# Patient Record
Sex: MALE | Race: WHITE | Employment: OTHER | URBAN - METROPOLITAN AREA
[De-identification: names, ages, dates, MRNs, and addresses within clinical notes are randomized per-mention and may not be internally consistent; named-entity substitution may affect disease eponyms.]

---

## 2020-01-13 ENCOUNTER — APPOINTMENT (OUTPATIENT)
Dept: GENERAL RADIOLOGY | Age: 75
End: 2020-01-13
Payer: MEDICARE

## 2020-01-13 ENCOUNTER — APPOINTMENT (OUTPATIENT)
Dept: CT IMAGING | Age: 75
End: 2020-01-13
Payer: MEDICARE

## 2020-01-13 ENCOUNTER — HOSPITAL ENCOUNTER (EMERGENCY)
Age: 75
Discharge: HOME | End: 2020-01-13
Attending: EMERGENCY MEDICINE
Payer: MEDICARE

## 2020-01-13 VITALS
SYSTOLIC BLOOD PRESSURE: 111 MMHG | DIASTOLIC BLOOD PRESSURE: 62 MMHG | TEMPERATURE: 98.3 F | HEART RATE: 73 BPM | OXYGEN SATURATION: 94 % | RESPIRATION RATE: 18 BRPM

## 2020-01-13 PROCEDURE — 72125 CT NECK SPINE W/O DYE: CPT

## 2020-01-13 PROCEDURE — 70450 CT HEAD/BRAIN W/O DYE: CPT

## 2020-01-13 PROCEDURE — 90715 TDAP VACCINE 7 YRS/> IM: CPT | Performed by: EMERGENCY MEDICINE

## 2020-01-13 PROCEDURE — 90471 IMMUNIZATION ADMIN: CPT | Performed by: EMERGENCY MEDICINE

## 2020-01-13 PROCEDURE — 99284 EMERGENCY DEPT VISIT MOD MDM: CPT

## 2020-01-13 PROCEDURE — 12001 RPR S/N/AX/GEN/TRNK 2.5CM/<: CPT

## 2020-01-13 PROCEDURE — 6360000002 HC RX W HCPCS: Performed by: EMERGENCY MEDICINE

## 2020-01-13 PROCEDURE — 73030 X-RAY EXAM OF SHOULDER: CPT

## 2020-01-13 PROCEDURE — 6370000000 HC RX 637 (ALT 250 FOR IP): Performed by: EMERGENCY MEDICINE

## 2020-01-13 RX ORDER — ACETAMINOPHEN 325 MG/1
650 TABLET ORAL ONCE
Status: COMPLETED | OUTPATIENT
Start: 2020-01-13 | End: 2020-01-13

## 2020-01-13 RX ADMIN — ACETAMINOPHEN 650 MG: 325 TABLET ORAL at 14:22

## 2020-01-13 RX ADMIN — Medication 2.25 ML: at 12:43

## 2020-01-13 RX ADMIN — TETANUS TOXOID, REDUCED DIPHTHERIA TOXOID AND ACELLULAR PERTUSSIS VACCINE, ADSORBED 0.5 ML: 5; 2.5; 8; 8; 2.5 SUSPENSION INTRAMUSCULAR at 12:24

## 2020-01-13 ASSESSMENT — ENCOUNTER SYMPTOMS
ABDOMINAL PAIN: 0
SHORTNESS OF BREATH: 0
SORE THROAT: 0
CONSTIPATION: 0
BACK PAIN: 0
COUGH: 0
DIARRHEA: 0

## 2020-01-13 ASSESSMENT — PAIN SCALES - GENERAL: PAINLEVEL_OUTOF10: 8

## 2020-01-13 ASSESSMENT — PAIN DESCRIPTION - LOCATION: LOCATION: SHOULDER

## 2020-01-13 ASSESSMENT — PAIN DESCRIPTION - ORIENTATION: ORIENTATION: LEFT

## 2020-01-13 NOTE — ED NOTES
Call was placed to Case management and call was returned by [facility] and spoke to [facility]  01/13/20 5747

## 2020-01-13 NOTE — DISCHARGE INSTR - COC
{YES / IP:55368}  · Bladder: {YES / PW:99389}  Urinary Catheter: {Urinary Catheter:691841673}   Colostomy/Ileostomy/Ileal Conduit: {YES / KV:13043}       Date of Last BM: ***  No intake or output data in the 24 hours ending 20 1404  No intake/output data recorded. Safety Concerns:     [email or web address] Safety Concerns:791250444}    Impairments/Disabilities:      [email or web address] Impairments/Disabilities:132231325}    Nutrition Therapy:  Current Nutrition Therapy:   [email or web address] Diet List:571745535}    Routes of Feeding: {CHP DME Other Feedings:905972961}  Liquids: {Slp liquid thickness:18243}  Daily Fluid Restriction: {CHP DME Yes amt example:465268924}  Last Modified Barium Swallow with Video (Video Swallowing Test): {Done Not Done EPUY:024996414}    Treatments at the Time of Hospital Discharge:   Respiratory Treatments: ***  Oxygen Therapy:  {Therapy; copd oxygen:77580}  Ventilator:    [employer]  Vent XLBU:303573813}    Rehab Therapies: Physical Therapy and Skilled Nurse  Weight Bearing Status/Restrictions: [employer]  Weight Bearin}  Other Medical Equipment (for information only, NOT a DME order):  {EQUIPMENT:877401998}  Other Treatments: ***    Patient's personal belongings (please select all that are sent with patient):  {CHP DME Belongings:475596875}    RN SIGNATURE:  {Esignature:266764433}    CASE MANAGEMENT/SOCIAL WORK SECTION    Inpatient Status Date: ED    Readmission Risk Assessment Score:  Readmission Risk              Risk of Unplanned Readmission:        0         Discharging to Facility/ Agency   Name:  [facility]    Address: [address] [place] [address]  Phone: [phone]  Fax: [phone]      / signature: Electronically signed by Lucio Lawrence RN on 20 at 2:10 PM    PHYSICIAN SECTION    Prognosis: Fair    Condition at Discharge: Stable    Rehab Potential (if transferring to Rehab):  Fair    Recommended Labs or Other Treatments After Discharge: ***    Physician Certification: I certify the above information and transfer of Mary Johnson  is necessary for the continuing treatment of the diagnosis listed and that he requires [address] for less 30 days.      Update Admission H&P: No change in H&P    PHYSICIAN SIGNATURE:  Electronically signed by Genesis Villanueva MD/Lianne Monique RN on 1/13/20 at 2:10 PM

## 2020-01-13 NOTE — ED PROVIDER NOTES
stapled, please see separate procedure note. Family members were requesting home health services so this is arranged by case management. Patient discharged home with family with return precautions. CONSULTS:  IP CONSULT TO CASE MANAGEMENT  IP CONSULT TO HOME CARE NEEDS    PROCEDURES:  Unless otherwise noted below, none     Procedures      FINAL IMPRESSION      1. Injury of head, initial encounter    2. Laceration of scalp, initial encounter    3. Fall, initial encounter    4. Acute pain of left shoulder          DISPOSITION/PLAN   DISPOSITION Decision To Discharge 01/13/2020 02:00:37 PM      PATIENT REFERRED TO:  Kd Steel MD  [address] [place] [address]  [phone]    Schedule an appointment as soon as possible for a visit in 10 days  For suture removal      DISCHARGE MEDICATIONS:  Discharge Medication List as of 1/13/2020  2:18 PM        Controlled Substances Monitoring:     No flowsheet data found.     (Please note that portions of this note were completed with a voice recognition program.  Efforts were made to edit the dictations but occasionally words are mis-transcribed.)    Bebeto Cooper MD (electronically signed)  Attending Emergency Physician            Maribel Mendiola MD  01/13/20 9740

## 2020-01-13 NOTE — CARE COORDINATION
[facility]    DC order noted, all docs needed have been faxed to [facility] for home care services.         Rachid Ricardo  Work mobile: [phone]  [facility] office: [phone]

## 2020-05-04 ENCOUNTER — APPOINTMENT (OUTPATIENT)
Dept: GENERAL RADIOLOGY | Age: 75
End: 2020-05-04
Payer: MEDICARE

## 2020-05-04 ENCOUNTER — APPOINTMENT (OUTPATIENT)
Dept: CT IMAGING | Age: 75
End: 2020-05-04
Payer: MEDICARE

## 2020-05-04 ENCOUNTER — HOSPITAL ENCOUNTER (EMERGENCY)
Age: 75
Discharge: HOME | End: 2020-05-05
Payer: MEDICARE

## 2020-05-04 LAB
A/G RATIO: 1.5 (ref 1.1–2.2)
ALBUMIN SERPL-MCNC: 4.1 G/DL (ref 3.4–5)
ALP BLD-CCNC: 276 U/L (ref 40–129)
ALT SERPL-CCNC: <5 U/L (ref 10–40)
ANION GAP SERPL CALCULATED.3IONS-SCNC: 8 MMOL/L (ref 3–16)
AST SERPL-CCNC: 21 U/L (ref 15–37)
BASOPHILS ABSOLUTE: 0.1 K/UL (ref 0–0.2)
BASOPHILS RELATIVE PERCENT: 1.1 %
BILIRUB SERPL-MCNC: 0.3 MG/DL (ref 0–1)
BILIRUBIN URINE: NEGATIVE
BLOOD, URINE: NEGATIVE
BUN BLDV-MCNC: 20 MG/DL (ref 7–20)
CALCIUM SERPL-MCNC: 9.2 MG/DL (ref 8.3–10.6)
CHLORIDE BLD-SCNC: 97 MMOL/L (ref 99–110)
CLARITY: CLEAR
CO2: 32 MMOL/L (ref 21–32)
COLOR: YELLOW
CREAT SERPL-MCNC: 0.6 MG/DL (ref 0.8–1.3)
EOSINOPHILS ABSOLUTE: 0.2 K/UL (ref 0–0.6)
EOSINOPHILS RELATIVE PERCENT: 1.8 %
GFR AFRICAN AMERICAN: >60
GFR NON-AFRICAN AMERICAN: >60
GLOBULIN: 2.8 G/DL
GLUCOSE BLD-MCNC: 82 MG/DL (ref 70–99)
GLUCOSE URINE: NEGATIVE MG/DL
HCT VFR BLD CALC: 45.7 % (ref 40.5–52.5)
HEMOGLOBIN: 15.4 G/DL (ref 13.5–17.5)
KETONES, URINE: NEGATIVE MG/DL
LEUKOCYTE ESTERASE, URINE: NEGATIVE
LIPASE: 33 U/L (ref 13–60)
LYMPHOCYTES ABSOLUTE: 1.9 K/UL (ref 1–5.1)
LYMPHOCYTES RELATIVE PERCENT: 21 %
MCH RBC QN AUTO: 31.5 PG (ref 26–34)
MCHC RBC AUTO-ENTMCNC: 33.6 G/DL (ref 31–36)
MCV RBC AUTO: 93.9 FL (ref 80–100)
MICROSCOPIC EXAMINATION: NORMAL
MONOCYTES ABSOLUTE: 1.1 K/UL (ref 0–1.3)
MONOCYTES RELATIVE PERCENT: 11.7 %
NEUTROPHILS ABSOLUTE: 5.9 K/UL (ref 1.7–7.7)
NEUTROPHILS RELATIVE PERCENT: 64.4 %
NITRITE, URINE: NEGATIVE
PDW BLD-RTO: 13.9 % (ref 12.4–15.4)
PH UA: 7 (ref 5–8)
PLATELET # BLD: 271 K/UL (ref 135–450)
PMV BLD AUTO: 6.2 FL (ref 5–10.5)
POTASSIUM SERPL-SCNC: 4.3 MMOL/L (ref 3.5–5.1)
PRO-BNP: 37 PG/ML (ref 0–449)
PROTEIN UA: NEGATIVE MG/DL
RBC # BLD: 4.87 M/UL (ref 4.2–5.9)
SODIUM BLD-SCNC: 137 MMOL/L (ref 136–145)
SPECIFIC GRAVITY UA: 1.02 (ref 1–1.03)
TOTAL PROTEIN: 6.9 G/DL (ref 6.4–8.2)
TROPONIN: <0.01 NG/ML
URINE TYPE: NORMAL
UROBILINOGEN, URINE: 0.2 E.U./DL
WBC # BLD: 9.2 K/UL (ref 4–11)

## 2020-05-04 PROCEDURE — 83690 ASSAY OF LIPASE: CPT

## 2020-05-04 PROCEDURE — 99284 EMERGENCY DEPT VISIT MOD MDM: CPT

## 2020-05-04 PROCEDURE — 70450 CT HEAD/BRAIN W/O DYE: CPT

## 2020-05-04 PROCEDURE — 93005 ELECTROCARDIOGRAM TRACING: CPT | Performed by: PHYSICIAN ASSISTANT

## 2020-05-04 PROCEDURE — 85025 COMPLETE CBC W/AUTO DIFF WBC: CPT

## 2020-05-04 PROCEDURE — 80053 COMPREHEN METABOLIC PANEL: CPT

## 2020-05-04 PROCEDURE — 72125 CT NECK SPINE W/O DYE: CPT

## 2020-05-04 PROCEDURE — 96360 HYDRATION IV INFUSION INIT: CPT

## 2020-05-04 PROCEDURE — 84484 ASSAY OF TROPONIN QUANT: CPT

## 2020-05-04 PROCEDURE — 83880 ASSAY OF NATRIURETIC PEPTIDE: CPT

## 2020-05-04 PROCEDURE — 81003 URINALYSIS AUTO W/O SCOPE: CPT

## 2020-05-04 PROCEDURE — 2580000003 HC RX 258: Performed by: PHYSICIAN ASSISTANT

## 2020-05-04 PROCEDURE — 71045 X-RAY EXAM CHEST 1 VIEW: CPT

## 2020-05-04 RX ORDER — 0.9 % SODIUM CHLORIDE 0.9 %
1000 INTRAVENOUS SOLUTION INTRAVENOUS ONCE
Status: COMPLETED | OUTPATIENT
Start: 2020-05-04 | End: 2020-05-05

## 2020-05-04 RX ORDER — TRAZODONE HYDROCHLORIDE 50 MG/1
50 TABLET ORAL NIGHTLY
COMMUNITY

## 2020-05-04 RX ORDER — CARBIDOPA AND LEVODOPA 25; 100 MG/1; MG/1
1 TABLET, ORALLY DISINTEGRATING ORAL 4 TIMES DAILY
COMMUNITY

## 2020-05-04 RX ORDER — SERTRALINE HYDROCHLORIDE 100 MG/1
100 TABLET, FILM COATED ORAL NIGHTLY
COMMUNITY

## 2020-05-04 RX ADMIN — SODIUM CHLORIDE 1000 ML: 9 INJECTION, SOLUTION INTRAVENOUS at 23:36

## 2020-05-05 VITALS
DIASTOLIC BLOOD PRESSURE: 73 MMHG | HEART RATE: 72 BPM | BODY MASS INDEX: 21.03 KG/M2 | WEIGHT: 142 LBS | TEMPERATURE: 98.9 F | SYSTOLIC BLOOD PRESSURE: 125 MMHG | RESPIRATION RATE: 16 BRPM | OXYGEN SATURATION: 100 % | HEIGHT: 69 IN

## 2020-05-05 LAB
EKG ATRIAL RATE: 69 BPM
EKG DIAGNOSIS: NORMAL
EKG P AXIS: 26 DEGREES
EKG P-R INTERVAL: 182 MS
EKG Q-T INTERVAL: 404 MS
EKG QRS DURATION: 98 MS
EKG QTC CALCULATION (BAZETT): 432 MS
EKG R AXIS: -42 DEGREES
EKG T AXIS: 35 DEGREES
EKG VENTRICULAR RATE: 69 BPM

## 2020-05-05 PROCEDURE — 93010 ELECTROCARDIOGRAM REPORT: CPT | Performed by: INTERNAL MEDICINE

## 2020-05-05 PROCEDURE — 96361 HYDRATE IV INFUSION ADD-ON: CPT

## 2020-05-05 NOTE — ED NOTES
Discharge instructions reviewed without questions. No further needs voiced at this time. Taken from department via wheelchair to private car, daughter here to take patient home.      Shiloh Motley RN  05/05/20 5828

## 2020-05-05 NOTE — ED PROVIDER NOTES
[facility] Emergency Department    CHIEF COMPLAINT  Fatigue (generalized weakness that started approx 4 days ago, also had a change in medication 4 days ago)      HISTORY OF PRESENT ILLNESS  Tahir Luna is a 66 y.o. male who presents to the ED complaining of falls and fatigue. Patient dropped off today for evaluation. Reports history of Parkinson's disease. States that he has had several falls over the past week. Last week did strike head and lost consciousness although was not evaluated. Denies headaches or confusion. Lightheadedness worse with standing. Does feel tired and worn out. He denies visual changes disturbances. No difficulty speaking or swallowing. Mild neck pain worse with movement. Tingling in the fourth and fifth digits of hands bilaterally. Denies chest pain or shortness of breath. Nauseous although has not vomited. No diarrhea or constipation. No urinary symptoms. Denies use of blood thinners. No other complaints, modifying factors or associated symptoms. Nursing notes reviewed. Past Medical History:   Diagnosis Date    Arthritis     Knee pain     Parkinson disease ([facility] [address]      Past Surgical History:   Procedure Laterality Date    NECK SURGERY      SHOULDER SURGERY       History reviewed. No pertinent family history.   Social History     Socioeconomic History    Marital status:      Spouse name: Not on file    Number of children: Not on file    Years of education: Not on file    Highest education level: Not on file   Occupational History    Not on file   Social Needs    Financial resource strain: Not on file    Food insecurity     Worry: Not on file     Inability: Not on file    Transportation needs     Medical: Not on file     Non-medical: Not on file   Tobacco Use    Smoking status: Former Smoker     Last attempt to quit: 3/19/2008     Years since quittin.1    Smokeless tobacco: Never Used   Substance and Sexual RRR. No murmurs. No chest wall tenderness. LUNGS: Respirations unlabored. CTAB. Good air exchange. Speaking comfortably in full sentences. No wheezes, rhonchi, rales. ABDOMEN: Soft. Non-distended. Mild epigastric tenderness without rigidity,guarding or rebound. No midline pulsatile mass. Negative Alvarenga's, McBurney's and Rovsing's. No fluid waves or ascites. No hernias or masses. Bowel sounds normal quadrants. No CVA tenderness. EXTREMITIES: No peripheral edema. No unilateral calf pain, redness or swelling. Moves all extremities equally. All extremities neurovascularly intact. SKIN: Warm and dry. No acute rashes. NEUROLOGICAL: Alert and oriented. CN's 2-12 intact. No gross facial drooping. Strength 5/5, sensation intact. PSYCHIATRIC: Normal mood and affect. RADIOLOGY  Ct Head Wo Contrast    Result Date: 5/4/2020  EXAMINATION: CT OF THE HEAD WITHOUT CONTRAST  5/4/2020 11:05 pm TECHNIQUE: CT of the head was performed without the administration of intravenous contrast. Dose modulation, iterative reconstruction, and/or weight based adjustment of the mA/kV was utilized to reduce the radiation dose to as low as reasonably achievable. COMPARISON: CT head 01/13/2020 HISTORY: ORDERING SYSTEM PROVIDED HISTORY: fall TECHNOLOGIST PROVIDED HISTORY: Reason for exam:->fall Has a \"code stroke\" or \"stroke alert\" been called? ->No Reason for Exam: fall 3 days ago  hit head  neck pain Acuity: Acute Type of Exam: Initial Mechanism of Injury: fall Relevant Medical/Surgical History: neck pain FINDINGS: BRAIN/VENTRICLES: There is no acute intracranial hemorrhage, mass effect or midline shift. No abnormal extra-axial fluid collection. The gray-white differentiation is maintained without evidence of an acute infarct. There is no evidence of hydrocephalus. ORBITS: The visualized portion of the orbits demonstrate no acute abnormality.  SINUSES: The visualized paranasal sinuses and mastoid air cells demonstrate no acute abnormality. SOFT TISSUES/SKULL:  No acute abnormality of the visualized skull or soft tissues. No acute intracranial abnormality. Ct Cervical Spine Wo Contrast    Result Date: 5/4/2020  EXAMINATION: CT OF THE CERVICAL SPINE WITHOUT CONTRAST 5/4/2020 11:05 pm TECHNIQUE: CT of the cervical spine was performed without the administration of intravenous contrast. Multiplanar reformatted images are provided for review. Dose modulation, iterative reconstruction, and/or weight based adjustment of the mA/kV was utilized to reduce the radiation dose to as low as reasonably achievable. COMPARISON: 01/13/2020 HISTORY: ORDERING SYSTEM PROVIDED HISTORY: fall TECHNOLOGIST PROVIDED HISTORY: Reason for exam:->fall Reason for Exam: fall 3 days ago  hit head  neck pain Acuity: Acute Type of Exam: Initial Relevant Medical/Surgical History: neck pain FINDINGS: BONES/ALIGNMENT: There is no acute fracture or suspect osseous lesion. Vertebral body heights are maintained. There are stable changes of prior C6-7 anterior discectomy and fusion. There is no evidence of hardware failure or loosening. DEGENERATIVE CHANGES: Stable mild adjacent segment disease at C7-T1. SOFT TISSUES: There is no prevertebral soft tissue swelling. No acute abnormality of the cervical spine. Xr Chest Portable    Result Date: 5/4/2020  EXAMINATION: ONE XRAY VIEW OF THE CHEST 5/4/2020 10:16 pm COMPARISON: Rib series 10/14/2011 HISTORY: ORDERING SYSTEM PROVIDED HISTORY: fall TECHNOLOGIST PROVIDED HISTORY: Reason for exam:->fall Reason for Exam: FALL Acuity: Acute Type of Exam: Initial FINDINGS: No pneumothorax. Mach band/skin fold artifact course along the upper right hemithorax. No pleural effusion or focal airspace consolidation. Elevated left hemidiaphragm with associated atelectasis. Normal heart size and mediastinal contours. No acute osseous abnormality.   Chronic deformity of the distal right clavicle, presumably sequela of remote

## 2020-05-06 ENCOUNTER — CARE COORDINATION (OUTPATIENT)
Dept: CARE COORDINATION | Age: 75
End: 2020-05-06

## 2020-05-06 NOTE — CARE COORDINATION
Patient contacted regarding recent visit for viral symptoms. This Laya Rao contacted the patient by telephone to perform post discharge call. Verified name and  with family as identifiers. Provided introduction to self, and reason for call due to viral symptoms of infection and/or exposure to COVID-19. Patient presented to emergency department/flu clinic with complaints of fatique. Patient reports symptoms are improving. Due to no new or worsening symptoms the RN CTN/ACMEGHANA was not notified for escalation. Discussed exposure protocols and quarantine with CDC Guidelines What To Do If You Are Sick    Family was given an opportunity for questions and concerns. Stay home except to get medical care    Separate yourself from other people and animals in your home    Call ahead before visiting your doctor    Wear a facemask    Cover your coughs and sneezes    Clean your hands often    Avoid sharing personal household items    Clean all high-touch surfaces everyday    Monitor your symptoms  Seek prompt medical attention if your illness is worsening (e.g., difficulty breathing). Before seeking care, call your healthcare provider and tell them that you have, or are being evaluated for, COVID-19. Put on a facemask before you enter the facility. These steps will help the healthcare provider's office to keep other people in the office or waiting room from getting infected or exposed. Ask your healthcare provider to call the local or [facility] health department. Persons who are placed under If you have a medical emergency and need to call 911, notify the dispatch personnel that you have, or are being evaluated for COVID-19. If possible, put on a facemask before emergency medical services arrive. The family agrees to contact the [facility] exposure line [phone], local health department [address]: ([phone]) and PCP office for questions related to their healthcare.  Author provided contact

## 2020-05-07 NOTE — ED PROVIDER NOTES
Hematocrit 45.7 40.5 - 52.5 %    MCV 93.9 80.0 - 100.0 fL    MCH 31.5 26.0 - 34.0 pg    MCHC 33.6 31.0 - 36.0 g/dL    RDW 13.9 12.4 - 15.4 %    Platelets 037 556 - 706 K/uL    MPV 6.2 5.0 - 10.5 fL    Neutrophils % 64.4 %    Lymphocytes % 21.0 %    Monocytes % 11.7 %    Eosinophils % 1.8 %    Basophils % 1.1 %    Neutrophils Absolute 5.9 1.7 - 7.7 K/uL    Lymphocytes Absolute 1.9 1.0 - 5.1 K/uL    Monocytes Absolute 1.1 0.0 - 1.3 K/uL    Eosinophils Absolute 0.2 0.0 - 0.6 K/uL    Basophils Absolute 0.1 0.0 - 0.2 K/uL   Comprehensive Metabolic Panel   Result Value Ref Range    Sodium 137 136 - 145 mmol/L    Potassium 4.3 3.5 - 5.1 mmol/L    Chloride 97 (L) 99 - 110 mmol/L    CO2 32 21 - 32 mmol/L    Anion Gap 8 3 - 16    Glucose 82 70 - 99 mg/dL    BUN 20 7 - 20 mg/dL    CREATININE 0.6 (L) 0.8 - 1.3 mg/dL    GFR Non-African American >60 >60    GFR African American >60 >60    Calcium 9.2 8.3 - 10.6 mg/dL    Total Protein 6.9 6.4 - 8.2 g/dL    Alb 4.1 3.4 - 5.0 g/dL    Albumin/Globulin Ratio 1.5 1.1 - 2.2    Total Bilirubin 0.3 0.0 - 1.0 mg/dL    Alkaline Phosphatase 276 (H) 40 - 129 U/L    ALT <5 (L) 10 - 40 U/L    AST 21 15 - 37 U/L    Globulin 2.8 g/dL   Urinalysis   Result Value Ref Range    Color, UA Yellow Straw/Yellow    Clarity, UA Clear Clear    Glucose, Ur Negative Negative mg/dL    Bilirubin Urine Negative Negative    Ketones, Urine Negative Negative mg/dL    Specific Gravity, UA 1.020 1.005 - 1.030    Blood, Urine Negative Negative    pH, UA 7.0 5.0 - 8.0    Protein, UA Negative Negative mg/dL    Urobilinogen, Urine 0.2 <2.0 E.U./dL    Nitrite, Urine Negative Negative    Leukocyte Esterase, Urine Negative Negative    Microscopic Examination Not Indicated     Urine Type NotGiven    Lipase   Result Value Ref Range    Lipase 33.0 13.0 - 60.0 U/L   Troponin   Result Value Ref Range    Troponin <0.01 <0.01 ng/mL   Brain Natriuretic Peptide   Result Value Ref Range    Pro-BNP 37 0 - 449 pg/mL   EKG 12 Lead

## 2020-05-14 ENCOUNTER — CARE COORDINATION (OUTPATIENT)
Dept: CARE COORDINATION | Age: 75
End: 2020-05-14

## 2020-05-20 ENCOUNTER — CARE COORDINATION (OUTPATIENT)
Dept: CARE COORDINATION | Age: 75
End: 2020-05-20

## 2020-09-03 ENCOUNTER — APPOINTMENT (OUTPATIENT)
Dept: GENERAL RADIOLOGY | Age: 75
DRG: 871 | End: 2020-09-03
Payer: OTHER GOVERNMENT

## 2020-09-03 ENCOUNTER — HOSPITAL ENCOUNTER (EMERGENCY)
Age: 75
Discharge: HOME | DRG: 871 | End: 2020-09-03
Attending: STUDENT IN AN ORGANIZED HEALTH CARE EDUCATION/TRAINING PROGRAM
Payer: OTHER GOVERNMENT

## 2020-09-03 VITALS
TEMPERATURE: 98.8 F | WEIGHT: 151 LBS | HEIGHT: 69 IN | BODY MASS INDEX: 22.36 KG/M2 | SYSTOLIC BLOOD PRESSURE: 110 MMHG | DIASTOLIC BLOOD PRESSURE: 76 MMHG | OXYGEN SATURATION: 95 % | HEART RATE: 57 BPM | RESPIRATION RATE: 16 BRPM

## 2020-09-03 LAB
A/G RATIO: 1.8 (ref 1.1–2.2)
ALBUMIN SERPL-MCNC: 3.7 G/DL (ref 3.4–5)
ALP BLD-CCNC: 139 U/L (ref 40–129)
ALT SERPL-CCNC: <5 U/L (ref 10–40)
ANION GAP SERPL CALCULATED.3IONS-SCNC: 7 MMOL/L (ref 3–16)
AST SERPL-CCNC: 15 U/L (ref 15–37)
BASOPHILS ABSOLUTE: 0.1 K/UL (ref 0–0.2)
BASOPHILS RELATIVE PERCENT: 1.4 %
BILIRUB SERPL-MCNC: 0.5 MG/DL (ref 0–1)
BILIRUBIN URINE: NEGATIVE
BLOOD, URINE: NEGATIVE
BUN BLDV-MCNC: 20 MG/DL (ref 7–20)
CALCIUM SERPL-MCNC: 8.2 MG/DL (ref 8.3–10.6)
CHLORIDE BLD-SCNC: 99 MMOL/L (ref 99–110)
CLARITY: CLEAR
CO2: 27 MMOL/L (ref 21–32)
COLOR: YELLOW
CREAT SERPL-MCNC: 0.9 MG/DL (ref 0.8–1.3)
EKG ATRIAL RATE: 61 BPM
EKG DIAGNOSIS: NORMAL
EKG P AXIS: 33 DEGREES
EKG P-R INTERVAL: 188 MS
EKG Q-T INTERVAL: 424 MS
EKG QRS DURATION: 92 MS
EKG QTC CALCULATION (BAZETT): 426 MS
EKG R AXIS: -19 DEGREES
EKG T AXIS: 48 DEGREES
EKG VENTRICULAR RATE: 61 BPM
EOSINOPHILS ABSOLUTE: 0 K/UL (ref 0–0.6)
EOSINOPHILS RELATIVE PERCENT: 1 %
GFR AFRICAN AMERICAN: >60
GFR NON-AFRICAN AMERICAN: >60
GLOBULIN: 2.1 G/DL
GLUCOSE BLD-MCNC: 105 MG/DL (ref 70–99)
GLUCOSE URINE: NEGATIVE MG/DL
HCT VFR BLD CALC: 41.7 % (ref 40.5–52.5)
HEMOGLOBIN: 14 G/DL (ref 13.5–17.5)
KETONES, URINE: NEGATIVE MG/DL
LACTIC ACID, SEPSIS: 0.8 MMOL/L (ref 0.4–1.9)
LEUKOCYTE ESTERASE, URINE: NEGATIVE
LYMPHOCYTES ABSOLUTE: 0.5 K/UL (ref 1–5.1)
LYMPHOCYTES RELATIVE PERCENT: 13.3 %
MCH RBC QN AUTO: 30.9 PG (ref 26–34)
MCHC RBC AUTO-ENTMCNC: 33.5 G/DL (ref 31–36)
MCV RBC AUTO: 92.3 FL (ref 80–100)
MICROSCOPIC EXAMINATION: NORMAL
MONOCYTES ABSOLUTE: 1 K/UL (ref 0–1.3)
MONOCYTES RELATIVE PERCENT: 24.5 %
NEUTROPHILS ABSOLUTE: 2.4 K/UL (ref 1.7–7.7)
NEUTROPHILS RELATIVE PERCENT: 59.8 %
NITRITE, URINE: NEGATIVE
PDW BLD-RTO: 14.9 % (ref 12.4–15.4)
PH UA: 6 (ref 5–8)
PLATELET # BLD: 151 K/UL (ref 135–450)
PMV BLD AUTO: 6.8 FL (ref 5–10.5)
POTASSIUM REFLEX MAGNESIUM: 3.8 MMOL/L (ref 3.5–5.1)
PROTEIN UA: NEGATIVE MG/DL
RBC # BLD: 4.51 M/UL (ref 4.2–5.9)
SODIUM BLD-SCNC: 133 MMOL/L (ref 136–145)
SPECIFIC GRAVITY UA: <=1.005 (ref 1–1.03)
TOTAL PROTEIN: 5.8 G/DL (ref 6.4–8.2)
TROPONIN: <0.01 NG/ML
URINE REFLEX TO CULTURE: NORMAL
URINE TYPE: NORMAL
UROBILINOGEN, URINE: 0.2 E.U./DL
WBC # BLD: 4 K/UL (ref 4–11)

## 2020-09-03 PROCEDURE — 85025 COMPLETE CBC W/AUTO DIFF WBC: CPT

## 2020-09-03 PROCEDURE — 87040 BLOOD CULTURE FOR BACTERIA: CPT

## 2020-09-03 PROCEDURE — 84484 ASSAY OF TROPONIN QUANT: CPT

## 2020-09-03 PROCEDURE — 99284 EMERGENCY DEPT VISIT MOD MDM: CPT

## 2020-09-03 PROCEDURE — U0003 INFECTIOUS AGENT DETECTION BY NUCLEIC ACID (DNA OR RNA); SEVERE ACUTE RESPIRATORY SYNDROME CORONAVIRUS 2 (SARS-COV-2) (CORONAVIRUS DISEASE [COVID-19]), AMPLIFIED PROBE TECHNIQUE, MAKING USE OF HIGH THROUGHPUT TECHNOLOGIES AS DESCRIBED BY CMS-2020-01-R: HCPCS

## 2020-09-03 PROCEDURE — 2580000003 HC RX 258: Performed by: PHYSICIAN ASSISTANT

## 2020-09-03 PROCEDURE — 80053 COMPREHEN METABOLIC PANEL: CPT

## 2020-09-03 PROCEDURE — 81003 URINALYSIS AUTO W/O SCOPE: CPT

## 2020-09-03 PROCEDURE — 93005 ELECTROCARDIOGRAM TRACING: CPT | Performed by: PHYSICIAN ASSISTANT

## 2020-09-03 PROCEDURE — 83605 ASSAY OF LACTIC ACID: CPT

## 2020-09-03 PROCEDURE — 93010 ELECTROCARDIOGRAM REPORT: CPT | Performed by: INTERNAL MEDICINE

## 2020-09-03 PROCEDURE — 71045 X-RAY EXAM CHEST 1 VIEW: CPT

## 2020-09-03 RX ORDER — 0.9 % SODIUM CHLORIDE 0.9 %
1000 INTRAVENOUS SOLUTION INTRAVENOUS ONCE
Status: COMPLETED | OUTPATIENT
Start: 2020-09-03 | End: 2020-09-03

## 2020-09-03 RX ORDER — BENZONATATE 100 MG/1
100 CAPSULE ORAL 2 TIMES DAILY PRN
Qty: 20 CAPSULE | Refills: 0 | Status: ON HOLD | OUTPATIENT
Start: 2020-09-03 | End: 2020-09-04

## 2020-09-03 RX ADMIN — SODIUM CHLORIDE 1000 ML: 9 INJECTION, SOLUTION INTRAVENOUS at 16:37

## 2020-09-03 RX ADMIN — SODIUM CHLORIDE 1000 ML: 9 INJECTION, SOLUTION INTRAVENOUS at 18:50

## 2020-09-03 ASSESSMENT — ENCOUNTER SYMPTOMS
COUGH: 1
ABDOMINAL PAIN: 0
VOMITING: 0
VISUAL CHANGE: 0
SHORTNESS OF BREATH: 0

## 2020-09-03 NOTE — ED PROVIDER NOTES
[address] ED  EMERGENCY DEPARTMENT ENCOUNTER        Pt Name: Arminda Reeves  MRN: [id number]  [place] 1945  Date of evaluation: 9/3/2020  Provider: Suzi Hogue PA-C  PCP: Jackie Strong MD    Shared Visit or Autonomous Visit:  I have seen and evaluated this patient with my supervising physician Destiny Dove MD.    [address] [place] [address]       Chief Complaint   Patient presents with    Fatigue     EMS states pt c/o general weakness since yesterday, hx of parkinsons       HISTORY OF PRESENT ILLNESS   (Location/Symptom, Timing/Onset, Context/Setting, Quality, Duration, Modifying Factors, Severity)  Note limiting factors. Arminda Reeves is a 66 y.o. male presenting to the emergency department with complaint of general weakness and fatigue for the past 2 days. States he has had a bad cough started last night he denies fever. Wife states she checked his temperature today was 99. patietn denies any chest pain or shortness of breath. No abdominal pain. No vomiting. No focal weakness. He has Parkinson's disease he has involuntary movements of extremities states sometimes he is unsteady on his feet and he uses a walker at home. Denies feeling dizzy or lightheaded. No headache. No falls. The history is provided by the patient. Fatigue   Onset quality:  Gradual  Duration:  2 days  Chronicity:  New  Associated symptoms: cough    Associated symptoms: no abdominal pain, no chest pain, no dizziness, no dysuria, no numbness in extremities, no falls, no fever, no headaches, no loss of consciousness, no near-syncope, no shortness of breath, no stroke symptoms, no syncope, no vision change and no vomiting        Nursing Notes were reviewed    REVIEW OF SYSTEMS    (2-9 systems for level 4, 10 or more for level 5)     Review of Systems   Constitutional: Positive for fatigue. Negative for fever. Respiratory: Positive for cough. Negative for shortness of breath.     Cardiovascular: Negative for chest pain, leg swelling, syncope and near-syncope. Gastrointestinal: Negative for abdominal pain and vomiting. Genitourinary: Negative for difficulty urinating and dysuria. Musculoskeletal: Negative for falls. Neurological: Negative for dizziness, loss of consciousness, syncope and headaches. All other systems reviewed and are negative. Positives and Pertinent negatives as per HPI. PAST MEDICAL HISTORY     Past Medical History:   Diagnosis Date    Arthritis     Knee pain     Parkinson disease ([facility] [address]          SURGICAL HISTORY     Past Surgical History:   Procedure Laterality Date    NECK SURGERY      SHOULDER SURGERY           CURRENTMEDICATIONS       Previous Medications    CARBIDOPA-LEVODOPA (PARCOPA)  MG TBDP    Take 2 tablets by mouth 2 times daily    GABAPENTIN (NEURONTIN) 300 MG CAPSULE    Take 300 mg by mouth 3 times daily. SERTRALINE (ZOLOFT) 100 MG TABLET    Take 100 mg by mouth nightly    TRAZODONE (DESYREL) 50 MG TABLET    Take 50 mg by mouth nightly         ALLERGIES     Patient has no known allergies. FAMILYHISTORY     History reviewed. No pertinent family history.        SOCIAL HISTORY       Social History     Socioeconomic History    Marital status:      Spouse name: None    Number of children: None    Years of education: None    Highest education level: None   Occupational History    None   Social Needs    Financial resource strain: None    Food insecurity     Worry: None     Inability: None    Transportation needs     Medical: None     Non-medical: None   Tobacco Use    Smoking status: Former Smoker     Last attempt to quit: 3/19/2008     Years since quittin.4    Smokeless tobacco: Never Used   Substance and Sexual Activity    Alcohol use: No    Drug use: No    Sexual activity: Never   Lifestyle    Physical activity     Days per week: None     Minutes per session: None    Stress: None   Relationships    Social connections Talks on phone: None     Gets together: None     Attends Quaker service: None     Active member of club or organization: None     Attends meetings of clubs or organizations: None     Relationship status: None    Intimate partner violence     Fear of current or ex partner: None     Emotionally abused: None     Physically abused: None     Forced sexual activity: None   Other Topics Concern    None   Social History Narrative    None       SCREENINGS    [place] Coma Scale  Eye Opening: Spontaneous  Best Verbal Response: Oriented  Best Motor Response: Obeys commands  [place] Coma Scale Score: 15        PHYSICAL EXAM    (up to 7 for level 4, 8 or more for level 5)     ED Triage Vitals [09/03/20 1602]   BP Temp Temp Source Pulse Resp SpO2 Height Weight   (!) 95/57 98.8 °F (37.1 °C) Oral 62 16 98 % 5' 9\" (1.753 m) 151 lb (68.5 kg)       Physical Exam  Vitals signs and nursing note reviewed. Constitutional:       Appearance: He is well-developed. He is not toxic-appearing. HENT:      Head: Normocephalic and atraumatic. Right Ear: Tympanic membrane and ear canal normal.      Left Ear: Tympanic membrane and ear canal normal.      Mouth/Throat:      Mouth: Mucous membranes are moist.      Pharynx: Oropharynx is clear. No pharyngeal swelling or posterior oropharyngeal erythema. Eyes:      Extraocular Movements: Extraocular movements intact. Conjunctiva/sclera: Conjunctivae normal.      Pupils: Pupils are equal, round, and reactive to light. Neck:      Musculoskeletal: Normal range of motion and neck supple. Cardiovascular:      Rate and Rhythm: Normal rate and regular rhythm. Pulses: Normal pulses. Radial pulses are 2+ on the right side and 2+ on the left side. Posterior tibial pulses are 2+ on the right side and 2+ on the left side. Heart sounds: Normal heart sounds. Pulmonary:      Effort: Pulmonary effort is normal. No respiratory distress.       Breath sounds: Normal breath sounds. No stridor. No wheezing, rhonchi or rales. Abdominal:      General: Bowel sounds are normal.      Palpations: Abdomen is soft. Abdomen is not rigid. Tenderness: There is no abdominal tenderness. There is no guarding or rebound. Musculoskeletal: Normal range of motion. Right lower leg: No edema. Left lower leg: No edema. Skin:     General: Skin is warm and dry. Neurological:      Mental Status: He is alert and oriented to person, place, and time. GCS: GCS eye subscore is 4. GCS verbal subscore is 5. GCS motor subscore is 6. Cranial Nerves: No cranial nerve deficit. Sensory: Sensation is intact. No sensory deficit. Motor: Motor function is intact. No weakness or abnormal muscle tone. Coordination: Coordination normal. Finger-Nose-Finger Test normal.      Comments: Cranial facial musculature and sensation are intact. the pt has normal ROM neck with no nuchal rigidity or meningismus. Pt has intact finger to nose and intact visual fields grossly intact bilaterally. Intact sensation. Equal strength 5 out of 5 symmetric upper and lower extremities. Holding extremity up off the bed no drift. Psychiatric:         Speech: Speech normal.         Behavior: Behavior normal.         Thought Content:  Thought content normal.         DIAGNOSTIC RESULTS   LABS:    Labs Reviewed   CBC WITH AUTO DIFFERENTIAL - Abnormal; Notable for the following components:       Result Value    Lymphocytes Absolute 0.5 (*)     All other components within normal limits    Narrative:     Performed at:  [place] [address],  [facility]   Phone [phone]   COMPREHENSIVE METABOLIC PANEL W/ REFLEX TO MG FOR LOW K - Abnormal; Notable for the following components:    Sodium 133 (*)     Glucose 105 (*)     Calcium 8.2 (*)     Total Protein 5.8 (*)     Alkaline Phosphatase 139 (*)     ALT <5 (*)     All other components within normal Calcium 8.2 (L) 8.3 - 10.6 mg/dL    Total Protein 5.8 (L) 6.4 - 8.2 g/dL    Alb 3.7 3.4 - 5.0 g/dL    Albumin/Globulin Ratio 1.8 1.1 - 2.2    Total Bilirubin 0.5 0.0 - 1.0 mg/dL    Alkaline Phosphatase 139 (H) 40 - 129 U/L    ALT <5 (L) 10 - 40 U/L    AST 15 15 - 37 U/L    Globulin 2.1 g/dL   Troponin   Result Value Ref Range    Troponin <0.01 <0.01 ng/mL   Urinalysis Reflex to Culture    Specimen: Urine, clean catch   Result Value Ref Range    Color, UA Yellow Straw/Yellow    Clarity, UA Clear Clear    Glucose, Ur Negative Negative mg/dL    Bilirubin Urine Negative Negative    Ketones, Urine Negative Negative mg/dL    Specific Gravity, UA <=1.005 1.005 - 1.030    Blood, Urine Negative Negative    pH, UA 6.0 5.0 - 8.0    Protein, UA Negative Negative mg/dL    Urobilinogen, Urine 0.2 <2.0 E.U./dL    Nitrite, Urine Negative Negative    Leukocyte Esterase, Urine Negative Negative    Microscopic Examination Not Indicated     Urine Type NotGiven     Urine Reflex to Culture Not Indicated    EKG 12 Lead   Result Value Ref Range    Ventricular Rate 61 BPM    Atrial Rate 61 BPM    P-R Interval 188 ms    QRS Duration 92 ms    Q-T Interval 424 ms    QTc Calculation (Bazett) 426 ms    P Axis 33 degrees    R Axis -19 degrees    T Axis 48 degrees    Diagnosis       Normal sinus rhythmNormal ECGWhen compared with ECG of 04-MAY-2020 22:42,No significant change was foundConfirmed by Earnestine Peterson MD [facility] ([id number]) on 9/3/2020 4:59:55 PM       All other labs were within normal range or not returned as of this dictation. EKG: All EKG's are interpreted by the Emergency Department Physician in the absence of a cardiologist.  Please see their note for interpretation of EKG.       RADIOLOGY:   Non-plain film images such as CT, Ultrasound and MRI are read by the radiologist. Plain radiographic images are visualized andpreliminarily interpreted by the  ED Provider with the below findings:        Interpretation perthe Radiologist below, if available at the time of this note:    XR CHEST PORTABLE   Final Result   No acute findings           Xr Chest Portable    Result Date: 9/3/2020  EXAMINATION: ONE XRAY VIEW OF THE CHEST 9/3/2020 4:28 pm COMPARISON: May 4, 2020 HISTORY: ORDERING SYSTEM PROVIDED HISTORY: cough TECHNOLOGIST PROVIDED HISTORY: Reason for exam:->cough Reason for Exam: Fatigue (EMS states pt c/o general weakness since yesterday, hx of parkinsons) Acuity: Acute Type of Exam: Initial FINDINGS: Cardiac silhouette is normal in size. Lungs appear clear. No acute bony abnormality. No acute findings         PROCEDURES   Unless otherwise noted below, none     Procedures    CRITICAL CARE TIME   N/A    CONSULTS:  None      EMERGENCY DEPARTMENT COURSE and DIFFERENTIAL DIAGNOSIS/MDM:   Vitals:    Vitals:    09/03/20 1904 09/03/20 1907 09/03/20 2003 09/03/20 2005   BP: (!) 72/37 96/74 116/77 (!) 107/92   Pulse: 55 55 72    Resp: 16 16     Temp:       TempSrc:       SpO2: 95% 98% 96% 97%   Weight:       Height:           Patient was given thefollowing medications:  Medications   0.9 % sodium chloride bolus (0 mLs Intravenous Stopped 9/3/20 1812)   0.9 % sodium chloride bolus (0 mLs Intravenous Stopped 9/3/20 1951)       6:07 PM EDT      8:10 PM EDT  Recheck patient. Overall much improved. States he is feeling much better. He has eaten and drink here. He had transient hypotension that resolved after IV fluids. He is up and walking in the room and denies feeling dizzy or lightheaded. Repeat blood pressure 116/77. He was not orthostatic. I discussed test results with him and wife in the room. EKG showing sinus rhythm no acute changes. Troponin is normal.  Chest x-ray is negative no pneumonia. Labs are unremarkable with the exception of elevated alk phos which is stable from prior. Urinalysis negative for infection. Suspect viral illness. He does not appear septic or toxic. He is hemodynamically stable. He is requesting to go home. Wife at bedside she is comfortable with him going home. I discussed close follow-up with his doctor and to return to the emergency department for any worsening or change in his symptoms. They understand and agree. I COVID swab was sent they will follow this result. I estimate there is LOW risk for PNEUMONIA, MENINGITIS, SEPSIS or ACUTE CORONARY SYNDROME thus I consider the discharge disposition reasonable. FINAL IMPRESSION      1. Other fatigue    2. Cough    3. Viral illness    4.  Parkinson's disease ([address]    5. Elevated alkaline phosphatase level          DISPOSITION/PLAN   DISPOSITION     PATIENT REFERREDTO:  Kenneth Mckinley MD  [address]  [phone]    Schedule an appointment as soon as possible for a visit       [facility] ED  [address] [facility]  [phone]    If symptoms worsen      DISCHARGE MEDICATIONS:  New Prescriptions    BENZONATATE (TESSALON) 100 MG CAPSULE    Take 1 capsule by mouth 2 times daily as needed for Cough       DISCONTINUED MEDICATIONS:  Discontinued Medications    No medications on file              (Please note that portions ofthis note were completed with a voice recognition program.  Efforts were made to edit the dictations but occasionally words are mis-transcribed.)    Rhina Campos PA-C (electronically signed)            Johanne Molina PA-C  09/03/20 4630

## 2020-09-03 NOTE — ED TRIAGE NOTES
Chief Complaint   Patient presents with    Fatigue     EMS states pt c/o general weakness since yesterday, hx of parkinsons

## 2020-09-03 NOTE — ED PROVIDER NOTES
I independently examined and evaluated Buster Chin. In brief, patient with a history of Parkinson's, depression who presents for fatigue and cough. Symptoms since this morning. Reports fatigue and generalized weakness and malaise. He has had a cough that is productive of sputum. He denies any shortness of breath or chest pain. T-max 99. He denies any abdominal pain. Patient was noted to be hypotensive today. He does report a generalized headache. He is not on blood thinners. No trauma. Denies numbness, weakness, vision changes, vomiting. This was not the worst headache of life and was not maximal at onset. He denies any recent exposures to coronavirus, wife noted to be coughing in the room. Focused exam revealed mild bradycardia, regular rhythm. Lungs clear to auscultation bilaterally. Abdomen soft and nontender. Strength and sensation intact in extremities. Mood and affect at baseline per wife. ED course:     Based off symptoms, suspect viral illness. Differential diagnosis also includes pneumonia, UTI, ACS. Will obtain EKG, labs, and chest x-ray. ED Course as of Sep 03 2342   Thu Sep 03, 2020   1806 Urinalysis shows no evidence of blood or infection. CXR: IMPRESSION: No acute findings    Evidence of pneumonia, pneumothorax, or pleural effusion.    [ER]   1806 Lactate within normal limits. [ER]   1806 No Leukocytosis, anemia, or thrombocytopenia. [ER]   1806 Mild hyponatremia, no other significant electrolyte abnormalities or evidence of kidney dysfunction.    [ER]   1806 Liver function testing non-concerning.    [ER]   1807 Troponin within normal limits. [ER]   8853 Patient's work-up overall unremarkable. Patient remains well-appearing in the emergency department. He is satting appropriately on room air.    [ER]   1958 As patient was preparing for discharge, he was hypotensive. Patient given another liter of fluids and will reassess.     [ER]   2036 Orthostatics WNL    [ER]   2037 Person reports resolution of symptoms. Is ambulating without any symptoms of lightheadedness. He has tolerated food. Patient states that he feels he is ready to go. Repeat blood pressure showed that patient was no longer significantly hypotensive. Patient cleared for discharge.    [ER]      ED Course User Index  [ER] Nevaeh Briceno MD     XR CHEST PORTABLE   Final Result   No acute findings           The Ekg interpreted by me shows  normal sinus rhythm with a rate of 61  Axis is   Normal  QTc is  normal  Intervals and Durations are unremarkable. ST Segments: no acute change  No significant change from prior EKG dated 5/4/20    Work-up unremarkable. Coronavirus testing pending. I do believe patient likely has a viral illness, very possibly coronavirus. There is no evidence of pneumonia, UTI, sepsis, significant electrolyte abnormality, ACS. Patient was mildly hypertensive at the time of initial plan for discharge, patient received another liter of fluids and reports significant improvement of symptoms. Strict return precautions given. Patient encouraged to follow-up with PCP, and return to the emergency department immediately if symptoms worsen. Patient counseled to self isolate until coronavirus testing results. Patient given a prescription for Tessalon Perles. Patient discharged in stable condition. All diagnostic, treatment, and disposition decisions were made by myself in conjunction with the advanced practice provider. For all further details of the patient's emergency department visit, please see the advanced practice provider's documentation. Comment: Please note this report has been produced using speech recognition software and may contain errors related to that system including errors in grammar, punctuation, and spelling, as well as words and phrases that may be inappropriate.  If there are any questions or concerns please feel free to contact the dictating provider for clarification.       Sobia Dao MD  09/03/20 5254

## 2020-09-03 NOTE — ED NOTES
Upon checking pt vitals for discharge pt was hypotensive. Pt ambulated in hallway and states having slight dizziness but improved from previously. Pt still hypotensive and per provider will give additional fluids and reevaluate.      Madison Banuelos RN  09/03/20 3989

## 2020-09-04 ENCOUNTER — APPOINTMENT (OUTPATIENT)
Dept: CT IMAGING | Age: 75
DRG: 871 | End: 2020-09-04
Payer: OTHER GOVERNMENT

## 2020-09-04 ENCOUNTER — APPOINTMENT (OUTPATIENT)
Dept: GENERAL RADIOLOGY | Age: 75
DRG: 871 | End: 2020-09-04
Payer: OTHER GOVERNMENT

## 2020-09-04 ENCOUNTER — HOSPITAL ENCOUNTER (INPATIENT)
Age: 75
LOS: 5 days | Discharge: HOME HEALTH | DRG: 871 | End: 2020-09-09
Attending: STUDENT IN AN ORGANIZED HEALTH CARE EDUCATION/TRAINING PROGRAM | Admitting: INTERNAL MEDICINE
Payer: OTHER GOVERNMENT

## 2020-09-04 PROBLEM — A41.9 SEPSIS (HCC): Status: ACTIVE | Noted: 2020-09-04

## 2020-09-04 LAB
A/G RATIO: 1.8 (ref 1.1–2.2)
ABO/RH: NORMAL
ALBUMIN SERPL-MCNC: 3.6 G/DL (ref 3.4–5)
ALP BLD-CCNC: 147 U/L (ref 40–129)
ALT SERPL-CCNC: 6 U/L (ref 10–40)
AMMONIA: 38 UMOL/L (ref 16–60)
ANION GAP SERPL CALCULATED.3IONS-SCNC: 10 MMOL/L (ref 3–16)
ANTIBODY SCREEN: NORMAL
APTT: 30.2 SEC (ref 24.2–36.2)
APTT: 31.3 SEC (ref 24.2–36.2)
AST SERPL-CCNC: 22 U/L (ref 15–37)
BASE EXCESS VENOUS: -5.8 MMOL/L (ref -3–3)
BASOPHILS ABSOLUTE: 0.1 K/UL (ref 0–0.2)
BASOPHILS RELATIVE PERCENT: 1.4 %
BILIRUB SERPL-MCNC: 0.3 MG/DL (ref 0–1)
BILIRUBIN URINE: NEGATIVE
BLOOD, URINE: NEGATIVE
BUN BLDV-MCNC: 16 MG/DL (ref 7–20)
CALCIUM SERPL-MCNC: 8.4 MG/DL (ref 8.3–10.6)
CARBOXYHEMOGLOBIN: 1.7 % (ref 0–1.5)
CHLORIDE BLD-SCNC: 103 MMOL/L (ref 99–110)
CLARITY: CLEAR
CO2: 26 MMOL/L (ref 21–32)
COLOR: YELLOW
CREAT SERPL-MCNC: 0.9 MG/DL (ref 0.8–1.3)
D DIMER: 289 NG/ML DDU (ref 0–229)
EKG ATRIAL RATE: 95 BPM
EKG DIAGNOSIS: NORMAL
EKG P AXIS: 46 DEGREES
EKG P-R INTERVAL: 168 MS
EKG Q-T INTERVAL: 340 MS
EKG QRS DURATION: 98 MS
EKG QTC CALCULATION (BAZETT): 427 MS
EKG R AXIS: -49 DEGREES
EKG T AXIS: 49 DEGREES
EKG VENTRICULAR RATE: 95 BPM
EOSINOPHILS ABSOLUTE: 0 K/UL (ref 0–0.6)
EOSINOPHILS RELATIVE PERCENT: 0.7 %
FIBRINOGEN: 272 MG/DL (ref 200–397)
GFR AFRICAN AMERICAN: >60
GFR NON-AFRICAN AMERICAN: >60
GLOBULIN: 2 G/DL
GLUCOSE BLD-MCNC: 140 MG/DL (ref 70–99)
GLUCOSE URINE: NEGATIVE MG/DL
HCO3 VENOUS: 19.6 MMOL/L (ref 23–29)
HCT VFR BLD CALC: 41.9 % (ref 40.5–52.5)
HEMOGLOBIN: 13.8 G/DL (ref 13.5–17.5)
INR BLD: 1.01 (ref 0.86–1.14)
KETONES, URINE: NEGATIVE MG/DL
LACTATE DEHYDROGENASE: 213 U/L (ref 100–190)
LACTIC ACID, SEPSIS: 1.1 MMOL/L (ref 0.4–1.9)
LACTIC ACID, SEPSIS: 1.7 MMOL/L (ref 0.4–1.9)
LEUKOCYTE ESTERASE, URINE: NEGATIVE
LYMPHOCYTES ABSOLUTE: 0.9 K/UL (ref 1–5.1)
LYMPHOCYTES RELATIVE PERCENT: 21.2 %
MCH RBC QN AUTO: 30.8 PG (ref 26–34)
MCHC RBC AUTO-ENTMCNC: 33 G/DL (ref 31–36)
MCV RBC AUTO: 93.3 FL (ref 80–100)
METHEMOGLOBIN VENOUS: 0.3 %
MICROSCOPIC EXAMINATION: NORMAL
MONOCYTES ABSOLUTE: 0.4 K/UL (ref 0–1.3)
MONOCYTES RELATIVE PERCENT: 9.8 %
NEUTROPHILS ABSOLUTE: 2.9 K/UL (ref 1.7–7.7)
NEUTROPHILS RELATIVE PERCENT: 66.9 %
NITRITE, URINE: NEGATIVE
O2 CONTENT, VEN: 14 VOL %
O2 SAT, VEN: 74 %
O2 THERAPY: ABNORMAL
PCO2, VEN: 38.3 MMHG (ref 40–50)
PDW BLD-RTO: 15.2 % (ref 12.4–15.4)
PH UA: 5.5 (ref 5–8)
PH VENOUS: 7.33 (ref 7.35–7.45)
PLATELET # BLD: 134 K/UL (ref 135–450)
PMV BLD AUTO: 7.2 FL (ref 5–10.5)
PO2, VEN: 41.4 MMHG (ref 25–40)
POTASSIUM REFLEX MAGNESIUM: 3.7 MMOL/L (ref 3.5–5.1)
PRO-BNP: 420 PG/ML (ref 0–449)
PROCALCITONIN: 0.08 NG/ML (ref 0–0.15)
PROTEIN UA: NEGATIVE MG/DL
PROTHROMBIN TIME: 11.7 SEC (ref 10–13.2)
RBC # BLD: 4.49 M/UL (ref 4.2–5.9)
SARS-COV-2, NAA: DETECTED
SODIUM BLD-SCNC: 139 MMOL/L (ref 136–145)
SPECIFIC GRAVITY UA: 1.02 (ref 1–1.03)
TCO2 CALC VENOUS: 21 MMOL/L
TOTAL PROTEIN: 5.6 G/DL (ref 6.4–8.2)
TROPONIN: <0.01 NG/ML
URINE REFLEX TO CULTURE: NORMAL
URINE TYPE: NORMAL
UROBILINOGEN, URINE: 0.2 E.U./DL
WBC # BLD: 4.3 K/UL (ref 4–11)

## 2020-09-04 PROCEDURE — 84484 ASSAY OF TROPONIN QUANT: CPT

## 2020-09-04 PROCEDURE — 83615 LACTATE (LD) (LDH) ENZYME: CPT

## 2020-09-04 PROCEDURE — 96368 THER/DIAG CONCURRENT INF: CPT

## 2020-09-04 PROCEDURE — 6370000000 HC RX 637 (ALT 250 FOR IP): Performed by: STUDENT IN AN ORGANIZED HEALTH CARE EDUCATION/TRAINING PROGRAM

## 2020-09-04 PROCEDURE — 85730 THROMBOPLASTIN TIME PARTIAL: CPT

## 2020-09-04 PROCEDURE — 6370000000 HC RX 637 (ALT 250 FOR IP): Performed by: PHYSICIAN ASSISTANT

## 2020-09-04 PROCEDURE — 85025 COMPLETE CBC W/AUTO DIFF WBC: CPT

## 2020-09-04 PROCEDURE — 2580000003 HC RX 258: Performed by: INTERNAL MEDICINE

## 2020-09-04 PROCEDURE — 82140 ASSAY OF AMMONIA: CPT

## 2020-09-04 PROCEDURE — 6360000002 HC RX W HCPCS: Performed by: INTERNAL MEDICINE

## 2020-09-04 PROCEDURE — 02HV33Z INSERTION OF INFUSION DEVICE INTO SUPERIOR VENA CAVA, PERCUTANEOUS APPROACH: ICD-10-PCS | Performed by: INTERNAL MEDICINE

## 2020-09-04 PROCEDURE — 81003 URINALYSIS AUTO W/O SCOPE: CPT

## 2020-09-04 PROCEDURE — 85610 PROTHROMBIN TIME: CPT

## 2020-09-04 PROCEDURE — 86901 BLOOD TYPING SEROLOGIC RH(D): CPT

## 2020-09-04 PROCEDURE — 87040 BLOOD CULTURE FOR BACTERIA: CPT

## 2020-09-04 PROCEDURE — 86850 RBC ANTIBODY SCREEN: CPT

## 2020-09-04 PROCEDURE — 6360000002 HC RX W HCPCS: Performed by: PHYSICIAN ASSISTANT

## 2020-09-04 PROCEDURE — 82803 BLOOD GASES ANY COMBINATION: CPT

## 2020-09-04 PROCEDURE — 86900 BLOOD TYPING SEROLOGIC ABO: CPT

## 2020-09-04 PROCEDURE — 85384 FIBRINOGEN ACTIVITY: CPT

## 2020-09-04 PROCEDURE — 83605 ASSAY OF LACTIC ACID: CPT

## 2020-09-04 PROCEDURE — 71260 CT THORAX DX C+: CPT

## 2020-09-04 PROCEDURE — 80053 COMPREHEN METABOLIC PANEL: CPT

## 2020-09-04 PROCEDURE — 93005 ELECTROCARDIOGRAM TRACING: CPT | Performed by: PHYSICIAN ASSISTANT

## 2020-09-04 PROCEDURE — 82728 ASSAY OF FERRITIN: CPT

## 2020-09-04 PROCEDURE — 99285 EMERGENCY DEPT VISIT HI MDM: CPT

## 2020-09-04 PROCEDURE — 83880 ASSAY OF NATRIURETIC PEPTIDE: CPT

## 2020-09-04 PROCEDURE — 93010 ELECTROCARDIOGRAM REPORT: CPT | Performed by: INTERNAL MEDICINE

## 2020-09-04 PROCEDURE — 71045 X-RAY EXAM CHEST 1 VIEW: CPT

## 2020-09-04 PROCEDURE — 85379 FIBRIN DEGRADATION QUANT: CPT

## 2020-09-04 PROCEDURE — 2060000000 HC ICU INTERMEDIATE R&B

## 2020-09-04 PROCEDURE — 36415 COLL VENOUS BLD VENIPUNCTURE: CPT

## 2020-09-04 PROCEDURE — 84145 PROCALCITONIN (PCT): CPT

## 2020-09-04 PROCEDURE — 82306 VITAMIN D 25 HYDROXY: CPT

## 2020-09-04 PROCEDURE — 96365 THER/PROPH/DIAG IV INF INIT: CPT

## 2020-09-04 PROCEDURE — 87150 DNA/RNA AMPLIFIED PROBE: CPT

## 2020-09-04 PROCEDURE — 6360000004 HC RX CONTRAST MEDICATION: Performed by: PHYSICIAN ASSISTANT

## 2020-09-04 PROCEDURE — 2580000003 HC RX 258: Performed by: PHYSICIAN ASSISTANT

## 2020-09-04 RX ORDER — CARBIDOPA AND LEVODOPA 25; 100 MG/1; MG/1
2 TABLET, ORALLY DISINTEGRATING ORAL 2 TIMES DAILY
Status: DISCONTINUED | OUTPATIENT
Start: 2020-09-04 | End: 2020-09-04 | Stop reason: CLARIF

## 2020-09-04 RX ORDER — SODIUM CHLORIDE 0.9 % (FLUSH) 0.9 %
10 SYRINGE (ML) INJECTION EVERY 12 HOURS SCHEDULED
Status: DISCONTINUED | OUTPATIENT
Start: 2020-09-04 | End: 2020-09-09 | Stop reason: HOSPADM

## 2020-09-04 RX ORDER — 0.9 % SODIUM CHLORIDE 0.9 %
1000 INTRAVENOUS SOLUTION INTRAVENOUS ONCE
Status: DISCONTINUED | OUTPATIENT
Start: 2020-09-04 | End: 2020-09-04

## 2020-09-04 RX ORDER — 0.9 % SODIUM CHLORIDE 0.9 %
30 INTRAVENOUS SOLUTION INTRAVENOUS ONCE
Status: COMPLETED | OUTPATIENT
Start: 2020-09-04 | End: 2020-09-04

## 2020-09-04 RX ORDER — ACETAMINOPHEN 650 MG/1
650 SUPPOSITORY RECTAL ONCE
Status: COMPLETED | OUTPATIENT
Start: 2020-09-04 | End: 2020-09-04

## 2020-09-04 RX ORDER — 0.9 % SODIUM CHLORIDE 0.9 %
500 INTRAVENOUS SOLUTION INTRAVENOUS ONCE
Status: DISCONTINUED | OUTPATIENT
Start: 2020-09-04 | End: 2020-09-09 | Stop reason: HOSPADM

## 2020-09-04 RX ORDER — SERTRALINE HYDROCHLORIDE 100 MG/1
100 TABLET, FILM COATED ORAL NIGHTLY
Status: DISCONTINUED | OUTPATIENT
Start: 2020-09-05 | End: 2020-09-09 | Stop reason: HOSPADM

## 2020-09-04 RX ORDER — ACETAMINOPHEN 325 MG/1
650 TABLET ORAL EVERY 6 HOURS PRN
Status: DISCONTINUED | OUTPATIENT
Start: 2020-09-04 | End: 2020-09-09 | Stop reason: HOSPADM

## 2020-09-04 RX ORDER — IBUPROFEN 400 MG/1
400 TABLET ORAL EVERY 8 HOURS PRN
Status: DISCONTINUED | OUTPATIENT
Start: 2020-09-04 | End: 2020-09-09 | Stop reason: HOSPADM

## 2020-09-04 RX ORDER — SODIUM CHLORIDE 0.9 % (FLUSH) 0.9 %
10 SYRINGE (ML) INJECTION PRN
Status: DISCONTINUED | OUTPATIENT
Start: 2020-09-04 | End: 2020-09-09 | Stop reason: HOSPADM

## 2020-09-04 RX ORDER — SODIUM CHLORIDE 9 MG/ML
INJECTION, SOLUTION INTRAVENOUS CONTINUOUS
Status: DISCONTINUED | OUTPATIENT
Start: 2020-09-04 | End: 2020-09-06

## 2020-09-04 RX ORDER — ACETAMINOPHEN 650 MG/1
650 SUPPOSITORY RECTAL EVERY 6 HOURS PRN
Status: DISCONTINUED | OUTPATIENT
Start: 2020-09-04 | End: 2020-09-09 | Stop reason: HOSPADM

## 2020-09-04 RX ADMIN — AZITHROMYCIN DIHYDRATE 500 MG: 500 INJECTION, POWDER, LYOPHILIZED, FOR SOLUTION INTRAVENOUS at 21:51

## 2020-09-04 RX ADMIN — SODIUM CHLORIDE 2055 ML: 9 INJECTION, SOLUTION INTRAVENOUS at 18:16

## 2020-09-04 RX ADMIN — SODIUM CHLORIDE: 9 INJECTION, SOLUTION INTRAVENOUS at 21:50

## 2020-09-04 RX ADMIN — IOPAMIDOL 85 ML: 755 INJECTION, SOLUTION INTRAVENOUS at 19:07

## 2020-09-04 RX ADMIN — ACETAMINOPHEN 650 MG: 650 SUPPOSITORY RECTAL at 18:13

## 2020-09-04 RX ADMIN — DEXAMETHASONE 6 MG: 4 TABLET ORAL at 23:07

## 2020-09-04 RX ADMIN — Medication 10 ML: at 21:51

## 2020-09-04 RX ADMIN — CARBIDOPA AND LEVODOPA 2 TABLET: 25; 100 TABLET ORAL at 18:20

## 2020-09-04 RX ADMIN — VANCOMYCIN HYDROCHLORIDE 1000 MG: 1 INJECTION, POWDER, LYOPHILIZED, FOR SOLUTION INTRAVENOUS at 18:31

## 2020-09-04 RX ADMIN — ENOXAPARIN SODIUM 30 MG: 30 INJECTION, SOLUTION INTRAVENOUS; SUBCUTANEOUS at 21:50

## 2020-09-04 RX ADMIN — CEFEPIME 2 G: 2 INJECTION, POWDER, FOR SOLUTION INTRAVENOUS at 18:33

## 2020-09-04 ASSESSMENT — ENCOUNTER SYMPTOMS
EYE REDNESS: 0
COUGH: 1
RHINORRHEA: 0
CHEST TIGHTNESS: 0
SINUS PRESSURE: 0
CONSTIPATION: 0
SORE THROAT: 0
NAUSEA: 0
VOMITING: 0
SHORTNESS OF BREATH: 0
ABDOMINAL PAIN: 0
DIARRHEA: 0
SINUS PAIN: 0
EYE DISCHARGE: 0

## 2020-09-04 NOTE — ED NOTES
Bed: 19  Expected date:   Expected time:   Means of arrival:   Comments:  salvatore Colunga RN  09/04/20 2879

## 2020-09-04 NOTE — ED PROVIDER NOTES
I independently examined and evaluated Rosario Cristina. In brief, 40-year-old female with history of Parkinson's who returns to the emergency department today for worsening fever and shortness of breath. He was seen in the emergency department yesterday for fatigue, generalized weakness, cough, and low-grade temperature. Patient was not having any shortness of breath yesterday, but is having shortness of breath today. Patient is not on oxygen at home. Focused exam revealed ill appearing patient, diaphoresis, cardiac regular rate and rhythm, significant fever. Coarse breath sounds bilaterally. Abdomen soft and nontender. Strength and sensation intact in extremities. ED course:     Patient was discharged yesterday with concern for coronavirus. His work-up yesterday had been overall unremarkable without evidence of pneumonia, UTI, sepsis, significant electrolyte abnormality, or ACS. Patient was mildly hypotensive on initial plan to discharge and received a second liter of fluids with improvement. Patient returns today for continued symptoms, worsening fever, and shortness of breath. Laboratory studies will be repeated as well as chest x-ray. COVID testing still pending at this time. ED Course as of Sep 05 0042   Fri Sep 04, 2020   1910 No electrolyte abnormalities or evidence of kidney dysfunction.    [ER]   1910 Mild uptrending of elevated alkaline phosphatase, liver function testing otherwise unremarkable. [ER]   1910 Urinalysis without evidence of infection or blood. [ER]   1911 Coags within normal limits. [ER]   1911 Lactate within normal limits. [ER]   1911 Procalcitonin within normal limits. [ER]   1911 BNP within normal limits. Troponin within normal limits. [ER]   1911 D-dimer mildly elevated. [ER]   1911 No leukocytosis, anemia. [ER]   1912 Mild thrombocytopenia with downtrend of platelets from 1 day ago.     [ER]   1912 CXR: IMPRESSION: Worsening bilateral infiltrates compared to prior study    [ER]   Sat Sep 05, 2020   0041 Blood gas shows mild acidemia. No evidence of hypercarbia. [ER]   0041 D-dimer mildly elevated. [ER]   0041 ProCalcitonin within normal limits. [ER]      ED Course User Index  [ER] Samantha Brothers MD     CT CHEST PULMONARY EMBOLISM W CONTRAST   Final Result   1. Left lower lobe pneumonia. Recommend chest radiograph in 8 weeks to   confirm resolution. 2. Multiple age-indeterminate thoracic compression fractures. If there is   point tenderness, recommend nonemergent MRI of the thoracic spine. XR CHEST PORTABLE   Final Result   Worsening bilateral infiltrates compared to prior study           Laboratory studies still overall unremarkable. D-dimer was mildly elevated and EDP ordered CT scan which did not show evidence of pulmonary embolism but did show left lower lobe pneumonia. Patient started on antibiotics. Patient's coronavirus swab also turned out to be positive for COVID. In the emergency department, patient received antibiotics, IV fluids, oxygen. Patient admitted for coronavirus and possible pneumonia with worsening respiratory function. All diagnostic, treatment, and disposition decisions were made by myself in conjunction with the advanced practice provider. For all further details of the patient's emergency department visit, please see the advanced practice provider's documentation. Comment: Please note this report has been produced using speech recognition software and may contain errors related to that system including errors in grammar, punctuation, and spelling, as well as words and phrases that may be inappropriate. If there are any questions or concerns please feel free to contact the dictating provider for clarification.       Samantha Brothers MD  09/05/20 5253    The Ekg interpreted by me shows  normal sinus rhythm with a rate of 95  Axis is   Left axis deviation  QTc is  normal  Intervals and Durations are unremarkable.       ST Segments: no acute change  No significant change from prior EKG dated 9/3/20         Daniel Pearson MD  09/05/20 4644

## 2020-09-04 NOTE — ED NOTES
Pt in room 1 with fever 104.7. max in place. With temp probe. Labs sent wife at bedside. Pt is more confused than baseline.       Judit eMehan  09/04/20 4811

## 2020-09-04 NOTE — ED PROVIDER NOTES
[facility] ICU  EMERGENCY DEPARTMENT ENCOUNTER        Pt Name: Rosario Cristina  MRN: [id number]  [place] 1945  Date of evaluation: 9/4/2020  Provider: Isac Gaines PA-C  PCP: Luis Elder MD  ED Attending: No att. providers found      This patient was seen and evaluated by the attending physician No att. providers found. I have not independently evaluated this patient. CHIEF COMPLAINT       Chief Complaint   Patient presents with    Illness     Pt seen in the ER yesterday for fever. Discharge and had a fever last night. Pt arrived to the ER        HISTORY OF PRESENT ILLNESS   (Location/Symptom, Timing/Onset, Context/Setting, Quality, Duration, Modifying Factors, Severity)  Note limiting factors. Rosario Cristina is a 66 y.o. male a history of Parkinson's, depression presents to the ED with a complaint of fatigue, generalized weakness for 3 days, accompanying cough and 2 days of fevers. Fevers are not responing to tylenol or motrin. Patient denies any chest pain or shortness of breath no abdominal pain or vomiting no focal weakness. Patient uses a walker to ambulate at home where he lives with his wife with his daughter as his primary caretaker. They report no recent trauma or injury no headaches or falls. Pt is not on oxygen at home. Patient was seen for the same complaint one day prior to arrival, at [facility]. Nursing Notes were all reviewed and agreed with or any disagreements were addressed  in the HPI. REVIEW OF SYSTEMS  (2-9 systems for level 4, 10 or more for level 5)     Review of Systems   Constitutional: Positive for chills, fatigue and fever. HENT: Negative. Negative for congestion, rhinorrhea, sinus pressure, sinus pain and sore throat. Eyes: Negative for discharge, redness and visual disturbance. Respiratory: Positive for cough. Negative for chest tightness and shortness of breath. Cardiovascular: Negative for chest pain and palpitations. Gastrointestinal: Negative for abdominal pain, constipation, diarrhea, nausea and vomiting. Genitourinary: Negative for difficulty urinating, dysuria and frequency. Musculoskeletal: Negative. Skin: Negative. Neurological: Negative. Negative for dizziness, weakness, numbness and headaches. Psychiatric/Behavioral: Negative. All other systems reviewed and are negative. Positivesand Pertinent negatives as per HPI. Except as noted above in the ROS, all other systems were reviewed and negative. PAST MEDICAL HISTORY     Past Medical History:   Diagnosis Date    Arthritis     Knee pain     Parkinson disease ([facility] [address]          SURGICAL HISTORY       Past Surgical History:   Procedure Laterality Date    NECK SURGERY      SHOULDER SURGERY           CURRENT MEDICATIONS       Current Discharge Medication List      CONTINUE these medications which have NOT CHANGED    Details   carbidopa-levodopa (PARCOPA)  MG TBDP Take 2 tablets by mouth 2 times daily      traZODone (DESYREL) 50 MG tablet Take 50 mg by mouth nightly      sertraline (ZOLOFT) 100 MG tablet Take 100 mg by mouth nightly      gabapentin (NEURONTIN) 300 MG capsule Take 300 mg by mouth 3 times daily. ALLERGIES     Patient has no known allergies. FAMILY HISTORY     No family history on file.       SOCIAL HISTORY       Social History     Socioeconomic History    Marital status:      Spouse name: None    Number of children: None    Years of education: None    Highest education level: None   Occupational History    None   Social Needs    Financial resource strain: None    Food insecurity     Worry: None     Inability: None    Transportation needs     Medical: None     Non-medical: None   Tobacco Use    Smoking status: Former Smoker     Last attempt to quit: 3/19/2008     Years since quittin.4    Smokeless tobacco: Never Used   Substance and Sexual Activity    Alcohol use: No    Drug use: No    Sexual activity: Never   Lifestyle    Physical activity     Days per week: None     Minutes per session: None    Stress: None   Relationships    Social connections     Talks on phone: None     Gets together: None     Attends Orthodoxy service: None     Active member of club or organization: None     Attends meetings of clubs or organizations: None     Relationship status: None    Intimate partner violence     Fear of current or ex partner: None     Emotionally abused: None     Physically abused: None     Forced sexual activity: None   Other Topics Concern    None   Social History Narrative    None       SCREENINGS     NIH Score       [place] Coma Scale  Eye Opening: Spontaneous  Best Verbal Response: Confused  Best Motor Response: Obeys commands  [place] Coma Scale Score: 14    [facility]     Heart Score         PHYSICAL EXAM    (up to 7 for level 4, 8 ormore for level 5)     Vitals:    09/05/20 0430 09/05/20 0600 09/05/20 0800 09/05/20 0900   BP: 111/61  116/67 114/65   Pulse: 56  (!) 43 (!) 40   Resp: 22  30 25   Temp: 99.5 °F (37.5 °C)  99.1 °F (37.3 °C)    TempSrc: Bladder  Bladder    SpO2: 96%  100% 100%   Weight:  151 lb (68.5 kg)     Height:           Physical Exam  Vitals signs and nursing note reviewed. Constitutional:       Appearance: He is well-developed. He is ill-appearing and diaphoretic. Comments: +rigors   HENT:      Head: Normocephalic. Nose: Nose normal.      Mouth/Throat:      Pharynx: No oropharyngeal exudate. Eyes:      General:         Right eye: No discharge. Left eye: No discharge. Conjunctiva/sclera: Conjunctivae normal.      Pupils: Pupils are equal, round, and reactive to light. Neck:      Musculoskeletal: Normal range of motion. Cardiovascular:      Rate and Rhythm: Normal rate and regular rhythm. Heart sounds: Normal heart sounds. No murmur. No friction rub. No gallop.     Pulmonary:      Effort: Pulmonary effort is normal. No Abnormal; Notable for the following components:     (*)     All other components within normal limits    Narrative:     Performed at:  [facility] [address],  [place]   Phone [phone]   COMPREHENSIVE METABOLIC PANEL W/ REFLEX TO MG FOR LOW K - Abnormal; Notable for the following components:    Glucose 135 (*)     CREATININE 0.7 (*)     Calcium 7.7 (*)     Total Protein 5.2 (*)     Alb 3.2 (*)     All other components within normal limits    Narrative:     Performed at:  [facility] [address],  [place], [place]   Phone [phone]   CBC WITH AUTO DIFFERENTIAL - Abnormal; Notable for the following components:    Hemoglobin 13.3 (*)     Hematocrit 39.9 (*)     RDW 15.6 (*)     Platelets 679 (*)     Neutrophils Absolute 9.5 (*)     Lymphocytes Absolute 0.6 (*)     All other components within normal limits    Narrative:     Performed at:  [place] [address],  [place]   Phone [phone]   D-DIMER, QUANTITATIVE - Abnormal; Notable for the following components:    D-Dimer, Quant 345 (*)     All other components within normal limits    Narrative:     Performed at:  [place] [address],  [place], [place]   Phone [phone]   CULTURE, BLOOD 1   CULTURE, BLOOD 2   CULTURE, RESPIRATORY   TROPONIN    Narrative:     Performed at:  [place] [address],  [place], [place]   Phone [phone]   BRAIN NATRIURETIC PEPTIDE    Narrative:     Performed at:  [place] [address],  [place], [place]   Phone [phone]   URINE RT REFLEX TO CULTURE    Narrative:     Performed at:  [place] [address],  [place], [place]   Phone [phone]   LACTATE, SEPSIS    Narrative: Performed at:  [place] [address],  [place], [facility]   Phone [phone]   LACTATE, SEPSIS    Narrative:     Performed at:  [facility] [address],  [place], [facility]   Phone [phone]   PROCALCITONIN    Narrative:     Performed at:  [place] [address],  [place], [facility] [employer]   Phone [phone]   PROTIME-INR    Narrative:     Performed at:  [place] [address],  [place], [place] [facility]   Phone [phone]   APTT    Narrative:     Performed at:  [place] [address],  [place], [place] [employer]   Phone [phone]   AMMONIA    Narrative:     Performed at:  [place] [address],  [place], [place]   Phone [phone]   FIBRINOGEN    Narrative:     Performed at:  [place] [address],  [place], [facility]   Phone [phone]   FIBRINOGEN    Narrative:     Performed at:  [place] [address],  [place], [facility]   Phone [phone]   PROTIME-INR    Narrative:     Performed at:  [place] [address],  [place], [facility]   Phone [phone]   APTT    Narrative:     Performed at:  [place] [address],  [place], [place] [employer]   Phone [phone]   CK    Narrative:     Performed at:  [place] [address],  [place], [facility]   Phone [phone]   FERRITIN   C-REACTIVE PROTEIN   FERRITIN   TYPE AND SCREEN    Narrative:     Performed at:  [facility] [address],  [place], [place] [facility] [employer]   Phone [phone] COVID-19 CONVALESCENT PLASMA       All other labs were within normal range or notreturned as of this dictation. EKG: All EKG's are interpreted by the Emergency Department Physician who either signs or Co-signs this chart in the absence of a cardiologist.  Please see their note for interpretation of EKG. RADIOLOGY:         Interpretation per the Radiologist below, if available at the time of this note:    CT CHEST PULMONARY EMBOLISM W CONTRAST   Final Result   1. Left lower lobe pneumonia. Recommend chest radiograph in 8 weeks to   confirm resolution. 2. Multiple age-indeterminate thoracic compression fractures. If there is   point tenderness, recommend nonemergent MRI of the thoracic spine. XR CHEST PORTABLE   Final Result   Worsening bilateral infiltrates compared to prior study           No results found. CRITICAL CARE TIME   Total critical care time today provided was 45 minutes. This excludes seperately billable procedures and family discussion time. Provided for sepsis, PNA requiring intervention with concern for potential decompensation.     CONSULTS:  IP CONSULT TO HOSPITALIST  IP CONSULT TO PULMONOLOGY  IP CONSULT TO PHARMACY      EMERGENCY DEPARTMENT COURSE and DIFFERENTIAL DIAGNOSIS/MDM:   Vitals:    Vitals:    09/05/20 0430 09/05/20 0600 09/05/20 0800 09/05/20 0900   BP: 111/61  116/67 114/65   Pulse: 56  (!) 43 (!) 40   Resp: 22  30 25   Temp: 99.5 °F (37.5 °C)  99.1 °F (37.3 °C)    TempSrc: Bladder  Bladder    SpO2: 96%  100% 100%   Weight:  151 lb (68.5 kg)     Height:           Patient was given the following medications:  Medications   sertraline (ZOLOFT) tablet 100 mg (has no administration in time range)   cefTRIAXone (ROCEPHIN) 1 g IVPB in 50 mL D5W minibag (has no administration in time range)   azithromycin (ZITHROMAX) 500 mg in D5W 250ml addavial (0 mg Intravenous Stopped 9/4/20 1948)   sodium chloride flush 0.9 % injection 10 mL (10 mLs Intravenous Given 9/4/20 1417)   sodium chloride flush 0.9 % injection 10 mL (has no administration in time range)   acetaminophen (TYLENOL) tablet 650 mg (has no administration in time range)     Or   acetaminophen (TYLENOL) suppository 650 mg (has no administration in time range)   enoxaparin (LOVENOX) injection 30 mg (30 mg Subcutaneous Given 9/4/20 2150)   0.9 % sodium chloride infusion ( Intravenous New Bag 9/5/20 0446)   dexamethasone (DECADRON) tablet 6 mg (6 mg Oral Given 9/4/20 2307)   ibuprofen (ADVIL;MOTRIN) tablet 400 mg (has no administration in time range)   carbidopa-levodopa (SINEMET)  MG per tablet 2 tablet (2 tablets Oral Not Given 9/4/20 2322)   0.9 % sodium chloride bolus (0 mLs Intravenous Held 9/4/20 2322)   0.9 % sodium chloride bolus (20 mLs Intravenous Not Given 9/5/20 0752)   remdesivir 200 mg in sodium chloride 0.9 % 250 mL IVPB (has no administration in time range)     Followed by   remdesivir 100 mg in sodium chloride 0.9 % 250 mL IVPB (has no administration in time range)   mupirocin (BACTROBAN) 2 % ointment (has no administration in time range)   0.9 % sodium chloride bolus (has no administration in time range)   lidocaine PF 1 % injection 5 mL (has no administration in time range)   sodium chloride flush 0.9 % injection 10 mL (has no administration in time range)   sodium chloride flush 0.9 % injection 10 mL (has no administration in time range)   norepinephrine (LEVOPHED) 16 mg in dextrose 5 % 250 mL infusion (has no administration in time range)   acetaminophen (TYLENOL) suppository 650 mg (650 mg Rectal Given 9/4/20 1813)   0.9 % sodium chloride IV bolus 2,055 mL (0 mLs Intravenous Stopped 9/4/20 2111)   cefepime (MAXIPIME) 2 g IVPB minibag (0 g Intravenous Stopped 9/4/20 1926)   vancomycin 1000 mg IVPB in 250 mL D5W addavial (0 mg Intravenous Stopped 9/4/20 1940)   carbidopa-levodopa (SINEMET)  MG per tablet 2 tablet (2 tablets Oral Given 9/4/20 1820)   iopamidol (ISOVUE-370) 76 % injection 85 mL (85 mLs Intravenous Given 9/4/20 8709)         Afebrile, stable, patient presents to the ED for evaluation. Seen in conjunction with attending ED provider who agrees with assessment and plan. +febrile, hypoxic, confused. Provided rectal tylenol fluids, IV abx. Concern for sepsis, respiratory failure, covid vs UTI. will admit to ICU in stable condition. Numerous re-evaluations. Time answering his wives questions. All questions are answered. The patient tolerated their visit well. They were seen and evaluated by the attendingphysician, No att. providers found who agreed with the assessment and plan. The patient and / or the family were informed of the results of any tests, a time was given to answer questions, a plan was proposed and they agreed Kraig Vasquez. FINAL IMPRESSION      1. Fever, unspecified fever cause    2. Cough    3. Other fatigue    4.  Pneumonia of both lower lobes due to infectious organism [facility])          DISPOSITION/PLAN   DISPOSITION        PATIENT REFERRED TO:  Yunior Garcia MD  [address] [facility] [address]  [phone]            DISCHARGE MEDICATIONS:  Current Discharge Medication List          DISCONTINUED MEDICATIONS:  Current Discharge Medication List      STOP taking these medications       benzonatate (TESSALON) 100 MG capsule Comments:   Reason for Stopping:                      (Please note that portions of this note were completed with a voice recognition program.  Efforts were made to edit the dictations but occasionally words are mis-transcribed.)    Maria Guadalupe Owens PA-C (electronically signed)        Maria Guadalupe Owens PA-C  09/05/20 6597

## 2020-09-05 ENCOUNTER — APPOINTMENT (OUTPATIENT)
Dept: INTERVENTIONAL RADIOLOGY/VASCULAR | Age: 75
DRG: 871 | End: 2020-09-05
Payer: OTHER GOVERNMENT

## 2020-09-05 LAB
A/G RATIO: 1.6 (ref 1.1–2.2)
ALBUMIN SERPL-MCNC: 3.2 G/DL (ref 3.4–5)
ALP BLD-CCNC: 112 U/L (ref 40–129)
ALT SERPL-CCNC: 16 U/L (ref 10–40)
ANION GAP SERPL CALCULATED.3IONS-SCNC: 8 MMOL/L (ref 3–16)
AST SERPL-CCNC: 21 U/L (ref 15–37)
BASOPHILS ABSOLUTE: 0 K/UL (ref 0–0.2)
BASOPHILS RELATIVE PERCENT: 0.3 %
BILIRUB SERPL-MCNC: 0.4 MG/DL (ref 0–1)
BLOOD BANK DISPENSE STATUS: NORMAL
BLOOD BANK DISPENSE STATUS: NORMAL
BLOOD BANK PRODUCT CODE: NORMAL
BLOOD BANK PRODUCT CODE: NORMAL
BPU ID: NORMAL
BPU ID: NORMAL
BUN BLDV-MCNC: 12 MG/DL (ref 7–20)
C-REACTIVE PROTEIN: 77.7 MG/L (ref 0–5.1)
CALCIUM SERPL-MCNC: 7.7 MG/DL (ref 8.3–10.6)
CHLORIDE BLD-SCNC: 110 MMOL/L (ref 99–110)
CO2: 21 MMOL/L (ref 21–32)
CREAT SERPL-MCNC: 0.7 MG/DL (ref 0.8–1.3)
D DIMER: 345 NG/ML DDU (ref 0–229)
DESCRIPTION BLOOD BANK: NORMAL
DESCRIPTION BLOOD BANK: NORMAL
EOSINOPHILS ABSOLUTE: 0 K/UL (ref 0–0.6)
EOSINOPHILS RELATIVE PERCENT: 0 %
FERRITIN: 656.5 NG/ML (ref 30–400)
FERRITIN: 687.4 NG/ML (ref 30–400)
FIBRINOGEN: 305 MG/DL (ref 200–397)
GFR AFRICAN AMERICAN: >60
GFR NON-AFRICAN AMERICAN: >60
GLOBULIN: 2 G/DL
GLUCOSE BLD-MCNC: 135 MG/DL (ref 70–99)
HCT VFR BLD CALC: 39.9 % (ref 40.5–52.5)
HEMOGLOBIN: 13.3 G/DL (ref 13.5–17.5)
INR BLD: 1.09 (ref 0.86–1.14)
LYMPHOCYTES ABSOLUTE: 0.6 K/UL (ref 1–5.1)
LYMPHOCYTES RELATIVE PERCENT: 5.8 %
MCH RBC QN AUTO: 31.1 PG (ref 26–34)
MCHC RBC AUTO-ENTMCNC: 33.3 G/DL (ref 31–36)
MCV RBC AUTO: 93.4 FL (ref 80–100)
MONOCYTES ABSOLUTE: 0.8 K/UL (ref 0–1.3)
MONOCYTES RELATIVE PERCENT: 7 %
NEUTROPHILS ABSOLUTE: 9.5 K/UL (ref 1.7–7.7)
NEUTROPHILS RELATIVE PERCENT: 86.9 %
PDW BLD-RTO: 15.6 % (ref 12.4–15.4)
PLATELET # BLD: 113 K/UL (ref 135–450)
PMV BLD AUTO: 6.8 FL (ref 5–10.5)
POTASSIUM REFLEX MAGNESIUM: 3.9 MMOL/L (ref 3.5–5.1)
PROTHROMBIN TIME: 12.7 SEC (ref 10–13.2)
RBC # BLD: 4.28 M/UL (ref 4.2–5.9)
SODIUM BLD-SCNC: 139 MMOL/L (ref 136–145)
TOTAL CK: 109 U/L (ref 39–308)
TOTAL PROTEIN: 5.2 G/DL (ref 6.4–8.2)
VITAMIN D 25-HYDROXY: 34.4 NG/ML
WBC # BLD: 11 K/UL (ref 4–11)

## 2020-09-05 PROCEDURE — 99291 CRITICAL CARE FIRST HOUR: CPT | Performed by: INTERNAL MEDICINE

## 2020-09-05 PROCEDURE — 51702 INSERT TEMP BLADDER CATH: CPT

## 2020-09-05 PROCEDURE — 85610 PROTHROMBIN TIME: CPT

## 2020-09-05 PROCEDURE — 2580000003 HC RX 258: Performed by: INTERNAL MEDICINE

## 2020-09-05 PROCEDURE — XW033E5 INTRODUCTION OF REMDESIVIR ANTI-INFECTIVE INTO PERIPHERAL VEIN, PERCUTANEOUS APPROACH, NEW TECHNOLOGY GROUP 5: ICD-10-PCS | Performed by: INTERNAL MEDICINE

## 2020-09-05 PROCEDURE — 80053 COMPREHEN METABOLIC PANEL: CPT

## 2020-09-05 PROCEDURE — 85384 FIBRINOGEN ACTIVITY: CPT

## 2020-09-05 PROCEDURE — C1751 CATH, INF, PER/CENT/MIDLINE: HCPCS

## 2020-09-05 PROCEDURE — 6370000000 HC RX 637 (ALT 250 FOR IP): Performed by: INTERNAL MEDICINE

## 2020-09-05 PROCEDURE — 82550 ASSAY OF CK (CPK): CPT

## 2020-09-05 PROCEDURE — 92610 EVALUATE SWALLOWING FUNCTION: CPT

## 2020-09-05 PROCEDURE — 86140 C-REACTIVE PROTEIN: CPT

## 2020-09-05 PROCEDURE — 36415 COLL VENOUS BLD VENIPUNCTURE: CPT

## 2020-09-05 PROCEDURE — 6360000002 HC RX W HCPCS: Performed by: INTERNAL MEDICINE

## 2020-09-05 PROCEDURE — 2000000000 HC ICU R&B

## 2020-09-05 PROCEDURE — 82728 ASSAY OF FERRITIN: CPT

## 2020-09-05 PROCEDURE — 85025 COMPLETE CBC W/AUTO DIFF WBC: CPT

## 2020-09-05 PROCEDURE — 2500000003 HC RX 250 WO HCPCS: Performed by: INTERNAL MEDICINE

## 2020-09-05 PROCEDURE — 85379 FIBRIN DEGRADATION QUANT: CPT

## 2020-09-05 PROCEDURE — 92526 ORAL FUNCTION THERAPY: CPT

## 2020-09-05 RX ORDER — 0.9 % SODIUM CHLORIDE 0.9 %
20 INTRAVENOUS SOLUTION INTRAVENOUS ONCE
Status: DISCONTINUED | OUTPATIENT
Start: 2020-09-05 | End: 2020-09-09 | Stop reason: HOSPADM

## 2020-09-05 RX ORDER — LIDOCAINE HYDROCHLORIDE 10 MG/ML
5 INJECTION, SOLUTION EPIDURAL; INFILTRATION; INTRACAUDAL; PERINEURAL ONCE
Status: DISCONTINUED | OUTPATIENT
Start: 2020-09-05 | End: 2020-09-09 | Stop reason: HOSPADM

## 2020-09-05 RX ORDER — SODIUM CHLORIDE 0.9 % (FLUSH) 0.9 %
10 SYRINGE (ML) INJECTION EVERY 12 HOURS SCHEDULED
Status: DISCONTINUED | OUTPATIENT
Start: 2020-09-05 | End: 2020-09-09 | Stop reason: HOSPADM

## 2020-09-05 RX ORDER — 0.9 % SODIUM CHLORIDE 0.9 %
500 INTRAVENOUS SOLUTION INTRAVENOUS
Status: DISCONTINUED | OUTPATIENT
Start: 2020-09-05 | End: 2020-09-09 | Stop reason: HOSPADM

## 2020-09-05 RX ORDER — SODIUM CHLORIDE 0.9 % (FLUSH) 0.9 %
10 SYRINGE (ML) INJECTION PRN
Status: DISCONTINUED | OUTPATIENT
Start: 2020-09-05 | End: 2020-09-09 | Stop reason: HOSPADM

## 2020-09-05 RX ADMIN — MUPIROCIN: 20 OINTMENT TOPICAL at 10:28

## 2020-09-05 RX ADMIN — Medication 10 ML: at 10:43

## 2020-09-05 RX ADMIN — MUPIROCIN: 20 OINTMENT TOPICAL at 21:07

## 2020-09-05 RX ADMIN — REMDESIVIR 200 MG: 100 INJECTION, POWDER, LYOPHILIZED, FOR SOLUTION INTRAVENOUS at 09:30

## 2020-09-05 RX ADMIN — CARBIDOPA AND LEVODOPA 2 TABLET: 25; 100 TABLET ORAL at 21:07

## 2020-09-05 RX ADMIN — CARBIDOPA AND LEVODOPA 2 TABLET: 25; 100 TABLET ORAL at 09:30

## 2020-09-05 RX ADMIN — SODIUM CHLORIDE: 9 INJECTION, SOLUTION INTRAVENOUS at 11:34

## 2020-09-05 RX ADMIN — SERTRALINE 100 MG: 100 TABLET, FILM COATED ORAL at 21:07

## 2020-09-05 RX ADMIN — ENOXAPARIN SODIUM 30 MG: 30 INJECTION, SOLUTION INTRAVENOUS; SUBCUTANEOUS at 21:07

## 2020-09-05 RX ADMIN — ACETAMINOPHEN 650 MG: 325 TABLET ORAL at 11:35

## 2020-09-05 RX ADMIN — Medication 10 ML: at 21:07

## 2020-09-05 RX ADMIN — SODIUM CHLORIDE: 9 INJECTION, SOLUTION INTRAVENOUS at 21:07

## 2020-09-05 RX ADMIN — CEFTRIAXONE 1 G: 1 INJECTION, POWDER, FOR SOLUTION INTRAMUSCULAR; INTRAVENOUS at 09:00

## 2020-09-05 RX ADMIN — DEXAMETHASONE 6 MG: 4 TABLET ORAL at 10:27

## 2020-09-05 RX ADMIN — AZITHROMYCIN DIHYDRATE 500 MG: 500 INJECTION, POWDER, LYOPHILIZED, FOR SOLUTION INTRAVENOUS at 21:07

## 2020-09-05 RX ADMIN — SODIUM CHLORIDE: 9 INJECTION, SOLUTION INTRAVENOUS at 04:46

## 2020-09-05 RX ADMIN — ENOXAPARIN SODIUM 30 MG: 30 INJECTION, SOLUTION INTRAVENOUS; SUBCUTANEOUS at 09:30

## 2020-09-05 RX ADMIN — Medication 10 ML: at 10:29

## 2020-09-05 ASSESSMENT — PAIN DESCRIPTION - LOCATION: LOCATION: GENERALIZED

## 2020-09-05 ASSESSMENT — PAIN SCALES - GENERAL
PAINLEVEL_OUTOF10: 0
PAINLEVEL_OUTOF10: 0
PAINLEVEL_OUTOF10: 3
PAINLEVEL_OUTOF10: 0
PAINLEVEL_OUTOF10: 0

## 2020-09-05 ASSESSMENT — PAIN - FUNCTIONAL ASSESSMENT: PAIN_FUNCTIONAL_ASSESSMENT: ACTIVITIES ARE NOT PREVENTED

## 2020-09-05 ASSESSMENT — PAIN DESCRIPTION - DESCRIPTORS: DESCRIPTORS: ACHING;DULL;DISCOMFORT;CONSTANT

## 2020-09-05 ASSESSMENT — PAIN DESCRIPTION - PAIN TYPE: TYPE: ACUTE PAIN

## 2020-09-05 ASSESSMENT — PAIN DESCRIPTION - ONSET: ONSET: GRADUAL

## 2020-09-05 ASSESSMENT — PAIN DESCRIPTION - FREQUENCY: FREQUENCY: CONTINUOUS

## 2020-09-05 ASSESSMENT — PAIN DESCRIPTION - ORIENTATION: ORIENTATION: RIGHT;LEFT;ANTERIOR;LOWER;MID

## 2020-09-05 ASSESSMENT — PAIN DESCRIPTION - PROGRESSION: CLINICAL_PROGRESSION: GRADUALLY WORSENING

## 2020-09-05 NOTE — H&P
Hospital Medicine History & Physical      PCP: Lynne Barclay MD    Date of Service: Pt seen/examined on 9/4/20 and admitted on 9/4/20 to Inpatient. Chief Complaint   Patient presents with    Illness     Pt seen in the ER yesterday for fever. Discharge and had a fever last night. Pt arrived to the ER        History Of Present Illness: The patient is a 66 y.o. male with PMH below, presents with fever, MS change, cough, fatigue, generalized weakness, SOB. Pt was in ER yesterday as well. He was reportedly not having SOB yd but was started to today. He was brought back to ER today. He is requiring 3L O2 to maintain sats. He is not on O2 at home. He has reportedly been increasingly weak and fatigued over last 3-4 days. He has also had fevers which have not fully responded to Tylenol and ibuprofen at home. His temp at arrival here was 105. After rectal Tylenol in the ER his temp came down to 101.8. He has had associated cough. He has a hx of Parkinson's and has reportedly not been somewhat less interactive the last day. His MS appears to have improved somewhat since arrival and reduction of his fever. He normally uses a walker to get around. His BP was reportedly a little low yd but it responded to IVF. He was DC home yd. Past Medical History:        Diagnosis Date    Arthritis     Knee pain     Parkinson disease ([facility] [address]        Past Surgical History:        Procedure Laterality Date    NECK SURGERY      SHOULDER SURGERY         Medications Prior to Admission:    Prior to Admission medications    Medication Sig Start Date End Date Taking?  Authorizing Provider   benzonatate (TESSALON) 100 MG capsule Take 1 capsule by mouth 2 times daily as needed for Cough 9/3/20 9/10/20  Loco Flores PA-C   carbidopa-levodopa (PARCOPA)  MG TBDP Take 2 tablets by mouth 2 times daily    Historical Provider, MD   traZODone (DESYREL) 50 MG tablet Take 50 mg by mouth nightly    Historical Provider, MD   sertraline (ZOLOFT) 100 MG tablet Take 100 mg by mouth nightly    Historical Provider, MD   gabapentin (NEURONTIN) 300 MG capsule Take 300 mg by mouth 3 times daily. Historical Provider, MD       Allergies:  Patient has no known allergies. Social History:    TOBACCO:   reports that he quit smoking about 12 years ago. He has never used smokeless tobacco.  ETOH:   reports no history of alcohol use. Family History:  Reviewed in detail and negative for DM, Early CAD, Cancer (except as below). Positive as follows:    No family history on file. REVIEW OF SYSTEMS:   Pertinent positives/negatives as follows: fever, MS change, cough, fatigue, generalized weakness, SOB, and as discussed in HPI, otherwise a complete ROS performed and all other systems are negative  PHYSICAL EXAM PERFORMED:    BP (!) 120/97   Pulse 88   Temp 105 °F (40.6 °C)   Resp 29   Ht 5' 9\" (1.753 m)   Wt 151 lb (68.5 kg)   SpO2 95%   BMI 22.30 kg/m²     GEN:  Mildly somnolent, does not follow commands very well. Appears ill. Diaphoretic. Increased WOB. HEENT:  NC/AT,EOMI, semi dry MM, no erythema/exudates or visible masses. CVS:  Normal S1,S2. RRR. Without M/G/R.   LUNG:   No wheezes, rales or rhonchi. Diminished at bases. Tachypnea. Some acc mm use. ABD:  Soft, ND/NT, BS+ x4. Without G/R. Rubio in place. EXT: 2+ pulses, no c/c/e. Brisk cap refill. PSY:  Thought process intact, affect appropriate. FRANCISCO:  CN III-XII grossly intact, moves all 4 spontaneously, sensory grossly intact. +tremors of ext. SKIN: No rash or lesions on visible skin. Chart review shows recent radiographs:  Xr Chest Portable    Result Date: 9/4/2020  EXAMINATION: ONE XRAY VIEW OF THE CHEST 9/4/2020 5:08 pm COMPARISON: September 3, 2020 HISTORY: ORDERING SYSTEM PROVIDED HISTORY: OTHER TECHNOLOGIST PROVIDED HISTORY: Reason for exam:->OTHER Reason for Exam: Pt in covid precautions.  Acuity: Acute Type of Exam: Initial FINDINGS: Worsening bilateral infiltrates compared to prior study. No significant effusions     Worsening bilateral infiltrates compared to prior study     Xr Chest Portable    Result Date: 9/3/2020  EXAMINATION: ONE XRAY VIEW OF THE CHEST 9/3/2020 4:28 pm COMPARISON: May 4, 2020 HISTORY: ORDERING SYSTEM PROVIDED HISTORY: cough TECHNOLOGIST PROVIDED HISTORY: Reason for exam:->cough Reason for Exam: Fatigue (EMS states pt c/o general weakness since yesterday, hx of parkinsons) Acuity: Acute Type of Exam: Initial FINDINGS: Cardiac silhouette is normal in size. Lungs appear clear. No acute bony abnormality. No acute findings     Ct Chest Pulmonary Embolism W Contrast    Result Date: 9/4/2020  EXAMINATION: CTA OF THE CHEST 9/4/2020 7:05 pm TECHNIQUE: CTA of the chest was performed after the administration of intravenous contrast.  Multiplanar reformatted images are provided for review. MIP images are provided for review. Dose modulation, iterative reconstruction, and/or weight based adjustment of the mA/kV was utilized to reduce the radiation dose to as low as reasonably achievable. COMPARISON: 09/04/2020 HISTORY: ORDERING SYSTEM PROVIDED HISTORY: r/o pe TECHNOLOGIST PROVIDED HISTORY: This is the correct order Reason for exam:->r/o pe Reason for Exam: illness Acuity: Acute Type of Exam: Initial Additional signs and symptoms: illness, fever: 105 FINDINGS: Pulmonary Arteries: Motion artifact degrades image quality. There is no acute pulmonary thromboembolus. Mediastinum: Coronary artery calcifications are a marker of atherosclerosis. There are no enlarged thoracic lymph nodes. Lungs/pleura: The airway is patent. There is no pneumothorax or pleural effusion. Mild emphysema involves the bilateral upper lungs. There is bibasilar atelectasis. However, there is patchy nodular consolidation within the left lower lobe due to pneumonia. Upper Abdomen: Images of the upper abdomen are unremarkable.  Soft Tissues/Bones: Degenerative changes involve the thoracic spine. The bones are demineralized. Status post anterior discectomy lower cervical spine. There are age-indeterminate compression fractures involving the superior endplates of T4 T7 and D20.     1. Left lower lobe pneumonia. Recommend chest radiograph in 8 weeks to confirm resolution. 2. Multiple age-indeterminate thoracic compression fractures. If there is point tenderness, recommend nonemergent MRI of the thoracic spine. EKG 12 Lead [[id number]]      Collected: 09/04/20 1708     Updated: 09/04/20 2035      Ventricular Rate  95  BPM     Atrial Rate  95  BPM     P-R Interval  168  ms     QRS Duration  98  ms     Q-T Interval  340  ms     QTc Calculation (Bazett)  427  ms     P Axis  46  degrees     R Axis  -49  degrees     T Axis  49  degrees     Diagnosis  Normal sinus rhythmLeft axis deviationAbnormal ECGWhen compared with ECG of 03-SEP-2020 16:07,Vent.  rate has increased BY  34 BPMConfirmed by Malia Brooks MD, [facility] ([id number]) on 9/4/2020 8:35:03 PM          CBC:  Recent Labs     09/03/20  1620 09/04/20  1646   WBC 4.0 4.3   HGB 14.0 13.8   HCT 41.7 41.9    134*        RENAL  Recent Labs     09/03/20  1620 09/04/20  1646   * 139   K 3.8 3.7   CL 99 103   CO2 27 26   BUN 20 16   CREATININE 0.9 0.9   GLUCOSE 105* 140*     LFT'S:  Recent Labs     09/03/20  1620 09/04/20  1646   AST 15 22   ALT <5* 6*   BILITOT 0.5 0.3   ALKPHOS 139* 147*     COAG:  Recent Labs     09/04/20  1646   INR 1.01       CARDIAC ENZYMES:   Recent Labs     09/03/20  1620 09/04/20  1646   TROPONINI <0.01 <0.01     Lab Results   Component Value Date    PROBNP 420 09/04/2020    PROBNP 37 05/04/2020       LACTIC ACID:  Recent Labs     09/03/20  1620 09/04/20  1646 09/04/20  1941   LACTSEPSIS 0.8 1.7 1.1     U/A:  Recent Labs     09/04/20  1645   LEUKOCYTESUR Negative   COLORU Yellow   CLARITYU Clear   SPECGRAV 1.025   BLOODU Negative   GLUCOSEU Negative     PHYSICIAN CERTIFICATION  I certify that Rosario Cristina is expected to be hospitalized for 2 midnights based on the following assessment and plan:    ASSESSMENT/PLAN:  1. Sepsis, likely related to #3, ABX as below. F/u cx, IVF. Lactate normal.  PCT 0.08. No need for pressors at this time. 2. Acute respiratory failure with hypoxia, likely related to #3, supplemental O2 to maintain SPO2 ? 92%, continuous pulse ox. Currently, he is requiring 3L O2 to maintain sats. Not normally on O2 at home. Wean as tolerated. Pulm c/s. 3. CAP, LLL, IV ceftriaxone and azithro, IBD per COVID protocol. 4. Acute encephaopathy on chronic Parkinson's. MS change is mild. Mildly lethargic and less interactive today compared to yd. Fall precautions and supportive measures. Cont home Parkinson's meds. 5. High grade fever, 105 measured in ED, 101.8 after rectal Tylenol. 6. Elevated D-dimer, 289, CTA neg for PE.    7. COVID r/o, droplet +, f/u labs. Start Decadron. DVT Prophylaxis: COVID protocol dosed Lx. Diet: NPO  Code Status: Full Code   PT/OT Eval Status: Will order if needed and as patient condition allows  Dispo - Admit to inpatient PCU level of care, COVID r/o. Will place in ICU as overflow. Cristiano Hooker MD    Thank you Luis Elder MD for the opportunity to be involved in this patient's care. If you have any questions or concerns please feel free to contact me via the [employer] at [phone]. This chart was generated using the [employer] dictation system. I created this record but it may contain dictation errors given the limitations of this technology.

## 2020-09-05 NOTE — FLOWSHEET NOTE
09/05/20 0430   Vitals   Temp 99.5 °F (37.5 °C)   Temp Source Bladder   Pulse 56   Heart Rate Source Monitor   Resp 22   /61   MAP (mmHg) 77   Level of Consciousness 0   MEWS Score 2   Oxygen Therapy   SpO2 96 %     Pt slept well overnight. Sufficient O2 sat. With only 3 L of O2. Via [place]. Pt denies SOB or confusion. Bilateral lung sounds still has expiratory wheezing. Sts he knows he is at [address] [facility] and has covid 19. Pt is distraught; he is concerned he may of passed the virus to his family and grand kids. Also pt sts he is missing his jeans and billfold with a \"400 hundred dollars in it\". I have contacted the ER; they said they do not see anything but will continue to look. I will also contact clinical to see if they can possibly find the pts items; as well as asking daughter/wife if pt was brought in via squad with said items.

## 2020-09-05 NOTE — PROGRESS NOTES
\    Hospitalist Progress Note      PCP: Jimmy Andre MD    Date of Admission: 9/4/2020    Hospital Course: Patient with history of Parkinson's disease 3 to 4 days of fever shortness of breath  Admitted  yesterday diagnosed to have pneumonia, caute respiratory failure needing oxygen hypotension and also COVID 19+  And was hypotensive in the emergency room received 4 L of fluid. Subjective: In ICU, 3 L of oxygen saturating 100%  Febrile not feeling well. Lying in bed  Patient has resting tremors ,lives at home with his wifePatient uses a walker to ambulate at home . his daughter as his primary caretaker.    Cough with some phlegm  CT scan of the chest showed left lower lobe pneumonia    Medications:  Reviewed    Infusion Medications    norepinephrine      sodium chloride 125 mL/hr at 09/05/20 1134     Scheduled Medications    sodium chloride  20 mL Intravenous Once    [START ON 9/6/2020] remdesivir IVPB  100 mg Intravenous Q24H    mupirocin   Nasal BID    lidocaine 1 % injection  5 mL Intradermal Once    sodium chloride flush  10 mL Intravenous 2 times per day    sertraline  100 mg Oral Nightly    cefTRIAXone (ROCEPHIN) IV  1 g Intravenous Q24H    azithromycin  500 mg Intravenous Q24H    sodium chloride flush  10 mL Intravenous 2 times per day    enoxaparin  30 mg Subcutaneous BID    dexamethasone  6 mg Oral Daily    carbidopa-levodopa  2 tablet Oral BID    sodium chloride  500 mL Intravenous Once     PRN Meds: sodium chloride, sodium chloride flush, sodium chloride flush, acetaminophen **OR** acetaminophen, ibuprofen      Intake/Output Summary (Last 24 hours) at 9/5/2020 1403  Last data filed at 9/5/2020 0800  Gross per 24 hour   Intake 1800 ml   Output 2100 ml   Net -300 ml       Physical Exam Performed:    /72   Pulse (!) 45   Temp 99.7 °F (37.6 °C) (Bladder)   Resp 18   Ht 5' 9\" (1.753 m)   Wt 151 lb (68.5 kg)   SpO2 100%   BMI 22.30 kg/m²     General appearance: No apparent distress, appears stated age and cooperative. HEENT: Pupils equal, round, and reactive to light. Conjunctivae/corneas clear. Neck: Supple, with full range of motion. No jugular venous distention. Trachea midline. Respiratory:  Normal respiratory effort. Cardiovascular: Regular rate and rhythm with normal S1/S2 without murmurs, rubs or gallops. Abdomen: Soft, non-tender, non-distended with normal bowel sounds. Musculoskeletal: No clubbing, cyanosis or edema bilaterally. Full range of motion without deformity. Skin: Skin color, texture, turgor normal.  No rashes or lesions. Neurologic:  Neurovascularly intact without any focal sensory/motor deficits. Cranial nerves: II-XII intact, grossly non-focal.  Resting tremors  Psychiatric: Alert and oriented,      Labs:   Recent Labs     09/03/20 1620 09/04/20 1646 09/05/20  0506   WBC 4.0 4.3 11.0   HGB 14.0 13.8 13.3*   HCT 41.7 41.9 39.9*    134* 113*     Recent Labs     09/03/20 1620 09/04/20 1646 09/05/20  0506   * 139 139   K 3.8 3.7 3.9   CL 99 103 110   CO2 27 26 21   BUN 20 16 12   CREATININE 0.9 0.9 0.7*   CALCIUM 8.2* 8.4 7.7*     Recent Labs     09/03/20 1620 09/04/20 1646 09/05/20  0506   AST 15 22 21   ALT <5* 6* 16   BILITOT 0.5 0.3 0.4   ALKPHOS 139* 147* 112     Recent Labs     09/04/20 1646 09/05/20  0506   INR 1.01 1.09     Recent Labs     09/03/20 1620 09/04/20 1646 09/05/20  0506   CKTOTAL  --   --  109   TROPONINI <0.01 <0.01  --        Urinalysis:      Lab Results   Component Value Date    NITRU Negative 09/04/2020    BLOODU Negative 09/04/2020    SPECGRAV 1.025 09/04/2020    GLUCOSEU Negative 09/04/2020       Radiology:  CT CHEST PULMONARY EMBOLISM W CONTRAST   Final Result   1. Left lower lobe pneumonia. Recommend chest radiograph in 8 weeks to   confirm resolution. 2. Multiple age-indeterminate thoracic compression fractures. If there is   point tenderness, recommend nonemergent MRI of the thoracic spine. XR CHEST PORTABLE   Final Result   Worsening bilateral infiltrates compared to prior study                 Assessment/Plan:    Active Hospital Problems    Diagnosis    Acute hypoxemic respiratory failure ([facility] [address] [J96.01]    [facility] [A41.9, R65.20]    COVID-19 virus infection [U07.1]    Pneumonia of both lower lobes due to infectious organism ([facility] [J18.1]    Thrombocytopenia ([facility] [D69.6]    Lymphopenia [D72.810]    Acute encephalopathy [G93.40]    Sepsis ([facility] [address] [A41.9]     Severe sepsis requiring 4 L fluid bolus  Due to bilateral pneumonia likely due to COVID-19  Test for procalcitonin  Decadron and Remdesvir, has been started  To  get CCP  Pt needs to  Be monitored for fluid balance  With CCp     Acute hypoxemic respiratory failure  o2  Due to pneumonia  atb Rocephin/Zithro    Parkinson. s continue home meds    Thoracic compression fractures noted patient is not complaining of pain    Bradycardic asymtomatic  Monitor   Cards /pulm following    DVT Prophylaxis: lovnex bid   Diet: Diet NPO Effective Now Exceptions are: Ice Chips, Sips with Meds   ?eat   Code Status: Full Code    PT/OT Eval Status:     Dispo -icu    Aleisha Solis MD

## 2020-09-05 NOTE — PROGRESS NOTES
Spoke with Dr. Piedad Mock regarding the patient's new diet recommendation of soft and bite sized solids and thin liquids. Also, I stated that the patient's blood pressure has been greater than 953 systolic. Dr. Piedad Mock stated to keep the PICC order related to the risk of him crashing and to order the soft and bite sized diet. Orders were read back and verified. Spoke with [employer] in imaging who stated that the picc nurse will be there shortly.

## 2020-09-05 NOTE — FLOWSHEET NOTE
09/04/20 2019   Vitals   Temp 101.8 °F (38.8 °C)   Temp Source Bladder   Pulse 77  (Simultaneous filing. User may not have seen previous data.)   Resp 28  (Simultaneous filing. User may not have seen previous data.)   BP (!) 102/57  (Simultaneous filing. User may not have seen previous data.)   MAP (mmHg) 71   Level of Consciousness 1   MEWS Score 5   Oxygen Therapy   SpO2 97 %  (Simultaneous filing. User may not have seen previous data.)   Pulse Oximeter Device Mode Continuous   O2 Device Nasal cannula  (Simultaneous filing. User may not have seen previous data.)   O2 Flow Rate (L/min) 3 L/min  (Simultaneous filing. User may not have seen previous data.)   End Tidal CO2 37 (%)     Patient admitted to room ICU 7 from ER. Patient oriented to room, call light, bed rails, phone, lights and bathroom. Patient instructed about the schedule of the day including: vital sign frequency, lab draws, possible tests, frequency of MD and staff rounds, daily weights, I &O's and prescribed diet. Bed alarm in place, patient aware of placement and reason. ICU Telemetry box in place, patient aware of placement and reason. Bed locked, in lowest position, side rails up 2/4, call light within reach. Recliner Assessment  Patient is able to demonstrated the ability to move from a reclining position to an upright position within the recliner. 4 Eyes Skin Assessment     The patient is being assess for   Admission    I agree that 2 RN's have performed a thorough Head to Toe Skin Assessment on the patient. ALL assessment sites listed below have been assessed. Areas assessed by both nurses:   [x]   Head, Face, and Ears   [x]   Shoulders, Back, and Chest, Abdomen  [x]   Arms, Elbows, and Hands   [x]   Coccyx, Sacrum, and Ischium  [x]   Legs, Feet, and Heels        Pt has abrasion on Left upper arm; dry flaky feet. No other issues.      **SHARE this note so that the co-signing nurse is able to place an eSignature**    Co-signer

## 2020-09-05 NOTE — PROGRESS NOTES
Pt arriving to Unit in stretcher. Positive for Covid. Pt is alert but disoriented to time and situation. Sts he started feeling ill yesterday with a fever and constant cough. Pt is NSR on the monitor, bilateral lung sounds have expiratory wheezing. Pt has max in place draining yellow clear urine. Pt given bath with chlorhexidene wipes. Pt is febrile and tachypneic at 33 respirations a minute. Per ER report pt was given 4 L of fluid downstairs with cefepime/vanc. Continuous fluids running at 125 ml/hr. Pt currently on 3 L of O2 via [place].     Spoke with pts' daughter to give update on pts' condition. Notified her of his DX. Daughter sts pt attended a wedding over the weekend and may of been exposed. Sts she monitors the pts' BP daily and says he is typically hypotensive. Sts she called squad when BP was 76/51 and pts fever had returned.

## 2020-09-05 NOTE — CONSULTS
Patient is being seen at the request of Beverly Dinh MD  for a consultation for COVID and pneumoni    HISTORY OF PRESENT ILLNESS:   66years old with Parkinson disease presented with progressive fatigue and generalized weakness 1 week. Associated with fever up to 105. Shortness of breath and cough. Hypoxemia requiring up to 3 L O2. Seen in ER 9/3 diagnosed with viral illness sent home on Tessal[place] Perles. No smoking. No IBD or O2 at home. Ira Ferrer is sick from school. Hypotensive received 4L boluses in ED. PAST MEDICAL HISTORY:  Past Medical History:   Diagnosis Date    Arthritis     Knee pain     Parkinson disease ([address]      PAST SURGICAL HISTORY:  Past Surgical History:   Procedure Laterality Date    NECK SURGERY      SHOULDER SURGERY         FAMILY HISTORY:  No lung cancer       SOCIAL HISTORY:   reports that he quit smoking about 12 years ago. He has never used smokeless tobacco.    Scheduled Meds:   sertraline  100 mg Oral Nightly    cefTRIAXone (ROCEPHIN) IV  1 g Intravenous Q24H    azithromycin  500 mg Intravenous Q24H    sodium chloride flush  10 mL Intravenous 2 times per day    enoxaparin  30 mg Subcutaneous BID    dexamethasone  6 mg Oral Daily    carbidopa-levodopa  2 tablet Oral BID    sodium chloride  500 mL Intravenous Once     Continuous Infusions:   sodium chloride 125 mL/hr at 09/05/20 0446     PRN Meds:  sodium chloride flush, acetaminophen **OR** acetaminophen, ibuprofen    ALLERGIES:  Patient has No Known Allergies.     REVIEW OF SYSTEMS:  Constitutional: + fever  HENT: Negative for sore throat  Eyes: Negative for redness   Respiratory: + dyspnea, cough  Cardiovascular: Negative for chest pain  Gastrointestinal: Negative for vomiting, diarrhea   Genitourinary: Negative for hematuria   Musculoskeletal: + arthralgias   Skin: Negative for rash  Neurological: Negative for syncope  Hematological: Negative for adenopathy  Psychiatric/Behavorial: Negative for anxiety    PHYSICAL EXAM:  Blood pressure 111/61, pulse 56, temperature 99.5 °F (37.5 °C), temperature source Bladder, resp. rate 22, height 5' 9\" (1.753 m), weight 151 lb (68.5 kg), SpO2 96 %.' on 3LPM  Gen: Mild distress. Eyes: PERRL. No sclera icterus. No conjunctival injection. ENT: No discharge. Pharynx clear. Neck: Trachea midline. No obvious mass. Resp: + accessory muscle use. Bibasilar crackles. No wheezes. No rhonchi. No dullness on percussion. CV: Regular rate. Regular rhythm. No murmur or rub. No edema. GI: Non-tender. Non-distended. No hernia. Skin: Warm and dry. No nodule on exposed extremities. Lymph: No cervical LAD. No supraclavicular LAD. M/S: No cyanosis. No joint deformity. No clubbing. Neuro: Awake. Alert. Moves all four extremities. + tremor   Psych: Oriented x 3. No anxiety. LABS:  CBC:   Recent Labs     09/03/20 1620 09/04/20 1646 09/05/20  0506   WBC 4.0 4.3 11.0   HGB 14.0 13.8 13.3*   HCT 41.7 41.9 39.9*   MCV 92.3 93.3 93.4    134* 113*     BMP:   Recent Labs     09/03/20  1620 09/04/20 1646 09/05/20  0506   * 139 139   K 3.8 3.7 3.9   CL 99 103 110   CO2 27 26 21   BUN 20 16 12   CREATININE 0.9 0.9 0.7*     LIVER PROFILE:   Recent Labs     09/03/20  1620 09/04/20  1646 09/05/20  0506   AST 15 22 21   ALT <5* 6* 16   BILITOT 0.5 0.3 0.4   ALKPHOS 139* 147* 112     PT/INR:   Recent Labs     09/04/20 1646 09/05/20  0506   PROTIME 11.7 12.7   INR 1.01 1.09     APTT:   Recent Labs     09/04/20 1646 09/04/20  2210   APTT 30.2 31.3     UA:  Recent Labs     09/04/20  1645   COLORU Yellow   PHUR 5.5   CLARITYU Clear   SPECGRAV 1.025   LEUKOCYTESUR Negative   UROBILINOGEN 0.2   BILIRUBINUR Negative   BLOODU Negative   GLUCOSEU Negative     No results for input(s): PHART, OLK2IFW, PO2ART in the last 72 hours. CT chest 9/4 imaging was reviewed by me and showed   1. Left lower lobe pneumonia  2. Multiple age-indeterminate thoracic compression fractures.  If there is   point tenderness, recommend nonemergent MRI of the thoracic spine        ASSESSMENT:  · Acute hypoxemic respiratory failure  · Severe sepsis   · COVID-19 virus infection  · Bilateral LL Pneumonia   · Thrombocytopenia   · Lymphopenia   · Acute encephalopathy/metabolic encephalopathy  · Fever up to 105      PLAN:  · Supplemental oxygen to maintain SaO2 >92%; wean as tolerated  · Droplet plus isolation (surgical mask, eye protection, gown, glove)  · Emergent Covid 19 testing  · Start Remdesivir, Decadron 6 mg daily, 2 units of convalescent plasma, and Lovenox 30 twice daily. Risks, benefits and alternatives were discussed with patients including liver toxicity and transfusion reaction. Patient is agreeable. · Monitor LFTs, renal, CBC daily  · NS with boluses if needed to keep SBP > 90   ·  cc/hr   · IV antibiotics to include Ceftriaxone and Zithromax  · Blood culture and sputum culture  · IVF  cc bolus PRN target SBP>90 up to 4 liters  · IV Levophed to keep MAP > 65 mmHg or SBP>90  · LFTs, PT/INR, CPK, ferritin, LDH, d-dimer, procalcitonin, CRP  · Moved to negative pressure room in case needs aerosolized procedure  · Avoid NEBs as able-albuterol MDI strongly preferred   · Contact tracing   · DVT prophylaxis: Lovenox 30 mg BID  · MRSA prophylaxis: Bactroban              Total critical care time caring for this patient with life threatening, unstable organ failure, including direct patient contact, management of life support systems, review of data including imaging and labs, discussions with other team members and physicians is 33 minutes, excluding procedures.

## 2020-09-05 NOTE — PROGRESS NOTES
Speech Language Pathology  Facility/Department: [facility]   CLINICAL BEDSIDE SWALLOW EVALUATION    NAME: Arminda Reeves  : 1945  MRN: [id number]     Recommend initiate Dysphagia Soft and Bite Sized Solids with sauces and gravies /Thin Liquids, meds whole with thin or puree as tolerated. Recommend upright position during and 30 minutes after meals, small bites/sips, slow rate of po intake, liquids wash as needed to clear oral cavity, and oral care before and after meals, and encourage self-feeding. ST to follow to instruct pt in safe swallow strategies and to ensure diet tolerance. D/c po intake and notify ST if signs of aspiration are observed. ADMISSION DATE: 2020  ADMITTING DIAGNOSIS: has Paralysis agitans ([address] Depressive disorder, not elsewhere classified; Mild cognitive impairment; Sepsis ([address] Acute hypoxemic respiratory failure ([address] Severe sepsis ([address] COVID-19 virus infection; Pneumonia of both lower lobes due to infectious organism [facility]); Thrombocytopenia ([address] Lymphopenia; and Acute encephalopathy on their problem list.  ONSET DATE: 2020    Recent Chest Xray/CT of Chest: (2020 )  1. Left lower lobe pneumonia.  Recommend chest radiograph in 8 weeks to    confirm resolution. 2. Multiple age-indeterminate thoracic compression fractures.  If there is    point tenderness, recommend nonemergent MRI of the thoracic spine.         Date of Eval: 2020  Evaluating Therapist: Jocelyn Doyle    Current Diet level:  Current Diet : NPO  Current Liquid Diet : NPO      Primary Complaint  Patient Complaint: No complaints    Pain:  Pain Assessment  Pain Assessment: 0-10  Pain Level: 3  Patient's Stated Pain Goal: No pain  RASS Score: Alert and calm    Reason for Referral  Arminda Reeves was referred for a bedside swallow evaluation to assess the efficiency of his swallow function, identify signs and symptoms of aspiration and make recommendations regarding safe dietary consistencies, effective compensatory strategies, and safe eating environment. Impression  Per MD Note, \"The patient is a 66 y.o. male with PMH below, presents with fever, MS change, cough, fatigue, generalized weakness, SOB. Pt was in ER yesterday as well. He was reportedly not having SOB yd but was started to today. He was brought back to ER today. He is requiring 3L O2 to maintain sats. He is not on O2 at home. He has reportedly been increasingly weak and fatigued over last 3-4 days. He has also had fevers which have not fully responded to Tylenol and ibuprofen at home. His temp at arrival here was 105. After rectal Tylenol in the ER his temp came down to 101.8. He has had associated cough. He has a hx of Parkinson's and has reportedly not been somewhat less interactive the last day. His MS appears to have improved somewhat since arrival and reduction of his fever. He normally uses a walker to get around. His BP was reportedly a little low yd but it responded to IVF. He was DC home yd. \"  PMH includes Parkinson's Disease with use of Sinemet. Pt is COVID positive    Yunior/DARIUS mcgill SLP for entry for BSE. Pt on 3L O2 per NC. RR: 26-28 during assessment with and without po intake. Pt has flat affect. Pt reports no h/o dysphagia, though pt does c/o dry mouth and requires liquids wash with dry solids. Pt also states he requires his food to be cut up. Oral motor exam yields lip/face and lingual ROM WNL and lingual and labial movements appear coordinated. Pt exhibits decreased vocal intensity and decreased inflection, however pt's speech is intelligible. Dry oral mucosa noted. Upper denture in place and lower teeth are adequate for mastication. Pt is assessed with ice chips thin liquids by tsp, cup, and straw, puree, soft solids, and regular solids. Oral prep and transit of thin liquids, puree, and soft sold appears WFL.  Pt requires increased time to masticate and clear hard, dry solid and requests thin liqiuds wash to aid in clearing oral cavity. Small amounts of residue remain in oral cavity after liquid wash. Pt appears aware of residue and attempts to further clear with thin liquids. No overt clinical signs of aspiration are observed with consistencies attempted. Pt does cough x1 during assessment, but this does not appears related to po intake. Assessment: Mild Oral Dysphagia characterized by decreased bolus transport/lingual motion resulting in small amounts of solid residue in oral cavity. No overt clinical signs of aspiration observed, however pt could benefit from further assessment for pt education and to ensure diet tolerance given overall fatigue, generalized weakness, and increased respiratory rate associated with pt's acute illness. These acute factors in the setting of known Parkinson's Disease could impact pt's ability to coordinate breathing and swallowing and place pt at increased risk for aspiration, especially across length of time required for a meal and if oral olus is not completely cleared. Recommend initiate Dysphagia Soft and Bite Sized Solids with sauces and gravies /Thin Liquids, meds whole with thin or puree as tolerated. Recommend upright position during and 30 minutes after meals, small bites/sips, slow rate of po intake, liquids wash as needed to clear oral cavity, and oral care before and after meals, and encourage self-feeding. ST to follow per POC to instruct pt in safe swallow strategies and to ensure diet tolerance. D/c po intake and notify ST if signs of aspiration are observed. Dysphagia Diagnosis: Suspected needs further assessment  Dysphagia Impression : Mild Oral Dysphagia. No overt clinical signs of aspiration observed, however pt could benefit from further assessment for pt education and to ensure diet tolerance. Dysphagia Outcome Severity Scale: Level 5: Mild dysphagia- Distant supervision.  May need one diet consistency restricted Treatment Plan  Requires SLP Intervention: Yes  Duration/Frequency of Treatment: 1-2x/wk for LOS  D/C Recommendations: To be determined       Recommended Diet and Intervention  Diet Solids Recommendation: Dysphagia Soft and Bite-Sized (Dysphagia III)  Liquid Consistency Recommendation: Thin  Recommended Form of Meds: Whole with water(or whole with puree as needed)  Recommendations: Dysphagia treatment  Therapeutic Interventions: Diet tolerance monitoring;Patient/Family education    Compensatory Swallowing Strategies  Compensatory Swallowing Strategies: Upright as possible for all oral intake;Remain upright for 30-45 minutes after meals;Small bites/sips;Eat/Feed slowly; Other (comments); Alternate solids and liquids(Oral care 3-4x/day)    Treatment/Goals  Short-term Goals  Timeframe for Short-term Goals: 5 days by 09/10/2020  Goal 1: Pt will demonstrate functional swallow of recommended diet in 5/5 opportunities  Goal 2: Pt will demonstrate understanding of safe swallow strategies (upright position, small bites/sips, slow rate of po intake, use of tongue sweep/liquid wash as needed to clear residue, oral care and self-feeding) independently. Long-term Goals  Timeframe for Long-term Goals: 7 days by 09/12/2020  Goal 1: Pt will demonstrate functional swallow of preferred consistencies with use of safe swallow strategies in 5/5 opportunities. General  Chart Reviewed: Yes  Comments: Order received for swallow eval. chart reviewed. Subjective  Subjective: Yunior/DARIUS mcgill SLP for entry. States pt took meds whole with thin liquids wihtout difficulty  Behavior/Cognition: Alert; Cooperative;Pleasant mood  Respiratory Status: O2 via nasual cannula  O2 Device: Nasal cannula  Liters of Oxygen: 3 L  Communication Observation: Dysarthria; Functional  Follows Directions: Simple  Dentition: Dentures top; Adequate  Patient Positioning: Upright in bed  Baseline Vocal Quality: Weak  Volitional Cough: (Not assessed d/t COVID positive understanding  Safety Devices in place: Yes  Type of devices: Bed alarm in place; All fall risk precautions in place;Call light within reach; Left in bed;Nurse notified       Therapy Time  SLP Individual Minutes  Time In: 9229  Time Out: [address]  Minutes: [address] Shena Adams M.A. CCC-SLP  Speech-Language Pathologist      JYOTHI Laughlin  9/5/2020 1:42 PM

## 2020-09-05 NOTE — PLAN OF CARE
Problem: Falls - Risk of:  Goal: Will remain free from falls  Description: Will remain free from falls  Outcome: Ongoing  Goal: Absence of physical injury  Description: Absence of physical injury  Outcome: Ongoing     Problem: Airway Clearance - Ineffective  Goal: Achieve or maintain patent airway  Outcome: Ongoing     Problem: Gas Exchange - Impaired  Goal: Absence of hypoxia  Outcome: Ongoing  Goal: Promote optimal lung function  Outcome: Ongoing     Problem: Breathing Pattern - Ineffective  Goal: Ability to achieve and maintain a regular respiratory rate  Outcome: Ongoing     Problem:  Body Temperature -  Risk of, Imbalanced  Goal: Ability to maintain a body temperature within defined limits  Outcome: Ongoing  Goal: Will regain or maintain usual level of consciousness  Outcome: Ongoing  Goal: Complications related to the disease process, condition or treatment will be avoided or minimized  Outcome: Ongoing

## 2020-09-06 LAB
ANION GAP SERPL CALCULATED.3IONS-SCNC: 7 MMOL/L (ref 3–16)
BASOPHILS ABSOLUTE: 0 K/UL (ref 0–0.2)
BASOPHILS RELATIVE PERCENT: 0.2 %
BUN BLDV-MCNC: 16 MG/DL (ref 7–20)
C-REACTIVE PROTEIN: 94.4 MG/L (ref 0–5.1)
CALCIUM SERPL-MCNC: 8.3 MG/DL (ref 8.3–10.6)
CHLORIDE BLD-SCNC: 107 MMOL/L (ref 99–110)
CO2: 22 MMOL/L (ref 21–32)
CREAT SERPL-MCNC: 0.7 MG/DL (ref 0.8–1.3)
D DIMER: 399 NG/ML DDU (ref 0–229)
EOSINOPHILS ABSOLUTE: 0 K/UL (ref 0–0.6)
EOSINOPHILS RELATIVE PERCENT: 0 %
FERRITIN: 657.9 NG/ML (ref 30–400)
FIBRINOGEN: 328 MG/DL (ref 200–397)
GFR AFRICAN AMERICAN: >60
GFR NON-AFRICAN AMERICAN: >60
GLUCOSE BLD-MCNC: 102 MG/DL (ref 70–99)
HCT VFR BLD CALC: 37.8 % (ref 40.5–52.5)
HEMOGLOBIN: 12.8 G/DL (ref 13.5–17.5)
INR BLD: 1.11 (ref 0.86–1.14)
LYMPHOCYTES ABSOLUTE: 1.3 K/UL (ref 1–5.1)
LYMPHOCYTES RELATIVE PERCENT: 14.5 %
MAGNESIUM: 1.9 MG/DL (ref 1.8–2.4)
MCH RBC QN AUTO: 31.3 PG (ref 26–34)
MCHC RBC AUTO-ENTMCNC: 33.9 G/DL (ref 31–36)
MCV RBC AUTO: 92.4 FL (ref 80–100)
MONOCYTES ABSOLUTE: 0.8 K/UL (ref 0–1.3)
MONOCYTES RELATIVE PERCENT: 8.6 %
NEUTROPHILS ABSOLUTE: 6.9 K/UL (ref 1.7–7.7)
NEUTROPHILS RELATIVE PERCENT: 76.7 %
PDW BLD-RTO: 15.2 % (ref 12.4–15.4)
PHOSPHORUS: 1.8 MG/DL (ref 2.5–4.9)
PLATELET # BLD: 113 K/UL (ref 135–450)
PMV BLD AUTO: 7.3 FL (ref 5–10.5)
POTASSIUM SERPL-SCNC: 3.6 MMOL/L (ref 3.5–5.1)
PROTHROMBIN TIME: 12.9 SEC (ref 10–13.2)
RBC # BLD: 4.09 M/UL (ref 4.2–5.9)
REPORT: NORMAL
SODIUM BLD-SCNC: 136 MMOL/L (ref 136–145)
TOTAL CK: 150 U/L (ref 39–308)
WBC # BLD: 9 K/UL (ref 4–11)

## 2020-09-06 PROCEDURE — 85025 COMPLETE CBC W/AUTO DIFF WBC: CPT

## 2020-09-06 PROCEDURE — 82728 ASSAY OF FERRITIN: CPT

## 2020-09-06 PROCEDURE — 85379 FIBRIN DEGRADATION QUANT: CPT

## 2020-09-06 PROCEDURE — 2580000003 HC RX 258: Performed by: INTERNAL MEDICINE

## 2020-09-06 PROCEDURE — 6370000000 HC RX 637 (ALT 250 FOR IP): Performed by: INTERNAL MEDICINE

## 2020-09-06 PROCEDURE — 80048 BASIC METABOLIC PNL TOTAL CA: CPT

## 2020-09-06 PROCEDURE — 83735 ASSAY OF MAGNESIUM: CPT

## 2020-09-06 PROCEDURE — 85610 PROTHROMBIN TIME: CPT

## 2020-09-06 PROCEDURE — 85384 FIBRINOGEN ACTIVITY: CPT

## 2020-09-06 PROCEDURE — 1200000000 HC SEMI PRIVATE

## 2020-09-06 PROCEDURE — 36573 INSJ PICC RS&I 5 YR+: CPT

## 2020-09-06 PROCEDURE — 6360000002 HC RX W HCPCS: Performed by: INTERNAL MEDICINE

## 2020-09-06 PROCEDURE — 2500000003 HC RX 250 WO HCPCS: Performed by: INTERNAL MEDICINE

## 2020-09-06 PROCEDURE — 82550 ASSAY OF CK (CPK): CPT

## 2020-09-06 PROCEDURE — 84100 ASSAY OF PHOSPHORUS: CPT

## 2020-09-06 PROCEDURE — 86140 C-REACTIVE PROTEIN: CPT

## 2020-09-06 PROCEDURE — 99291 CRITICAL CARE FIRST HOUR: CPT | Performed by: INTERNAL MEDICINE

## 2020-09-06 RX ADMIN — CARBIDOPA AND LEVODOPA 2 TABLET: 25; 100 TABLET ORAL at 08:41

## 2020-09-06 RX ADMIN — ENOXAPARIN SODIUM 30 MG: 30 INJECTION, SOLUTION INTRAVENOUS; SUBCUTANEOUS at 08:41

## 2020-09-06 RX ADMIN — Medication 10 ML: at 08:41

## 2020-09-06 RX ADMIN — REMDESIVIR 100 MG: 100 INJECTION, POWDER, LYOPHILIZED, FOR SOLUTION INTRAVENOUS at 09:34

## 2020-09-06 RX ADMIN — SERTRALINE 100 MG: 100 TABLET, FILM COATED ORAL at 20:20

## 2020-09-06 RX ADMIN — DEXAMETHASONE 6 MG: 4 TABLET ORAL at 09:34

## 2020-09-06 RX ADMIN — ENOXAPARIN SODIUM 30 MG: 30 INJECTION, SOLUTION INTRAVENOUS; SUBCUTANEOUS at 20:20

## 2020-09-06 RX ADMIN — Medication 10 ML: at 08:53

## 2020-09-06 RX ADMIN — AZITHROMYCIN DIHYDRATE 500 MG: 500 INJECTION, POWDER, LYOPHILIZED, FOR SOLUTION INTRAVENOUS at 20:21

## 2020-09-06 RX ADMIN — CARBIDOPA AND LEVODOPA 2 TABLET: 25; 100 TABLET ORAL at 20:20

## 2020-09-06 RX ADMIN — MUPIROCIN: 20 OINTMENT TOPICAL at 08:42

## 2020-09-06 RX ADMIN — Medication 10 ML: at 08:52

## 2020-09-06 RX ADMIN — POTASSIUM PHOSPHATE, MONOBASIC POTASSIUM PHOSPHATE, DIBASIC 20 MMOL: 224; 236 INJECTION, SOLUTION, CONCENTRATE INTRAVENOUS at 12:02

## 2020-09-06 RX ADMIN — CEFTRIAXONE 1 G: 1 INJECTION, POWDER, FOR SOLUTION INTRAMUSCULAR; INTRAVENOUS at 09:34

## 2020-09-06 RX ADMIN — Medication 10 ML: at 20:20

## 2020-09-06 ASSESSMENT — PAIN SCALES - GENERAL
PAINLEVEL_OUTOF10: 0

## 2020-09-06 NOTE — PLAN OF CARE
Problem: Falls - Risk of:  Goal: Will remain free from falls  Description: Will remain free from falls  Outcome: Ongoing  Goal: Absence of physical injury  Description: Absence of physical injury  Outcome: Ongoing     Problem: Airway Clearance - Ineffective  Goal: Achieve or maintain patent airway  Outcome: Ongoing     Problem: Gas Exchange - Impaired  Goal: Absence of hypoxia  Outcome: Ongoing  Goal: Promote optimal lung function  Outcome: Ongoing     Problem: Breathing Pattern - Ineffective  Goal: Ability to achieve and maintain a regular respiratory rate  Outcome: Ongoing     Problem:  Body Temperature -  Risk of, Imbalanced  Goal: Ability to maintain a body temperature within defined limits  Outcome: Ongoing  Goal: Will regain or maintain usual level of consciousness  Outcome: Ongoing  Goal: Complications related to the disease process, condition or treatment will be avoided or minimized  Outcome: Ongoing     Problem: Isolation Precautions - Risk of Spread of Infection  Goal: Prevent transmission of infection  Outcome: Ongoing     Problem: Nutrition Deficits  Goal: Optimize nutrtional status  Outcome: Ongoing     Problem: Risk for Fluid Volume Deficit  Goal: Maintain normal heart rhythm  Outcome: Ongoing  Goal: Maintain absence of muscle cramping  Outcome: Ongoing  Goal: Maintain normal serum potassium, sodium, calcium, phosphorus, and pH  Outcome: Ongoing     Problem: Loneliness or Risk for Loneliness  Goal: Demonstrate positive use of time alone when socialization is not possible  Outcome: Ongoing     Problem: Fatigue  Goal: Verbalize increase energy and improved vitality  Outcome: Ongoing     Problem: Patient Education: Go to Patient Education Activity  Goal: Patient/Family Education  Outcome: Ongoing     Problem: Infection - Central Venous Catheter-Associated Bloodstream Infection:  Goal: Will show no infection signs and symptoms  Description: Will show no infection signs and symptoms  Outcome: Ongoing Problem: Skin Integrity:  Goal: Will show no infection signs and symptoms  Description: Will show no infection signs and symptoms  Outcome: Ongoing  Goal: Absence of new skin breakdown  Description: Absence of new skin breakdown  Outcome: Ongoing     Problem: Confusion - Acute:  Goal: Absence of continued neurological deterioration signs and symptoms  Description: Absence of continued neurological deterioration signs and symptoms  Outcome: Ongoing  Goal: Mental status will be restored to baseline  Description: Mental status will be restored to baseline  Outcome: Ongoing     Problem: Discharge Planning:  Goal: Ability to perform activities of daily living will improve  Description: Ability to perform activities of daily living will improve  Outcome: Ongoing  Goal: Participates in care planning  Description: Participates in care planning  Outcome: Ongoing     Problem: Injury - Risk of, Physical Injury:  Goal: Will remain free from falls  Description: Will remain free from falls  Outcome: Ongoing  Goal: Absence of physical injury  Description: Absence of physical injury  Outcome: Ongoing     Problem: Mood - Altered:  Goal: Mood stable  Description: Mood stable  Outcome: Ongoing  Goal: Absence of abusive behavior  Description: Absence of abusive behavior  Outcome: Ongoing  Goal: Verbalizations of feeling emotionally comfortable while being cared for will increase  Description: Verbalizations of feeling emotionally comfortable while being cared for will increase  Outcome: Ongoing     Problem: Psychomotor Activity - Altered:  Goal: Absence of psychomotor disturbance signs and symptoms  Description: Absence of psychomotor disturbance signs and symptoms  Outcome: Ongoing     Problem: Sensory Perception - Impaired:  Goal: Demonstrations of improved sensory functioning will increase  Description: Demonstrations of improved sensory functioning will increase  Outcome: Ongoing  Goal: Decrease in sensory misperception frequency  Description: Decrease in sensory misperception frequency  Outcome: Ongoing  Goal: Able to refrain from responding to false sensory perceptions  Description: Able to refrain from responding to false sensory perceptions  Outcome: Ongoing  Goal: Demonstrates accurate environmental perceptions  Description: Demonstrates accurate environmental perceptions  Outcome: Ongoing  Goal: Able to distinguish between reality-based and nonreality-based thinking  Description: Able to distinguish between reality-based and nonreality-based thinking  Outcome: Ongoing  Goal: Able to interrupt nonreality-based thinking  Description: Able to interrupt nonreality-based thinking  Outcome: Ongoing     Problem: Sleep Pattern Disturbance:  Goal: Appears well-rested  Description: Appears well-rested  Outcome: Ongoing

## 2020-09-06 NOTE — PROGRESS NOTES
Patient arrived to unit confused, and removing telemetry. Pt was redirected and tele sitter placed for patient safety.

## 2020-09-06 NOTE — PROGRESS NOTES
Patient scored high on the reaves fall risk scale. Interventions taken; verified bed/chair alarm is activated, yellow socks placed on patient, and stay with me sign posted outside patients room.

## 2020-09-06 NOTE — PLAN OF CARE
Problem: Falls - Risk of:  Goal: Will remain free from falls  Description: Will remain free from falls  9/5/2020 2047 by Shirley Yates RN  Outcome: Ongoing  Note: Fall precautions in place. 9/5/2020 1857 by Collin Lamas RN  Outcome: Ongoing  Goal: Absence of physical injury  Description: Absence of physical injury  9/5/2020 2047 by Shirley Yates RN  Outcome: Ongoing  9/5/2020 1857 by Collin Lamas RN  Outcome: Ongoing     Problem: Airway Clearance - Ineffective  Goal: Achieve or maintain patent airway  9/5/2020 2047 by Shirley Yates RN  Outcome: Ongoing  9/5/2020 1857 by Collin Lamas RN  Outcome: Ongoing     Problem: Gas Exchange - Impaired  Goal: Absence of hypoxia  9/5/2020 2047 by Shirley Yates RN  Outcome: Ongoing  9/5/2020 1857 by Collin Lamas RN  Outcome: Ongoing  Goal: Promote optimal lung function  9/5/2020 2047 by Shirley Yates RN  Outcome: Ongoing  9/5/2020 1857 by Collin Lamas RN  Outcome: Ongoing     Problem: Breathing Pattern - Ineffective  Goal: Ability to achieve and maintain a regular respiratory rate  9/5/2020 2047 by Shirley Yates RN  Outcome: Ongoing  9/5/2020 1857 by Collin Lamas RN  Outcome: Ongoing     Problem:  Body Temperature -  Risk of, Imbalanced  Goal: Ability to maintain a body temperature within defined limits  9/5/2020 2047 by Shirley Yates RN  Outcome: Ongoing  9/5/2020 1857 by Collin Lamas RN  Outcome: Ongoing  Goal: Will regain or maintain usual level of consciousness  9/5/2020 2047 by Shirley Yates RN  Outcome: Ongoing  9/5/2020 1857 by Collin Lamas RN  Outcome: Ongoing  Goal: Complications related to the disease process, condition or treatment will be avoided or minimized  9/5/2020 2047 by Shirley Yates RN  Outcome: Ongoing  9/5/2020 1857 by Collin Lamas RN  Outcome: Ongoing     Problem: Isolation Precautions - Risk of Spread of Infection  Goal: Prevent transmission of infection  9/5/2020 2047 by Shirley Yates RN  Outcome: Ongoing  9/5/2020 1857 by Samantha Lamb RN  Outcome: Ongoing     Problem: Nutrition Deficits  Goal: Optimize nutrtional status  9/5/2020 2047 by Warner Baez RN  Outcome: Ongoing  9/5/2020 1857 by Samantha Lamb RN  Outcome: Ongoing     Problem: Risk for Fluid Volume Deficit  Goal: Maintain normal heart rhythm  9/5/2020 2047 by Warner Baez RN  Outcome: Ongoing  9/5/2020 1857 by Samantha Lamb RN  Outcome: Ongoing  Goal: Maintain absence of muscle cramping  9/5/2020 2047 by Warner Baez RN  Outcome: Ongoing  9/5/2020 1857 by Samantha Lamb RN  Outcome: Ongoing  Goal: Maintain normal serum potassium, sodium, calcium, phosphorus, and pH  9/5/2020 2047 by Warner Baez RN  Outcome: Ongoing  9/5/2020 1857 by Samantha Lamb RN  Outcome: Ongoing     Problem: Loneliness or Risk for Loneliness  Goal: Demonstrate positive use of time alone when socialization is not possible  9/5/2020 2047 by Warner Baez RN  Outcome: Ongoing  9/5/2020 1857 by Samantha Lamb RN  Outcome: Ongoing     Problem: Fatigue  Goal: Verbalize increase energy and improved vitality  9/5/2020 2047 by Warner Baez RN  Outcome: Ongoing  9/5/2020 1857 by Samantha Lamb RN  Outcome: Ongoing     Problem: Patient Education: Go to Patient Education Activity  Goal: Patient/Family Education  9/5/2020 2047 by Warner Baez RN  Outcome: Ongoing  9/5/2020 1857 by Samantha Lamb RN  Outcome: Ongoing     Problem: Infection - Central Venous Catheter-Associated Bloodstream Infection:  Goal: Will show no infection signs and symptoms  Description: Will show no infection signs and symptoms  9/5/2020 2047 by Warner Baez RN  Outcome: Ongoing  Note: Patient mobile in bed. Instructed on importance of frequent repositioning to maintain skin integrity. Demonstrates understanding.    9/5/2020 1857 by Samantha Lamb RN  Outcome: Ongoing     Problem: Skin Integrity:  Goal: Will show no infection signs and symptoms  Description: Will show no infection signs and symptoms  9/5/2020 2047 by Isaac Bell, RN  Outcome: Ongoing  Note: Patient mobile in bed. Instructed on importance of frequent repositioning to maintain skin integrity. Demonstrates understanding.    9/5/2020 1857 by Guanaco Gross RN  Outcome: Ongoing  Goal: Absence of new skin breakdown  Description: Absence of new skin breakdown  Outcome: Ongoing

## 2020-09-06 NOTE — PROGRESS NOTES
Patient removed SpO2 monitor. Writer responded to room to find that patient had also pulled out his PICC as well, and removed his BP cuff. Patient states he was trying to \"get untangled. \" Pressure held to PICC site, minimal blood loss noted, PICC catheter intact. Patient bathed, linens changed, positioned for comfort. Patient instructed not to pull on any tubes, cords, or wires. Patient verbalizes understanding. Will monitor.

## 2020-09-06 NOTE — PROGRESS NOTES
\    Hospitalist Progress Note      PCP: Cora Avila MD    Date of Admission: 9/4/2020    Hospital Course: Patient with history of Parkinson's disease 3 to 4 days of fever shortness of breath  Admitted  yesterday diagnosed to have pneumonia, caute respiratory failure needing oxygen hypotension and also COVID 19+  And was hypotensive in the emergency room received 4 L of fluid. Subjective:     Patient has resting tremors ,lives at home with his wifePatient uses a walker to ambulate at home . his daughter as his primary caretaker.    Cough with some phlegm  CT scan of the chest showed left lower lobe pneumonia  Since admission has been treated with Decadron remdesvir ,Cplasma and antibiotic   improving symptoms   feeling better   some bloody sputum   oxygen im proving    Medications:  Reviewed    Infusion Medications    norepinephrine       Scheduled Medications    potassium phosphate IVPB  20 mmol Intravenous Once    sodium chloride  20 mL Intravenous Once    remdesivir IVPB  100 mg Intravenous Q24H    mupirocin   Nasal BID    lidocaine 1 % injection  5 mL Intradermal Once    sodium chloride flush  10 mL Intravenous 2 times per day    lidocaine 1 % injection  5 mL Intradermal Once    sodium chloride flush  10 mL Intravenous 2 times per day    sertraline  100 mg Oral Nightly    cefTRIAXone (ROCEPHIN) IV  1 g Intravenous Q24H    azithromycin  500 mg Intravenous Q24H    sodium chloride flush  10 mL Intravenous 2 times per day    enoxaparin  30 mg Subcutaneous BID    dexamethasone  6 mg Oral Daily    carbidopa-levodopa  2 tablet Oral BID    sodium chloride  500 mL Intravenous Once     PRN Meds: sodium chloride, sodium chloride flush, sodium chloride flush, sodium chloride flush, acetaminophen **OR** acetaminophen, ibuprofen      Intake/Output Summary (Last 24 hours) at 9/6/2020 1121  Last data filed at 9/6/2020 0800  Gross per 24 hour   Intake 6255.16 ml   Output 2125 ml   Net 4130.16 ml

## 2020-09-06 NOTE — PROGRESS NOTES
Received call from Microbiology. Patient growing Coag (-) Staph in 1 blood culture bottle. Relayed message to Elisa Hall in pharmacy, Elisa Hall states patient's current Rocephin should provide coverage. Will monitor.

## 2020-09-06 NOTE — PROGRESS NOTES
Handoff report and transfer of care given at bedside to [facility]. Patient in stable condition, denies needs/concerns at this time. Call light within reach.

## 2020-09-06 NOTE — PROGRESS NOTES
Pulmonary & Critical Care Medicine ICU Progress Note    CC: COVID-19    Events of Last 24 hours:   RA   + hemoptysis   Still with shortness of breath    Invasive Lines: IV: PIVs - pulled his PICC overnight            / / /   No results for input(s): PHART, [id number], PO2ART in the last 72 hours. IV:   norepinephrine      sodium chloride 125 mL/hr at 20 2107       Vitals:  Blood pressure 133/73, pulse 64, temperature 99.1 °F (37.3 °C), temperature source Bladder, resp. rate 21, height 5' 9\" (1.753 m), weight 163 lb 12.8 oz (74.3 kg), SpO2 92 %. on RA   Temp  Av.6 °F (37.6 °C)  Min: 98.8 °F (37.1 °C)  Max: 100.1 °F (37.8 °C)    Intake/Output Summary (Last 24 hours) at 2020 0746  Last data filed at 2020 0725  Gross per 24 hour   Intake 6105.16 ml   Output 2325 ml   Net 3780.16 ml     EXAM:  Gen: Mild distress. Eyes: PERRL. No sclera icterus. No conjunctival injection. ENT: No discharge. Pharynx clear. Neck: Trachea midline. No obvious mass. Resp: + accessory muscle use. Basilar  crackles. No wheezes. No rhonchi. No dullness on percussion. CV: Regular rate. Regular rhythm. No murmur or rub. No edema. GI: Non-tender. Non-distended. No hernia. Skin: Warm and dry. No nodule on exposed extremities. Lymph: No cervical LAD. No supraclavicular LAD. M/S: No cyanosis. No joint deformity. No clubbing. Neuro: Awake. Alert. Moves all four extremities. + tremor   Psych: Oriented x 3.  No anxiety.        Scheduled Meds:   sodium chloride  20 mL Intravenous Once    remdesivir IVPB  100 mg Intravenous Q24H    mupirocin   Nasal BID    lidocaine 1 % injection  5 mL Intradermal Once    sodium chloride flush  10 mL Intravenous 2 times per day    lidocaine 1 % injection  5 mL Intradermal Once    sodium chloride flush  10 mL Intravenous 2 times per day    sertraline  100 mg Oral Nightly    cefTRIAXone (ROCEPHIN) IV  1 g Intravenous Q24H    azithromycin  500 mg Intravenous Q24H    sodium chloride flush  10 mL Intravenous 2 times per day    enoxaparin  30 mg Subcutaneous BID    dexamethasone  6 mg Oral Daily    carbidopa-levodopa  2 tablet Oral BID    sodium chloride  500 mL Intravenous Once     PRN Meds:  sodium chloride, sodium chloride flush, sodium chloride flush, sodium chloride flush, acetaminophen **OR** acetaminophen, ibuprofen    Results:  CBC:   Recent Labs     09/04/20  1646 09/05/20  0506 09/06/20  0451   WBC 4.3 11.0 9.0   HGB 13.8 13.3* 12.8*   HCT 41.9 39.9* 37.8*   MCV 93.3 93.4 92.4   * 113* 113*     BMP:   Recent Labs     09/04/20  1646 09/05/20  0506 09/06/20  0451    139 136   K 3.7 3.9 3.6    110 107   CO2 26 21 22   PHOS  --   --  1.8*   BUN 16 12 16   CREATININE 0.9 0.7* 0.7*     LIVER PROFILE:   Recent Labs     09/03/20  1620 09/04/20  1646 09/05/20  0506   AST 15 22 21   ALT <5* 6* 16   BILITOT 0.5 0.3 0.4   ALKPHOS 139* 147* 112       Cultures:  9/4 BC Staphylococcus coagulase negative DNA DetectedAbnormal       Films:  CT chest 9/4 imaging was reviewed by me and showed   1. Left lower lobe pneumonia  2.  Multiple age-indeterminate thoracic compression fractures.  If there is   point tenderness, recommend nonemergent MRI of the thoracic spine           ASSESSMENT:  · Acute hypoxemic respiratory failure  · Severe sepsis   · COVID-19 virus infection  · Bilateral LL Pneumonia   · Hemoptysis   · Thrombocytopenia   · Lymphopenia   · Acute encephalopathy/metabolic encephalopathy  · Fever up to 105        PLAN:  · Supplemental oxygen to maintain SaO2 >92%; wean as tolerated  · Droplet plus isolation (surgical mask, eye protection, gown, glove)  · Emergent Covid 19 testing  · Remdesivir day #2/5  · Decadron 6 mg day #2  · Status post convalescent plasma 2 units 9/5/2020  · D/C IVF  · IV antibiotics to include Ceftriaxone and Zithromax day #2  · Follow up Cx   · IVF  cc bolus PRN target SBP>90 up to 4 liters  · LFTs, PT/INR, CPK, ferritin, LDH, d-dimer, procalcitonin, CRP  · Will watch hemoptysis   · Avoid NEBs as able-albuterol MDI strongly preferred   · DVT prophylaxis: Lovenox 30 mg BID  · MRSA prophylaxis: Bactroban                Total critical care time caring for this patient with life threatening, unstable organ failure, including direct patient contact, management of life support systems, review of data including imaging and labs, discussions with other team members and physicians is 32 minutes so far today, excluding procedures.

## 2020-09-06 NOTE — PROGRESS NOTES
Shift re-assessment complete; see flow sheet. IV infusing without difficulty. Pt denies pain at this time. Pt denies any needs at this time. Call light within reach, bed in low locked position, bed alarm on. Telesitter in place for pt safety.

## 2020-09-06 NOTE — PROGRESS NOTES
Patient care assumed, assessment completed as charted. 2nd unit convalescent plasma completed at this time, patient tolerated without incident. NS continues at 125mL/hr through intact right upper arm PICC. Rubio catheter patent, patient repositioned for comfort. Patient currently on phone with family, states no further needs at this time. Will monitor.

## 2020-09-07 LAB
ANION GAP SERPL CALCULATED.3IONS-SCNC: 8 MMOL/L (ref 3–16)
BASOPHILS ABSOLUTE: 0 K/UL (ref 0–0.2)
BASOPHILS RELATIVE PERCENT: 0.1 %
BLOOD CULTURE, ROUTINE: NORMAL
BUN BLDV-MCNC: 19 MG/DL (ref 7–20)
C-REACTIVE PROTEIN: 57.4 MG/L (ref 0–5.1)
CALCIUM SERPL-MCNC: 8.5 MG/DL (ref 8.3–10.6)
CHLORIDE BLD-SCNC: 106 MMOL/L (ref 99–110)
CO2: 23 MMOL/L (ref 21–32)
CREAT SERPL-MCNC: 0.7 MG/DL (ref 0.8–1.3)
CULTURE, BLOOD 2: NORMAL
D DIMER: 290 NG/ML DDU (ref 0–229)
EOSINOPHILS ABSOLUTE: 0 K/UL (ref 0–0.6)
EOSINOPHILS RELATIVE PERCENT: 0 %
FERRITIN: 640.4 NG/ML (ref 30–400)
FIBRINOGEN: 372 MG/DL (ref 200–397)
GFR AFRICAN AMERICAN: >60
GFR NON-AFRICAN AMERICAN: >60
GLUCOSE BLD-MCNC: 87 MG/DL (ref 70–99)
HCT VFR BLD CALC: 38.2 % (ref 40.5–52.5)
HEMOGLOBIN: 12.7 G/DL (ref 13.5–17.5)
INR BLD: 1.17 (ref 0.86–1.14)
LYMPHOCYTES ABSOLUTE: 1 K/UL (ref 1–5.1)
LYMPHOCYTES RELATIVE PERCENT: 11.1 %
MAGNESIUM: 2 MG/DL (ref 1.8–2.4)
MCH RBC QN AUTO: 30.3 PG (ref 26–34)
MCHC RBC AUTO-ENTMCNC: 33.2 G/DL (ref 31–36)
MCV RBC AUTO: 91.1 FL (ref 80–100)
MONOCYTES ABSOLUTE: 0.9 K/UL (ref 0–1.3)
MONOCYTES RELATIVE PERCENT: 9.6 %
NEUTROPHILS ABSOLUTE: 7.1 K/UL (ref 1.7–7.7)
NEUTROPHILS RELATIVE PERCENT: 79.2 %
PDW BLD-RTO: 14.9 % (ref 12.4–15.4)
PHOSPHORUS: 2.1 MG/DL (ref 2.5–4.9)
PLATELET # BLD: 138 K/UL (ref 135–450)
PMV BLD AUTO: 8 FL (ref 5–10.5)
POTASSIUM SERPL-SCNC: 3.4 MMOL/L (ref 3.5–5.1)
PROCALCITONIN: 0.4 NG/ML (ref 0–0.15)
PROTHROMBIN TIME: 13.6 SEC (ref 10–13.2)
RBC # BLD: 4.2 M/UL (ref 4.2–5.9)
SODIUM BLD-SCNC: 137 MMOL/L (ref 136–145)
TOTAL CK: 207 U/L (ref 39–308)
WBC # BLD: 9 K/UL (ref 4–11)

## 2020-09-07 PROCEDURE — 6360000002 HC RX W HCPCS: Performed by: INTERNAL MEDICINE

## 2020-09-07 PROCEDURE — 86140 C-REACTIVE PROTEIN: CPT

## 2020-09-07 PROCEDURE — 1200000000 HC SEMI PRIVATE

## 2020-09-07 PROCEDURE — 82728 ASSAY OF FERRITIN: CPT

## 2020-09-07 PROCEDURE — 85025 COMPLETE CBC W/AUTO DIFF WBC: CPT

## 2020-09-07 PROCEDURE — 83735 ASSAY OF MAGNESIUM: CPT

## 2020-09-07 PROCEDURE — 2500000003 HC RX 250 WO HCPCS: Performed by: INTERNAL MEDICINE

## 2020-09-07 PROCEDURE — 85384 FIBRINOGEN ACTIVITY: CPT

## 2020-09-07 PROCEDURE — 2580000003 HC RX 258: Performed by: INTERNAL MEDICINE

## 2020-09-07 PROCEDURE — 6370000000 HC RX 637 (ALT 250 FOR IP): Performed by: INTERNAL MEDICINE

## 2020-09-07 PROCEDURE — 6370000000 HC RX 637 (ALT 250 FOR IP): Performed by: HOSPITALIST

## 2020-09-07 PROCEDURE — 99233 SBSQ HOSP IP/OBS HIGH 50: CPT | Performed by: INTERNAL MEDICINE

## 2020-09-07 PROCEDURE — 82550 ASSAY OF CK (CPK): CPT

## 2020-09-07 PROCEDURE — 36415 COLL VENOUS BLD VENIPUNCTURE: CPT

## 2020-09-07 PROCEDURE — 84100 ASSAY OF PHOSPHORUS: CPT

## 2020-09-07 PROCEDURE — 85379 FIBRIN DEGRADATION QUANT: CPT

## 2020-09-07 PROCEDURE — 84145 PROCALCITONIN (PCT): CPT

## 2020-09-07 PROCEDURE — 85610 PROTHROMBIN TIME: CPT

## 2020-09-07 PROCEDURE — 80048 BASIC METABOLIC PNL TOTAL CA: CPT

## 2020-09-07 RX ORDER — GABAPENTIN 300 MG/1
300 CAPSULE ORAL 3 TIMES DAILY
Status: DISCONTINUED | OUTPATIENT
Start: 2020-09-07 | End: 2020-09-09 | Stop reason: HOSPADM

## 2020-09-07 RX ORDER — POTASSIUM CHLORIDE 20 MEQ/1
20 TABLET, EXTENDED RELEASE ORAL ONCE
Status: COMPLETED | OUTPATIENT
Start: 2020-09-07 | End: 2020-09-07

## 2020-09-07 RX ADMIN — Medication 10 ML: at 09:58

## 2020-09-07 RX ADMIN — GABAPENTIN 300 MG: 300 CAPSULE ORAL at 15:52

## 2020-09-07 RX ADMIN — CARBIDOPA AND LEVODOPA 2 TABLET: 25; 100 TABLET ORAL at 21:05

## 2020-09-07 RX ADMIN — DEXAMETHASONE 6 MG: 4 TABLET ORAL at 09:57

## 2020-09-07 RX ADMIN — SERTRALINE 100 MG: 100 TABLET, FILM COATED ORAL at 21:05

## 2020-09-07 RX ADMIN — CARBIDOPA AND LEVODOPA 2 TABLET: 25; 100 TABLET ORAL at 09:57

## 2020-09-07 RX ADMIN — ENOXAPARIN SODIUM 30 MG: 30 INJECTION, SOLUTION INTRAVENOUS; SUBCUTANEOUS at 21:05

## 2020-09-07 RX ADMIN — REMDESIVIR 100 MG: 100 INJECTION, POWDER, LYOPHILIZED, FOR SOLUTION INTRAVENOUS at 09:57

## 2020-09-07 RX ADMIN — Medication 10 ML: at 21:06

## 2020-09-07 RX ADMIN — CEFTRIAXONE 1 G: 1 INJECTION, POWDER, FOR SOLUTION INTRAMUSCULAR; INTRAVENOUS at 12:00

## 2020-09-07 RX ADMIN — Medication 10 ML: at 09:59

## 2020-09-07 RX ADMIN — POTASSIUM CHLORIDE 20 MEQ: 1500 TABLET, EXTENDED RELEASE ORAL at 12:19

## 2020-09-07 RX ADMIN — GABAPENTIN 300 MG: 300 CAPSULE ORAL at 21:05

## 2020-09-07 RX ADMIN — AZITHROMYCIN DIHYDRATE 500 MG: 500 INJECTION, POWDER, LYOPHILIZED, FOR SOLUTION INTRAVENOUS at 21:04

## 2020-09-07 RX ADMIN — ENOXAPARIN SODIUM 30 MG: 30 INJECTION, SOLUTION INTRAVENOUS; SUBCUTANEOUS at 09:58

## 2020-09-07 NOTE — PLAN OF CARE
Problem: Falls - Risk of:  Goal: Will remain free from falls  Description: Will remain free from falls  9/6/2020 2327 by Frank Amato RN  Outcome: Ongoing     Problem: Airway Clearance - Ineffective  Goal: Achieve or maintain patent airway  9/6/2020 2327 by Frank Amato RN  Outcome: Ongoing     Problem: Gas Exchange - Impaired  Goal: Absence of hypoxia  9/6/2020 2327 by Frank Amato RN  Outcome: Ongoing     Problem:  Body Temperature -  Risk of, Imbalanced  Goal: Ability to maintain a body temperature within defined limits  9/6/2020 2327 by Frank Amato RN  Outcome: Ongoing

## 2020-09-07 NOTE — PROGRESS NOTES
\    Hospitalist Progress Note      PCP: Phoebe Francois MD    Date of Admission: 9/4/2020    Hospital Course  : Patient with history of Parkinson's disease 3 to 4 days of fever shortness of breath  Admitted  9/4/20diagnosed to have pneumonia,acute  respiratory failure needing oxygen   hypotension and also COVID 19+  And was hypotensive in the emergency room received 4 L of fluid. Subjective:     Patient has resting tremors ,lives at home with his wife  Patient uses a walker to ambulate at home . his daughter as his primary caretaker.    Cough with some phlegm  CT scan of the chest showed left lower lobe pneumonia  Since admission has been treated with Decadron remdesvir ,Cplasma and antibiotic   feeling ok  hemoptysis better     o2 sat  91 room air    Medications:  Reviewed    Infusion Medications     Scheduled Medications    sodium chloride  20 mL Intravenous Once    remdesivir IVPB  100 mg Intravenous Q24H    mupirocin   Nasal BID    lidocaine 1 % injection  5 mL Intradermal Once    sodium chloride flush  10 mL Intravenous 2 times per day    lidocaine 1 % injection  5 mL Intradermal Once    sodium chloride flush  10 mL Intravenous 2 times per day    sertraline  100 mg Oral Nightly    cefTRIAXone (ROCEPHIN) IV  1 g Intravenous Q24H    azithromycin  500 mg Intravenous Q24H    sodium chloride flush  10 mL Intravenous 2 times per day    enoxaparin  30 mg Subcutaneous BID    dexamethasone  6 mg Oral Daily    carbidopa-levodopa  2 tablet Oral BID    sodium chloride  500 mL Intravenous Once     PRN Meds: sodium chloride, sodium chloride flush, sodium chloride flush, sodium chloride flush, acetaminophen **OR** acetaminophen, ibuprofen      Intake/Output Summary (Last 24 hours) at 9/7/2020 1326  Last data filed at 9/7/2020 1220  Gross per 24 hour   Intake 240 ml   Output 700 ml   Net -460 ml       Physical Exam Performed:    BP (!) 120/58   Pulse 56   Temp 97.1 °F (36.2 °C) (Oral)   Resp 21   Ht 5' 9\" (1.753 m)   Wt 157 lb 4.8 oz (71.4 kg)   SpO2 91%   BMI 23.23 kg/m²     General appearance: No apparent distress, appears stated age and cooperative. HEENT: Pupils equal, round, and reactive to light. Conjunctivae/corneas clear. Neck: Supple, with full range of motion. No jugular venous distention. Trachea midline. Respiratory:  Normal respiratory effort. Cardiovascular: Regular rate and rhythm with normal S1/S2 without murmurs, rubs or gallops. Abdomen: Soft, non-tender, non-distended with normal bowel sounds. Musculoskeletal: No clubbing, cyanosis or edema bilaterally. Full range of motion without deformity. Skin: Skin color, texture, turgor normal.  No rashes or lesions. Neurologic:  Neurovascularly intact without any focal sensory/motor deficits. Cranial nerves: II-XII intact, grossly non-focal.  Resting tremors  Psychiatric: Alert and oriented,      Labs:   Recent Labs     09/05/20  0506 09/06/20 0451 09/07/20  0624   WBC 11.0 9.0 9.0   HGB 13.3* 12.8* 12.7*   HCT 39.9* 37.8* 38.2*   * 113* 138     Recent Labs     09/05/20  0506 09/06/20  0451 09/07/20  0624    136 137   K 3.9 3.6 3.4*    107 106   CO2 21 22 23   BUN 12 16 19   CREATININE 0.7* 0.7* 0.7*   CALCIUM 7.7* 8.3 8.5   PHOS  --  1.8* 2.1*     Recent Labs     09/04/20  1646 09/05/20  0506   AST 22 21   ALT 6* 16   BILITOT 0.3 0.4   ALKPHOS 147* 112     Recent Labs     09/05/20  0506 09/06/20  0451 09/07/20  0624   INR 1.09 1.11 1.17*     Recent Labs     09/04/20  1646 09/05/20  0506 09/06/20  0451 09/07/20  0624   CKTOTAL  --  109 150 207   TROPONINI <0.01  --   --   --        Radiology:  IR PICC WO SQ PORT/PUMP > 5 YEARS   Final Result   Successful placement of PICC line. CT CHEST PULMONARY EMBOLISM W CONTRAST   Final Result   1. Left lower lobe pneumonia. Recommend chest radiograph in 8 weeks to   confirm resolution. 2. Multiple age-indeterminate thoracic compression fractures.   If there is   point tenderness, recommend nonemergent MRI of the thoracic spine. XR CHEST PORTABLE   Final Result   Worsening bilateral infiltrates compared to prior study                 Assessment/Plan:    Active Hospital Problems    Diagnosis    Hemoptysis [R04.2]    Acute hypoxemic respiratory failure ([address] [J96.01]    Severe sepsis ([address] [A41.9, R65.20]    COVID-19 virus infection [U07.1]    Pneumonia of both lower lobes due to infectious organism ([address] [J18.1]    Thrombocytopenia ([address] [D69.6]    Lymphopenia [D72.810]    Acute encephalopathy [G93.40]    Sepsis ([address] [A41.9]     Severe sepsis requirED 4 L fluid bolus  Due to bilateral pneumonia likely due to COVID-19    Decadron and Remdesvir,D3   CCP  9/5  CRP 91,Follow    Acute hypoxemic respiratory failure  o2  Due to pneumonia  PCTlow  atb Rocephin/Zithrod D4    Parkinson. s continue home meds    Thoracic compression fractures noted patient is not complaining of pain    Bradycardic asymtomatic  Monitor     Need therapy due to pparkinsons  and o2 moniotor    DVT Prophylaxis: lovnex bid   Diet: DIET DYSPHAGIA SOFT AND BITE-SIZED;      Code Status: Full Code    PT/OT Eval Status:     Mason Jansen MD

## 2020-09-07 NOTE — PROGRESS NOTES
Pt resting in bed at this time. No distress noted. PCA providing care and said that pt was swatting at a what he called \"a swarm of gnats\" in his room. No gnats seen. No current signs of confusion or hallucinating.

## 2020-09-07 NOTE — PROGRESS NOTES
Pt assisted up to recliner chair with assist x 1. Denies needs at this time. Chair alarm in place for pt safety. Chair not in reclined position.

## 2020-09-07 NOTE — PROGRESS NOTES
Pulmonary & Critical Care Medicine ICU Progress Note    CC: COVID-19    Events of Last 24 hours:   Room air  This comfortable today  No more hemoptysis  Blood pressure improved    Invasive Lines: IV: PIVs            / / /   No results for input(s): PHART, [id number], PO2ART in the last 72 hours. IV:      Vitals:  Blood pressure (!) 143/73, pulse 54, temperature 97.5 °F (36.4 °C), temperature source Oral, resp. rate 22, height 5' 9\" (1.753 m), weight 157 lb 4.8 oz (71.4 kg), SpO2 90 %. on RA   Temp  Av.4 °F (36.9 °C)  Min: 97.3 °F (36.3 °C)  Max: 99.5 °F (37.5 °C)    Intake/Output Summary (Last 24 hours) at 2020 0707  Last data filed at 2020 2150  Gross per 24 hour   Intake 2477.08 ml   Output 1100 ml   Net 1377.08 ml     EXAM:  Gen:  No distress. Eyes: PERRL. No sclera icterus. No conjunctival injection. ENT: No discharge. Pharynx clear. Neck: Trachea midline. No obvious mass. Resp:  Mild accessory muscle use. Bibasilar crackles. No wheezes. No rhonchi. No dullness on percussion. CV: Regular rate. Regular rhythm. No murmur or rub. No edema. GI: Non-tender. Non-distended. No hernia. Skin: Warm and dry. No nodule on exposed extremities. Lymph: No cervical LAD. No supraclavicular LAD. M/S: No cyanosis. No joint deformity. No clubbing. Neuro: Awake. Alert. Moves all four extremities. + tremor   Psych: Oriented x 3.  No anxiety.        Scheduled Meds:   sodium chloride  20 mL Intravenous Once    remdesivir IVPB  100 mg Intravenous Q24H    mupirocin   Nasal BID    lidocaine 1 % injection  5 mL Intradermal Once    sodium chloride flush  10 mL Intravenous 2 times per day    lidocaine 1 % injection  5 mL Intradermal Once    sodium chloride flush  10 mL Intravenous 2 times per day    sertraline  100 mg Oral Nightly    cefTRIAXone (ROCEPHIN) IV  1 g Intravenous Q24H    azithromycin  500 mg Intravenous Q24H    sodium chloride flush  10 mL Intravenous 2 times per day    enoxaparin 30 mg Subcutaneous BID    dexamethasone  6 mg Oral Daily    carbidopa-levodopa  2 tablet Oral BID    sodium chloride  500 mL Intravenous Once     PRN Meds:  sodium chloride, sodium chloride flush, sodium chloride flush, sodium chloride flush, acetaminophen **OR** acetaminophen, ibuprofen    Results:  CBC:   Recent Labs     09/05/20  0506 09/06/20  0451 09/07/20  0624   WBC 11.0 9.0 9.0   HGB 13.3* 12.8* 12.7*   HCT 39.9* 37.8* 38.2*   MCV 93.4 92.4 91.1   * 113* 138     BMP:   Recent Labs     09/04/20  1646 09/05/20  0506 09/06/20  0451    139 136   K 3.7 3.9 3.6    110 107   CO2 26 21 22   PHOS  --   --  1.8*   BUN 16 12 16   CREATININE 0.9 0.7* 0.7*     LIVER PROFILE:   Recent Labs     09/04/20  1646 09/05/20  0506   AST 22 21   ALT 6* 16   BILITOT 0.3 0.4   ALKPHOS 147* 112       Cultures:  9/4 BC Staphylococcus coagulase negative DNA DetectedAbnormal       Films:  CT chest 9/4 imaging was reviewed by me and showed   1. Left lower lobe pneumonia  2.  Multiple age-indeterminate thoracic compression fractures.  If there is   point tenderness, recommend nonemergent MRI of the thoracic spine           ASSESSMENT:  · Acute hypoxemic respiratory failure  · Severe sepsis   · COVID-19 virus infection  · Bilateral LL Pneumonia   · Hemoptysis-none today  · Thrombocytopenia -improving  · Lymphopenia -improving  · Acute encephalopathy/metabolic encephalopathy-improved  · Fever up to 105        PLAN:  · Supplemental oxygen to maintain SaO2 >92%; wean as tolerated  · Droplet plus isolation (surgical mask, eye protection, gown, glove)  · Emergent Covid 19 testing  · Remdesivir day #3/5  · Decadron 6 mg day #3  · Status post convalescent plasma 2 units 9/5/2020  · Ceftriaxone and Zithromax day #3  · Follow up Cx   · Watch hemoptysis  · Avoid NEBs as able-albuterol MDI strongly preferred   · DVT prophylaxis: Lovenox 30 mg BID  · MRSA prophylaxis: Bactroban

## 2020-09-07 NOTE — PLAN OF CARE
Problem: Falls - Risk of:  Goal: Will remain free from falls  Description: Will remain free from falls  9/7/2020 1944 by Alexus House RN  Outcome: Ongoing  9/7/2020 1754 by Susan Barber RN  Outcome: Ongoing  Goal: Absence of physical injury  Description: Absence of physical injury  Outcome: Ongoing     Problem: Airway Clearance - Ineffective  Goal: Achieve or maintain patent airway  9/7/2020 1944 by Alexus House RN  Outcome: Ongoing  9/7/2020 1754 by Susan Barber RN  Outcome: Ongoing     Problem: Gas Exchange - Impaired  Goal: Absence of hypoxia  9/7/2020 1944 by Alexus House RN  Outcome: Ongoing  9/7/2020 1754 by Susan Barber RN  Outcome: Ongoing  Goal: Promote optimal lung function  9/7/2020 1944 by Alexus House RN  Outcome: Ongoing  9/7/2020 1754 by Susan Barber RN  Outcome: Ongoing     Problem: Breathing Pattern - Ineffective  Goal: Ability to achieve and maintain a regular respiratory rate  9/7/2020 1944 by Alexus House RN  Outcome: Ongoing  9/7/2020 1754 by Susan Barber RN  Outcome: Ongoing     Problem:  Body Temperature -  Risk of, Imbalanced  Goal: Ability to maintain a body temperature within defined limits  9/7/2020 1944 by Alexus House RN  Outcome: Ongoing  9/7/2020 1754 by Susan Barber RN  Outcome: Ongoing  Goal: Will regain or maintain usual level of consciousness  Outcome: Ongoing  Goal: Complications related to the disease process, condition or treatment will be avoided or minimized  Outcome: Ongoing     Problem: Isolation Precautions - Risk of Spread of Infection  Goal: Prevent transmission of infection  Outcome: Ongoing     Problem: Nutrition Deficits  Goal: Optimize nutrtional status  Outcome: Ongoing     Problem: Risk for Fluid Volume Deficit  Goal: Maintain normal heart rhythm  Outcome: Ongoing  Goal: Maintain absence of muscle cramping  Outcome: Ongoing  Goal: Maintain normal serum potassium, sodium, calcium, phosphorus, and pH  Outcome: Ongoing Ongoing  Goal: Verbalizations of feeling emotionally comfortable while being cared for will increase  Description: Verbalizations of feeling emotionally comfortable while being cared for will increase  Outcome: Ongoing     Problem: Psychomotor Activity - Altered:  Goal: Absence of psychomotor disturbance signs and symptoms  Description: Absence of psychomotor disturbance signs and symptoms  Outcome: Ongoing     Problem: Sensory Perception - Impaired:  Goal: Demonstrations of improved sensory functioning will increase  Description: Demonstrations of improved sensory functioning will increase  Outcome: Ongoing  Goal: Decrease in sensory misperception frequency  Description: Decrease in sensory misperception frequency  Outcome: Ongoing  Goal: Able to refrain from responding to false sensory perceptions  Description: Able to refrain from responding to false sensory perceptions  Outcome: Ongoing  Goal: Demonstrates accurate environmental perceptions  Description: Demonstrates accurate environmental perceptions  Outcome: Ongoing  Goal: Able to distinguish between reality-based and nonreality-based thinking  Description: Able to distinguish between reality-based and nonreality-based thinking  Outcome: Ongoing  Goal: Able to interrupt nonreality-based thinking  Description: Able to interrupt nonreality-based thinking  Outcome: Ongoing     Problem: Sleep Pattern Disturbance:  Goal: Appears well-rested  Description: Appears well-rested  Outcome: Ongoing

## 2020-09-07 NOTE — PROGRESS NOTES
Patient alert. Denies any needs at this time. Side rails up x 2, bed in lowest position, wheels locked, bed alarm on, call light and bed side table in reach. Blood pressure (!) 143/73, pulse 54, temperature 97.5 °F (36.4 °C), temperature source Oral, resp. rate 22, height 5' 9\" (1.753 m), weight 157 lb 4.8 oz (71.4 kg), SpO2 90 %. Respirations easy/even. No s.s of shortness of breath noted.      Electronically signed by Dwight Fuller RN on 9/7/2020 at 2:42 AM

## 2020-09-07 NOTE — FLOWSHEET NOTE
09/07/20 0901   Vital Signs   Temp 97.2 °F (36.2 °C)   Temp Source Oral   Heart Rate Source Monitor   Resp 22   /74   BP Location Left upper arm   Patient Position Sitting   Level of Consciousness 0   Oxygen Therapy   SpO2 92 %   O2 Device None (Room air)   AM assessment completed, see flow sheet. Pt is alert and oriented. Vital signs are WNL. Respirations are even & easy. No complaints voiced. Pt denies needs at this time. SR up x 2, and bed in low position. Call light is within reach.

## 2020-09-07 NOTE — PLAN OF CARE
Problem: Falls - Risk of:  Goal: Will remain free from falls  Description: Will remain free from falls  9/7/2020 0036 by Zack Guillen RN  Outcome: Ongoing     Problem: Airway Clearance - Ineffective  Goal: Achieve or maintain patent airway  9/7/2020 0036 by Zack Guillen RN  Outcome: Ongoing     Problem: Gas Exchange - Impaired  Goal: Absence of hypoxia  9/7/2020 0036 by Zack Guillen RN  Outcome: Ongoing     Problem: Breathing Pattern - Ineffective  Goal: Ability to achieve and maintain a regular respiratory rate  9/7/2020 0036 by Zack Guillen RN  Outcome: Ongoing

## 2020-09-07 NOTE — PROGRESS NOTES
Verbal order from Dr. Margaux Crane to give 20meq PO of KCL for potassium level of 3.4. Also made him aware of preliminary blood cultures showing Staphylococcus coagulase.

## 2020-09-07 NOTE — PLAN OF CARE
Problem: Falls - Risk of:  Goal: Will remain free from falls  Description: Will remain free from falls  Outcome: Ongoing     Problem: Airway Clearance - Ineffective  Goal: Achieve or maintain patent airway  Outcome: Ongoing     Problem: Gas Exchange - Impaired  Goal: Absence of hypoxia  Outcome: Ongoing  Goal: Promote optimal lung function  Outcome: Ongoing     Problem: Breathing Pattern - Ineffective  Goal: Ability to achieve and maintain a regular respiratory rate  Outcome: Ongoing     Problem:  Body Temperature -  Risk of, Imbalanced  Goal: Ability to maintain a body temperature within defined limits  Outcome: Ongoing     Problem: Injury - Risk of, Physical Injury:  Goal: Will remain free from falls  Description: Will remain free from falls  Outcome: Ongoing

## 2020-09-08 LAB
ANION GAP SERPL CALCULATED.3IONS-SCNC: 11 MMOL/L (ref 3–16)
BASOPHILS ABSOLUTE: 0.1 K/UL (ref 0–0.2)
BASOPHILS RELATIVE PERCENT: 0.7 %
BLOOD CULTURE, ROUTINE: ABNORMAL
BLOOD CULTURE, ROUTINE: ABNORMAL
BUN BLDV-MCNC: 17 MG/DL (ref 7–20)
C-REACTIVE PROTEIN: 39.3 MG/L (ref 0–5.1)
CALCIUM SERPL-MCNC: 8.7 MG/DL (ref 8.3–10.6)
CHLORIDE BLD-SCNC: 106 MMOL/L (ref 99–110)
CO2: 23 MMOL/L (ref 21–32)
CREAT SERPL-MCNC: 0.6 MG/DL (ref 0.8–1.3)
CULTURE, BLOOD 2: NORMAL
D DIMER: 235 NG/ML DDU (ref 0–229)
EOSINOPHILS ABSOLUTE: 0 K/UL (ref 0–0.6)
EOSINOPHILS RELATIVE PERCENT: 0.1 %
FERRITIN: 625 NG/ML (ref 30–400)
FIBRINOGEN: 393 MG/DL (ref 200–397)
GFR AFRICAN AMERICAN: >60
GFR NON-AFRICAN AMERICAN: >60
GLUCOSE BLD-MCNC: 86 MG/DL (ref 70–99)
HCT VFR BLD CALC: 40.4 % (ref 40.5–52.5)
HEMOGLOBIN: 13.8 G/DL (ref 13.5–17.5)
INR BLD: 1.21 (ref 0.86–1.14)
LYMPHOCYTES ABSOLUTE: 1 K/UL (ref 1–5.1)
LYMPHOCYTES RELATIVE PERCENT: 11.3 %
MAGNESIUM: 2 MG/DL (ref 1.8–2.4)
MCH RBC QN AUTO: 31.2 PG (ref 26–34)
MCHC RBC AUTO-ENTMCNC: 34 G/DL (ref 31–36)
MCV RBC AUTO: 91.6 FL (ref 80–100)
MONOCYTES ABSOLUTE: 0.9 K/UL (ref 0–1.3)
MONOCYTES RELATIVE PERCENT: 9.9 %
NEUTROPHILS ABSOLUTE: 6.8 K/UL (ref 1.7–7.7)
NEUTROPHILS RELATIVE PERCENT: 78 %
ORGANISM: ABNORMAL
ORGANISM: ABNORMAL
PDW BLD-RTO: 14.5 % (ref 12.4–15.4)
PHOSPHORUS: 2.8 MG/DL (ref 2.5–4.9)
PLATELET # BLD: 142 K/UL (ref 135–450)
PMV BLD AUTO: 8.2 FL (ref 5–10.5)
POTASSIUM SERPL-SCNC: 3.7 MMOL/L (ref 3.5–5.1)
PROTHROMBIN TIME: 14.1 SEC (ref 10–13.2)
RBC # BLD: 4.41 M/UL (ref 4.2–5.9)
SODIUM BLD-SCNC: 140 MMOL/L (ref 136–145)
TOTAL CK: 119 U/L (ref 39–308)
WBC # BLD: 8.7 K/UL (ref 4–11)

## 2020-09-08 PROCEDURE — 82728 ASSAY OF FERRITIN: CPT

## 2020-09-08 PROCEDURE — 6370000000 HC RX 637 (ALT 250 FOR IP): Performed by: HOSPITALIST

## 2020-09-08 PROCEDURE — 99233 SBSQ HOSP IP/OBS HIGH 50: CPT | Performed by: INTERNAL MEDICINE

## 2020-09-08 PROCEDURE — 82550 ASSAY OF CK (CPK): CPT

## 2020-09-08 PROCEDURE — 80048 BASIC METABOLIC PNL TOTAL CA: CPT

## 2020-09-08 PROCEDURE — 97162 PT EVAL MOD COMPLEX 30 MIN: CPT

## 2020-09-08 PROCEDURE — 97116 GAIT TRAINING THERAPY: CPT

## 2020-09-08 PROCEDURE — 84100 ASSAY OF PHOSPHORUS: CPT

## 2020-09-08 PROCEDURE — 2500000003 HC RX 250 WO HCPCS: Performed by: INTERNAL MEDICINE

## 2020-09-08 PROCEDURE — 97166 OT EVAL MOD COMPLEX 45 MIN: CPT

## 2020-09-08 PROCEDURE — 85384 FIBRINOGEN ACTIVITY: CPT

## 2020-09-08 PROCEDURE — 97112 NEUROMUSCULAR REEDUCATION: CPT

## 2020-09-08 PROCEDURE — 99232 SBSQ HOSP IP/OBS MODERATE 35: CPT | Performed by: INTERNAL MEDICINE

## 2020-09-08 PROCEDURE — 85610 PROTHROMBIN TIME: CPT

## 2020-09-08 PROCEDURE — 83735 ASSAY OF MAGNESIUM: CPT

## 2020-09-08 PROCEDURE — 97530 THERAPEUTIC ACTIVITIES: CPT

## 2020-09-08 PROCEDURE — 2580000003 HC RX 258: Performed by: INTERNAL MEDICINE

## 2020-09-08 PROCEDURE — 85025 COMPLETE CBC W/AUTO DIFF WBC: CPT

## 2020-09-08 PROCEDURE — 6370000000 HC RX 637 (ALT 250 FOR IP): Performed by: INTERNAL MEDICINE

## 2020-09-08 PROCEDURE — 6360000002 HC RX W HCPCS: Performed by: INTERNAL MEDICINE

## 2020-09-08 PROCEDURE — 86140 C-REACTIVE PROTEIN: CPT

## 2020-09-08 PROCEDURE — 36415 COLL VENOUS BLD VENIPUNCTURE: CPT

## 2020-09-08 PROCEDURE — 97535 SELF CARE MNGMENT TRAINING: CPT

## 2020-09-08 PROCEDURE — 92526 ORAL FUNCTION THERAPY: CPT

## 2020-09-08 PROCEDURE — 85379 FIBRIN DEGRADATION QUANT: CPT

## 2020-09-08 PROCEDURE — 97165 OT EVAL LOW COMPLEX 30 MIN: CPT

## 2020-09-08 PROCEDURE — 1200000000 HC SEMI PRIVATE

## 2020-09-08 RX ADMIN — Medication 10 ML: at 08:22

## 2020-09-08 RX ADMIN — AZITHROMYCIN DIHYDRATE 500 MG: 500 INJECTION, POWDER, LYOPHILIZED, FOR SOLUTION INTRAVENOUS at 21:00

## 2020-09-08 RX ADMIN — ENOXAPARIN SODIUM 30 MG: 30 INJECTION, SOLUTION INTRAVENOUS; SUBCUTANEOUS at 21:04

## 2020-09-08 RX ADMIN — CARBIDOPA AND LEVODOPA 2 TABLET: 25; 100 TABLET ORAL at 20:59

## 2020-09-08 RX ADMIN — GABAPENTIN 300 MG: 300 CAPSULE ORAL at 08:21

## 2020-09-08 RX ADMIN — CARBIDOPA AND LEVODOPA 2 TABLET: 25; 100 TABLET ORAL at 08:21

## 2020-09-08 RX ADMIN — GABAPENTIN 300 MG: 300 CAPSULE ORAL at 21:00

## 2020-09-08 RX ADMIN — CEFTRIAXONE 1 G: 1 INJECTION, POWDER, FOR SOLUTION INTRAMUSCULAR; INTRAVENOUS at 08:21

## 2020-09-08 RX ADMIN — Medication 10 ML: at 20:45

## 2020-09-08 RX ADMIN — DEXAMETHASONE 6 MG: 4 TABLET ORAL at 08:21

## 2020-09-08 RX ADMIN — GABAPENTIN 300 MG: 300 CAPSULE ORAL at 15:18

## 2020-09-08 RX ADMIN — REMDESIVIR 100 MG: 100 INJECTION, POWDER, LYOPHILIZED, FOR SOLUTION INTRAVENOUS at 11:08

## 2020-09-08 RX ADMIN — SERTRALINE 100 MG: 100 TABLET, FILM COATED ORAL at 20:59

## 2020-09-08 RX ADMIN — ENOXAPARIN SODIUM 30 MG: 30 INJECTION, SOLUTION INTRAVENOUS; SUBCUTANEOUS at 08:21

## 2020-09-08 ASSESSMENT — PAIN SCALES - GENERAL: PAINLEVEL_OUTOF10: 0

## 2020-09-08 NOTE — PROGRESS NOTES
Pt sitting up in chair at this time. No distress noted. All needs and call light within reach. Will monitor.

## 2020-09-08 NOTE — PROGRESS NOTES
bar:     Preadmission Status:  Pt. Able to drive: No - wife drives  Pt Fully independent with ADLs: No  Pt. Required assistance from family for: Dressing              Wife assists with dressing when pt wears T-shirts (does okay with all other dressing)             Wife does cooking and laundry             [facility] ([facility]) 1x/wk for cleaning  Pt. independent for transfers and gait and walked with U-step walker   History of falls          Yes - a few months ago, tripped over throw rug (has since removed the rugs), bruised R elbow and shoulder  Had both home PT/OT recently     Pain   No     Cognition    A&O Person, Place and Time (month, year, able to state that it is the beginning of the month with extra time)   Able to follow 2 step commands    Subjective  Patient lying supine in bed with no family present - no visitors present due to COVID-19 restrictions  Pt agreeable to this OT eval & tx.      Upper Extremity ROM:    B limited shoulder flexion-R to 110 degrees  L to 95 degrees    Upper Extremity Strength:    BUE strength impaired but not formally assessed w/ MMT    Upper Extremity Sensation    WFL    Upper Extremity Proprioception:  WFL    Coordination and Tone  Impaired due to resting tremors    Balance  Functional Sitting Balance:  WFL  Functional Standing Balance:Impaired slightly (dynamic-tends to lean back at times when stepping backwards)    Bed mobility:    Supine to Sit:                      SBA with HOB elevated and use of rail  Sit to Supine:                      Not Tested  Rolling:                     SBA and use of rail  Scooting in sitting:    Independent  Scooting in supine:  Min A , pt able to set up UE and LEs without cues  Bridge:                      Supervision     Transfer Training                Sit to stand:              SBA from bed and commode, VC for hand placement with both  Stand to sit:              SBA to commode and chair  Bed to Chair:                       Not Tested with use of N/A - pt ambulated    Dressing:      UE: Min A  LE:    Min A- assist to position socks. Pt donned pants without difficulty    Bathing:    UE:  Not Tested  LE:  Min A for buttocks    Eating:   Not Tested    Toileting:  Min A to clean thoroughly after BM. Pt demo'd ability to wipe buttocks    Activity Tolerance   Pt completed therapy session with SOB noted with ambulation and prolonged standing   Supine (at rest) SpO2: 93-94% on RA  HR: 56 bpm  BP: 145/77  Sitting EOB SpO2: 94% on RA  HR:  51 bpm  BP: 126/75  After ambulation SpO2: 90-91% on RA with ambulation  HR: 65 bpm  Sitting (end of session) SpO2: 94% on RA    Positioning Needs:   Pt completed therapy session with SOB noted with ambulation and prolonged standing   Supine (at rest) SpO2: 93-94% on RA  HR: 56 bpm  BP: 145/77  Sitting EOB SpO2: 94% on RA  HR:  51 bpm  BP: 126/75  After ambulation SpO2: 90-91% on RA with ambulation  HR: 65 bpm  Sitting (end of session) SpO2: 94% on RA    Exercise / Activities Initiated:   Shoulder flex/ext-instructed on UE ROM importance-can be done in supine for stretching    Patient/Family Education:   Role of OT  Recommendations for DC  Safe RW use/hand placement   ADL retraining    Assessment of Deficits: Pt seen for Occupational therapy evaluation in acute care setting. Pt demonstrated decreased Activity tolerance, ADLs, Balance , Bed mobility, Strength, Transfers and Cognition. Pt functioning below baseline and will likely benefit from skilled occupational therapy services to maximize safety and independence. Goal(s) : To be met in 3 Visits:  1). Bed to toilet/BSC: Supervision    To be met in 5 Visits:  1). Supine to/from Sit:  Modified Independent  2). Upper Body Bathing:   Independent  3). Lower Body Bathing:   SBA  4). Upper Body Dressing:  Min A  5). Lower Body Dressing:  SBA  6). Pt to demonstrate UE exs x 15 reps with minimal cues    Rehabilitation Potential:  Good for goals listed above.     Strengths for

## 2020-09-08 NOTE — FLOWSHEET NOTE
09/08/20 1525   Encounter Summary   Services provided to: Patient   Referral/Consult From: [facility]   (9/8/Patient phone support.  Prayed with patient.)   Complexity of Encounter Moderate   Length of Encounter 15 minutes   Spiritual/Taoist   Type Spiritual support   Assessment Approachable   Intervention Explored feelings, thoughts, concerns;Sustaining presence/ Ministry of presence   Outcome Expressed gratitude;Engaged in conversation

## 2020-09-08 NOTE — CARE COORDINATION
Case Management Assessment  Initial Evaluation      Patient Name: Yara Tucker  YOB: 1945  Diagnosis: Sepsis [facility]) [A41.9]  Date / Time: 9/4/2020  4:23 PM    Admission status/Date:9/4/2020 inpt  Chart Reviewed: Yes      Patient Interviewed: Yes   Family Interviewed:  No      Hospitalization in the last 30 days:  No    Jayashree 23 Maker:Yes    Met with: pt  Interview conducted  (bedside/phone):telephone    Current PCP:   Dr Bean Macdonald required for SNF : Y          3 night stay required -  N    ADLS  Support Systems/Care Needs:    Transportation: self    Meal Preparation: spouse    Housing  Living Arrangements: lives in 1 story house with spouse; dtr lives next door Steps: 3  Intent for return to present living arrangements: Yes  Identified Issues:     Home Care Information  Active with [address] : No Agency:(Services)     Passport/Waiver : No  :                      Phone Number:    Passport/Waiver Services:   [facility] 1x per week for housekeeping with [facility] [address]   DME Provider: none  Equipment:   Walker_X__Cane___RTS___ BSC_X__Shower Chair_X__Hospital Bed___W/C____Other________  02 at ____Liter(s)---wears(frequency)_______ [facility] ___ CPAP___ BiPap___   N/A____      Home O2 Use :  No    If No for home O2---if presently on O2 during hospitalization:  No      Community Service Affiliation  Dialysis:  No    · Agency:  · Location:  · Dialysis Schedule:  · Phone:   · Fax: Other Community Services:none (ex:PT/OT,Mental Health,Wound Clinic, Cardio/Pul [address])    DISCHARGE PLAN: Explained Case Management role/services. Chart reviewed, spoke with pt via telephone as he is in COVID+ isolation. Pt is from house with spouse and plans return. Ambulates with walker. +drives. Will follow for possible DC needs. COVID positive from 9/4.

## 2020-09-08 NOTE — PROGRESS NOTES
WFL, and no oral residue remains. Pt requires increased time for oral clearance of dry solids. Cough occurs after x2 of 2 dry solid bites when pt uses liquids wash. Suspect dry solid residue contributes to difficulty with oral bolus coordination when two consistencies are in oral cavity resulting in premature spillage of thin and airway invasion. Pt encouraged to avoid mixed consistencies and to continue with soft solids with moisture to increase ability to manage bolus orally and decrease risk of aspiration. Pt voices good understanding of info discussed. Recommend continue Dysphagia soft and bite-sized solids with gravies and sauces/Thin Liquids, meds whole with puree. Avoid mixed consistencies (cereal, fruit cocktail, etc). ST to continue to follow to educate pt in safe swallow strategies. Dysphagia Goals:  Timeframe for Long-term Goals: 7 days by 09/12/2020  Goal 1: Pt will demonstrate functional swallow of preferred consistencies with use of safe swallow strategies in 5/5 opportunities. 09/08 Ongoing; See above     Short-term Goals  Timeframe for Short-term Goals: 5 days by 09/10/2020  Goal 1: Pt will demonstrate functional swallow of recommended diet in 5/5 opportunities 09/08 Ongoing; See above  Goal 2: Pt will demonstrate understanding of safe swallow strategies (upright position, small bites/sips, slow rate of po intake, use of tongue sweep/liquid wash as needed to clear residue, oral care and self-feeding) independently. 09/08 Ongoing; See above       Speech/Language/Cog Goals:n/a                        Recommendations:  Solid Consistency: Dysphagia Soft and Bite-sized with sauces and gravies  Liquid Consistency: Thin  Medication: whole with puree as tolerated    Patient/Family/Caregiver Education: Education completed re: recommendations, diet modifications, safe swallow strategies, and benefits of oral care. Pt voices good understanding of info discussed.     Compensatory Strategies: Recommend

## 2020-09-08 NOTE — PLAN OF CARE
Problem: Falls - Risk of:  Goal: Will remain free from falls  Description: Will remain free from falls  Outcome: Ongoing     Problem: Gas Exchange - Impaired  Goal: Absence of hypoxia  Outcome: Ongoing  Goal: Promote optimal lung function  Outcome: Ongoing     Problem: Breathing Pattern - Ineffective  Goal: Ability to achieve and maintain a regular respiratory rate  Outcome: Ongoing     Problem: Isolation Precautions - Risk of Spread of Infection  Goal: Prevent transmission of infection  Outcome: Ongoing     Problem: Nutrition Deficits  Goal: Optimize nutrtional status  Outcome: Ongoing     Problem: Injury - Risk of, Physical Injury:  Goal: Will remain free from falls  Description: Will remain free from falls  Outcome: Ongoing

## 2020-09-08 NOTE — PROGRESS NOTES
Inpatient Physical Therapy Evaluation and Treatment    Unit: [address] [place]  Date:  9/8/2020  Patient Name:    Arminda Reeves  Admitting diagnosis:  Sepsis [facility]) [A41.9]  Admit Date:  9/4/2020  Precautions/Restrictions/WB Status/ Lines/ Wounds/ Oxygen: Fall risk, Bed/chair alarm, Lines -IV, Telemetry, Continuous pulse oximetry and Isolation Precautions: Droplet Plus - COVID, Parkinson's    Treatment Time:  13:25-14:50  Treatment Number:  1   Timed Code Treatment Minutes: 75 minutes  Total Treatment Minutes:  85  minutes    Patient Goals for Therapy: \" go home \"          Discharge Recommendations: Home 24 hr supervision  and Home PT  DME needs for discharge: Needs Met       Therapy recommendation for EMS Transport: can transport by wheelchair    Therapy recommendations for staff:   Assist of 1 with use of rolling walker (RW) and gait belt for all transfers and ambulation to/from bathroom    History of Present Illness: (Per Dr. Tej Catalan H&P on 9/4/20)  66 y.o. male with PMH below, presents with fever, MS change, cough, fatigue, generalized weakness, SOB. Pt was in ER yesterday as well. He was reportedly not having SOB yd but was started to today. He was brought back to ER today. He is requiring 3L O2 to maintain sats. He is not on O2 at home. He has reportedly been increasingly weak and fatigued over last 3-4 days. He has also had fevers which have not fully responded to Tylenol and ibuprofen at home. His temp at arrival here was 105. After rectal Tylenol in the ER his temp came down to 101.8. He has had associated cough. He has a hx of Parkinson's and has reportedly not been somewhat less interactive the last day. His MS appears to have improved somewhat since arrival and reduction of his fever. He normally uses a walker to get around. His BP was reportedly a little low yd but it responded to IVF. He was DC home yd.    COVID +  PMH: arthritis, Parkinson's    Home Health S4 Level Recommendation:  Level 1 Standard  AM-PAC Mobility Score    AM-PAC Inpatient Mobility Raw Score : 18       Preadmission Environment    Pt. Lives with spouse (able to provide 24/7 assist)  Home environment:  one story home  Steps to enter first floor: 4 steps to enter and hand rail unilateral  Steps to second floor: N/A  Bathroom: tub/shower unit, walk in shower and shower chair (one in each shower), grab bars in both showers; comfort height toilet with armrests    Pt gets down into tub  Equipment owned: RW, shower chair/bench, quad cane, lift chair, lifealert and U-step 2 walker (standing rollator), manual w/c  Pt sleeps in adjustable bed with a \"safety bar:    Preadmission Status:  Pt. Able to drive: No - wife drives  Pt Fully independent with ADLs: No  Pt. Required assistance from family for: Dressing    Wife assists with dressing when pt wears T-shirts (does okay with all other dressing)   Wife does cooking and laundry   [facility] ([facility]) 1x/wk for cleaning  Pt. independent for transfers and gait and walked with U-step walker   History of falls Yes - a few months ago, tripped over throw rug (has since removed the rugs), bruised R elbow and shoulder  Had both home PT/OT recently    Pain   No    Cognition    A&O Person, Place and Time (month, year, able to state that it is the beginning of the month with extra time)   Able to follow 2 step commands    Subjective  Patient lying supine in bed with no family present. Pt agreeable to this PT eval & tx. Pt is incontinent and needs changing upon therapist arrival    Upper Extremity ROM/Strength  Please see OT evaluation. Lower Extremity ROM / Strength   AROM WFL: Yes    BLE strength WFL, but not formally assessed with MMT. B LEs grossly at least 3/5 as observed through functional mobility.     Lower Extremity Sensation    Diminished to light touch sensation in B feet    Lower Extremity Proprioception:   WFL    Coordination and Tone  WFL   Resting tremor in

## 2020-09-08 NOTE — PROGRESS NOTES
Pulmonary & Critical Care Medicine Progress Note    CC: COVID-19    Events of Last 24 hours:   Patient states he feels slightly better. Invasive Lines: IV: PIVs -      Vitals:  Blood pressure (!) 147/73, pulse 52, temperature 98 °F (36.7 °C), temperature source Oral, resp. rate 18, height 5' 9\" (1.753 m), weight 157 lb 4.8 oz (71.4 kg), SpO2 92 %. on RA   Temp  Av.6 °F (36.4 °C)  Min: 97.1 °F (36.2 °C)  Max: 98.7 °F (37.1 °C)    Intake/Output Summary (Last 24 hours) at 2020 0800  Last data filed at 2020 0346  Gross per 24 hour   Intake 1128 ml   Output 1075 ml   Net 53 ml     EXAM:  Gen: Mild distress. Normocephalic, atraumatic. Eyes: PERRL. No sclera icterus. No conjunctival injection. ENT: No discharge. Pharynx clear. Neck: Trachea midline. No obvious mass. Resp: No accessory muscle use. Basilar  crackles. No wheezes. No rhonchi. No dullness on percussion. CV: Regular rate. Regular rhythm. No murmur or rub. No edema. GI: Non-tender. Non-distended. No hernia. Skin: Warm and dry. No nodule on exposed extremities. Lymph: No cervical LAD. No supraclavicular LAD. M/S: No cyanosis. No joint deformity. No clubbing. Neuro: Awake. Alert. Moves all four extremities. + tremor   Psych: Oriented x 3.  No anxiety.        Scheduled Meds:   gabapentin  300 mg Oral TID    sodium chloride  20 mL Intravenous Once    remdesivir IVPB  100 mg Intravenous Q24H    mupirocin   Nasal BID    lidocaine 1 % injection  5 mL Intradermal Once    sodium chloride flush  10 mL Intravenous 2 times per day    lidocaine 1 % injection  5 mL Intradermal Once    sodium chloride flush  10 mL Intravenous 2 times per day    sertraline  100 mg Oral Nightly    cefTRIAXone (ROCEPHIN) IV  1 g Intravenous Q24H    azithromycin  500 mg Intravenous Q24H    sodium chloride flush  10 mL Intravenous 2 times per day    enoxaparin  30 mg Subcutaneous BID    dexamethasone  6 mg Oral Daily    carbidopa-levodopa  2

## 2020-09-08 NOTE — FLOWSHEET NOTE
09/08/20 0751   Vital Signs   Temp 98 °F (36.7 °C)   Temp Source Oral   Pulse 52   Heart Rate Source Monitor   Resp 18   BP (!) 147/73   BP Location Left upper arm   BP Upper/Lower Upper   Patient Position High fowlers   Level of Consciousness 0   MEWS Score 1   Patient Currently in Pain No   Oxygen Therapy   SpO2 92 %   O2 Device None (Room air)   AM assessment completed, see flow sheet. Pt is alert and oriented. Vital signs are WNL. Respirations are even & easy, and breath sounds clear. Poor appetite this AM, but no difficulties with chewing, swallowing, or taking medications. No complaints of pain voiced. Pt denies needs at this time. SR up x 2, and bed in low position. Bed alarm on and tele sitter in place for safety. Call light is within reach.

## 2020-09-08 NOTE — PROGRESS NOTES
\    Hospitalist Progress Note      PCP: Krishna Lyles MD    Date of Admission: 9/4/2020    Hospital Course  : Patient with history of Parkinson's disease 3 to 4 days of fever shortness of breath  Admitted  9/4/20diagnosed to have pneumonia,acute  respiratory failure needing oxygen   hypotension and also COVID 19+  And was hypotensive in the emergency room received 4 L of fluid. Subjective:   He is feeling better. On Room air. Medications:  Reviewed    Infusion Medications     Scheduled Medications    gabapentin  300 mg Oral TID    sodium chloride  20 mL Intravenous Once    remdesivir IVPB  100 mg Intravenous Q24H    mupirocin   Nasal BID    lidocaine 1 % injection  5 mL Intradermal Once    sodium chloride flush  10 mL Intravenous 2 times per day    lidocaine 1 % injection  5 mL Intradermal Once    sodium chloride flush  10 mL Intravenous 2 times per day    sertraline  100 mg Oral Nightly    cefTRIAXone (ROCEPHIN) IV  1 g Intravenous Q24H    azithromycin  500 mg Intravenous Q24H    sodium chloride flush  10 mL Intravenous 2 times per day    enoxaparin  30 mg Subcutaneous BID    dexamethasone  6 mg Oral Daily    carbidopa-levodopa  2 tablet Oral BID    sodium chloride  500 mL Intravenous Once     PRN Meds: sodium chloride, sodium chloride flush, sodium chloride flush, sodium chloride flush, acetaminophen **OR** acetaminophen, ibuprofen      Intake/Output Summary (Last 24 hours) at 9/8/2020 1129  Last data filed at 9/8/2020 0800  Gross per 24 hour   Intake 888 ml   Output 1175 ml   Net -287 ml       Physical Exam Performed:    BP (!) 147/73   Pulse 52   Temp 98 °F (36.7 °C) (Oral)   Resp 18   Ht 5' 9\" (1.753 m)   Wt 157 lb 4.8 oz (71.4 kg)   SpO2 92%   BMI 23.23 kg/m²     General appearance: No apparent distress, appears stated age and cooperative. HEENT: Pupils equal, round, and reactive to light. Conjunctivae/corneas clear. Neck: Supple, with full range of motion.  No jugular organism ([facility] [address]    Thrombocytopenia ([facility] [address]    Lymphopenia    Acute encephalopathy    Hemoptysis  Resolved Problems:    * No resolved hospital problems. *     Severe sepsis requirED 4 L fluid bolus. Resolved. Due to bilateral pneumonia likely due to COVID-19    Decadron and Remdesvir,D4   CCP  9/5  CRP 91,Follow    Acute hypoxemic respiratory failure resolved. o2  Due to pneumonia  PCTlow  atb Rocephin/Zithrod D5    Parkinson. s continue home meds    Thoracic compression fractures noted patient is not complaining of pain    Bradycardic asymtomatic  Monitor     Need therapy due to pparkinsons  and o2 moniotor    DVT Prophylaxis: lovnex bid   Diet: DIET DYSPHAGIA SOFT AND BITE-SIZED;      Code Status: Full Code    PT/OT Eval Status:     [facility]      Discharge planning    Sammy Eason 9/8/2020 11:31 AM

## 2020-09-09 VITALS
BODY MASS INDEX: 22.07 KG/M2 | HEIGHT: 69 IN | DIASTOLIC BLOOD PRESSURE: 77 MMHG | TEMPERATURE: 97.4 F | SYSTOLIC BLOOD PRESSURE: 155 MMHG | RESPIRATION RATE: 16 BRPM | HEART RATE: 48 BPM | OXYGEN SATURATION: 96 % | WEIGHT: 149 LBS

## 2020-09-09 LAB
ANION GAP SERPL CALCULATED.3IONS-SCNC: 9 MMOL/L (ref 3–16)
BANDED NEUTROPHILS RELATIVE PERCENT: 1 % (ref 0–7)
BASOPHILS ABSOLUTE: 0.1 K/UL (ref 0–0.2)
BASOPHILS RELATIVE PERCENT: 1 %
BUN BLDV-MCNC: 17 MG/DL (ref 7–20)
C-REACTIVE PROTEIN: 29.8 MG/L (ref 0–5.1)
CALCIUM SERPL-MCNC: 8.7 MG/DL (ref 8.3–10.6)
CHLORIDE BLD-SCNC: 103 MMOL/L (ref 99–110)
CO2: 25 MMOL/L (ref 21–32)
CREAT SERPL-MCNC: 0.6 MG/DL (ref 0.8–1.3)
D DIMER: 232 NG/ML DDU (ref 0–229)
EOSINOPHILS ABSOLUTE: 0 K/UL (ref 0–0.6)
EOSINOPHILS RELATIVE PERCENT: 0 %
FERRITIN: 551.5 NG/ML (ref 30–400)
FIBRINOGEN: 343 MG/DL (ref 200–397)
GFR AFRICAN AMERICAN: >60
GFR NON-AFRICAN AMERICAN: >60
GLUCOSE BLD-MCNC: 74 MG/DL (ref 70–99)
HCT VFR BLD CALC: 41.3 % (ref 40.5–52.5)
HEMOGLOBIN: 14.2 G/DL (ref 13.5–17.5)
INR BLD: 1.26 (ref 0.86–1.14)
LYMPHOCYTES ABSOLUTE: 1.6 K/UL (ref 1–5.1)
LYMPHOCYTES RELATIVE PERCENT: 20 %
MAGNESIUM: 2.1 MG/DL (ref 1.8–2.4)
MCH RBC QN AUTO: 31 PG (ref 26–34)
MCHC RBC AUTO-ENTMCNC: 34.3 G/DL (ref 31–36)
MCV RBC AUTO: 90.4 FL (ref 80–100)
MONOCYTES ABSOLUTE: 0.3 K/UL (ref 0–1.3)
MONOCYTES RELATIVE PERCENT: 4 %
NEUTROPHILS ABSOLUTE: 6.1 K/UL (ref 1.7–7.7)
NEUTROPHILS RELATIVE PERCENT: 74 %
PDW BLD-RTO: 14.7 % (ref 12.4–15.4)
PHOSPHORUS: 3.2 MG/DL (ref 2.5–4.9)
PLATELET # BLD: 176 K/UL (ref 135–450)
PLATELET SLIDE REVIEW: ADEQUATE
PMV BLD AUTO: 8.4 FL (ref 5–10.5)
POTASSIUM SERPL-SCNC: 3.6 MMOL/L (ref 3.5–5.1)
PROTHROMBIN TIME: 14.6 SEC (ref 10–13.2)
RBC # BLD: 4.57 M/UL (ref 4.2–5.9)
SLIDE REVIEW: NORMAL
SODIUM BLD-SCNC: 137 MMOL/L (ref 136–145)
TOTAL CK: 50 U/L (ref 39–308)
WBC # BLD: 8.1 K/UL (ref 4–11)

## 2020-09-09 PROCEDURE — 84100 ASSAY OF PHOSPHORUS: CPT

## 2020-09-09 PROCEDURE — 2580000003 HC RX 258: Performed by: INTERNAL MEDICINE

## 2020-09-09 PROCEDURE — 80048 BASIC METABOLIC PNL TOTAL CA: CPT

## 2020-09-09 PROCEDURE — 86140 C-REACTIVE PROTEIN: CPT

## 2020-09-09 PROCEDURE — 85025 COMPLETE CBC W/AUTO DIFF WBC: CPT

## 2020-09-09 PROCEDURE — 99233 SBSQ HOSP IP/OBS HIGH 50: CPT | Performed by: INTERNAL MEDICINE

## 2020-09-09 PROCEDURE — 85379 FIBRIN DEGRADATION QUANT: CPT

## 2020-09-09 PROCEDURE — 6360000002 HC RX W HCPCS: Performed by: INTERNAL MEDICINE

## 2020-09-09 PROCEDURE — 85384 FIBRINOGEN ACTIVITY: CPT

## 2020-09-09 PROCEDURE — 85610 PROTHROMBIN TIME: CPT

## 2020-09-09 PROCEDURE — 2500000003 HC RX 250 WO HCPCS: Performed by: INTERNAL MEDICINE

## 2020-09-09 PROCEDURE — 83735 ASSAY OF MAGNESIUM: CPT

## 2020-09-09 PROCEDURE — 82550 ASSAY OF CK (CPK): CPT

## 2020-09-09 PROCEDURE — 99238 HOSP IP/OBS DSCHRG MGMT 30/<: CPT | Performed by: INTERNAL MEDICINE

## 2020-09-09 PROCEDURE — 36415 COLL VENOUS BLD VENIPUNCTURE: CPT

## 2020-09-09 PROCEDURE — 6370000000 HC RX 637 (ALT 250 FOR IP): Performed by: INTERNAL MEDICINE

## 2020-09-09 PROCEDURE — 82728 ASSAY OF FERRITIN: CPT

## 2020-09-09 PROCEDURE — 6370000000 HC RX 637 (ALT 250 FOR IP): Performed by: HOSPITALIST

## 2020-09-09 RX ORDER — DEXAMETHASONE 6 MG/1
6 TABLET ORAL DAILY
Qty: 6 TABLET | Refills: 0 | Status: SHIPPED | OUTPATIENT
Start: 2020-09-10 | End: 2020-09-16

## 2020-09-09 RX ORDER — AZITHROMYCIN 250 MG/1
250 TABLET, FILM COATED ORAL DAILY
Qty: 1 TABLET | Refills: 0 | Status: SHIPPED | OUTPATIENT
Start: 2020-09-09 | End: 2020-09-10

## 2020-09-09 RX ORDER — CEFDINIR 300 MG/1
300 CAPSULE ORAL 2 TIMES DAILY
Qty: 10 CAPSULE | Refills: 0 | Status: SHIPPED | OUTPATIENT
Start: 2020-09-09 | End: 2020-09-14

## 2020-09-09 RX ADMIN — CARBIDOPA AND LEVODOPA 2 TABLET: 25; 100 TABLET ORAL at 08:19

## 2020-09-09 RX ADMIN — CEFTRIAXONE 1 G: 1 INJECTION, POWDER, FOR SOLUTION INTRAMUSCULAR; INTRAVENOUS at 08:19

## 2020-09-09 RX ADMIN — GABAPENTIN 300 MG: 300 CAPSULE ORAL at 08:19

## 2020-09-09 RX ADMIN — ENOXAPARIN SODIUM 30 MG: 30 INJECTION, SOLUTION INTRAVENOUS; SUBCUTANEOUS at 08:20

## 2020-09-09 RX ADMIN — DEXAMETHASONE 6 MG: 4 TABLET ORAL at 08:19

## 2020-09-09 RX ADMIN — Medication 10 ML: at 08:20

## 2020-09-09 RX ADMIN — REMDESIVIR 100 MG: 100 INJECTION, POWDER, LYOPHILIZED, FOR SOLUTION INTRAVENOUS at 09:32

## 2020-09-09 ASSESSMENT — PAIN SCALES - GENERAL: PAINLEVEL_OUTOF10: 0

## 2020-09-09 NOTE — DISCHARGE SUMMARY
Name:  Елена Ramirez  Room:  0204/0204-01  MRN:    [id number]    Discharge Summary      This discharge summary is in conjunction with a complete physical exam done on the day of discharge. Discharging Physician: Liz Almeida MD       Admit: 9/4/2020  Discharge:  9/9/2020    HPI taken from admission H&P:      The patient is a 66 y.o. male with PMH below, presents with fever, MS change, cough, fatigue, generalized weakness, SOB. Pt was in ER yesterday as well. He was reportedly not having SOB yd but was started to today. He was brought back to ER today. He is requiring 3L O2 to maintain sats. He is not on O2 at home. He has reportedly been increasingly weak and fatigued over last 3-4 days. He has also had fevers which have not fully responded to Tylenol and ibuprofen at home. His temp at arrival here was 105. After rectal Tylenol in the ER his temp came down to 101.8. He has had associated cough. He has a hx of Parkinson's and has reportedly not been somewhat less interactive the last day. His MS appears to have improved somewhat since arrival and reduction of his fever. He normally uses a walker to get around. His BP was reportedly a little low yd but it responded to IVF. He was DC home yd. Diagnoses this Admission and Hospital Course     Severe sepsis - Resolved  COVID 19 illness  - required 4 L fluid bolus. Resolved. - Due to bilateral pneumonia likely due to COVID-19  - Decadron and Remdesvir,D4  - CCP  9/5  - CRP 91, Followed  - discharged on Azithro and Omnicef with Decadron     Acute hypoxemic respiratory failure - resolved.   - o2  - Due to pneumonia  - PCTlow  - atb Rocephin/Zithrod D5     Parkinson's Disease  - continue home meds     Thoracic compression fractures  - Noted; patient is not complaining of pain     Bradycardic  -  asymtomatic  Monitored      Need therapy due to parkinsons  and o2 moniotor    Procedures (Please Review Full Report for Details)  none    Consults Pulmonology    Physical Exam at Discharge:    BP (!) 155/77   Pulse (!) 48   Temp 97.4 °F (36.3 °C) (Oral)   Resp 16   Ht 5' 9\" (1.753 m)   Wt 149 lb (67.6 kg)   SpO2 96%   BMI 22.00 kg/m²       General appearance: No apparent distress, appears stated age and cooperative. HEENT: Pupils equal, round, and reactive to light. Conjunctivae/corneas clear. Neck: Supple, with full range of motion. No jugular venous distention. Trachea midline. Respiratory:  Normal respiratory effort. Cardiovascular: Regular rate and rhythm with normal S1/S2 without murmurs, rubs or gallops. Abdomen: Soft, non-tender, non-distended with normal bowel sounds. Musculoskeletal: No clubbing, cyanosis or edema bilaterally. Full range of motion without deformity. Skin: Skin color, texture, turgor normal.  No rashes or lesions. Neurologic:  Neurovascularly intact without any focal sensory/motor deficits. Cranial nerves: II-XII intact, grossly non-focal.  Resting tremors  Psychiatric: Alert and oriented,    CBC:   Recent Labs     09/07/20  0624 09/08/20  0613 09/09/20  0519   WBC 9.0 8.7 8.1   HGB 12.7* 13.8 14.2   HCT 38.2* 40.4* 41.3   MCV 91.1 91.6 90.4    142 176     BMP:   Recent Labs     09/07/20  0624 09/08/20  0613 09/09/20  0519    140 137   K 3.4* 3.7 3.6    106 103   CO2 23 23 25   PHOS 2.1* 2.8 3.2   BUN 19 17 17   CREATININE 0.7* 0.6* 0.6*     PT/INR:   Recent Labs     09/07/20  0624 09/08/20  0614 09/09/20  0519   PROTIME 13.6* 14.1* 14.6*   INR 1.17* 1.21* 1.26*     CULTURES    Blood cx #2: NGTD    Blood cx #1: Staph coag neg DNA    RADIOLOGY    IR PICC WO SQ PORT/PUMP > 5 YEARS 9/5/2020   Final Result   Successful placement of PICC line. CT CHEST PULMONARY EMBOLISM W CONTRAST 9/4/2020   Final Result   1. Left lower lobe pneumonia. Recommend chest radiograph in 8 weeks to   confirm resolution. 2. Multiple age-indeterminate thoracic compression fractures.   If there is   point tenderness, recommend nonemergent MRI of the thoracic spine. XR CHEST PORTABLE 9/4/2020   Final Result   Worsening bilateral infiltrates compared to prior study           Discharge Medications     Medication List      START taking these medications    azithromycin 250 MG tablet  Commonly known as:  Zithromax  Take 1 tablet by mouth daily for 1 day     cefdinir 300 MG capsule  Commonly known as:  OMNICEF  Take 1 capsule by mouth 2 times daily for 5 days     dexamethasone 6 MG tablet  Commonly known as:  DECADRON  Take 1 tablet by mouth daily for 6 days  Start taking on:  September 10, 2020        Alejandro Larose taking these medications    carbidopa-levodopa  MG Tbdp  Commonly known as:  PARCOPA     gabapentin 300 MG capsule  Commonly known as:  NEURONTIN     sertraline 100 MG tablet  Commonly known as:  ZOLOFT     traZODone 50 MG tablet  Commonly known as:  DESYREL           Where to Get Your Medications      These medications were sent to [address] [place] [address],[facility], [address] [place] [address], [address] [address]    Phone:  [phone]   · azithromycin 250 MG tablet  · cefdinir 300 MG capsule  · dexamethasone 6 MG tablet           Discharged in stable condition to home. Follow Up: Follow up with PCP in 1 week.       Hilda Sen 9/10/2020 12:50 PM

## 2020-09-09 NOTE — PLAN OF CARE
Problem: Falls - Risk of:  Goal: Will remain free from falls  Description: Will remain free from falls  9/9/2020 1411 by Janis Allen RN  Outcome: Ongoing  9/9/2020 0030 by Sweetie Raman RN  Outcome: Ongoing  Goal: Absence of physical injury  Description: Absence of physical injury  9/9/2020 1411 by Janis Allen RN  Outcome: Ongoing  9/9/2020 0030 by Sweetie Raman RN  Outcome: Ongoing     Problem: Airway Clearance - Ineffective  Goal: Achieve or maintain patent airway  9/9/2020 1411 by Janis Allen RN  Outcome: Ongoing  9/9/2020 0030 by Sweetie Raman RN  Outcome: Ongoing     Problem: Gas Exchange - Impaired  Goal: Absence of hypoxia  9/9/2020 1411 by Janis Allen RN  Outcome: Ongoing  9/9/2020 0030 by Sweetie Raman RN  Outcome: Ongoing  Goal: Promote optimal lung function  9/9/2020 1411 by Janis Allen RN  Outcome: Ongoing  9/9/2020 0030 by Sweetie Raman RN  Outcome: Ongoing     Problem: Breathing Pattern - Ineffective  Goal: Ability to achieve and maintain a regular respiratory rate  9/9/2020 1411 by Janis Allen RN  Outcome: Ongoing  9/9/2020 0030 by Sweetie Raman RN  Outcome: Ongoing     Problem:  Body Temperature -  Risk of, Imbalanced  Goal: Ability to maintain a body temperature within defined limits  9/9/2020 1411 by Janis Allen RN  Outcome: Ongoing  9/9/2020 0030 by Sweetie Raman RN  Outcome: Ongoing  Goal: Will regain or maintain usual level of consciousness  9/9/2020 1411 by Janis Allen RN  Outcome: Ongoing  9/9/2020 0030 by Sweetie Raman RN  Outcome: Ongoing  Goal: Complications related to the disease process, condition or treatment will be avoided or minimized  9/9/2020 1411 by Janis Allen RN  Outcome: Ongoing  9/9/2020 0030 by Sweetie Raman RN  Outcome: Ongoing     Problem: Isolation Precautions - Risk of Spread of Infection  Goal: Prevent transmission of infection  9/9/2020 1411 by Janis Allen RN  Outcome: Ongoing  9/9/2020 0030 by Jagjit Gonsalez RN  Outcome: Ongoing     Problem: Nutrition Deficits  Goal: Optimize nutrtional status  9/9/2020 1411 by Afua Odell RN  Outcome: Ongoing  9/9/2020 0030 by Jagjit Gonsalez RN  Outcome: Ongoing     Problem: Risk for Fluid Volume Deficit  Goal: Maintain normal heart rhythm  9/9/2020 1411 by Afua Odell RN  Outcome: Ongoing  9/9/2020 0030 by Jagjit Gonsalez RN  Outcome: Ongoing  Goal: Maintain absence of muscle cramping  9/9/2020 1411 by Afua Odell RN  Outcome: Ongoing  9/9/2020 0030 by Jagjit Gonsalez RN  Outcome: Ongoing  Goal: Maintain normal serum potassium, sodium, calcium, phosphorus, and pH  9/9/2020 1411 by Afua Odell RN  Outcome: Ongoing  9/9/2020 0030 by Jagjit Gonsalez RN  Outcome: Ongoing     Problem: Loneliness or Risk for Loneliness  Goal: Demonstrate positive use of time alone when socialization is not possible  9/9/2020 1411 by Afua Odell RN  Outcome: Ongoing  9/9/2020 0030 by Jagjit Gonsalez RN  Outcome: Ongoing     Problem: Fatigue  Goal: Verbalize increase energy and improved vitality  9/9/2020 1411 by Afua Odell RN  Outcome: Ongoing  9/9/2020 0030 by Jagjit Gonsalez RN  Outcome: Ongoing     Problem: Patient Education: Go to Patient Education Activity  Goal: Patient/Family Education  9/9/2020 1411 by Afua Odell RN  Outcome: Ongoing  9/9/2020 0030 by Jagjit Gonsalez RN  Outcome: Ongoing     Problem: Infection - Central Venous Catheter-Associated Bloodstream Infection:  Goal: Will show no infection signs and symptoms  Description: Will show no infection signs and symptoms  9/9/2020 1411 by Afua Odell RN  Outcome: Ongoing  9/9/2020 0030 by Jagjit Gonsalez RN  Outcome: Ongoing     Problem: Skin Integrity:  Goal: Will show no infection signs and symptoms  Description: Will show no infection signs and symptoms  9/9/2020 1411 by Afua Odell RN  Outcome: Ongoing  9/9/2020 0030 by Jagjit Gonsalez RN  Outcome: Ongoing  Goal: Absence of new skin breakdown  Description: Absence of new skin breakdown  9/9/2020 1411 by Emmanuel Geronimo RN  Outcome: Ongoing  9/9/2020 0030 by Luke Jarquin RN  Outcome: Ongoing     Problem: Confusion - Acute:  Goal: Absence of continued neurological deterioration signs and symptoms  Description: Absence of continued neurological deterioration signs and symptoms  9/9/2020 1411 by Emmanuel Geronimo RN  Outcome: Ongoing  9/9/2020 0030 by Luke Jarquin RN  Outcome: Ongoing  Goal: Mental status will be restored to baseline  Description: Mental status will be restored to baseline  9/9/2020 1411 by Emmanuel Geronimo RN  Outcome: Ongoing  9/9/2020 0030 by Luke Jarquin RN  Outcome: Ongoing     Problem: Discharge Planning:  Goal: Ability to perform activities of daily living will improve  Description: Ability to perform activities of daily living will improve  9/9/2020 1411 by Emmanuel Geronimo RN  Outcome: Ongoing  9/9/2020 0030 by Luke Jarquin RN  Outcome: Ongoing  Goal: Participates in care planning  Description: Participates in care planning  9/9/2020 1411 by Emmanuel Geronimo RN  Outcome: Ongoing  9/9/2020 0030 by Luke Jarquin RN  Outcome: Ongoing     Problem: Injury - Risk of, Physical Injury:  Goal: Will remain free from falls  Description: Will remain free from falls  9/9/2020 1411 by Emmanuel Geronimo RN  Outcome: Ongoing  9/9/2020 0030 by Luke Jarquin RN  Outcome: Ongoing  Goal: Absence of physical injury  Description: Absence of physical injury  9/9/2020 1411 by Emmanuel Geronimo RN  Outcome: Ongoing  9/9/2020 0030 by Luke Jarquin RN  Outcome: Ongoing     Problem: Mood - Altered:  Goal: Mood stable  Description: Mood stable  9/9/2020 1411 by Emmanuel Geronimo RN  Outcome: Ongoing  9/9/2020 0030 by Luke Jarquin RN  Outcome: Ongoing  Goal: Absence of abusive behavior  Description: Absence of abusive behavior  9/9/2020 1411 by Emmanuel Geronimo RN  Outcome: Ongoing  9/9/2020 0030 by Lorra Severs, RN  Outcome: Ongoing  Goal: Verbalizations of feeling emotionally comfortable while being cared for will increase  Description: Verbalizations of feeling emotionally comfortable while being cared for will increase  9/9/2020 1411 by Funmilayo Munoz RN  Outcome: Ongoing  9/9/2020 0030 by Lorra Severs, RN  Outcome: Ongoing     Problem: Psychomotor Activity - Altered:  Goal: Absence of psychomotor disturbance signs and symptoms  Description: Absence of psychomotor disturbance signs and symptoms  9/9/2020 1411 by Funmilayo Munoz RN  Outcome: Ongoing  9/9/2020 0030 by Lorra Severs, RN  Outcome: Ongoing     Problem: Sensory Perception - Impaired:  Goal: Demonstrations of improved sensory functioning will increase  Description: Demonstrations of improved sensory functioning will increase  9/9/2020 1411 by Funmilayo Munoz RN  Outcome: Ongoing  9/9/2020 0030 by Lorra Severs, RN  Outcome: Ongoing  Goal: Decrease in sensory misperception frequency  Description: Decrease in sensory misperception frequency  9/9/2020 1411 by Funmilayo Munoz RN  Outcome: Ongoing  9/9/2020 0030 by Lorra Severs, RN  Outcome: Ongoing  Goal: Able to refrain from responding to false sensory perceptions  Description: Able to refrain from responding to false sensory perceptions  9/9/2020 1411 by Funmilayo Munoz RN  Outcome: Ongoing  9/9/2020 0030 by Lorra Severs, RN  Outcome: Ongoing  Goal: Demonstrates accurate environmental perceptions  Description: Demonstrates accurate environmental perceptions  9/9/2020 1411 by Funmilayo Munoz RN  Outcome: Ongoing  9/9/2020 0030 by Lorra Severs, RN  Outcome: Ongoing  Goal: Able to distinguish between reality-based and nonreality-based thinking  Description: Able to distinguish between reality-based and nonreality-based thinking  9/9/2020 1411 by Funmilayo Munoz RN  Outcome: Ongoing  9/9/2020 0030 by Lorra Severs, RN  Outcome: Ongoing  Goal: Able to interrupt nonreality-based thinking  Description: Able to interrupt nonreality-based thinking  9/9/2020 1411 by Amber Dobbs RN  Outcome: Ongoing  9/9/2020 0030 by Rony Dozier RN  Outcome: Ongoing     Problem: Sleep Pattern Disturbance:  Goal: Appears well-rested  Description: Appears well-rested  9/9/2020 1411 by Amber Dobbs RN  Outcome: Ongoing  9/9/2020 0030 by Rony Dozier RN  Outcome: Ongoing

## 2020-09-09 NOTE — CARE COORDINATION
[facility]  Received referral regarding HC services from [facility]. [facility]. [facility] is unable to service pt for [facility] within next 1-2 days. Pt has [facility] Admin coverage. Telephone call to [facility] [phone]. Spoke with triage nurse. Triage nurse aware of [facility] needs and request noted to PCP, Dr. Latosha Valdes. Faxed facesheet, HC order, DC summary and H&P to [phone] Attn Dr. Latosha Valdes per triage nurse request. Due to pt having [facility] Admin coverage, pt's PCP to arrange [facility] services with [facility]. agency contracted with [facility] is unable to staff a [facility] in next 1-2 days. Spoke with pt's spouse, [place]. [place] aware [facility] is unable to service and Pt's PCP Dr. Cas Clement will be setting up [facility], [facility] through [facility] Collaborated with  and [facility] to arrange for [facility] services.     Electronically signed by Lashanda Fox RN on 9/9/2020 at 3:58 PM

## 2020-09-09 NOTE — PROGRESS NOTES
sodium chloride flush, sodium chloride flush, acetaminophen **OR** acetaminophen, ibuprofen    Results:  CBC:   Recent Labs     09/07/20  0624 09/08/20  0613 09/09/20  0519   WBC 9.0 8.7 8.1   HGB 12.7* 13.8 14.2   HCT 38.2* 40.4* 41.3   MCV 91.1 91.6 90.4    142 176     BMP:   Recent Labs     09/07/20  0624 09/08/20  0613 09/09/20  0519    140 137   K 3.4* 3.7 3.6    106 103   CO2 23 23 25   PHOS 2.1* 2.8 3.2   BUN 19 17 17   CREATININE 0.7* 0.6* 0.6*     LIVER PROFILE:   No results for input(s): AST, ALT, LIPASE, BILIDIR, BILITOT, ALKPHOS in the last 72 hours. Invalid input(s): AMYLASE,  ALB    Cultures:  9/4 BC Staphylococcus coagulase negative DNA DetectedAbnormal   9/3 sars Cov2- positive    Films:  CT chest 9/4 imaging  showed   1. Left lower lobe pneumonia  2.  Multiple age-indeterminate thoracic compression fractures.  If there is   point tenderness, recommend nonemergent MRI of the thoracic spine           ASSESSMENT:  · Acute hypoxemic respiratory failure  · Severe sepsis   · COVID-19 virus pneumonia/Bilateral LL Pneumonia   · Hemoptysis   · Thrombocytopenia   · Lymphopenia   · Acute encephalopathy/metabolic encephalopathy  · Fever - previously up to 105        PLAN:  · Supplemental oxygen to maintain SaO2 >92%; wean as tolerated  · Droplet plus isolation   · Remdesivir day #5/5  · Decadron 6 mg day #5  · Status post convalescent plasma 2 units 9/5/2020  · IV antibiotics to include Ceftriaxone and Zithromax day #5  · Follow up Cx

## 2020-09-09 NOTE — CARE COORDINATION
DISCHARGE ORDER  Date/Time 2020 2:53 PM  Completed by: Cj Lofton, Case Management    Patient Name: Paula Cabral      : 1945  Admitting Diagnosis: Sepsis ([address] [A41.9]      Admit order Date and Status: 20 inpt  (verify MD's last order for status of admission)      Noted discharge order. If applicable PT/OT recommendation at Discharge: Home 24 hr supervision and home PT  DME recommendation by PT/OT:none  Confirmed discharge plan  : Yes  with whom shoaib Laura via phone and wife  If pt confirmed DC plan does family need to be contacted by CM Yes if yes who wife and shoaib Laura  Discharge Plan:  Order for dc noted. Spoke with shoaib via phone who is here to transport home. Per shoaib who has talked with pt wife via phone plan is home with [address] and would like [facility] as they have used them in the past. Referral made to [facility] at [facility]. Chart reviewed and no other dc needs identified. Reviewed chart. Role of discharge planner explained and patient verbalized understanding. Discharge order is noted. Has Home O2 in place on admit:  No  Informed of need to bring portable home O2 tank on day of discharge for nursing to connect prior to leaving:   Not Indicated  Verbalized agreement/Understanding:   Not Indicated  Pt is being d/c'd to home today. Pt's O2 sats are 96% on RA. Discharge timeout done with   All discharge needs and concerns addressed.

## 2020-09-09 NOTE — FLOWSHEET NOTE
Pt A/Ox4. VSS. Denies pain, 0/10. Pt unlabored, respirations even & easy. No distress noted. Shift assessment complete. See flowsheet. PM meds given. See MAR. Incontinent at this time, cleansed & applied new brief. Denies further needs at this time. Bed in low position. Bed alarm on. Call light within reach. Will continue to monitor.        09/08/20 1906   Vital Signs   Temp 97.9 °F (36.6 °C)   Temp Source Oral   Pulse 50   Heart Rate Source Monitor   Resp 18   /68   BP Location Left upper arm   BP Upper/Lower Upper   Patient Position Semi fowlers   Level of Consciousness 0   MEWS Score 2   Oxygen Therapy   SpO2 95 %   O2 Device None (Room air)

## 2020-09-09 NOTE — PROGRESS NOTES
Patient discharging to home. Patient aware to self quarantine for two weeks. Patient heart rate high 40's - low 50's chronically otherwise stable.

## 2020-09-09 NOTE — DISCHARGE INSTR - COC
Continuity of Care Form    Patient Name: Jessica Ivory   :  1945  MRN:  [id number]    Admit date:  2020  Discharge date:  2020    Code Status Order: Full Code   Advance Directives:   Advance Care Flowsheet Documentation     Date/Time Healthcare Directive Type of Healthcare Directive Copy in [address] Agent's Name Healthcare Agent's Phone Number    20 5704  No, patient does not have an advance directive for healthcare treatment -- -- -- -- --    20 0701  No, patient does not have an advance directive for healthcare treatment -- -- -- -- --          Admitting Physician:  Beverly Dinh MD  PCP: Kavitha Rivera MD    Discharging Nurse: Good Ishan Unit/Room#: 0204/0204-01  Discharging Unit Phone Number: [phone]    Emergency Contact:   Extended Emergency Contact Information  Primary Emergency Contact: Valarie Reynolds  Address: [address] [place] [address] Phone: [phone]  Mobile Phone: [phone]  Relation: Spouse  Secondary Emergency Contact: David Carcamo  [place] Phone: [phone]  Relation: Child  Preferred language: English   needed?  No    Past Surgical History:  Past Surgical History:   Procedure Laterality Date    NECK SURGERY      SHOULDER SURGERY         Immunization History:   Immunization History   Administered Date(s) Administered    Influenza Virus Vaccine 2013, 2015, 10/05/2017, 10/31/2018, 2019    Pneumococcal Conjugate 13-valent ([id number]) 2017    Pneumococcal Polysaccharide ([id number]) 2009, 2014    Pneumococcal Vaccine 2014    Td vaccine (adult) 2014    Tdap (Boostrix, Adacel) 2020    Zoster Live (Zostavax) 2020    Zoster Recombinant (Shingrix) 2018, 05/10/2019       Active Problems:  Patient Active Problem List   Diagnosis Code    Paralysis agitans ([facility] [address] G20    Depressive disorder, not elsewhere classified F32.9    Mild cognitive impairment G31.84    Sepsis ([address] A41.9    Acute hypoxemic respiratory failure (HCC) J96.01    Severe sepsis (HCC) A41.9, R65.20    COVID-19 virus infection U07.1    Pneumonia of both lower lobes due to infectious organism ([address] J18.1    Thrombocytopenia (HCC) D69.6    Lymphopenia D72.810    Acute encephalopathy G93.40    Hemoptysis R04.2    Fever R50.9    Cough R05       Isolation/Infection:   Isolation          Droplet Plus        Patient Infection Status     Infection Onset Added Last Indicated Last Indicated By Review Planned Expiration Resolved Resolved By    COVID-19 09/03/20 09/04/20 09/03/20 COVID-19 09/11/20 09/17/20      Resolved    COVID-19 Rule Out 09/03/20 09/03/20 09/04/20 COVID-19 (Ordered)   09/04/20 Rule-Out Test Resulted          Nurse Assessment:  Last Vital Signs: BP (!) 155/77   Pulse (!) 48   Temp 97.4 °F (36.3 °C) (Oral)   Resp 16   Ht 5' 9\" (1.753 m)   Wt 149 lb (67.6 kg)   SpO2 96%   BMI 22.00 kg/m²     Last documented pain score (0-10 scale): Pain Level: 0  Last Weight:   Wt Readings from Last 1 Encounters:   09/09/20 149 lb (67.6 kg)     Mental Status:  oriented and alert    IV Access:  - None    Nursing Mobility/ADLs:  Walking   Assisted  Transfer  Assisted  Bathing  Assisted  Dressing  Independent  Toileting  Assisted  Feeding  Independent  Med Admin  Independent  Med Delivery   whole    Wound Care Documentation and Therapy:        Elimination:  Continence:   · Bowel: No  · Bladder: No  Urinary Catheter: None   Colostomy/Ileostomy/Ileal Conduit: No       Date of Last BM: 9/9/2020    Intake/Output Summary (Last 24 hours) at 9/9/2020 1340  Last data filed at 9/9/2020 1301  Gross per 24 hour   Intake 1258.79 ml   Output 1100 ml   Net 158.79 ml     I/O last 3 completed shifts: In: 618.8 [I.V.:118.8; IV Piggyback:500]  Out: 1175 [Urine:1175]    Safety Concerns:      At Risk for Falls    Impairments/Disabilities:      None    Nutrition Therapy:  Current Nutrition Therapy:   - Oral Diet:  Dysphagia 2 mechanically altered    Routes of Feeding: Oral  Liquids: Thin Liquids  Daily Fluid Restriction: no  Last Modified Barium Swallow with Video (Video Swallowing Test): not done    Treatments at the Time of Hospital Discharge:   Respiratory Treatments: ***  Oxygen Therapy:  is not on home oxygen therapy. Ventilator:    - No ventilator support    Rehab Therapies: Physical Therapy and Occupational Therapy  Weight Bearing Status/Restrictions: No weight bearing restirctions  Other Medical Equipment (for information only, NOT a DME order):  walker  Other Treatments:     Patient's personal belongings (please select all that are sent with patient):  clothing    RN SIGNATURE:  Electronically signed by Noam Santos RN on 9/9/20 at 2:08 PM EDT    CASE MANAGEMENT/SOCIAL WORK SECTION    Inpatient Status Date: 9/4/20    Readmission Risk Assessment Score:  Readmission Risk              Risk of Unplanned Readmission:        11           Discharging to Facility/ Agency   · Discharging to Facility/ Agency   · Name:  [address] [place], [place] [address] services through [place] [address]    · Address:   · Phone:   · Fax:     / signature: Electronically signed by Inga Marin RN on 9/9/20 at 2:47 PM EDT    PHYSICIAN SECTION    Prognosis: Good    Condition at Discharge: Stable    Rehab Potential (if transferring to Rehab): {Prognosis:[id number]}    Recommended Labs or Other Treatments After Discharge: ***    Physician Certification: I certify the above information and transfer of Latanya Easley  is necessary for the continuing treatment of the diagnosis listed and that he requires [address] for less 30 days.      Update Admission H&P: No change in H&P    PHYSICIAN SIGNATURE:  Electronically signed by MEENAKSHI Li, RN on 9/9/20 at 2:47 PM EDT

## 2020-09-09 NOTE — PLAN OF CARE
Problem: Falls - Risk of:  Goal: Will remain free from falls  Description: Will remain free from falls  9/9/2020 0030 by Remy Hernandez RN  Outcome: Ongoing  9/8/2020 1152 by Heather Dee RN  Outcome: Ongoing  Goal: Absence of physical injury  Description: Absence of physical injury  Outcome: Ongoing     Problem: Airway Clearance - Ineffective  Goal: Achieve or maintain patent airway  Outcome: Ongoing     Problem: Gas Exchange - Impaired  Goal: Absence of hypoxia  9/9/2020 0030 by Remy Hernandez RN  Outcome: Ongoing  9/8/2020 1152 by Heather Dee RN  Outcome: Ongoing  Goal: Promote optimal lung function  9/9/2020 0030 by Remy Hernandez RN  Outcome: Ongoing  9/8/2020 1152 by Heather Dee RN  Outcome: Ongoing     Problem: Breathing Pattern - Ineffective  Goal: Ability to achieve and maintain a regular respiratory rate  9/9/2020 0030 by Remy Hernandez RN  Outcome: Ongoing  9/8/2020 1152 by Heathre Dee RN  Outcome: Ongoing     Problem:  Body Temperature -  Risk of, Imbalanced  Goal: Ability to maintain a body temperature within defined limits  Outcome: Ongoing  Goal: Will regain or maintain usual level of consciousness  Outcome: Ongoing  Goal: Complications related to the disease process, condition or treatment will be avoided or minimized  Outcome: Ongoing     Problem: Isolation Precautions - Risk of Spread of Infection  Goal: Prevent transmission of infection  9/9/2020 0030 by Remy Hernandez RN  Outcome: Ongoing  9/8/2020 1152 by Heather Dee RN  Outcome: Ongoing     Problem: Nutrition Deficits  Goal: Optimize nutrtional status  9/9/2020 0030 by Remy Hernandez RN  Outcome: Ongoing  9/8/2020 1152 by Heather Dee RN  Outcome: Ongoing     Problem: Risk for Fluid Volume Deficit  Goal: Maintain normal heart rhythm  Outcome: Ongoing  Goal: Maintain absence of muscle cramping  Outcome: Ongoing  Goal: Maintain normal serum potassium, sodium, calcium, phosphorus, and pH  Outcome: Ongoing     Problem: Loneliness or Risk for Loneliness  Goal: Demonstrate positive use of time alone when socialization is not possible  Outcome: Ongoing     Problem: Fatigue  Goal: Verbalize increase energy and improved vitality  Outcome: Ongoing     Problem: Patient Education: Go to Patient Education Activity  Goal: Patient/Family Education  Outcome: Ongoing     Problem: Infection - Central Venous Catheter-Associated Bloodstream Infection:  Goal: Will show no infection signs and symptoms  Description: Will show no infection signs and symptoms  Outcome: Ongoing     Problem: Skin Integrity:  Goal: Will show no infection signs and symptoms  Description: Will show no infection signs and symptoms  Outcome: Ongoing  Goal: Absence of new skin breakdown  Description: Absence of new skin breakdown  Outcome: Ongoing     Problem: Confusion - Acute:  Goal: Absence of continued neurological deterioration signs and symptoms  Description: Absence of continued neurological deterioration signs and symptoms  Outcome: Ongoing  Goal: Mental status will be restored to baseline  Description: Mental status will be restored to baseline  Outcome: Ongoing     Problem: Discharge Planning:  Goal: Ability to perform activities of daily living will improve  Description: Ability to perform activities of daily living will improve  Outcome: Ongoing  Goal: Participates in care planning  Description: Participates in care planning  Outcome: Ongoing     Problem: Injury - Risk of, Physical Injury:  Goal: Will remain free from falls  Description: Will remain free from falls  9/9/2020 0030 by Luke Jarquin RN  Outcome: Ongoing  9/8/2020 1152 by Kierra Story RN  Outcome: Ongoing  Goal: Absence of physical injury  Description: Absence of physical injury  Outcome: Ongoing     Problem: Mood - Altered:  Goal: Mood stable  Description: Mood stable  Outcome: Ongoing  Goal: Absence of abusive behavior  Description: Absence of abusive behavior  Outcome: Ongoing  Goal: Verbalizations of feeling emotionally comfortable while being cared for will increase  Description: Verbalizations of feeling emotionally comfortable while being cared for will increase  Outcome: Ongoing     Problem: Psychomotor Activity - Altered:  Goal: Absence of psychomotor disturbance signs and symptoms  Description: Absence of psychomotor disturbance signs and symptoms  Outcome: Ongoing     Problem: Sensory Perception - Impaired:  Goal: Demonstrations of improved sensory functioning will increase  Description: Demonstrations of improved sensory functioning will increase  Outcome: Ongoing  Goal: Decrease in sensory misperception frequency  Description: Decrease in sensory misperception frequency  Outcome: Ongoing  Goal: Able to refrain from responding to false sensory perceptions  Description: Able to refrain from responding to false sensory perceptions  Outcome: Ongoing  Goal: Demonstrates accurate environmental perceptions  Description: Demonstrates accurate environmental perceptions  Outcome: Ongoing  Goal: Able to distinguish between reality-based and nonreality-based thinking  Description: Able to distinguish between reality-based and nonreality-based thinking  Outcome: Ongoing  Goal: Able to interrupt nonreality-based thinking  Description: Able to interrupt nonreality-based thinking  Outcome: Ongoing     Problem: Sleep Pattern Disturbance:  Goal: Appears well-rested  Description: Appears well-rested  Outcome: Ongoing

## 2020-11-16 NOTE — ED NOTES
Bed: 05  Expected date:   Expected time:   Means of arrival:   Comments:  [facility] [address] [place] [address]  01/13/20 6964 Call placed to patient and informed UA does not meet criteria to treat. Culture is being ran and we will call him if those results show otherwise. Pt verbalized understanding and denies further questions.

## 2022-04-25 ENCOUNTER — APPOINTMENT (OUTPATIENT)
Dept: CT IMAGING | Age: 77
End: 2022-04-25
Payer: OTHER GOVERNMENT

## 2022-04-25 ENCOUNTER — APPOINTMENT (OUTPATIENT)
Dept: GENERAL RADIOLOGY | Age: 77
End: 2022-04-25
Payer: OTHER GOVERNMENT

## 2022-04-25 ENCOUNTER — HOSPITAL ENCOUNTER (EMERGENCY)
Age: 77
Discharge: HOME | End: 2022-04-25
Payer: OTHER GOVERNMENT

## 2022-04-25 VITALS
OXYGEN SATURATION: 97 % | HEART RATE: 68 BPM | BODY MASS INDEX: 21.33 KG/M2 | SYSTOLIC BLOOD PRESSURE: 126 MMHG | TEMPERATURE: 97.9 F | DIASTOLIC BLOOD PRESSURE: 84 MMHG | RESPIRATION RATE: 18 BRPM | HEIGHT: 70 IN | WEIGHT: 149 LBS
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
## 2022-04-25 DIAGNOSIS — R07.81 RIB PAIN: ICD-10-CM

## 2022-04-25 DIAGNOSIS — R10.9 FLANK PAIN: Primary | ICD-10-CM

## 2022-04-25 DIAGNOSIS — Z86.69 HX OF PARKINSON'S DISEASE: ICD-10-CM

## 2022-04-25 LAB
A/G RATIO: 1.8 (ref 1.1–2.2)
ALBUMIN SERPL-MCNC: 4 G/DL (ref 3.4–5)
ALP BLD-CCNC: 146 U/L (ref 40–129)
ALT SERPL-CCNC: 10 U/L (ref 10–40)
ANION GAP SERPL CALCULATED.3IONS-SCNC: 8 MMOL/L (ref 3–16)
AST SERPL-CCNC: 18 U/L (ref 15–37)
BASOPHILS ABSOLUTE: 0 K/UL (ref 0–0.2)
BASOPHILS RELATIVE PERCENT: 0.6 %
BILIRUB SERPL-MCNC: 0.5 MG/DL (ref 0–1)
BILIRUBIN URINE: NEGATIVE
BLOOD, URINE: NEGATIVE
BUN BLDV-MCNC: 16 MG/DL (ref 7–20)
CALCIUM SERPL-MCNC: 9.7 MG/DL (ref 8.3–10.6)
CHLORIDE BLD-SCNC: 103 MMOL/L (ref 99–110)
CLARITY: CLEAR
CO2: 29 MMOL/L (ref 21–32)
COLOR: YELLOW
CREAT SERPL-MCNC: 0.9 MG/DL (ref 0.8–1.3)
EKG ATRIAL RATE: 62 BPM
EKG DIAGNOSIS: NORMAL
EKG P AXIS: 36 DEGREES
EKG P-R INTERVAL: 188 MS
EKG Q-T INTERVAL: 410 MS
EKG QRS DURATION: 98 MS
EKG QTC CALCULATION (BAZETT): 416 MS
EKG R AXIS: -33 DEGREES
EKG T AXIS: 34 DEGREES
EKG VENTRICULAR RATE: 62 BPM
EOSINOPHILS ABSOLUTE: 0.1 K/UL (ref 0–0.6)
EOSINOPHILS RELATIVE PERCENT: 1.6 %
GFR AFRICAN AMERICAN: >60
GFR NON-AFRICAN AMERICAN: >60
GLUCOSE BLD-MCNC: 98 MG/DL (ref 70–99)
GLUCOSE URINE: NEGATIVE MG/DL
HCT VFR BLD CALC: 40 % (ref 40.5–52.5)
HEMOGLOBIN: 13.3 G/DL (ref 13.5–17.5)
KETONES, URINE: NEGATIVE MG/DL
LEUKOCYTE ESTERASE, URINE: NEGATIVE
LIPASE: 34 U/L (ref 13–60)
LYMPHOCYTES ABSOLUTE: 1.1 K/UL (ref 1–5.1)
LYMPHOCYTES RELATIVE PERCENT: 22.8 %
MCH RBC QN AUTO: 29.3 PG (ref 26–34)
MCHC RBC AUTO-ENTMCNC: 33.2 G/DL (ref 31–36)
MCV RBC AUTO: 88.2 FL (ref 80–100)
MICROSCOPIC EXAMINATION: NORMAL
MONOCYTES ABSOLUTE: 0.4 K/UL (ref 0–1.3)
MONOCYTES RELATIVE PERCENT: 7.5 %
NEUTROPHILS ABSOLUTE: 3.4 K/UL (ref 1.7–7.7)
NEUTROPHILS RELATIVE PERCENT: 67.5 %
NITRITE, URINE: NEGATIVE
PDW BLD-RTO: 13.8 % (ref 12.4–15.4)
PH UA: 6 (ref 5–8)
PLATELET # BLD: 251 K/UL (ref 135–450)
PMV BLD AUTO: 6.9 FL (ref 5–10.5)
POTASSIUM SERPL-SCNC: 4.6 MMOL/L (ref 3.5–5.1)
PROTEIN UA: NEGATIVE MG/DL
RAPID INFLUENZA  B AGN: NEGATIVE
RAPID INFLUENZA A AGN: NEGATIVE
RBC # BLD: 4.53 M/UL (ref 4.2–5.9)
SARS-COV-2, NAAT: NOT DETECTED
SODIUM BLD-SCNC: 140 MMOL/L (ref 136–145)
SPECIFIC GRAVITY UA: 1.01 (ref 1–1.03)
TOTAL PROTEIN: 6.2 G/DL (ref 6.4–8.2)
TROPONIN: <0.01 NG/ML
URINE TYPE: NORMAL
UROBILINOGEN, URINE: 0.2 E.U./DL
WBC # BLD: 5 K/UL (ref 4–11)

## 2022-04-25 PROCEDURE — 81003 URINALYSIS AUTO W/O SCOPE: CPT

## 2022-04-25 PROCEDURE — 83690 ASSAY OF LIPASE: CPT

## 2022-04-25 PROCEDURE — 6370000000 HC RX 637 (ALT 250 FOR IP): Performed by: PHYSICIAN ASSISTANT

## 2022-04-25 PROCEDURE — 84484 ASSAY OF TROPONIN QUANT: CPT

## 2022-04-25 PROCEDURE — 99285 EMERGENCY DEPT VISIT HI MDM: CPT

## 2022-04-25 PROCEDURE — 85025 COMPLETE CBC W/AUTO DIFF WBC: CPT

## 2022-04-25 PROCEDURE — 87804 INFLUENZA ASSAY W/OPTIC: CPT

## 2022-04-25 PROCEDURE — 71045 X-RAY EXAM CHEST 1 VIEW: CPT

## 2022-04-25 PROCEDURE — 93005 ELECTROCARDIOGRAM TRACING: CPT | Performed by: PHYSICIAN ASSISTANT

## 2022-04-25 PROCEDURE — 80053 COMPREHEN METABOLIC PANEL: CPT

## 2022-04-25 PROCEDURE — 71250 CT THORAX DX C-: CPT

## 2022-04-25 PROCEDURE — 93010 ELECTROCARDIOGRAM REPORT: CPT | Performed by: INTERNAL MEDICINE

## 2022-04-25 PROCEDURE — 87635 SARS-COV-2 COVID-19 AMP PRB: CPT

## 2022-04-25 RX ORDER — ONDANSETRON 4 MG/1
4 TABLET, ORALLY DISINTEGRATING ORAL ONCE
Status: COMPLETED | OUTPATIENT
Start: 2022-04-25 | End: 2022-04-25

## 2022-04-25 RX ORDER — ACETAMINOPHEN 325 MG/1
650 TABLET ORAL ONCE
Status: COMPLETED | OUTPATIENT
Start: 2022-04-25 | End: 2022-04-25

## 2022-04-25 RX ADMIN — ACETAMINOPHEN 650 MG: 325 TABLET ORAL at 15:37

## 2022-04-25 RX ADMIN — CARBIDOPA AND LEVODOPA 1 TABLET: 25; 100 TABLET ORAL at 16:04

## 2022-04-25 RX ADMIN — ONDANSETRON 4 MG: 4 TABLET, ORALLY DISINTEGRATING ORAL at 16:41

## 2022-04-25 ASSESSMENT — ENCOUNTER SYMPTOMS
VOMITING: 0
BACK PAIN: 1
NAUSEA: 0
COUGH: 0
DIARRHEA: 0
CHEST TIGHTNESS: 0
ABDOMINAL PAIN: 0
SHORTNESS OF BREATH: 0

## 2022-04-25 ASSESSMENT — PAIN SCALES - GENERAL: PAINLEVEL_OUTOF10: 3

## 2022-04-25 ASSESSMENT — PAIN - FUNCTIONAL ASSESSMENT: PAIN_FUNCTIONAL_ASSESSMENT: 0-10

## 2022-04-25 NOTE — ED PROVIDER NOTES
I have reviewed the below EKG. I was not otherwise involved in this patient's care. EKG  The Ekg interpreted by myself  normal sinus rhythm with a rate of 62  Axis is   Left axis deviation  QTc is  normal  Intervals and Durations are unremarkable. No specific ST-T wave changes appreciated. No evidence of acute ischemia. No significant change from prior EKG dated September 4, 2020.         Colton Richard MD  04/25/22 0653

## 2022-04-25 NOTE — ED PROVIDER NOTES
congestion. He is able to swallow normally. Again they are concerned that this is worsening Parkinson's disease. PastMedical/Surgical History:      Diagnosis Date    Arthritis     Kidney stone     Knee pain     Parkinson disease ([address]          Procedure Laterality Date    NECK SURGERY      SHOULDER SURGERY         Medications:  Previous Medications    CARBIDOPA-LEVODOPA (PARCOPA)  MG TBDP    Take 2 tablets by mouth 2 times daily    GABAPENTIN (NEURONTIN) 300 MG CAPSULE    Take 300 mg by mouth 3 times daily. SERTRALINE (ZOLOFT) 100 MG TABLET    Take 100 mg by mouth nightly    TRAZODONE (DESYREL) 50 MG TABLET    Take 50 mg by mouth nightly         Review of Systems:  (2-9 systems needed)  Review of Systems   Constitutional: Positive for appetite change and fatigue. Negative for chills and fever. HENT: Negative for congestion. Eyes: Negative for visual disturbance. Respiratory: Negative for cough, chest tightness and shortness of breath. Cardiovascular: Negative for chest pain and palpitations. Gastrointestinal: Negative for abdominal pain, diarrhea, nausea and vomiting. Genitourinary: Positive for flank pain. Negative for dysuria, frequency and urgency. Musculoskeletal: Positive for back pain. Skin: Negative for rash. Neurological: Positive for weakness (increased from baseline). Negative for dizziness, light-headedness and headaches. Psychiatric/Behavioral: Positive for confusion and hallucinations (visual). Physical Exam:  Physical Exam  Vitals and nursing note reviewed. Constitutional:       Appearance: Normal appearance. He is not diaphoretic. HENT:      Head: Normocephalic and atraumatic. Nose: Nose normal.   Eyes:      General:         Right eye: No discharge. Left eye: No discharge. Extraocular Movements: Extraocular movements intact. Cardiovascular:      Rate and Rhythm: Normal rate and regular rhythm. Pulses: Normal pulses. Heart sounds: Normal heart sounds. No murmur heard. No friction rub. No gallop. Pulmonary:      Effort: Pulmonary effort is normal. No respiratory distress. Breath sounds: Normal breath sounds. No stridor. No wheezing, rhonchi or rales. Abdominal:      General: Abdomen is flat. Palpations: Abdomen is soft. Tenderness: There is no abdominal tenderness. There is no right CVA tenderness or left CVA tenderness. Musculoskeletal:         General: Normal range of motion. Cervical back: Normal range of motion and neck supple. Skin:     General: Skin is warm and dry. Coloration: Skin is not pale. Neurological:      Mental Status: He is alert and oriented to person, place, and time. Psychiatric:         Mood and Affect: Mood normal.         Behavior: Behavior normal.         MEDICAL DECISION MAKING    Vitals:    Vitals:    04/25/22 1319 04/25/22 1331 04/25/22 1445 04/25/22 1454   BP: (!) 141/81   135/76   Pulse: 61  65 61   Resp: 16  16 22   Temp:  97.9 °F (36.6 °C)     TempSrc:  Oral     SpO2: 99%  99% 98%   Weight: 149 lb (67.6 kg)      Height: 5' 10\" (1.778 m)          LABS:  Labs Reviewed   CBC WITH AUTO DIFFERENTIAL - Abnormal; Notable for the following components:       Result Value    Hemoglobin 13.3 (*)     Hematocrit 40.0 (*)     All other components within normal limits   COMPREHENSIVE METABOLIC PANEL - Abnormal; Notable for the following components: Total Protein 6.2 (*)     Alkaline Phosphatase 146 (*)     All other components within normal limits   RAPID INFLUENZA A/B ANTIGENS   COVID-19, RAPID   LIPASE   URINALYSIS   TROPONIN        Remainder of labs reviewed and were negative at this time or not returned at the time of this note.     RADIOLOGY:   Non-plain film images such as CT, Ultrasound and MRI are read by the radiologist. CHACORTA Gregorio have directly visualized the radiologic plain film image(s) with the below findings:      Interpretation per the Radiologist below, if available at the time of this note:    CT CHEST [address]   Preliminary Result   No renal or ureteral stone identified. No obstruction of the kidneys. No   evidence of acute osseous abnormality. Degenerative changes seen within the   spine with several levels of anterior wedging chronic in appearance. No   displaced rib fracture present. No acute intrathoracic, intra-abdominal or pelvic abnormality. XR CHEST PORTABLE   Final Result   No acute process. Tremaine Ruvalcaba MEDICAL DECISION MAKING / ED COURSE:      Patient was evaluated in the emergency department today for abdominal pain and flank pain. Patient is a very poor historian. He is hemodynamically stable at triage. Physical exam is unremarkable. Work-up is fairly broad due to patient vague symptoms. We will obtain CT chest abdomen and pelvis to evaluate for rib fracture and kidney stones. Work-up results include:  Urinalysis without evidence of infection. There is no blood in the urine. CBC without evidence of leukocytosis or acute anemia  CMP without acute electrolyte abnormality maintain renal function. No evidence of transaminitis. Lipase is 34  Troponin is less than 0.01  CT chest abdomen pelvis is unremarkable. Influenza test is negative  COVID test is negative    Swallow screening is performed by nursing staff, patient passed. Patient was given:  Medications   ondansetron (ZOFRAN-ODT) disintegrating tablet 4 mg (has no administration in time range)   carbidopa-levodopa (SINEMET)  MG per tablet 1 tablet (1 tablet Oral Given 4/25/22 1604)   acetaminophen (TYLENOL) tablet 650 mg (650 mg Oral Given 4/25/22 1537)     Upon reevaluation patient continues to remain hemodynamically stable. A discussion was had with the patient and family member regarding all lab and imaging results. His work-up is overall very reassuring.   He did receive his Sinemet home dose which he typically takes with food therefore did have some complaints of nausea and received Zofran. A discussion was had with the patient and family regarding close follow-up with primary care physician in the next 48 to 72 hours for reevaluation. The patient is advised to take Tylenol for his rib pain. He will return to the emergency department if he develops any new or worsening symptoms. All questions are answered and the patient is ultimately discharged home in good condition. The patient tolerated their visit well. I evaluated the patient. The physician was available for consultation as needed. The patient and / or the family were informed of the results of any tests, a time was given to answer questions, a plan was proposed and they agreed with plan. CLINICAL IMPRESSION:  1. Flank pain    2. Rib pain    3. Fall, initial encounter    4.  Hx of Parkinson's disease      Results for orders placed or performed during the hospital encounter of 04/25/22   Rapid influenza A/B antigens    Specimen: Nasopharyngeal   Result Value Ref Range    Rapid Influenza A Ag Negative Negative    Rapid Influenza B Ag Negative Negative   COVID-19, Rapid    Specimen: Nasopharyngeal Swab   Result Value Ref Range    SARS-CoV-2, NAAT Not Detected Not Detected   CBC with Auto Differential   Result Value Ref Range    WBC 5.0 4.0 - 11.0 K/uL    RBC 4.53 4.20 - 5.90 M/uL    Hemoglobin 13.3 (L) 13.5 - 17.5 g/dL    Hematocrit 40.0 (L) 40.5 - 52.5 %    MCV 88.2 80.0 - 100.0 fL    MCH 29.3 26.0 - 34.0 pg    MCHC 33.2 31.0 - 36.0 g/dL    RDW 13.8 12.4 - 15.4 %    Platelets 755 926 - 538 K/uL    MPV 6.9 5.0 - 10.5 fL    Neutrophils % 67.5 %    Lymphocytes % 22.8 %    Monocytes % 7.5 %    Eosinophils % 1.6 %    Basophils % 0.6 %    Neutrophils Absolute 3.4 1.7 - 7.7 K/uL    Lymphocytes Absolute 1.1 1.0 - 5.1 K/uL    Monocytes Absolute 0.4 0.0 - 1.3 K/uL    Eosinophils Absolute 0.1 0.0 - 0.6 K/uL    Basophils Absolute 0.0 0.0 - 0.2 K/uL   Comprehensive Metabolic Panel   Result Value Ref Range    Sodium 140 136 - 145 mmol/L    Potassium 4.6 3.5 - 5.1 mmol/L    Chloride 103 99 - 110 mmol/L    CO2 29 21 - 32 mmol/L    Anion Gap 8 3 - 16    Glucose 98 70 - 99 mg/dL    BUN 16 7 - 20 mg/dL    CREATININE 0.9 0.8 - 1.3 mg/dL    GFR Non-African American >60 >60    GFR African American >60 >60    Calcium 9.7 8.3 - 10.6 mg/dL    Total Protein 6.2 (L) 6.4 - 8.2 g/dL    Albumin 4.0 3.4 - 5.0 g/dL    Albumin/Globulin Ratio 1.8 1.1 - 2.2    Total Bilirubin 0.5 0.0 - 1.0 mg/dL    Alkaline Phosphatase 146 (H) 40 - 129 U/L    ALT 10 10 - 40 U/L    AST 18 15 - 37 U/L   Lipase   Result Value Ref Range    Lipase 34.0 13.0 - 60.0 U/L   Urinalysis   Result Value Ref Range    Color, UA Yellow Straw/Yellow    Clarity, UA Clear Clear    Glucose, Ur Negative Negative mg/dL    Bilirubin Urine Negative Negative    Ketones, Urine Negative Negative mg/dL    Specific Gravity, UA 1.010 1.005 - 1.030    Blood, Urine Negative Negative    pH, UA 6.0 5.0 - 8.0    Protein, UA Negative Negative mg/dL    Urobilinogen, Urine 0.2 <2.0 E.U./dL    Nitrite, Urine Negative Negative    Leukocyte Esterase, Urine Negative Negative    Microscopic Examination Not Indicated     Urine Type NotGiven    Troponin   Result Value Ref Range    Troponin <0.01 <0.01 ng/mL   EKG 12 Lead   Result Value Ref Range    Ventricular Rate 62 BPM    Atrial Rate 62 BPM    P-R Interval 188 ms    QRS Duration 98 ms    Q-T Interval 410 ms    QTc Calculation (Bazett) 416 ms    P Axis 36 degrees    R Axis -33 degrees    T Axis 34 degrees    Diagnosis       Normal sinus rhythmLeft axis deviationAbnormal ECGConfirmed by Ronald Brown MD, [facility] ([id number]) on 4/25/2022 4:50:44 PM         I estimate there is LOW risk for ACUTE APPENDICITIS, BOWEL OBSTRUCTION, CHOLECYSTITIS, DIVERTICULITIS, INCARCERATED HERNIA, PANCREATITIS, or PERFORATED BOWEL or ULCER, thus I consider the discharge disposition reasonable.  Also, there is no evidence or peritonitis, sepsis, or toxicity. Alessio Ravi and I have discussed the diagnosis and risks, and we agree with discharging home to follow-up with their primary doctor. We also discussed returning to the Emergency Department immediately if new or worsening symptoms occur. We have discussed the symptoms which are most concerning (e.g., bloody stool, fever, changing or worsening pain, vomiting) that necessitate immediate return. FINAL Impression    1. Flank pain    2. Rib pain    3. Fall, initial encounter    4. Hx of Parkinson's disease        Blood pressure 126/84, pulse 68, temperature 97.9 °F (36.6 °C), temperature source Oral, resp. rate 18, height 5' 10\" (1.778 m), weight 149 lb (67.6 kg), SpO2 97 %.       DISPOSITION Decision To Discharge 04/25/2022 04:37:21 PM      PATIENT REFERRED TO:  [facility]  ED  [address]  Go to   If symptoms worsen    Herbie Canavan, MD  [address]  [phone]            DISCHARGE MEDICATIONS:  New Prescriptions    No medications on file       DISCONTINUED MEDICATIONS:  Discontinued Medications    No medications on file              (Please note the MDM and HPI sections of this note were completed with a voice recognition program.  Efforts were made to edit the dictations but occasionally words are mis-transcribed.)    Electronically signed, Nelson Hernandez,           Nelson Hernandez  04/25/22 0365

## 2022-05-06 ENCOUNTER — APPOINTMENT (OUTPATIENT)
Dept: CT IMAGING | Age: 77
End: 2022-05-06
Payer: OTHER GOVERNMENT

## 2022-05-06 ENCOUNTER — APPOINTMENT (OUTPATIENT)
Dept: GENERAL RADIOLOGY | Age: 77
End: 2022-05-06
Payer: OTHER GOVERNMENT

## 2022-05-06 ENCOUNTER — HOSPITAL ENCOUNTER (EMERGENCY)
Age: 77
Discharge: HOME | End: 2022-05-06
Payer: OTHER GOVERNMENT

## 2022-05-06 VITALS
WEIGHT: 149 LBS | BODY MASS INDEX: 21.33 KG/M2 | HEIGHT: 70 IN | OXYGEN SATURATION: 96 % | HEART RATE: 58 BPM | SYSTOLIC BLOOD PRESSURE: 98 MMHG | RESPIRATION RATE: 18 BRPM | TEMPERATURE: 98.3 F | DIASTOLIC BLOOD PRESSURE: 65 MMHG

## 2022-05-06 DIAGNOSIS — S16.1XXA CERVICAL STRAIN, ACUTE, INITIAL ENCOUNTER: Primary | ICD-10-CM

## 2022-05-06 DIAGNOSIS — Z86.69 HISTORY OF PARKINSON'S DISEASE: ICD-10-CM

## 2022-05-06 LAB
A/G RATIO: 1.1 (ref 1.1–2.2)
ALBUMIN SERPL-MCNC: 3.3 G/DL (ref 3.4–5)
ALP BLD-CCNC: 149 U/L (ref 40–129)
ALT SERPL-CCNC: <5 U/L (ref 10–40)
ANION GAP SERPL CALCULATED.3IONS-SCNC: 8 MMOL/L (ref 3–16)
AST SERPL-CCNC: 13 U/L (ref 15–37)
BASOPHILS ABSOLUTE: 0 K/UL (ref 0–0.2)
BASOPHILS RELATIVE PERCENT: 0.6 %
BILIRUB SERPL-MCNC: 0.5 MG/DL (ref 0–1)
BUN BLDV-MCNC: 15 MG/DL (ref 7–20)
CALCIUM SERPL-MCNC: 9.7 MG/DL (ref 8.3–10.6)
CHLORIDE BLD-SCNC: 100 MMOL/L (ref 99–110)
CO2: 30 MMOL/L (ref 21–32)
CREAT SERPL-MCNC: 0.7 MG/DL (ref 0.8–1.3)
EKG ATRIAL RATE: 62 BPM
EKG DIAGNOSIS: NORMAL
EKG P AXIS: -27 DEGREES
EKG P-R INTERVAL: 170 MS
EKG Q-T INTERVAL: 406 MS
EKG QRS DURATION: 82 MS
EKG QTC CALCULATION (BAZETT): 412 MS
EKG R AXIS: -39 DEGREES
EKG T AXIS: -12 DEGREES
EKG VENTRICULAR RATE: 62 BPM
EOSINOPHILS ABSOLUTE: 0.1 K/UL (ref 0–0.6)
EOSINOPHILS RELATIVE PERCENT: 0.9 %
GFR AFRICAN AMERICAN: >60
GFR NON-AFRICAN AMERICAN: >60
GLUCOSE BLD-MCNC: 99 MG/DL (ref 70–99)
HCT VFR BLD CALC: 38.4 % (ref 40.5–52.5)
HEMOGLOBIN: 12.8 G/DL (ref 13.5–17.5)
LYMPHOCYTES ABSOLUTE: 0.8 K/UL (ref 1–5.1)
LYMPHOCYTES RELATIVE PERCENT: 10.7 %
MCH RBC QN AUTO: 29.7 PG (ref 26–34)
MCHC RBC AUTO-ENTMCNC: 33.5 G/DL (ref 31–36)
MCV RBC AUTO: 88.7 FL (ref 80–100)
MONOCYTES ABSOLUTE: 1.1 K/UL (ref 0–1.3)
MONOCYTES RELATIVE PERCENT: 13.8 %
NEUTROPHILS ABSOLUTE: 5.8 K/UL (ref 1.7–7.7)
NEUTROPHILS RELATIVE PERCENT: 74 %
PDW BLD-RTO: 14.2 % (ref 12.4–15.4)
PLATELET # BLD: 178 K/UL (ref 135–450)
PMV BLD AUTO: 6.7 FL (ref 5–10.5)
POTASSIUM REFLEX MAGNESIUM: 4.3 MMOL/L (ref 3.5–5.1)
RBC # BLD: 4.33 M/UL (ref 4.2–5.9)
SODIUM BLD-SCNC: 138 MMOL/L (ref 136–145)
TOTAL PROTEIN: 6.3 G/DL (ref 6.4–8.2)
TROPONIN: <0.01 NG/ML
WBC # BLD: 7.9 K/UL (ref 4–11)

## 2022-05-06 PROCEDURE — 93005 ELECTROCARDIOGRAM TRACING: CPT | Performed by: EMERGENCY MEDICINE

## 2022-05-06 PROCEDURE — 93010 ELECTROCARDIOGRAM REPORT: CPT | Performed by: INTERNAL MEDICINE

## 2022-05-06 PROCEDURE — 99284 EMERGENCY DEPT VISIT MOD MDM: CPT

## 2022-05-06 PROCEDURE — 84484 ASSAY OF TROPONIN QUANT: CPT

## 2022-05-06 PROCEDURE — 72125 CT NECK SPINE W/O DYE: CPT

## 2022-05-06 PROCEDURE — 73030 X-RAY EXAM OF SHOULDER: CPT

## 2022-05-06 PROCEDURE — 80053 COMPREHEN METABOLIC PANEL: CPT

## 2022-05-06 PROCEDURE — 96374 THER/PROPH/DIAG INJ IV PUSH: CPT

## 2022-05-06 PROCEDURE — 71046 X-RAY EXAM CHEST 2 VIEWS: CPT

## 2022-05-06 PROCEDURE — 73110 X-RAY EXAM OF WRIST: CPT

## 2022-05-06 PROCEDURE — 85025 COMPLETE CBC W/AUTO DIFF WBC: CPT

## 2022-05-06 PROCEDURE — 70450 CT HEAD/BRAIN W/O DYE: CPT

## 2022-05-06 PROCEDURE — 6360000002 HC RX W HCPCS: Performed by: NURSE PRACTITIONER

## 2022-05-06 RX ORDER — ONDANSETRON 2 MG/ML
4 INJECTION INTRAMUSCULAR; INTRAVENOUS ONCE
Status: DISCONTINUED | OUTPATIENT
Start: 2022-05-06 | End: 2022-05-06 | Stop reason: HOSPADM

## 2022-05-06 RX ORDER — MORPHINE SULFATE 2 MG/ML
2 INJECTION, SOLUTION INTRAMUSCULAR; INTRAVENOUS ONCE
Status: COMPLETED | OUTPATIENT
Start: 2022-05-06 | End: 2022-05-06

## 2022-05-06 RX ORDER — MELOXICAM 15 MG/1
15 TABLET ORAL DAILY
Qty: 30 TABLET | Refills: 0 | Status: ON HOLD | OUTPATIENT
Start: 2022-05-06 | End: 2022-06-12

## 2022-05-06 RX ADMIN — MORPHINE SULFATE 2 MG: 2 INJECTION, SOLUTION INTRAMUSCULAR; INTRAVENOUS at 12:42

## 2022-05-06 ASSESSMENT — ENCOUNTER SYMPTOMS
COLOR CHANGE: 0
VOMITING: 0
SHORTNESS OF BREATH: 0
NAUSEA: 0
WHEEZING: 0
ABDOMINAL PAIN: 0
DIARRHEA: 0
BACK PAIN: 0
COUGH: 0

## 2022-05-06 ASSESSMENT — PAIN SCALES - GENERAL: PAINLEVEL_OUTOF10: 6

## 2022-05-06 NOTE — ED PROVIDER NOTES
**ADVANCED PRACTICE PROVIDER, I HAVE EVALUATED THIS [facility]  ED  EMERGENCY DEPARTMENT ENCOUNTER      Pt Name: Gloria SEO:[id number]  [place] 1945  Date of evaluation: 5/6/2022  Provider: ZULEYMA Pang CNP      Chief Complaint:    Chief Complaint   Patient presents with    Chest Pain     shoulder neck down into back. . denies cardiac hx. .per pt multiple falls over last week. . 324 asa given in squad         Nursing Notes, Past Medical Hx, Past Surgical Hx, Social Hx, Allergies, and Family Hx were all reviewed and agreed with or any disagreements were addressed in the HPI.    HPI: (Location, Duration, Timing, Severity, Quality, Assoc Sx, Context, Modifying factors)    Chief Complaint of right-sided neck pain    This is a  64 y.o. male who presents to the emergency department with right-sided neck pain is ongoing across the anterior chest and the right scapula, patient evidently was recently seen in the emergency department within the past couple of weeks however, when he got home, he fell with his walker walking out to the mail. Patient has a history of Parkinson's, walks with a walker wife states that he has been complaining of right-sided neck pain since the fall however, today she said she got concerned because he started having pain across his chest.  He has not had any recent cough, congestion, fever or chills. He is a former smoker however he quit smoking over 30 years ago. He is denying any abdominal pain, no nausea vomiting or diarrhea, he does report that the pain is worse with movement. He is not really taking any medicine for the pain, as he rates the pain a 6 out of 10. He denies any numbness tingling or paresthesias. No one-sided weakness or neglect. No headache and does not recall any head injury or loss of consciousness after the fall. Patient denies any additional symptoms or complaints.   No additional aggravating or relieving factors. Patient presents awake, alert and in no acute respiratory distress or toxic appearance. PastMedical/Surgical History:      Diagnosis Date    Arthritis     Kidney stone     Knee pain     Parkinson disease ([address]          Procedure Laterality Date    NECK SURGERY      SHOULDER SURGERY         Medications:  Previous Medications    CARBIDOPA-LEVODOPA (PARCOPA)  MG TBDP    Take 2 tablets by mouth 2 times daily    GABAPENTIN (NEURONTIN) 300 MG CAPSULE    Take 300 mg by mouth 3 times daily. SERTRALINE (ZOLOFT) 100 MG TABLET    Take 100 mg by mouth nightly    TRAZODONE (DESYREL) 50 MG TABLET    Take 50 mg by mouth nightly         Review of Systems:  (2-9 systems needed)  Review of Systems   Constitutional: Negative for chills and fever. HENT: Negative for congestion. Respiratory: Negative for cough, shortness of breath and wheezing. Cardiovascular: Negative for chest pain. Gastrointestinal: Negative for abdominal pain, diarrhea, nausea and vomiting. Genitourinary: Negative for dysuria, frequency, hematuria and urgency. Musculoskeletal: Positive for myalgias and neck pain. Negative for back pain. Patient complains of right-sided neck pain is ongoing across the anterior chest and the right scapula, patient evidently was recently seen in the emergency department within the past couple of weeks however, when he got home, he fell with his walker walking out to the mail. Skin: Negative for color change. Neurological: Negative for weakness, numbness and headaches. \"Positives and Pertinent negatives as per HPI\"    Physical Exam:  Physical Exam  Vitals and nursing note reviewed. Constitutional:       Appearance: He is well-developed. He is not diaphoretic. HENT:      Head: Normocephalic. Right Ear: External ear normal.      Left Ear: External ear normal.   Eyes:      General: No scleral icterus. Right eye: No discharge. Left eye: No discharge. Cardiovascular:      Rate and Rhythm: Normal rate. Comments: Normal S1 and 2, peripheral pulses 2+, chronic swelling of bilateral feet and ankles, 1+. Pulmonary:      Effort: Pulmonary effort is normal. No respiratory distress. Comments: Lungs are clear anteriorly, diminished posteriorly in the bases, patient is not tachypneic or dyspneic, saturations are 98% on room air  Abdominal:      Palpations: Abdomen is soft. Musculoskeletal:         General: Normal range of motion. Cervical back: Normal range of motion and neck supple. Skin:     General: Skin is warm. Capillary Refill: Capillary refill takes less than 2 seconds. Coloration: Skin is not pale. Neurological:      General: No focal deficit present. Mental Status: He is alert and oriented to person, place, and time. Cranial Nerves: Cranial nerves are intact. Sensory: Sensation is intact. Motor: Motor function is intact.       Comments: Patient is awake, alert answering questions appropriately, patient has history of Parkinson's, his fine tremors appear to be normal   Psychiatric:         Behavior: Behavior normal.         MEDICAL DECISION MAKING    Vitals:    Vitals:    05/06/22 1049 05/06/22 1243 05/06/22 1525   BP:  101/65 98/65   Pulse: 63 65 58   Resp: 18 18 18   Temp: 98.3 °F (36.8 °C)     TempSrc: Oral     SpO2: 92% 93% 96%   Weight: 149 lb (67.6 kg)     Height: 5' 10\" (1.778 m)         LABS:  Labs Reviewed   CBC WITH AUTO DIFFERENTIAL - Abnormal; Notable for the following components:       Result Value    Hemoglobin 12.8 (*)     Hematocrit 38.4 (*)     Lymphocytes Absolute 0.8 (*)     All other components within normal limits   COMPREHENSIVE METABOLIC PANEL W/ REFLEX TO MG FOR LOW K - Abnormal; Notable for the following components:    CREATININE 0.7 (*)     Total Protein 6.3 (*)     Albumin 3.3 (*)     Alkaline Phosphatase 149 (*)     ALT <5 (*)     AST 13 (*)     All other components within normal limits   TROPONIN        Remainder of labs reviewed and were negative at this time or not returned at the time of this note. RADIOLOGY:   Non-plain film images such as CT, Ultrasound and MRI are read by the radiologist. Elda SANCHEZ APRN - CNP have directly visualized the radiologic plain film image(s) with the below findings:      Interpretation per the Radiologist below, if available at the time of this note:    CT CERVICAL SPINE WO CONTRAST   Final Result   Head-      No acute intracranial abnormality. ---      Cervical spine-      No acute fracture or subluxation of the cervical spine. C6-7 anterior cervical discectomy and fusion, without hardware complication. CT HEAD WO CONTRAST   Final Result   Head-      No acute intracranial abnormality. ---      Cervical spine-      No acute fracture or subluxation of the cervical spine. C6-7 anterior cervical discectomy and fusion, without hardware complication. XR CHEST (2 VW)   Final Result   No acute cardiopulmonary disease. XR WRIST LEFT (MIN 3 VIEWS)   Final Result   No acute osseous abnormality. XR SHOULDER RIGHT (MIN 2 VIEWS)   Final Result   Chronic degenerative changes at the [facility] joint with no acute abnormality. MEDICAL DECISION MAKING / ED COURSE:    Because of high probability of sudden clinical deterioration of the patient's condition and risk of further deterioration, critical care time required my full attention to the patient's condition; which included chart data review, documentation, medication ordering, reviewing the patient's old records, reevaluation patient's cardiac, pulmonary and neurological status. Reevaluation of vital signs. Consultations with ED attending and admitting physician. Ordering, interpreting reviewing diagnostic testing.   Therefore a critical care time was 18 minutes of direct attention to the patient's condition did not include time spent on procedures. PROCEDURES:   Procedures    None    Patient was given:  Medications   [facility] injection 4 mg (0 mg IntraVENous Held 5/6/22 1313)   morphine (PF) injection 2 mg (2 mg IntraVENous Given 5/6/22 1242)       Patient complains of right-sided neck pain is ongoing across the anterior chest and the right scapula, patient evidently was recently seen in the emergency department within the past couple of weeks however, when he got home, he fell with his walker walking out to the mail. After evaluation and examination patient IV access, blood work, chest x-ray, urinalysis, CT scan of the head and neck were ordered. Patient did not have any shoulder or wrist complaints to me on exam however, the wife states that when he fell he did lay on that side he has been complaining of however the fall was several weeks ago. CBC shows no acute sepsis or anemia requiring transfusion. Metabolic panel shows no electrolyte disturbances or renal failure. Alk phos is 149 however it appears to be elevated in the past when compared to last month's labs. Other liver functions are normal.  Troponin is negative. CT of the head shows no acute intracranial abnormality. Cervical spine CT shows no acute fracture or subluxation, patient does have C6-7 anterior cervical discectomy and fusion without hardware complication. Chest x-ray shows no acute cardiopulmonary findings. Left wrist x-ray shows no acute osseous abnormality. Right shoulder x-ray shows chronic degenerative changes at the [facility] joint with no acute abnormality. EKG shows sinus rhythm rate of 62 bpm, no acute ST elevation, please see the attending physician documentation for EKG interpretation. Patient was given morphine and Zofran, upon reevaluation vital signs are stable, I do believe some of this could be related to musculoskeletal pain however, he was able to ambulate with the nursing staff without difficulty.   Blood pressure appears to be normal, nursing staff was concerned about possible dehydration, patient was encouraged to drink fluids, he was given a big gulp here. I did educate him about various exercises he could do to help alleviate some of his neck pain as I do believe this were musculoskeletal in nature    The history and physical exam suggests a soft tissue source for pain such as muscles, tendons, nerve irritation, etc. I estimate there is LOW risk for CAUDA EQUINA or CENTRAL CORD SYNDROME, EPIDURAL MASS LESION OR ABSCESS, ARTERIAL DISSECTION, MENINGITIS, FRACTURE, CORD COMPRESSION, OR SEVERE SPINAL STENOSIS,  thus I consider the discharge disposition reasonable. Therefore, shared medical decision was made between the patient, his wife and myself and we agreed patient could be discharged home with outpatient follow-up. Patient was discharged home with referral back to PCP. Patient is advised to follow-up with their family doctor within the next 24-48 hours and return to the emergency department with any concerns. He was discharged home with Mobic to take as prescribed. Along with various exercises he can do. The patient tolerated their visit well. I evaluated the patient. The physician was available for consultation as needed. The patient and / or the family were informed of the results of any tests, a time was given to answer questions, a plan was proposed and they agreed with plan. Patient and family verbalized understanding of discharge instructions and the patient was discharged from the department in stable condition.     Addendum: I did speak with the patient and wife at length about if he were to have frequent falls continuously like this that it could be advancing of his Parkinson's, his wife states they already have PT and OT at home through the [place], they have not discussed about placement to a nursing facility, I did educate that the falls could continue and if they become more frequent due to his medical history and the Parkinson's. However, the wife and patient both agree that he feels comfortable enough to go home. CLINICAL IMPRESSION:  1. Cervical strain, acute, initial encounter    2.  History of Parkinson's disease        DISPOSITION Decision To Discharge 05/06/2022 02:13:26 PM      PATIENT REFERRED TO:  Carol Ann Lopez MD  [address]  [phone]    Call   Call the family doctor make a follow-up appointment for next week for reevaluation    [facility]  ED  [facility] [address] [phone]  Go to   If symptoms worsen      DISCHARGE MEDICATIONS:  New Prescriptions    MELOXICAM (MOBIC) 15 MG TABLET    Take 1 tablet by mouth daily       DISCONTINUED MEDICATIONS:  Discontinued Medications    No medications on file              (Please note the MDM and HPI sections of this note were completed with a voice recognition program.  Efforts were made to edit the dictations but occasionally words are mis-transcribed.)    Electronically signed, ZULEYMA Puri CNP,          ZULEYMA Puri CNP  05/06/22 1716 [facility], APRN - CNP  05/06/22 2873

## 2022-05-06 NOTE — ED NOTES
Ambulated patient around patient room. Patient did more of a foot shuffle than an actual step type of walk. Kept walker out in front of him, instead of staying inside of walker. Writer didn't feel as though patient was unsteady, just weak.  Patient also stated he felt weak and he needed to get stronger      William Negron  05/06/22 0129

## 2022-05-06 NOTE — ED NOTES
Assumed care for discharge. Pt taken to waiting room in wheelchair, with wife, to wait on ride (son). Pt and wife state they are comfortable with waiting there. No s/s of distress. Denies needs.       Mal Gates RN  05/06/22 3508

## 2022-06-12 ENCOUNTER — APPOINTMENT (OUTPATIENT)
Dept: CT IMAGING | Age: 77
End: 2022-06-12
Payer: OTHER GOVERNMENT

## 2022-06-12 ENCOUNTER — APPOINTMENT (OUTPATIENT)
Dept: GENERAL RADIOLOGY | Age: 77
End: 2022-06-12
Payer: OTHER GOVERNMENT

## 2022-06-12 ENCOUNTER — HOSPITAL ENCOUNTER (OUTPATIENT)
Age: 77
Setting detail: OBSERVATION
Discharge: NURSING, REHAB OR LONG-TERM CARE | End: 2022-06-15
Attending: STUDENT IN AN ORGANIZED HEALTH CARE EDUCATION/TRAINING PROGRAM | Admitting: INTERNAL MEDICINE
Payer: OTHER GOVERNMENT

## 2022-06-12 DIAGNOSIS — R53.1 GENERAL WEAKNESS: ICD-10-CM

## 2022-06-12 DIAGNOSIS — S82.892A CLOSED FRACTURE OF LEFT ANKLE, INITIAL ENCOUNTER: Primary | ICD-10-CM

## 2022-06-12 LAB
A/G RATIO: 1 (ref 1.1–2.2)
ALBUMIN SERPL-MCNC: 3.4 G/DL (ref 3.4–5)
ALP BLD-CCNC: 153 U/L (ref 40–129)
ALT SERPL-CCNC: 8 U/L (ref 10–40)
ANION GAP SERPL CALCULATED.3IONS-SCNC: 9 MMOL/L (ref 3–16)
AST SERPL-CCNC: 17 U/L (ref 15–37)
BASOPHILS ABSOLUTE: 0 K/UL (ref 0–0.2)
BASOPHILS RELATIVE PERCENT: 0.5 %
BILIRUB SERPL-MCNC: 0.5 MG/DL (ref 0–1)
BILIRUBIN URINE: ABNORMAL
BLOOD, URINE: ABNORMAL
BUN BLDV-MCNC: 16 MG/DL (ref 7–20)
CALCIUM SERPL-MCNC: 9.1 MG/DL (ref 8.3–10.6)
CHLORIDE BLD-SCNC: 98 MMOL/L (ref 99–110)
CLARITY: ABNORMAL
CO2: 29 MMOL/L (ref 21–32)
COLOR: ABNORMAL
CREAT SERPL-MCNC: 0.8 MG/DL (ref 0.8–1.3)
EOSINOPHILS ABSOLUTE: 0 K/UL (ref 0–0.6)
EOSINOPHILS RELATIVE PERCENT: 0.5 %
GFR AFRICAN AMERICAN: >60
GFR NON-AFRICAN AMERICAN: >60
GLUCOSE BLD-MCNC: 96 MG/DL (ref 70–99)
GLUCOSE URINE: NEGATIVE MG/DL
HCT VFR BLD CALC: 39.9 % (ref 40.5–52.5)
HEMOGLOBIN: 13.4 G/DL (ref 13.5–17.5)
INFLUENZA A: NOT DETECTED
INFLUENZA B: NOT DETECTED
INR BLD: 1.07 (ref 0.87–1.14)
KETONES, URINE: NEGATIVE MG/DL
LACTIC ACID: 0.7 MMOL/L (ref 0.4–2)
LEUKOCYTE ESTERASE, URINE: NEGATIVE
LYMPHOCYTES ABSOLUTE: 0.8 K/UL (ref 1–5.1)
LYMPHOCYTES RELATIVE PERCENT: 9.3 %
MCH RBC QN AUTO: 28.9 PG (ref 26–34)
MCHC RBC AUTO-ENTMCNC: 33.5 G/DL (ref 31–36)
MCV RBC AUTO: 86.4 FL (ref 80–100)
MICROSCOPIC EXAMINATION: YES
MONOCYTES ABSOLUTE: 1.2 K/UL (ref 0–1.3)
MONOCYTES RELATIVE PERCENT: 13.2 %
MUCUS: ABNORMAL /LPF
NEUTROPHILS ABSOLUTE: 7 K/UL (ref 1.7–7.7)
NEUTROPHILS RELATIVE PERCENT: 76.5 %
NITRITE, URINE: NEGATIVE
PDW BLD-RTO: 15 % (ref 12.4–15.4)
PH UA: 6.5 (ref 5–8)
PLATELET # BLD: 244 K/UL (ref 135–450)
PMV BLD AUTO: 6.1 FL (ref 5–10.5)
POTASSIUM REFLEX MAGNESIUM: 4.1 MMOL/L (ref 3.5–5.1)
PRO-BNP: 222 PG/ML (ref 0–449)
PROTEIN UA: NEGATIVE MG/DL
PROTHROMBIN TIME: 13.7 SEC (ref 11.7–14.5)
RBC # BLD: 4.62 M/UL (ref 4.2–5.9)
RBC UA: >100 /HPF (ref 0–4)
SARS-COV-2 RNA, RT PCR: NOT DETECTED
SODIUM BLD-SCNC: 136 MMOL/L (ref 136–145)
SPECIFIC GRAVITY UA: 1.01 (ref 1–1.03)
TOTAL PROTEIN: 6.7 G/DL (ref 6.4–8.2)
TROPONIN: <0.01 NG/ML
URINE TYPE: ABNORMAL
UROBILINOGEN, URINE: 1 E.U./DL
WBC # BLD: 9.1 K/UL (ref 4–11)
WBC UA: ABNORMAL /HPF (ref 0–5)

## 2022-06-12 PROCEDURE — 36415 COLL VENOUS BLD VENIPUNCTURE: CPT

## 2022-06-12 PROCEDURE — 93005 ELECTROCARDIOGRAM TRACING: CPT | Performed by: STUDENT IN AN ORGANIZED HEALTH CARE EDUCATION/TRAINING PROGRAM

## 2022-06-12 PROCEDURE — 85610 PROTHROMBIN TIME: CPT

## 2022-06-12 PROCEDURE — 87636 SARSCOV2 & INF A&B AMP PRB: CPT

## 2022-06-12 PROCEDURE — 73610 X-RAY EXAM OF ANKLE: CPT

## 2022-06-12 PROCEDURE — 85025 COMPLETE CBC W/AUTO DIFF WBC: CPT

## 2022-06-12 PROCEDURE — 6360000004 HC RX CONTRAST MEDICATION: Performed by: STUDENT IN AN ORGANIZED HEALTH CARE EDUCATION/TRAINING PROGRAM

## 2022-06-12 PROCEDURE — 72128 CT CHEST SPINE W/O DYE: CPT

## 2022-06-12 PROCEDURE — 1200000000 HC SEMI PRIVATE

## 2022-06-12 PROCEDURE — 83880 ASSAY OF NATRIURETIC PEPTIDE: CPT

## 2022-06-12 PROCEDURE — 96374 THER/PROPH/DIAG INJ IV PUSH: CPT

## 2022-06-12 PROCEDURE — 2580000003 HC RX 258: Performed by: INTERNAL MEDICINE

## 2022-06-12 PROCEDURE — 84484 ASSAY OF TROPONIN QUANT: CPT

## 2022-06-12 PROCEDURE — 71045 X-RAY EXAM CHEST 1 VIEW: CPT

## 2022-06-12 PROCEDURE — 72125 CT NECK SPINE W/O DYE: CPT

## 2022-06-12 PROCEDURE — 74177 CT ABD & PELVIS W/CONTRAST: CPT

## 2022-06-12 PROCEDURE — G0378 HOSPITAL OBSERVATION PER HR: HCPCS

## 2022-06-12 PROCEDURE — 72131 CT LUMBAR SPINE W/O DYE: CPT

## 2022-06-12 PROCEDURE — 83605 ASSAY OF LACTIC ACID: CPT

## 2022-06-12 PROCEDURE — 80053 COMPREHEN METABOLIC PANEL: CPT

## 2022-06-12 PROCEDURE — 6370000000 HC RX 637 (ALT 250 FOR IP): Performed by: STUDENT IN AN ORGANIZED HEALTH CARE EDUCATION/TRAINING PROGRAM

## 2022-06-12 PROCEDURE — 99285 EMERGENCY DEPT VISIT HI MDM: CPT

## 2022-06-12 PROCEDURE — 70450 CT HEAD/BRAIN W/O DYE: CPT

## 2022-06-12 PROCEDURE — 6360000002 HC RX W HCPCS: Performed by: STUDENT IN AN ORGANIZED HEALTH CARE EDUCATION/TRAINING PROGRAM

## 2022-06-12 PROCEDURE — 81001 URINALYSIS AUTO W/SCOPE: CPT

## 2022-06-12 RX ORDER — HYDROCODONE BITARTRATE AND ACETAMINOPHEN 5; 325 MG/1; MG/1
1 TABLET ORAL EVERY 6 HOURS PRN
Status: DISCONTINUED | OUTPATIENT
Start: 2022-06-12 | End: 2022-06-15 | Stop reason: HOSPADM

## 2022-06-12 RX ORDER — CARBIDOPA AND LEVODOPA 50; 200 MG/1; MG/1
1 TABLET, EXTENDED RELEASE ORAL NIGHTLY
Status: DISCONTINUED | OUTPATIENT
Start: 2022-06-13 | End: 2022-06-15 | Stop reason: HOSPADM

## 2022-06-12 RX ORDER — DOCUSATE SODIUM 100 MG/1
100 CAPSULE, LIQUID FILLED ORAL DAILY PRN
COMMUNITY

## 2022-06-12 RX ORDER — ONDANSETRON 4 MG/1
4 TABLET, ORALLY DISINTEGRATING ORAL EVERY 8 HOURS PRN
Status: DISCONTINUED | OUTPATIENT
Start: 2022-06-12 | End: 2022-06-15 | Stop reason: HOSPADM

## 2022-06-12 RX ORDER — SODIUM CHLORIDE 9 MG/ML
INJECTION, SOLUTION INTRAVENOUS CONTINUOUS
Status: DISCONTINUED | OUTPATIENT
Start: 2022-06-12 | End: 2022-06-13

## 2022-06-12 RX ORDER — POTASSIUM CHLORIDE 750 MG/1
10 CAPSULE, EXTENDED RELEASE ORAL DAILY
COMMUNITY

## 2022-06-12 RX ORDER — POTASSIUM CHLORIDE 7.45 MG/ML
10 INJECTION INTRAVENOUS PRN
Status: DISCONTINUED | OUTPATIENT
Start: 2022-06-12 | End: 2022-06-15 | Stop reason: HOSPADM

## 2022-06-12 RX ORDER — GABAPENTIN 300 MG/1
300 CAPSULE ORAL 3 TIMES DAILY
Status: DISCONTINUED | OUTPATIENT
Start: 2022-06-13 | End: 2022-06-15 | Stop reason: HOSPADM

## 2022-06-12 RX ORDER — PYRIDOXINE HCL (VITAMIN B6) 50 MG
50 TABLET ORAL DAILY
COMMUNITY

## 2022-06-12 RX ORDER — SODIUM CHLORIDE 0.9 % (FLUSH) 0.9 %
5-40 SYRINGE (ML) INJECTION PRN
Status: DISCONTINUED | OUTPATIENT
Start: 2022-06-12 | End: 2022-06-15 | Stop reason: HOSPADM

## 2022-06-12 RX ORDER — SENNA PLUS 8.6 MG/1
1 TABLET ORAL NIGHTLY PRN
Status: DISCONTINUED | OUTPATIENT
Start: 2022-06-12 | End: 2022-06-15 | Stop reason: HOSPADM

## 2022-06-12 RX ORDER — SODIUM CHLORIDE 9 MG/ML
INJECTION, SOLUTION INTRAVENOUS PRN
Status: DISCONTINUED | OUTPATIENT
Start: 2022-06-12 | End: 2022-06-15 | Stop reason: HOSPADM

## 2022-06-12 RX ORDER — SERTRALINE HYDROCHLORIDE 100 MG/1
100 TABLET, FILM COATED ORAL NIGHTLY
Status: DISCONTINUED | OUTPATIENT
Start: 2022-06-12 | End: 2022-06-12

## 2022-06-12 RX ORDER — TRAZODONE HYDROCHLORIDE 50 MG/1
50 TABLET ORAL NIGHTLY
Status: DISCONTINUED | OUTPATIENT
Start: 2022-06-13 | End: 2022-06-15 | Stop reason: HOSPADM

## 2022-06-12 RX ORDER — ONDANSETRON 2 MG/ML
4 INJECTION INTRAMUSCULAR; INTRAVENOUS EVERY 6 HOURS PRN
Status: DISCONTINUED | OUTPATIENT
Start: 2022-06-12 | End: 2022-06-15 | Stop reason: HOSPADM

## 2022-06-12 RX ORDER — LIDOCAINE 50 MG/G
1 PATCH TOPICAL DAILY
COMMUNITY

## 2022-06-12 RX ORDER — SODIUM CHLORIDE 0.9 % (FLUSH) 0.9 %
5-40 SYRINGE (ML) INJECTION EVERY 12 HOURS SCHEDULED
Status: DISCONTINUED | OUTPATIENT
Start: 2022-06-12 | End: 2022-06-15 | Stop reason: HOSPADM

## 2022-06-12 RX ORDER — ACETAMINOPHEN 500 MG
1000 TABLET ORAL ONCE
Status: COMPLETED | OUTPATIENT
Start: 2022-06-12 | End: 2022-06-12

## 2022-06-12 RX ORDER — ACETAMINOPHEN 650 MG/1
650 SUPPOSITORY RECTAL EVERY 6 HOURS PRN
Status: DISCONTINUED | OUTPATIENT
Start: 2022-06-12 | End: 2022-06-15 | Stop reason: HOSPADM

## 2022-06-12 RX ORDER — ENOXAPARIN SODIUM 100 MG/ML
40 INJECTION SUBCUTANEOUS DAILY
Status: DISCONTINUED | OUTPATIENT
Start: 2022-06-13 | End: 2022-06-15 | Stop reason: HOSPADM

## 2022-06-12 RX ORDER — ACETAMINOPHEN 325 MG/1
650 TABLET ORAL EVERY 6 HOURS PRN
Status: DISCONTINUED | OUTPATIENT
Start: 2022-06-12 | End: 2022-06-15 | Stop reason: HOSPADM

## 2022-06-12 RX ORDER — POTASSIUM CHLORIDE 20 MEQ/1
40 TABLET, EXTENDED RELEASE ORAL PRN
Status: DISCONTINUED | OUTPATIENT
Start: 2022-06-12 | End: 2022-06-15 | Stop reason: HOSPADM

## 2022-06-12 RX ORDER — TRAMADOL HYDROCHLORIDE 50 MG/1
50 TABLET ORAL 2 TIMES DAILY
Status: ON HOLD | COMMUNITY
End: 2022-06-15 | Stop reason: SDUPTHER

## 2022-06-12 RX ORDER — MELOXICAM 15 MG/1
15 TABLET ORAL DAILY
Status: DISCONTINUED | OUTPATIENT
Start: 2022-06-12 | End: 2022-06-12 | Stop reason: ALTCHOICE

## 2022-06-12 RX ORDER — KETOCONAZOLE 20 MG/ML
SHAMPOO TOPICAL
COMMUNITY

## 2022-06-12 RX ORDER — TRAZODONE HYDROCHLORIDE 50 MG/1
50 TABLET ORAL NIGHTLY
Status: DISCONTINUED | OUTPATIENT
Start: 2022-06-13 | End: 2022-06-12

## 2022-06-12 RX ORDER — KETOROLAC TROMETHAMINE 30 MG/ML
15 INJECTION, SOLUTION INTRAMUSCULAR; INTRAVENOUS ONCE
Status: COMPLETED | OUTPATIENT
Start: 2022-06-12 | End: 2022-06-12

## 2022-06-12 RX ORDER — LIDOCAINE 4 G/G
1 PATCH TOPICAL DAILY
Status: DISCONTINUED | OUTPATIENT
Start: 2022-06-13 | End: 2022-06-15 | Stop reason: HOSPADM

## 2022-06-12 RX ORDER — POLYETHYLENE GLYCOL 3350 17 G/17G
17 POWDER, FOR SOLUTION ORAL DAILY
Status: DISCONTINUED | OUTPATIENT
Start: 2022-06-12 | End: 2022-06-15 | Stop reason: HOSPADM

## 2022-06-12 RX ORDER — MAGNESIUM SULFATE 1 G/100ML
1000 INJECTION INTRAVENOUS PRN
Status: DISCONTINUED | OUTPATIENT
Start: 2022-06-12 | End: 2022-06-15 | Stop reason: HOSPADM

## 2022-06-12 RX ORDER — CARBIDOPA AND LEVODOPA 50; 200 MG/1; MG/1
1 TABLET, EXTENDED RELEASE ORAL NIGHTLY
COMMUNITY

## 2022-06-12 RX ORDER — SERTRALINE HYDROCHLORIDE 100 MG/1
100 TABLET, FILM COATED ORAL NIGHTLY
Status: DISCONTINUED | OUTPATIENT
Start: 2022-06-13 | End: 2022-06-15 | Stop reason: HOSPADM

## 2022-06-12 RX ORDER — LANOLIN ALCOHOL/MO/W.PET/CERES
50 CREAM (GRAM) TOPICAL DAILY
Status: DISCONTINUED | OUTPATIENT
Start: 2022-06-12 | End: 2022-06-15 | Stop reason: HOSPADM

## 2022-06-12 RX ADMIN — CARBIDOPA AND LEVODOPA 1 TABLET: 25; 100 TABLET ORAL at 17:46

## 2022-06-12 RX ADMIN — KETOROLAC TROMETHAMINE 15 MG: 30 INJECTION, SOLUTION INTRAMUSCULAR; INTRAVENOUS at 17:46

## 2022-06-12 RX ADMIN — IOPAMIDOL 75 ML: 755 INJECTION, SOLUTION INTRAVENOUS at 17:35

## 2022-06-12 RX ADMIN — ACETAMINOPHEN 1000 MG: 500 TABLET ORAL at 17:46

## 2022-06-12 RX ADMIN — SODIUM CHLORIDE: 9 INJECTION, SOLUTION INTRAVENOUS at 21:50

## 2022-06-12 ASSESSMENT — PAIN DESCRIPTION - LOCATION: LOCATION: BACK

## 2022-06-12 ASSESSMENT — PAIN SCALES - GENERAL
PAINLEVEL_OUTOF10: 4
PAINLEVEL_OUTOF10: 8

## 2022-06-12 ASSESSMENT — PAIN - FUNCTIONAL ASSESSMENT: PAIN_FUNCTIONAL_ASSESSMENT: 0-10

## 2022-06-12 NOTE — ED NOTES
1847- Yg served the Hospitalist for a consult about (General weakness, Falls, Left ankle fracture)     Domingo Solis  06/12/22 1849    1854- Dr. Alberto Johnson called back and spoke to dennis PHILLIPS.      Domingo Solis  06/12/22 1855

## 2022-06-12 NOTE — ED PROVIDER NOTES
I independently examined and evaluated Maryanne Hall. I personally saw the patient and performed a substantive portion of the visit including all aspects of the medical decision making. In brief, this 77-year-old male is presenting with worsening generalized weakness over the last few days and multiple falls. He is endorsing acute on chronic low back pain. Denies any chest pain, shortness of breath, nausea, vomiting. Does have a history of Parkinson's, but feels that his weakness is worse than usual..    Focused exam revealed   General: Alert, no acute distress, patient resting comfortably. Appears chronically ill. Skin: warm, intact, no pallor noted   Head: Normocephalic, atraumatic   Eye: Normal conjunctiva   Cardiac: Normal peripheral perfusion  Respiratory: No acute distress   Musculoskeletal: No deformity, full ROM. Neurological: alert and oriented, resting pill-rolling tremor. Psychiatric: Cooperative    ED course: Lab work obtained and is reassuring. CT head and C/T/L-spine obtained and showed no acute process. X-ray of the left ankle does show possible fracture of the medial malleolus. He is mildly tender in this area. We will place him in a stirrup splint. After PA discussed recent history with the patient's family, they do note that he has been having some abdominal and flank pain recently. We will obtain a CT abdomen pelvis as well. Pain treated with Toradol and acetaminophen. Gave home dose of carbidopa/levodopa. CT abdomen pelvis consistent with constipation, but is otherwise unremarkable. Patient discussed with hospitalist team by PA and admitted for further treatment and evaluation. Likely placement for acute rehab versus SNF. All diagnostic, treatment, and disposition decisions were made by myself in conjunction with the advanced practice provider.     For all further details of the patient's emergency department visit, please see the advanced practice provider's documentation. Comment: Please note this report has been produced using speech recognition software and may contain errors related to that system including errors in grammar, punctuation, and spelling, as well as words and phrases that may be inappropriate. If there are any questions or concerns please feel free to contact the dictating provider for clarification. ECG    The Ekg interpreted by me shows sinus rhythm with a rate of 77 bpm.  Left axis deviation. No acute injury pattern. , QRS 92, QTc 400. No significant change from prior EKG dated 5/6/2022.      Kamran Joshua, DO  06/12/22 73625 Charles De Luna, DO  06/12/22 0280

## 2022-06-12 NOTE — ED NOTES
Splint applied to left ankle by Neftaly Emanuel PA: pt tolerated procedure well       Sola Holley, RN  06/12/22 1940

## 2022-06-12 NOTE — ED NOTES
Patients visitor came out and stated that patient would need assistance in eating and that he needed ensure. Explained to visitor that we did not have ensure and that food offerings in the ED were limited. Visitor also stated that pt needed thickened liquids.       Veda Hamman, RN  06/12/22 4124

## 2022-06-12 NOTE — ED NOTES
Daughter updated on plan of care; pt had apple sauce and pudding; pt request no ice in water states that is why he was choking on water.        Vito Rucker RN  06/12/22 5290

## 2022-06-12 NOTE — ED NOTES
Pt daughter states that pt can take pills whole; pt is on thin liquids at home; pt given water with meds coughing noted ; pt unable to get medications down; pills given with apple sauce; medications when down easier.       Teodora Riddle RN  06/12/22 3326

## 2022-06-12 NOTE — ED NOTES
Spoke with pts daughter Óscar Billingsley ([phone]), states patient has been falling because he \"can't see\". Reports that LKW was 0830 this am. Angie Kidney NP notified.       Shahram Jordan RN  06/12/22 [address] DARIUS  06/12/22 8754

## 2022-06-13 ENCOUNTER — APPOINTMENT (OUTPATIENT)
Dept: GENERAL RADIOLOGY | Age: 77
End: 2022-06-13
Payer: OTHER GOVERNMENT

## 2022-06-13 LAB
ANION GAP SERPL CALCULATED.3IONS-SCNC: 12 MMOL/L (ref 3–16)
BASOPHILS ABSOLUTE: 0 K/UL (ref 0–0.2)
BASOPHILS RELATIVE PERCENT: 0.6 %
BUN BLDV-MCNC: 16 MG/DL (ref 7–20)
CALCIUM SERPL-MCNC: 9.1 MG/DL (ref 8.3–10.6)
CHLORIDE BLD-SCNC: 105 MMOL/L (ref 99–110)
CO2: 23 MMOL/L (ref 21–32)
CREAT SERPL-MCNC: 0.7 MG/DL (ref 0.8–1.3)
EKG ATRIAL RATE: 77 BPM
EKG DIAGNOSIS: NORMAL
EKG P AXIS: 33 DEGREES
EKG P-R INTERVAL: 182 MS
EKG Q-T INTERVAL: 354 MS
EKG QRS DURATION: 92 MS
EKG QTC CALCULATION (BAZETT): 400 MS
EKG R AXIS: -35 DEGREES
EKG T AXIS: 52 DEGREES
EKG VENTRICULAR RATE: 77 BPM
EOSINOPHILS ABSOLUTE: 0.1 K/UL (ref 0–0.6)
EOSINOPHILS RELATIVE PERCENT: 2.3 %
GFR AFRICAN AMERICAN: >60
GFR NON-AFRICAN AMERICAN: >60
GLUCOSE BLD-MCNC: 76 MG/DL (ref 70–99)
HCT VFR BLD CALC: 37.8 % (ref 40.5–52.5)
HEMOGLOBIN: 12.4 G/DL (ref 13.5–17.5)
LYMPHOCYTES ABSOLUTE: 1.2 K/UL (ref 1–5.1)
LYMPHOCYTES RELATIVE PERCENT: 19.1 %
MCH RBC QN AUTO: 28.8 PG (ref 26–34)
MCHC RBC AUTO-ENTMCNC: 32.8 G/DL (ref 31–36)
MCV RBC AUTO: 87.9 FL (ref 80–100)
MONOCYTES ABSOLUTE: 0.8 K/UL (ref 0–1.3)
MONOCYTES RELATIVE PERCENT: 12.3 %
NEUTROPHILS ABSOLUTE: 4.2 K/UL (ref 1.7–7.7)
NEUTROPHILS RELATIVE PERCENT: 65.7 %
PDW BLD-RTO: 15 % (ref 12.4–15.4)
PLATELET # BLD: 203 K/UL (ref 135–450)
PMV BLD AUTO: 6.5 FL (ref 5–10.5)
POTASSIUM REFLEX MAGNESIUM: 4 MMOL/L (ref 3.5–5.1)
RBC # BLD: 4.31 M/UL (ref 4.2–5.9)
SODIUM BLD-SCNC: 140 MMOL/L (ref 136–145)
WBC # BLD: 6.5 K/UL (ref 4–11)

## 2022-06-13 PROCEDURE — 6370000000 HC RX 637 (ALT 250 FOR IP): Performed by: INTERNAL MEDICINE

## 2022-06-13 PROCEDURE — 92610 EVALUATE SWALLOWING FUNCTION: CPT

## 2022-06-13 PROCEDURE — 99222 1ST HOSP IP/OBS MODERATE 55: CPT | Performed by: INTERNAL MEDICINE

## 2022-06-13 PROCEDURE — 93010 ELECTROCARDIOGRAM REPORT: CPT | Performed by: INTERNAL MEDICINE

## 2022-06-13 PROCEDURE — 92611 MOTION FLUOROSCOPY/SWALLOW: CPT

## 2022-06-13 PROCEDURE — 96372 THER/PROPH/DIAG INJ SC/IM: CPT

## 2022-06-13 PROCEDURE — 74230 X-RAY XM SWLNG FUNCJ C+: CPT

## 2022-06-13 PROCEDURE — 1200000000 HC SEMI PRIVATE

## 2022-06-13 PROCEDURE — 99252 IP/OBS CONSLTJ NEW/EST SF 35: CPT | Performed by: PHYSICIAN ASSISTANT

## 2022-06-13 PROCEDURE — 6360000002 HC RX W HCPCS: Performed by: INTERNAL MEDICINE

## 2022-06-13 PROCEDURE — 92526 ORAL FUNCTION THERAPY: CPT

## 2022-06-13 PROCEDURE — G0378 HOSPITAL OBSERVATION PER HR: HCPCS

## 2022-06-13 PROCEDURE — 36415 COLL VENOUS BLD VENIPUNCTURE: CPT

## 2022-06-13 PROCEDURE — 85025 COMPLETE CBC W/AUTO DIFF WBC: CPT

## 2022-06-13 PROCEDURE — 80048 BASIC METABOLIC PNL TOTAL CA: CPT

## 2022-06-13 PROCEDURE — 2580000003 HC RX 258: Performed by: INTERNAL MEDICINE

## 2022-06-13 RX ADMIN — CARBIDOPA AND LEVODOPA 1 TABLET: 25; 100 TABLET ORAL at 17:20

## 2022-06-13 RX ADMIN — ENOXAPARIN SODIUM 40 MG: 100 INJECTION SUBCUTANEOUS at 08:02

## 2022-06-13 RX ADMIN — CARBIDOPA AND LEVODOPA 1 TABLET: 25; 100 TABLET ORAL at 13:51

## 2022-06-13 RX ADMIN — GABAPENTIN 300 MG: 300 CAPSULE ORAL at 20:50

## 2022-06-13 RX ADMIN — Medication 10 ML: at 20:50

## 2022-06-13 RX ADMIN — CARBIDOPA AND LEVODOPA 1 TABLET: 50; 200 TABLET, EXTENDED RELEASE ORAL at 20:50

## 2022-06-13 RX ADMIN — TRAZODONE HYDROCHLORIDE 50 MG: 50 TABLET ORAL at 20:50

## 2022-06-13 RX ADMIN — SERTRALINE 100 MG: 100 TABLET, FILM COATED ORAL at 20:50

## 2022-06-13 RX ADMIN — GABAPENTIN 300 MG: 300 CAPSULE ORAL at 13:51

## 2022-06-13 RX ADMIN — Medication 10 ML: at 08:02

## 2022-06-13 RX ADMIN — CARBIDOPA AND LEVODOPA 1 TABLET: 25; 100 TABLET ORAL at 20:50

## 2022-06-13 ASSESSMENT — PAIN SCALES - GENERAL: PAINLEVEL_OUTOF10: 0

## 2022-06-13 NOTE — CARE COORDINATION
Case Management Assessment  Initial Evaluation      Patient Name: Roberto Diop  YOB: 1945  Diagnosis: Weakness [R53.1]  General weakness [R53.1]  Closed fracture of left ankle, initial encounter [S82.895V]  Date / Time: 6/12/2022  3:25 PM    Admission status/Date: INPT 6/12/22  Chart Reviewed: Yes      Patient Interviewed: No   Family Interviewed:  Yes - pt dtr/POA      Hospitalization in the last 30 days:  No      Health Care Decision Maker :   Primary Decision Maker: Fela Sharma - Spouse - [phone]    Secondary Decision Maker: Mary Grant - Child - [phone]    (CM - must 1st enter selection under Navigator - emergency contact- [place] Relationship and pick relationship)   Who do you trust or have selected to make healthcare decisions for you      Met with: pt dtr/poa, Robson Doyle conducted  (bedside/phone):    Current PCP: [facility]    Financial  [facility]  Precert required for SNF : Y, N          3 night stay required -  [place]    ADLS  Support Systems/Care Needs: Spouse/Significant Other,Children,Family Members  Transportation: family    Meal Preparation: family    Housing  Living Arrangements: home with dtr/granddtr  Steps: 5-12  Intent for return to present living arrangements: unsure at this time  Identified Issues: recent falls, ankle injury    Home Care Information  [facility] [address] [facility] [facility] [address] [facility] - [facility] for nurse/ NP. Has hha with [facility].  Agency:(Services)     Passport/Waiver : No  :                      Phone Number:    Passport/Waiver Services: -          Durable Medical Equiptment   DME Provider: -  Equipment:   Walker_X__Cane_X__RTS___ BSC___Shower Chair__X_Hospital Bed___W/C____Other________  02 at ____Liter(s)---wears(frequency)_______ HHN ___ CPAP___ BiPap___   N/A____      Home O2 Use :  No    If No for home O2---if presently on O2 during hospitalization:  No  if yes CM to follow for potential DC O2 need  Informed of need for care provider to bring portable home O2 tank on day of discharge for nursing to connect prior to leaving:   Not Indicated  Verbalized agreement/Understanding:   Not Indicated    Community Service Affiliation  Dialysis:  No    · Agency:  · Location:  · Dialysis Schedule:  · Phone:   · Fax: Other Community Services: (ex:PT/OT,Mental Health,Wound Clinic, Cardio/Pul [address])    DISCHARGE PLAN: Explained Case Management role/services. Pt lives at home at dtrs house and sometimes stays at granddaughter's home. Joyce Zamarripa states that plan is for pt to return home vs STR. Joyce Zamarripa states preference of [facility] if STR needed. CM attempted to call referral to [facility] with [facility] with no answer and voicemail full. PT/OT pending. Will follow.

## 2022-06-13 NOTE — ACP (ADVANCE CARE PLANNING)
Advance Care Planning     General Advance Care Planning (ACP) Conversation    Date of Conversation: 6/12/2022  Conducted with: Patient with [address]: Named in Advance Directive or Healthcare Power of  (name) [facility] Decision Maker:    Primary Decision Maker: Maria Del Rosario Reynoso - Spouse - [phone]    Secondary Decision Maker: Dre Gambino - Child - [phone]  Click here to complete Healthcare Decision Makers including selection of the Healthcare Decision Maker Relationship (ie \"Primary\"). Today we documented Decision Maker(s). The patient will provide ACP documents. Content/Action Overview:   Has ACP document(s) NOT on file - requested patient to provide  Reviewed DNR/DNI and patient elects Full Code (Attempt Resuscitation)      Length of Voluntary ACP Conversation in minutes:  <16 minutes (Non-Billable)    Isra Yen RN

## 2022-06-13 NOTE — PROCEDURES
SPEECH/LANGUAGE PATHOLOGY  VIDEOFLUOROSCOPIC STUDY OF SWALLOWING (MBS)    Multiple PO considerations could be made for this patient pending MD discussion with family and pt regarding goals of care. Currently, the patient remains a full-code, thus, safest and least restrictive oral intake would be puree solids and moderately thick (honey) liquids. Pt would remain at high risk of aspiration and malnutrition with this diet given pharyngeal retention noted and minimal PO passage into UES. Family could also consider alternate means of nutrition if this aligns with goals of care. If family/pt wishes to eat for pleasure with understood risks, could consider thin liquids and pureed solids as the patient has been consuming PO, including thin liquids, and chest radiography is clear. Would crush all meds with puree. PATIENT NAME:  Erlene Goltz      :  1945    Room: 0211/0211-02   TODAY'S DATE:  2022    [x]The admitting diagnosis and active problem list, as listed below have been reviewed prior to initiation of this evaluation.      ADMITTING DIAGNOSIS: Weakness [R53.1]  General weakness [R53.1]  Closed fracture of left ankle, initial encounter [S82.282A]     ACTIVE PROBLEM LIST:   Patient Active Problem List   Diagnosis    Paralysis agitans ([address]    Depressive disorder, not elsewhere classified    Mild cognitive impairment    Sepsis ([address]    Acute hypoxemic respiratory failure (HCC)    Severe sepsis ([address]    COVID-19 virus infection    Pneumonia of both lower lobes due to infectious organism    Thrombocytopenia (HCC)    Lymphopenia    Acute encephalopathy    Hemoptysis    Fever    Weakness           PAST MEDICAL HISTORY        Diagnosis Date    Arthritis     Kidney stone     Knee pain     Parkinson disease ([address]        PAST SURGICAL HISTORY    Past Surgical History:   Procedure Laterality Date    NECK SURGERY      SHOULDER SURGERY         ALLERGIES    No Known Allergies    Referring Provider: Dulce Hanley CNP  Radiologist: Dr. Roc Hernandez    Current respiratory status:    [x] Room Air   [] Nasal cannula [] O2 mask           []  Non-rebreather mask  []  Tracheostomy  []  Vent dependent    Vocal quality prior to PO intake:  []  WFL  [x]  Weak  []  Wet    Cognitive status:    [x]  Alert    []  Lethargic  [x] Functional   [] Confused  [] Aphasic   [] Dysarthric   [] Impulsive   [] Cognitive Deficits                 DIET LEVEL PRIOR TO THIS EVALUATION/PRIOR LEVEL OF FUNCTION :  Diet NPO    Recent Chest Radiography: [x]? Chest XR   []? CT of Chest  Date: 06/12/2022  Impressions  Impression   No acute airspace disease identified.  No discrete fracture identified.         Pain: The patient does not complain of pain      Medical record review/interview: Per MD ED Note: \"In brief, this 43-year-old male is presenting with worsening generalized weakness over the last few days and multiple falls.  He is endorsing acute on chronic low back pain.  Denies any chest pain, shortness of breath, nausea, vomiting.  Does have a history of Parkinson's, but feels that his weakness is worse than usual.\"    Additional Reported Symptoms/Complaints: Pt completed BSE this morning with recs for NPO pending MBS. He c/o globus sensation, weight loss, and coughing/choking with PO intake. Per chart review, pt's daughter stated pt has a hx of aspiration. He \"coughs and struggles\" with liquids, but has less difficulty with solids. Pt reported that he drinks thickened liquids all of the time at home and that food will remain in the buccal cavities when eating. While daughter reports pt is on thin liquids at baseline, visitor reports that he is on thickened liquids. Pt was not able to take meds whole with thin liquids and was noted to cough/choke. Tolerated meds in puree OK. Pt has primary dx of Parkinson's and take carbidopa.      Predisposing dysphagia risk factors: Parkinson's Disease, Age, Hx of dysphagia and Hx of aspiration  Clinical signs of possible chronic dysphagia: weight loss, hx of dysphagia and hx of aspiration  Precipitating dysphagia risk factors: reduced physical mobility      PROCEDURE   Patient positioning: Seated 70-90°  Viewing Plane(s): LATERAL ONLY  Contrast: commercially prepared, standardized barium viscosities via Varibar; graduated from thin liquid to pudding consistency. Administered via tsp (5 ml boluses), by cup or straw in single and sequentially swallowed boluses as tolerated. Solid evaluated with 1/2 danny cracker/3 ml barium pudding coated as tolerated. Consistencies Administered During the Evaluation   Liquids: [x] Thin (IDDSI 0)        [x] Mildly Thick (Nectar-IDDSI 2)         [x] Moderately Thick (Honey- IDDSI 3)   Solids:  [x] Pureed/Pudding (IDDSI 4)    [x] Soft Solid      [] Regular Solid  [] Other:     Method of Intake:      []Self Fed       [x]Fed by Clinician     [x]Cup      [x]Spoon     [x]Straw             Oral Phase Severity: Severe  Oral Phase Characteristics : Reduced oral bolus control resulting in premature spillage to the valleculae and UES with all consistencies. Functional A-P bolus transit. Lingual fasciculations. Anterior munch chew pattern. Pharyngeal Phase Severity: Severe  Pharyngeal Phase Characteristics: Impaired hyolaryngeal excursion resulting in reduced duration and extent of UES opening leading to only partial bolus passage as evidenced by residue at the UES with all consistencies and the pyriform sinuses with puree and soft solids. Impaired hyolaryngeal excursion also resulting in incomplete epiglottic inversion resulting in deep penetration that does not clear with thin teaspoon and thin cup with aspiration during the swallow with thin straw, nectar cup, nectar controlled sip via 10cc. This also results in post-swallow residue in the valleculae with puree and soft solids.  Of note, did note postprandial aspiration of pharyngeal retention throughout study.      Esophageal Phase: esophageal phase of the swallow grossly functional from lateral view of cervical esophagus to oropharynx      LARYNGEAL PENETRATION/ASPIRATION  []Laryngeal penetration was not present during this evaluation    []Aspiration was not present during this evaluation      Response to aspiration:  [] N/A- no aspiration occurred  [] spontaneous cough  [] throat clearing  [] inconsistent/delayedcough   [x] absent cough          Aspiration Scale  []  1 Material does not enter the airway   []  2 Material enters the airway, remains above the vocal folds, and is ejected from the airway   []  3 Material enters the airway, remains above the vocal folds, and is not ejected from the airway   []  4 Material enters the airway, contacts the vocal folds, an is ejected from the airway   []  5 Material enters the airway, contacts the vocal folds, and is not ejected from the airway   []  6 Material enters the airway, passes below the vocal folds and is ejected into the larynx or out of the airway   []  7 Material enters the airway, passes below the vocal folds, and is not ejected from the trachea despite effort   [x]  8 Material enters the airway, passes below the vocal folds, and no effort is made to eject. Assessment: Severe oropharyngeal dysphagia, likely chronic related to hx of dysphagia, reduced physical mobility, Parkinson's Disease and the aging swallow. Swallow safety is impaired; swallow efficiency is impaired. Pt appears to be at high risk for aspiration PNA, and high risk for malnutrition/dehydration. Diet modification/non-oral nutrition is indicated. Swallow prognosis is poor given severity of laryngeal dysfunction and silent aspiration noted on instrumental swallow study. Patient appears to be a fair candidate for behavioral swallow rehabilitation. Multiple PO considerations could be made for this patient pending MD discussion with family and pt regarding goals of care.  Currently, the patient remains a full-code, thus, safest and least restrictive oral intake would be puree solids and moderately thick (honey) liquids. Pt would remain at high risk of aspiration and malnutrition with this diet given pharyngeal retention noted and minimal PO passage into UES. Family could also consider alternate means of nutrition if this aligns with goals of care. If family/pt wishes to eat for pleasure with understood risks, could consider thin liquids and pureed solids as the patient has been consuming PO, including thin liquids, and chest radiography is clear. Would crush all meds with puree. Diet Recommendation: See above. Risk Management: upright for all intake, stay upright for at least 30 mins after intake, small bites/sips, assist feed, 1:1 supervision with intake, oral care q4 hrs to reduce adverse affects in the event of aspiration, increase physical mobility as able, slow rate of intake, coordinate PO intake with Sinemet dosage, general aspiration precautions and hold PO and contact SLP if s/s of aspiration or worsening respiratory status develop. Specialist Referrals: RD  Ancillary Tests: N/A  Goals: Tolerate LRD  Follow-up exam: will f/u during tx      Education  SLP educated the patient re: Role of SLP, anatomical components of swallow structures as they pertain to airway protection, results of assessment, recommendations, POC and rationale for MBS  Response to education:   [x] Verbalized Understanding  [] Demonstrated Understanding  [] No evidence of learning  [] Ongoing education is recommended  [] RN aware of results and recommendations    Goals Defer to BSE Goals    Pain: The patient does not complain of pain.       Therapy time  Therapy Time:   Individual   Time In  1030   Time Out  1055   Minutes  25 min/2 units      Signature:    Cecille ROE  Clinician

## 2022-06-13 NOTE — PROGRESS NOTES
Diet order added. Tolerating pudding at this time. Took meds in pudding crushed. No issues noted. Splint to LLE remains in place. C/d/i. Neuro check to LLE wnls. No s/s distress noted.

## 2022-06-13 NOTE — PROGRESS NOTES
Speech Language Pathology  MBS Brief    Name: Erlene Goltz  : 1945  Medical Diagnosis: Weakness [R53.1]  General weakness [R53.1]  Closed fracture of left ankle, initial encounter [B95.450N]      SLP has completed MBS at this time. Preliminary rec's include puree solids and honey-thick (moderately thick IDDSI 3) liquids. Meds crushed in puree. Formal report to follow pending [id number] review of study. Call with questions or concerns.  Thank you,      Darcy Reaves M.A., [address] [place] [address]  Speech-Language Pathologist  Phone: [phone], [phone]

## 2022-06-13 NOTE — PROGRESS NOTES
4 Eyes Skin Assessment     The patient is being assess for   Admission    I agree that 2 RN's have performed a thorough Head to Toe Skin Assessment on the patient. ALL assessment sites listed below have been assessed. Areas assessed for pressure by both nurses:   [x]   Head, Face, and Ears   [x]   Shoulders, Back, and Chest, Abdomen  [x]   Arms, Elbows, and Hands   [x]   Coccyx, Sacrum, and Ischium  [x]   Legs, Feet, and Heels  Left ankle fx with splint intact    Blanchable redness to sacrum. No open areas      Skin Assessed Under all Medical Devices by both nurses:        All Mepilex Borders were peeled back and area peeked at by both nurses:  No: n/a  Please list where Mepilex Borders are located:               **SHARE this note so that the co-signing nurse is able to place an eSignature**    Co-signer eSignature: Electronically signed by Chris Silva RN on 6/12/22 at 10:39 PM EDT    Does the Patient have Skin Breakdown related to pressure?   No              Brock Prevention initiated:  Yes   Wound Care Orders initiated:  NA      [facility] nurse consulted for Pressure Injury (Stage 3,4, Unstageable, DTI, NWPT, Complex wounds)and New or Established Ostomies:  NA      Primary Nurse eSignature: Electronically signed by Yonathan Whitaker RN on 6/12/22 at 10:22 PM EDT

## 2022-06-13 NOTE — CONSULTS
Department of Orthopedic Surgery  Physician Assistant   Consult Note        Reason for Consult:  Left ankle injury  Requesting Physician: Baldomero Cline*  Date of Service: 6/13/2022 1:09 PM    CHIEF COMPLAINT:  As Above    History Obtained From:  patient, spouse, electronic medical record    HISTORY OF PRESENT ILLNESS:                The patient is a 64 y.o. male who presents with above chief complaint. Pt has had multiple falls recently and was seen in the ER last night for a fall with continued weakness and inability to ambulate. He doesn't answer many questions today. Granddaughter and spouse present today. They are not sure when he actually hurt his ankle as he has fallen several times recently. No knowledge or prior injuries either. Past Medical History:        Diagnosis Date    Arthritis     Kidney stone     Knee pain     Parkinson disease [facility]      Past Surgical History:        Procedure Laterality Date    NECK SURGERY      SHOULDER SURGERY           Medications Prior to Admission:   Prior to Admission medications    Medication Sig Start Date End Date Taking? Authorizing Provider   Pyridoxine HCl (B-6) 50 MG TABS Take 50 mg by mouth daily    Yes Historical Provider, MD   carbidopa-levodopa (SINEMET CR)  MG per extended release tablet Take 1 tablet by mouth nightly   Yes Historical Provider, MD   bisacodyl (DULCOLAX) 5 MG EC tablet Take 5 mg by mouth daily as needed for Constipation   Yes Historical Provider, MD   docusate sodium (COLACE) 100 mg capsule Take 100 mg by mouth daily as needed for Constipation   Yes Historical Provider, MD   ketoconazole (NIZORAL) 2 % shampoo Apply to affected areas three times a week (lather, leave on for 5 minutes, then rinse)   Yes Historical Provider, MD   lidocaine (LIDODERM) 5 % Place 1 patch onto the skin daily 12 hours on, 12 hours off.    Yes Historical Provider, MD   potassium chloride (MICRO-K) 10 MEQ extended release capsule Take 10 mEq by mouth daily   Yes Historical Provider, MD   traMADol (ULTRAM) 50 MG tablet Take 50 mg by mouth in the morning and at bedtime. Yes Historical Provider, MD   carbidopa-levodopa (PARCOPA)  MG TBDP Take 1 tablet by mouth 4 times daily     Historical Provider, MD   traZODone (DESYREL) 50 MG tablet Take 50 mg by mouth nightly    Historical Provider, MD   sertraline (ZOLOFT) 100 MG tablet Take 100 mg by mouth nightly    Historical Provider, MD   gabapentin (NEURONTIN) 300 MG capsule Take 300 mg by mouth 3 times daily. Historical Provider, MD       Allergies:  Patient has no known allergies. Social History:    Tobacco:  reports that he quit smoking about 14 years ago. He has never used smokeless tobacco.   Alcohol:  reports no history of alcohol use. Illicit Drug: No  Family History:   History reviewed. No pertinent family history. REVIEW OF SYSTEMS:    CONSTITUTIONAL:  negative  MUSCULOSKELETAL:  positive for  pain  All other systems reviewed and negative    PHYSICAL EXAM:    awake, alert, cooperative, no apparent distress, and appears stated age  MUSCULOSKELETAL:  there is no redness, warmth, or swelling of the joints  full range of motion noted  motor strength is 5 out of 5 all extremities bilaterally  tone is normal  with exception of  LEFT ANKLE:  Splint intact. Moving toes. Sensation intact. No pain in the knee. DATA:    CBC:   Recent Labs     06/12/22  1546 06/13/22  0518   WBC 9.1 6.5   HGB 13.4* 12.4*    203     BMP:    Recent Labs     06/12/22  1546 06/13/22  0518    140   K 4.1 4.0   CL 98* 105   CO2 29 23   BUN 16 16   CREATININE 0.8 0.7*   GLUCOSE 96 76     INR:   Recent Labs     06/12/22  1546   INR 1.07       Radiology:   FL MODIFIED BARIUM SWALLOW W VIDEO   Final Result   Penetration and subglottic tracheal aspiration with thin and nectar thick   barium. There also significant residuals leading to subsequent episodes of   penetration with thicker barium substances. Please see separate speech pathology report for full discussion of findings   and recommendations. CT ABDOMEN PELVIS W IV CONTRAST Additional Contrast? None   Final Result   Moderate formed stool load suggests constipation. No other acute finding in   the abdomen or pelvis. Remote T11 and T12 compression fractures. No acute bone finding. RECOMMENDATIONS:   2.5 cm infrarenal mild aortic dilatation, not meeting criteria for aneurysm. No follow-up imaging is recommended. Reference: J Am Tasha Radiol 6359;25:181-254. CT Lumbar Spine WO Contrast   Final Result   No acute osseous abnormality identified in the thoracic or lumbar spine. CT THORACIC SPINE WO CONTRAST   Final Result   No acute osseous abnormality identified in the thoracic or lumbar spine. CT Cervical Spine WO Contrast   Final Result   No acute fracture or traumatic malalignment of the cervical spine. Remote discectomy and fusion at C6-C7. CT HEAD WO CONTRAST   Final Result   No acute intracranial abnormality. XR ANKLE LEFT (MIN 3 VIEWS)   Final Result   Irregularity about the medial malleolus is noted for which correlation for an   acute nondisplaced fracture is recommended. XR CHEST PORTABLE   Final Result   No acute airspace disease identified. No discrete fracture identified. IMPRESSION/RECOMMENDATIONS:    Assessment: suspected acute nondisplaced medial malleolus fracture    Plan:  1) Will plan to keep him in the splint at this time and at follow up can transition him in to a boot I suspect. Maintain NWB on the left side. Family arranging for stair lift at home but appt about 2 weeks out. May need SNF for short term. Will have him f/u with Dr Suzette Olivares in 2 weeks. Thank you for the opportunity to consult on this patient.     Trae Cunningham, [address]

## 2022-06-13 NOTE — PROGRESS NOTES
Physical Therapy/Occuaptional Therapy    Awaiting Ortho consultation for L ankle fracture for precautions. PT/OT evaluation will be on hold till then. Patient will be followed up later as schedule permits. No charge.      Octavio Cornell MS PT, # [id number] General

## 2022-06-13 NOTE — PROGRESS NOTES
Patient is not able to demonstrated the ability to move from a reclining position to an upright position within the recliner. Patient is confused, demented and /or unable to follow instruction. Bedside Mobility Assessment Tool (BMAT):     Assessment Level 1- Sit and Shake    1. From a semi-reclined position, ask patient to sit up and rotate to a seated position at the side of the bed. Can use the bedrail. 2. Ask patient to reach out and grab your hand and shake making sure patient reaches across his/her midline. Fail- Patient is unable to perform tasks, patient is MOBILITY LEVEL 1. Assessment Level 2- Stretch and Point   1. With patient in seated position at the side of the bed, have patient place both feet on the floor (or stool) with knees no higher than hips. 2. Ask patient to stretch one leg and straighten the knee, then bend the ankle/flex and point the toes. If appropriate, repeat with the other leg. Fail- Patient is unable to complete task. Patient is MOBILITY LEVEL 2. Assessment Level 3- Stand   1. Ask patient to elevate off the bed or chair (seated to standing) using an assistive device (cane, bedrail). 2. Patient should be able to raise buttocks off be and hold for a count of five. May repeat once. Fail- Patient unable to demonstrate standing stability. Patient is MOBILITY LEVEL 3. Assessment Level 4- Walk   1. Ask patient to march in place at bedside. 2. Then ask patient to advance step and return each foot. Some medical conditions may render a patient from stepping backwards, use your best clinical judgement. Fail- Patient not able to complete tasks OR requires use of assistive device. Patient is MOBILITY LEVEL 3.        Mobility Level- 3

## 2022-06-13 NOTE — FLOWSHEET NOTE
06/12/22 2000   Vital Signs   Temp 99 °F (37.2 °C)   Temp Source Oral   Heart Rate 66   Resp 16   BP 90/66   BP Location Right upper arm   MAP (Calculated) 74   Level of Consciousness Alert (0)   MEWS Score 2   Oxygen Therapy   SpO2 98 %   O2 Device None (Room air)   Height and Weight   Height 5' 10\" (1.778 m)   Weight 128 lb 14.4 oz (58.5 kg)   Weight Method Actual;Bed scale   BSA (Calculated - sq m) 1.7 sq meters   BMI (Calculated) 18.5   admission questions completed with pt's daughter Arlene Morales. She wasn't sure about pt's home meds. They are filled through [facility]. Note left for dayshift to call pharmacy about home meds on pt's chart. Pt has hx of aspiration from report from ER nurse and from pt's daughter Arlene Morales. Pt coughs and struggles with liquids, but not so much with solid food. Held po meds until speech therapy can evaluate pt. Pt made NPO.         Wilmar Mendoza RN

## 2022-06-13 NOTE — H&P
Hospital Medicine History & Physical      PCP: Divya Sierra MD    Date of Admission: 6/12/2022    Date of Service: Pt seen/examined on 6/13/2022     Chief Complaint:    Chief Complaint   Patient presents with    Fall     ems reports multiple falls, increasing generalized weakness x several days. reports back pain. History Of Present Illness: The patient is a 64 y.o. male with parkinson's disease and arthritis who presents to [facility] with c/o progressive weakness and multiple falls. Patient is a poor historian and does not know what is going on. Per review of EMS, he had fallen at home. He feels more weak than usual.  Currently denies any pain. He states he lives with his daughter. Vitals stable. Labs stable. Found to have left ankle fracture. Admitted to med-surg. Orthopedic surgery consulted. Past Medical History:        Diagnosis Date    Arthritis     Kidney stone     Knee pain     Parkinson disease ([facility] [address]        Past Surgical History:        Procedure Laterality Date    NECK SURGERY      SHOULDER SURGERY         Medications Prior to Admission:    Prior to Admission medications    Medication Sig Start Date End Date Taking? Authorizing Provider   Pyridoxine HCl (B-6) 50 MG TABS Take 50 mg by mouth daily    Yes Historical Provider, MD   carbidopa-levodopa (SINEMET CR)  MG per extended release tablet Take 1 tablet by mouth nightly   Yes Historical Provider, MD   bisacodyl (DULCOLAX) 5 MG EC tablet Take 5 mg by mouth daily as needed for Constipation   Yes Historical Provider, MD   docusate sodium (COLACE) 100 mg capsule Take 100 mg by mouth daily as needed for Constipation   Yes Historical Provider, MD   ketoconazole (NIZORAL) 2 % shampoo Apply to affected areas three times a week (lather, leave on for 5 minutes, then rinse)   Yes Historical Provider, MD   lidocaine (LIDODERM) 5 % Place 1 patch onto the skin daily 12 hours on, 12 hours off.    Yes Historical Provider, MD   potassium chloride (MICRO-K) 10 MEQ extended release capsule Take 10 mEq by mouth daily   Yes Historical Provider, MD   traMADol (ULTRAM) 50 MG tablet Take 50 mg by mouth in the morning and at bedtime. Yes Historical Provider, MD   carbidopa-levodopa (PARCOPA)  MG TBDP Take 1 tablet by mouth 4 times daily     Historical Provider, MD   traZODone (DESYREL) 50 MG tablet Take 50 mg by mouth nightly    Historical Provider, MD   sertraline (ZOLOFT) 100 MG tablet Take 100 mg by mouth nightly    Historical Provider, MD   gabapentin (NEURONTIN) 300 MG capsule Take 300 mg by mouth 3 times daily. Historical Provider, MD       Allergies:  Patient has no known allergies. Social History:  The patient currently lives with his daughter. TOBACCO:   reports that he quit smoking about 14 years ago. He has never used smokeless tobacco.  ETOH:   reports no history of alcohol use. Family History:   Positive as follows:    History reviewed. No pertinent family history. REVIEW OF SYSTEMS:       Constitutional: Negative for fever   Respiratory: Negative  for dyspnea, cough   Cardiovascular: Negative for chest pain   Gastrointestinal: Negative for vomiting, diarrhea   Genitourinary: Negative for dysuria  Musculoskeletal: + weakness, arthralgias   Neurological: Negative for syncope   Psychiatric/Behavorial: Negative for anxiety    PHYSICAL EXAM:    /69   Pulse 63   Temp 97.9 °F (36.6 °C) (Oral)   Resp 16   Ht 5' 10\" (1.778 m)   Wt 122 lb 6.4 oz (55.5 kg)   SpO2 99%   BMI 17.56 kg/m²     Gen: No distress. Alert. chronically ill-appearing  Eyes: PERRL. No sclera icterus. No conjunctival injection. ENT: No discharge. Pharynx clear. Neck: Trachea midline. Resp: No accessory muscle use. No crackles. No wheezes. No rhonchi. CV: Regular rate. Regular rhythm. No murmur. No rub. No edema. GI: Non-tender. Non-distended. Normal bowel sounds. No hernia. Skin: Warm and dry.  No nodule on exposed extremities. No rash on exposed extremities. M/S: No cyanosis. No joint deformity. No clubbing. Neuro: Awake. Grossly nonfocal    Psych: Oriented to person. Disoriented to time, place, situation. No anxiety or agitation. Den Shannon MD have reviewed the chart on [facility] and personally interviewed and examined patient, reviewed the data (labs and imaging) and after discussion with my PA formulated the plan. Agree with note with the following edits. HPI: A 66-year-old male with history of Parkinson's disease presented to the emergency room with progressive weakness and multiple falls. He was found to have a left ankle fracture. I reviewed the patient's Past Medical History, Past Surgical History, Medications, and Allergies. Physical exam:    /69   Pulse 63   Temp 97.9 °F (36.6 °C) (Oral)   Resp 16   Ht 5' 10\" (1.778 m)   Wt 122 lb 6.4 oz (55.5 kg)   SpO2 99%   BMI 17.56 kg/m²   Gen: No distress. Alert. Eyes: PERRL. No sclera icterus. No conjunctival injection. ENT: No discharge. Pharynx clear. Neck: Trachea midline. Normal thyroid. Resp: No accessory muscle use. No crackles. No wheezes. No rhonchi. No dullness on percussion. CV: Regular rate. Regular rhythm. No murmur or rub. No edema. GI: Non-tender. Non-distended. No masses. No organomegaly. Normal bowel sounds. No hernia. Skin: Warm and dry. No nodule on exposed extremities. No rash on exposed extremities. Lymph: No cervical LAD. No supraclavicular LAD. M/S: ace bandage left ankle   Neuro: Awake. Moves all 4 extremities, non focal  Psych: Oriented x 3. No anxiety or agitation.            JOHNSON Coffman.      CBC:   Recent Labs     06/12/22  1546 06/13/22  0518   WBC 9.1 6.5   HGB 13.4* 12.4*   HCT 39.9* 37.8*   MCV 86.4 87.9    203     BMP:   Recent Labs     06/12/22  1546 06/13/22  0518    140   K 4.1 4.0   CL 98* 105   CO2 29 23   BUN 16 16   CREATININE 0.8 0.7* LIVER PROFILE:   Recent Labs     06/12/22  1546   AST 17   ALT 8*   BILITOT 0.5   ALKPHOS 153*     PT/INR:   Recent Labs     06/12/22  1546   PROTIME 13.7   INR 1.07     UA:  Recent Labs     06/12/22  1759   COLORU LEXA*   PHUR 6.5   WBCUA None seen   RBCUA >100*   MUCUS Rare*   CLARITYU SL CLOUDY*   SPECGRAV 1.010   LEUKOCYTESUR Negative   UROBILINOGEN 1.0   BILIRUBINUR SMALL*   BLOODU LARGE*   GLUCOSEU Negative       CARDIAC ENZYMES  Recent Labs     06/12/22  1546   TROPONINI <0.01       CULTURES  COVID/Influenza: not detected    EKG:  I have reviewed the EKG with the following interpretation:   Normal sinus rhythm, Left axis deviation, Nonspecific ST abnormality. Baseline artifact. RADIOLOGY  CT ABDOMEN PELVIS W IV CONTRAST Additional Contrast? None   Final Result   Moderate formed stool load suggests constipation. No other acute finding in   the abdomen or pelvis. Remote T11 and T12 compression fractures. No acute bone finding. RECOMMENDATIONS:   2.5 cm infrarenal mild aortic dilatation, not meeting criteria for aneurysm. No follow-up imaging is recommended. Reference: J Am Tasha Radiol 2921;44:664-967. CT Lumbar Spine WO Contrast   Final Result   No acute osseous abnormality identified in the thoracic or lumbar spine. CT THORACIC SPINE WO CONTRAST   Final Result   No acute osseous abnormality identified in the thoracic or lumbar spine. CT Cervical Spine WO Contrast   Final Result   No acute fracture or traumatic malalignment of the cervical spine. Remote discectomy and fusion at C6-C7. CT HEAD WO CONTRAST   Final Result   No acute intracranial abnormality. XR ANKLE LEFT (MIN 3 VIEWS)   Final Result   Irregularity about the medial malleolus is noted for which correlation for an   acute nondisplaced fracture is recommended. XR CHEST PORTABLE   Final Result   No acute airspace disease identified. No discrete fracture identified. Principal Problem:    Weakness  Resolved Problems:    * No resolved hospital problems. *        ASSESSMENT/PLAN:  Weakness  Multiple falls  -admit to med-surg  -PT/OT    Left ankle fracture  -Orthopedic surgery consulted  -Norco prn    Parkinson's disease  -supportive care measures  -continue home medication regimen    Depression, anxiety  -continue home regimen    UA with large amount of blood  -unsure if the ED straight catheterized the patient for UA    Constipation  miralax daily   -patient states he had a BM this morning. DVT Prophylaxis: Lovenox  Diet: Diet NPO  Code Status: Full Code    Cali STONE-MARTHA  6/13/2022    Agree with above    JOHNSON Coffman.

## 2022-06-13 NOTE — FLOWSHEET NOTE
06/13/22 0750   Vital Signs   Temp 97.9 °F (36.6 °C)   Temp Source Oral   Heart Rate 63   Resp 16   /69   BP Location Right upper arm   BP Method Automatic   MAP (Calculated) 84.67   Patient Position Semi fowlers   Level of Consciousness Alert (0)   MEWS Score 1   Pain Assessment   Pain Assessment None - Denies Pain   Oxygen Therapy   SpO2 99 %   O2 Device None (Room air)     Alert and oriented to self and place. Stirrup splint to left lower leg/ankle. Patient is NPO pending swallowing eval. Ortho consulted for fracture left ankle. Scattered bruising. Small BM noted. Nursing asmt completed. No s/s distress noted. Bed alarm on.

## 2022-06-13 NOTE — PROGRESS NOTES
Speech Language Pathology  Clinical Bedside Swallow Assessment  Facility/Department: [address] MEDICAL-SURGICAL    Instrumentation: MBS orders have been placed. Diet recommendation: NPO; NPO; Meds via alt means of nutrition  Risk management: oral care q4 hrs to reduce adverse affects in the event of aspiration, general aspiration precautions and hold PO and contact SLP if s/s of aspiration or worsening respiratory status develop. : 1945 (72 y.o.)   MRN: [id number]  ROOM: [address] [place] [address]  ADMISSION DATE: 2022  PATIENT DIAGNOSIS(ES): Weakness [R53.1]  General weakness [R53.1]  Closed fracture of left ankle, initial encounter [H85.098B]  Chief Complaint   Patient presents with    Fall     ems reports multiple falls, increasing generalized weakness x several days. reports back pain. Patient Active Problem List    Diagnosis Date Noted    Weakness 2022    Fever     Hemoptysis     Acute hypoxemic respiratory failure (HCC)     Severe sepsis ([address]     COVID-19 virus infection     Pneumonia of both lower lobes due to infectious organism     Thrombocytopenia ([address]     Lymphopenia     Acute encephalopathy     Sepsis ([address] 2020    Paralysis agitans ([address] 2014    Depressive disorder, not elsewhere classified 2014    Mild cognitive impairment 2014     Past Medical History:   Diagnosis Date    Arthritis     Kidney stone     Knee pain     Parkinson disease ([address]      Past Surgical History:   Procedure Laterality Date    NECK SURGERY      SHOULDER SURGERY       No Known Allergies    DATE ONSET: 2022    Date of Evaluation: 2022   Evaluating Therapist: JYOTHI Collins    Chart Reviewed: : [x] Yes [] No    Current Diet: Diet NPO    Recent Chest Radiography: [x] Chest XR   [] CT of Chest  Date: 2022  Impressions  Impression   No acute airspace disease identified.  No discrete fracture identified.        Pain: The patient does not complain of pain     Reason for Referral  Celsa Wynne was referred for a bedside swallow evaluation to assess the efficiency of their swallow function, identify signs and symptoms of aspiration and make recommendations regarding safe dietary consistencies, effective compensatory strategies, and safe eating environment. Assessment    Medical record review/interview: Per MD ED Note: \"In brief, this 55-year-old male is presenting with worsening generalized weakness over the last few days and multiple falls. He is endorsing acute on chronic low back pain. Denies any chest pain, shortness of breath, nausea, vomiting. Does have a history of Parkinson's, but feels that his weakness is worse than usual.\"      Patient Complaints:  Odynophagia: [] Yes [x] No  Globus Sensation: [x] Yes [] No  SOB with PO intake: [] Yes [x] No  Increased WOB with PO intake: [x] Yes [] No  Reflux Sx's: [] Yes [x] No  Weight loss: [x] Yes [] No  Coughing/Choking with PO intake: [x] Yes [] No  Reduced Appetite: [] Yes [x] No    Additional Reported Symptoms/Complaints: Per chart review, pt's daughter stated pt has a hx of aspiration. He \"coughs and struggles\" with liquids, but has less difficulty with solids. Pt reported that he drinks thickened liquids all of the time at home and that food will remain in the buccal cavities when eating. While daughter reports pt is on thin liquids at baseline, visitor reports that he is on thickened liquids. Pt was not able to take meds whole with thin liquids and was noted to cough/choke. [place] [facility] [place] has primary dx of Parkinson's and take carbidopa.        Predisposing dysphagia risk factors: Parkinson's Disease, Age, Hx of dysphagia and Hx of aspiration  Clinical signs of possible chronic dysphagia: weight loss, hx of dysphagia and hx of aspiration  Precipitating dysphagia risk factors: reduced physical mobility    Vitals/labs:     Vitals:    06/12/22 1905 06/12/22 2000 06/13/22 0251 06/13/22 0750   BP: (!) 97/51 90/66 104/66 116/69   Pulse: 71 66 63 63   Resp: 16 16 16 16   Temp:  99 °F (37.2 °C) 97.6 °F (36.4 °C) 97.9 °F (36.6 °C)   TempSrc:  Oral Oral Oral   SpO2:  98% 97% 99%   Weight:  128 lb 14.4 oz (58.5 kg) 122 lb 6.4 oz (55.5 kg)    Height:  5' 10\" (1.778 m)          CBC:   Recent Labs     06/13/22  0518   WBC 6.5   HGB 12.4*         BMP:  Recent Labs     06/13/22  0518      K 4.0      CO2 23   BUN 16   CREATININE 0.7*   GLUCOSE 76          Cranial nerve exam:   CN V (trigeminal): ophthalmic, maxillary, and mandibular facial sensation- WFL  CN VII (facial): Impaired  L&R  CN IX/X (glossopharyngeal/vagus): MPT: Reduced; pitch range: Reduced; vocal quality: breathy and weak; cough: Weak- perceptually, Non-Productive and Dry  CN XII (hypoglossal): Lingual fasciculations upon protrusion  These deficits are consistency with LMN dysfunction that can be expected with Parkinson's Disease. Laryngeal function exam:   Secretions: oral mucosa was pink and dry  Vocal quality: See CN exam above  MPT: See CN exam above  S/Z ratio: DNT  Pitch range: See CN exam above  Cough: See CN exam above    Oral Care Status:    [] Oral Care [facility]  [x] Poor oral care status  [] Edentulous  [] Upper Dentures  [] Lower Dentures  [x] Missing/Broken Teeth  [] Evidence of dental cavities/carries    PO trials:     IDDSI 0 (thin) 5cc bolus (tsp), Cup, Straw: no anterior bolus loss, suspect functional A-P bolus transit, suspect premature bolus loss into pharynx, suspect delayed swallow onset, coughing after the swallow, and drop in O2 saturation    IDDSI 4 (puree): suspect reduced/impaired A-P bolus transit, suspect premature bolus loss into pharynx, suspect delayed swallow onset, oral clearance WFL, and no clinical s/s of aspiration.     IDDSI 7 (regular): suspect reduced/impaired A-P bolus transit, suspect premature bolus loss into pharynx, suspect delayed swallow onset, oral clearance WFL, and no clinical s/s of aspiration     3 oz water: CASEY d/t pt coughing after swallow and drop in O2 saturation after thin liquid trials    Impressions:    Clinical signs of oropharyngeal dysphagia likely chronic related to hx of dysphagia, reduced physical mobility, Parkinson's Disease, Progressive nature of disease/condition and the aging swallow. Swallow prognosis is guarded. Instrumental swallow study is indicated. Given progressive nature of condition/disease and overt clinical s/s of aspiration at bedside, pt is not safe for oral diet prior to instrumental study. Instrumentation: MBS orders have been placed. Diet recommendation: NPO; NPO; Meds via alt means of nutrition  Risk management: oral care q4 hrs to reduce adverse affects in the event of aspiration, general aspiration precautions and hold PO and contact SLP if s/s of aspiration or worsening respiratory status develop.     Prognosis: Fair    Recommended Intervention:  [x] Dysphagia tx  [] Videostroboscopy                      [x] NPO   [x] MBS       [] Speech/Cog Eval    [x] Therapeutic PO Trials     [] Ice Chips   [] Other:  [] FEES                                                 Dysphagia Therapeutic Intervention:  []  Bolus control Exercises  []  Oral Motor Exercises  []  [employer] Protocol  []  Thermal Stimulation  [x]  Oral Care    []  Vital Stim/NMES  []  Laryngeal Exercises  [x]  Patient/Family Education  []  Pharyngeal Exercises  [x]  Therapeutic PO trials with SLP  [x]  Diet tolerance monitoring  []  Other:     Referrals:  [] ENT    [] PT  [x] Pulmonology [] GI  [] Neurology  [] RD  [] OT   []     Goals:  Short Term Goals:  Timeframe for Short Term Goals: (5 days 06/18/2022)  Goal 1: The patient will tolerate instrumental assessment when able   Goal 2: The patient will tolerate therapeutic diet upgrade trials with no clinical s/s of aspiration 5/5  Goal 3: The patient/caregiver will demonstrate understanding of compensatory swallow strategies, for improved swallow safety    Long Term Goals:   Timeframe for Long Term Goals: (7 days 06/20/2022)  Goal 1: The patient will tolerate least restrictive diet with no clinical s/s of aspiration or worsening respiratory/pulmonary status    Treatment:  Skilled instruction completed with patient re: evidenced based practice regarding recommendations and POC, importance of oral care to reduce adverse affects in the event of aspiration, and instruction of recommended compensatory strategies developed based upon clinical exam. Pt able to recall/demonstrate compensatory strategies with min cues. Pt Education: SLP educated the patient re: Role of SLP, rationale for completion of assessment, results of assessment, recommendations and POC  Pt Education Response: verbalized understanding and RN aware    Duration/Frequency of Tx: 3-5x/wk    Individuals Consulted:   [x]  Patient     []  NP         [x]  RN   []  RD                   []  MD      []  Family Member                        []  PA    []  Other:      Safety Devices / Report:  [x]  All fall risk precautions in place [x]  Safety handoff completed with RN  [x]  Bed alarm in place  [x]  Left in bed     []  Chair alarm in place  []  Left in chair   [x]  Call light in reach   []  Other:                          Total Treatment Time / Charges       Time in Time out Total Time / units   Swallow Eval/Tx Time 0871 0855 30 min/2 units      Signature:    Nataliya Garcia  SLP  Clinician

## 2022-06-13 NOTE — PLAN OF CARE
Problem: Discharge Planning  Goal: Discharge to home or other facility with appropriate resources  6/13/2022 1026 by Major Francis RN  Outcome: Progressing  6/13/2022 0203 by Lonell Severs, RN  Outcome: Progressing     Problem: Safety - Adult  Goal: Free from fall injury  6/13/2022 1026 by Major Francis RN  Outcome: Progressing  6/13/2022 0203 by Lonell Severs, RN  Outcome: Progressing     Problem: Skin/Tissue Integrity  Goal: Absence of new skin breakdown  Description: 1. Monitor for areas of redness and/or skin breakdown  2. Assess vascular access sites hourly  3. Every 4-6 hours minimum:  Change oxygen saturation probe site  4. Every 4-6 hours:  If on nasal continuous positive airway pressure, respiratory therapy assess nares and determine need for appliance change or resting period.   6/13/2022 1026 by Major Francis RN  Outcome: Progressing  6/13/2022 0203 by Lonell Severs, RN  Outcome: Progressing     Problem: Neurosensory - Adult  Goal: Achieves stable or improved neurological status  6/13/2022 1026 by Major Francis RN  Outcome: Progressing  6/13/2022 0203 by Lonell Severs, RN  Outcome: Progressing     Problem: Skin/Tissue Integrity - Adult  Goal: Skin integrity remains intact  6/13/2022 1026 by Major Francis RN  Outcome: Progressing  6/13/2022 0203 by Lonell Severs, RN  Outcome: Progressing     Problem: Musculoskeletal - Adult  Goal: Return mobility to safest level of function  6/13/2022 1026 by Major Francis RN  Outcome: Progressing  6/13/2022 0203 by Lonell Severs, RN  Outcome: Progressing     Problem: Genitourinary - Adult  Goal: Absence of urinary retention  6/13/2022 1026 by Major Francis RN  Outcome: Progressing  6/13/2022 0203 by Lonell Severs, RN  Outcome: Progressing

## 2022-06-13 NOTE — PROGRESS NOTES
Speech Language Pathology  SLP Brief    Name: Mayuri Orellana  : 1945  Medical Diagnosis: Weakness [R53.1]  General weakness [R53.1]  Closed fracture of left ankle, initial encounter [O36.272F]    BSE completed. Pt exhibiting overt s/s of aspiration at bedside. MBS requested and ordered by provider at this time. Tentatively planned for . RN aware.     Marizol Sims M.A., [address] [facility]  Speech-Language Pathologist  Phone: [phone]

## 2022-06-13 NOTE — PLAN OF CARE
Problem: SLP Adult - Impaired Swallowing  Goal: By Discharge: Advance to least restrictive diet without signs or symptoms of aspiration for planned discharge setting. See evaluation for individualized goals. Note: SLP completed evaluation. Please refer to notes in EMR.      [address]  Clinician

## 2022-06-14 PROCEDURE — 2580000003 HC RX 258: Performed by: INTERNAL MEDICINE

## 2022-06-14 PROCEDURE — 6360000002 HC RX W HCPCS: Performed by: INTERNAL MEDICINE

## 2022-06-14 PROCEDURE — 96372 THER/PROPH/DIAG INJ SC/IM: CPT

## 2022-06-14 PROCEDURE — G0378 HOSPITAL OBSERVATION PER HR: HCPCS

## 2022-06-14 PROCEDURE — 97162 PT EVAL MOD COMPLEX 30 MIN: CPT

## 2022-06-14 PROCEDURE — 99231 SBSQ HOSP IP/OBS SF/LOW 25: CPT | Performed by: INTERNAL MEDICINE

## 2022-06-14 PROCEDURE — 6370000000 HC RX 637 (ALT 250 FOR IP): Performed by: INTERNAL MEDICINE

## 2022-06-14 PROCEDURE — 92526 ORAL FUNCTION THERAPY: CPT

## 2022-06-14 PROCEDURE — 97535 SELF CARE MNGMENT TRAINING: CPT

## 2022-06-14 PROCEDURE — 97530 THERAPEUTIC ACTIVITIES: CPT

## 2022-06-14 PROCEDURE — 1200000000 HC SEMI PRIVATE

## 2022-06-14 PROCEDURE — 97166 OT EVAL MOD COMPLEX 45 MIN: CPT

## 2022-06-14 RX ADMIN — ENOXAPARIN SODIUM 40 MG: 100 INJECTION SUBCUTANEOUS at 09:01

## 2022-06-14 RX ADMIN — POLYETHYLENE GLYCOL (3350) 17 G: 17 POWDER, FOR SOLUTION ORAL at 09:01

## 2022-06-14 RX ADMIN — GABAPENTIN 300 MG: 300 CAPSULE ORAL at 13:23

## 2022-06-14 RX ADMIN — Medication 10 ML: at 20:47

## 2022-06-14 RX ADMIN — GABAPENTIN 300 MG: 300 CAPSULE ORAL at 20:39

## 2022-06-14 RX ADMIN — TRAZODONE HYDROCHLORIDE 50 MG: 50 TABLET ORAL at 20:39

## 2022-06-14 RX ADMIN — CARBIDOPA AND LEVODOPA 1 TABLET: 25; 100 TABLET ORAL at 09:01

## 2022-06-14 RX ADMIN — PYRIDOXINE HCL TAB 50 MG 50 MG: 50 TAB at 09:01

## 2022-06-14 RX ADMIN — CARBIDOPA AND LEVODOPA 1 TABLET: 50; 200 TABLET, EXTENDED RELEASE ORAL at 20:39

## 2022-06-14 RX ADMIN — CARBIDOPA AND LEVODOPA 1 TABLET: 25; 100 TABLET ORAL at 20:39

## 2022-06-14 RX ADMIN — CARBIDOPA AND LEVODOPA 1 TABLET: 25; 100 TABLET ORAL at 16:36

## 2022-06-14 RX ADMIN — CARBIDOPA AND LEVODOPA 1 TABLET: 25; 100 TABLET ORAL at 13:23

## 2022-06-14 RX ADMIN — SERTRALINE 100 MG: 100 TABLET, FILM COATED ORAL at 20:39

## 2022-06-14 RX ADMIN — GABAPENTIN 300 MG: 300 CAPSULE ORAL at 09:01

## 2022-06-14 NOTE — PLAN OF CARE
Problem: Discharge Planning  Goal: Discharge to home or other facility with appropriate resources  6/14/2022 1142 by Sammy Mike RN  Outcome: Progressing  6/14/2022 0745 by Sarai Goode RN  Outcome: Progressing     Problem: Safety - Adult  Goal: Free from fall injury  6/14/2022 1142 by Sammy Mike RN  Outcome: Progressing  Flowsheets (Taken 6/14/2022 1138)  Free From Fall Injury:   Instruct family/caregiver on patient safety   Based on caregiver fall risk screen, instruct family/caregiver to ask for assistance with transferring infant if caregiver noted to have fall risk factors  6/14/2022 0745 by Sarai Goode RN  Outcome: Progressing     Problem: Skin/Tissue Integrity  Goal: Absence of new skin breakdown  6/14/2022 1142 by Sammy Mike RN  Outcome: Progressing  6/14/2022 0745 by Sarai Goode RN  Outcome: Progressing     Problem: Neurosensory - Adult  Goal: Achieves stable or improved neurological status  6/14/2022 1142 by Sammy Mike RN  Outcome: Progressing  6/14/2022 0745 by Sarai Goode RN  Outcome: Progressing     Problem: Skin/Tissue Integrity - Adult  Goal: Skin integrity remains intact  6/14/2022 1142 by Sammy Mike RN  Outcome: Progressing  Flowsheets  Taken 6/14/2022 1139 by Sammy Mike RN  Skin Integrity Remains Intact:   Monitor for areas of redness and/or skin breakdown   Assess vascular access sites hourly   Every 4-6 hours minimum: Change oxygen saturation probe site   Every 4-6 hours: If on nasal continuous positive airway pressure, respiratory therapy assesses nares and determine need for appliance change or resting period  Taken 6/14/2022 0746 by Sarai Goode RN  Skin Integrity Remains Intact: Monitor for areas of redness and/or skin breakdown  6/14/2022 0745 by Sarai Goode RN  Outcome: Progressing     Problem: Musculoskeletal - Adult  Goal: Return mobility to safest level of function  6/14/2022 1142 by Sammy Mike RN  Outcome: Progressing  6/14/2022 0745 by Susie Brooks RN  Outcome: Progressing     Problem: Genitourinary - Adult  Goal: Absence of urinary retention  6/14/2022 1142 by Alexandre Gibson RN  Outcome: Progressing  6/14/2022 0745 by Susie Brooks RN  Outcome: Progressing     Problem: ABCDS Injury Assessment  Goal: Absence of physical injury  Outcome: Progressing     Problem: Chronic Conditions and Co-morbidities  Goal: Patient's chronic conditions and co-morbidity symptoms are monitored and maintained or improved  Outcome: Progressing

## 2022-06-14 NOTE — PROGRESS NOTES
Inpatient Physical Therapy Evaluation and Treatment    Unit: [facility]  Date:  6/14/2022  Patient Name:    Celsa Wynne  Admitting diagnosis:  Weakness [R53.1]  General weakness [R53.1]  Closed fracture of left ankle, initial encounter [S82.926L]  Admit Date:  6/12/2022  Precautions/Restrictions/WB Status/ Lines/ Wounds/ Oxygen: Fall risk, Bed/chair alarm, Lines -Purewick catheter and WB Restrictions (NWB LLE) - suspected acute nondisplaced medial malleolus fracture    Treatment Time:  0933 - 1026  Treatment Number:  1   Timed Code Treatment Minutes: 43 minutes  Total Treatment Minutes:  53  minutes    Patient Goals for Therapy: Pt reports he would like to return home to his granddaughter and wife. Discharge Recommendations: SNF  DME needs for discharge: defer to facility       Therapy recommendation for EMS Transport: can transport by wheelchair    Therapy recommendations for staff:   Assist of 2 with use of [address] for all transfers to/from [facility]  to/from chair (assist needed to maintain NWB on the LLE)    History of Present Illness: H & P as per Merissa Bailey MD's note dated 6/13/2022  The patient is a 64 y.o. male with parkinson's disease and arthritis who presents to [facility] with c/o progressive weakness and multiple falls. Patient is a poor historian and does not know what is going on. Per review of EMS, he had fallen at home. He feels more weak than usual.  Currently denies any pain. He states he lives with his daughter. Vitals stable. Labs stable. Found to have left ankle fracture. Admitted to med-surg. Orthopedic surgery consulted. XR ANKLE LEFT (MIN 3 VIEWS)   Final Result   Irregularity about the medial malleolus is noted for which correlation for an   acute nondisplaced fracture is recommended. CT ABDOMEN PELVIS W IV CONTRAST Additional Contrast? None   Final Result   Moderate formed stool load suggests constipation.   No other acute finding in   the abdomen or pelvis.       Remote T11 and T12 compression fractures. No acute bone finding. CT Cervical Spine WO Contrast   Final Result   No acute fracture or traumatic malalignment of the cervical spine.       Remote discectomy and fusion at C6-C7. IMPRESSION/RECOMMENDATIONS:  Assessment: suspected acute nondisplaced medial malleolus fracture  Plan:  1) Will plan to keep him in the splint at this time and at follow up can transition him in to a boot I suspect. Maintain NWB on the left side. Family arranging for stair lift at home but appt about 2 weeks out. May need SNF for short term. Will have him f/u with Dr Cindy Monterroso in 2 weeks. Thank you for the opportunity to consult on this patient. Home Health S4 Level Recommendation:  NA  AM-PAC Mobility Score    AM-PAC Inpatient Mobility Raw Score : 11     Preadmission Environment    Pt. Lives with family (wife, granddaughter and great granddaughter - 6years old; granddaughter works full time, sometimes at home)  Home environment:    [address] house  Steps to enter first floor:   unknown number steps to enter - reports having handrail on steps but unsure of number  Steps to second floor: Full flight of 12-13 - [facility] to install stair lift  Bathroom:       Walk-in Shower, Shower Chair  and Raised toilet seat w/ arms  Equipment owned:      EchoStar (RW), small base quad cane (SBQC), manual W/C (recently got this and it is too big-planning to exchange), hospital bed and life alert, [facility]     Preadmission Status / PLOF:  History of falls             Yes-several falls leading to this admission, caught his foot on threshold while opening storm door  Pt. Able to drive           - [facility] or granddaughter assist with transportation; also has weekly visits from [address] [facility] home based care staff (MD, RN, PT/OT, etc)  Pt Fully independent with ADL's         Yes  Pt. Required assistance from family for:  Cleaning, Cooking and Laundry     Pt.  Fully independent for transfers and gait and walked with: Walker    Pain   No    Cognition    A&O x4   Able to follow 2 step commands    Subjective  Patient lying supine in bed with no family present. Pt agreeable to this PT eval & tx. Upper Extremity ROM/Strength  Please see OT evaluation. Lower Extremity ROM / Strength   AROM WFL: No  ROM limitations: Lacking FABRIZIO TKE ~5-10deg, L ankle restriction at this time 2/2 frac    Strength Assessment (measured on a 0-5 scale):  R LE   Quad   4   Ant Tib  5   Hamstring 4-   Iliopsoas 4-  L LE  Quad   At least 3/5, no manual resistance provided 2/2 L ankle frac   Ant Tib  Deferred 2/2 frac   Hamstring At least 3/5, no manual resistance provided 2/2 L ankle frac   Iliopsoas 4-    Lower Extremity Sensation    WFL    Lower Extremity Proprioception:   NT    Coordination and Tone  NT    Balance  Sitting:  Fair +; SBA  Comments: at EOB ~5min during strength assessment. [facility] ; Mod A in order to maintain LLE NWB status in STEDY  Comments: Unable to reach full hip and knee ext in standing; completed 4 bouts of standing (3 bouts ~30sec, 1 bout ~2 min). Pt demonstrated increased muscle fatigue during 2 min standing bout noted by increased shakiness and increased crouched stance. Bed Mobility   Supine to Sit:    Min A   Sit to Supine:   Not Tested (up to chair at end of session)  Rolling:   Not Tested  Scooting in sitting: Max A to align hips /c bed; CGA for scooting to EOB /c UE pulling on STEDY  Scooting in supine:  Not Tested    Transfer Training     Sit to stand: Mod A in STEDY;  increased difficulty maintaining LLE NWB precautions, required repeated VC and manual assistance  Stand to sit:   Mod A  at gait belt and under L knee to maintain precautions  Bed to Chair:   Total A with use of gait belt and ANN STEDY /c added assist at L knee to ensure NWB    Gait gait deferred due to inability to  RW while maintaining LLE NWB precautions; pt ambulated 0 ft.      Stair Training deferred, pt unsafe/ not appropriate to complete stairs at this time    Activity Tolerance   Pt completed therapy session with no adverse affects today. Positioning Needs   Pt reclined in chair, alarm set, positioned in proper neutral alignment and pressure relief provided. Call light provided and all needs within reach    Exercises Initiated  Ace deferred secondary to treatment focus on functional mobility  NA    Other  None. Patient/Family Education   Pt educated on role of inpatient PT, POC, importance of continued activity, DC recommendations, safety awareness, transfer techniques and calling for assist with mobility. Assessment  Pt seen for Physical Therapy evaluation in acute care setting. Pt's PLOF includes IND /c ADLs and amb /c RW, but required assistance for cooking, cleaning, laundry and driving. Pt presents /c moderate balance disturbances, weakness and inability to maintain LLE NWB status /s repeated VC and manual assist during standing. Trialed standing /c RW however, d/t inability to maintain precautions, completed bed>BSC>chair transfers Ax2 /c STEDY to assist LLE at knee. Pt would benefit from continued skilled physical therapy to improve strength, balance, and overall functional mobility. Recommending SNF upon discharge as patient functioning well below baseline, demonstrates good rehab potential and unable to return home due to inability to negotiate stairs to enter home/bedroom/bathroom, burden of care beyond caregiver ability, home environment not conducive to patient recovery and limited safety awareness. Goals : To be met in 3 visits:  1). Independent with LE Ex x 10 reps    To be met in 6 visits:  1). Supine to/from sit: Supervision  2). Sit to/from stand: Min A  /c RW  3). Bed to chair: Mod A /c RW  4). Gait: Ambulate 10 ft.   with  Mod A  and use of rolling walker (RW)  5).   Tolerate B LE exercises 3 sets of 10-15 reps    Rehabilitation Potential: Fair  Strengths for achieving goals include:   PLOF and Pt cooperative   Barriers to achieving goals include:    Weakness    Plan    To be seen 3-5 x / week  while in acute care setting for therapeutic exercises, bed mobility, transfers, progressive gait training, balance training, and family/patient education. Signature: Bertha Stein [facility]  Co-signature: Ernst Torres MS PT, # [id number]    If patient discharges from this facility prior to next visit, this note will serve as the Discharge Summary.

## 2022-06-14 NOTE — PROGRESS NOTES
Speech Language Pathology  Dysphagia Treatment/Follow-Up Note  Facility/Department: [address] MEDICAL-SURGICAL    Recommendations: SLP recommends to continue with IDDSI 4 Puree Solids; IDDSI 3 Moderately Thick (honey) Liquids; Meds crushed in puree as able  Risk Management: upright for all intake, stay upright for at least 30 mins after intake, small bites/sips, assist feed, oral care 2-3x/day to reduce adverse affects in the event of aspiration, increase physical mobility as able, slow rate of intake, coordinate PO intake with Sinemet dosage, general aspiration precautions and hold PO and contact SLP if s/s of aspiration or worsening respiratory status develop. Multiple PO considerations could be made for this patient pending MD discussion with family and pt regarding goals of care. Currently, the patient remains a full-code, thus, safest and least restrictive oral intake would be puree solids and moderately thick (honey) liquids. Pt would remain at high risk of aspiration and malnutrition with this diet given pharyngeal retention noted and minimal PO passage into UES. Family could also consider alternate means of nutrition if this aligns with goals of care. If family/pt wishes to eat for pleasure with understood risks, could consider thin liquids and pureed solids as the patient has been consuming PO, including thin liquids, and chest radiography is clear. Would crush all meds with puree. NAME:Mervin Saha  : 1945 (64 y.o.)   MRN: [id number]  ROOM: [facility]/0211-02  ADMISSION DATE: 2022  PATIENT DIAGNOSIS(ES): Weakness [R53.1]  General weakness [R53.1]  Closed fracture of left ankle, initial encounter [X26.182X]  No Known Allergies    DATE ONSET: 2022    Pain: The patient does not complain of pain      Current Diet: ADULT DIET; Dysphagia - Pureed;  Moderately Thick (Honey); medication in puree/crushed  ADULT ORAL NUTRITION SUPPLEMENT; Lunch, Dinner, Breakfast; Frozen Oral Supplement    Diet Tolerance:  Patient tolerating current diet level without signs/symptoms of aspiration. Dysphagia Treatment and Impressions:   Subjective: Pt seen in room at bedside with RN permission.  RN Report/Chart Review: pt tolerated 2 puddings and honey thick apple juice per nursing notes yesterday. Pt verbalized his distaste for pureed food and honey thick liquids. At this time, pt remains a full code. Explained that this is the safest oral diet recommendation at this time and encouraged the patient to discuss the various recommendations made from [facility] yesterday (puree/honey as safest oral intake, puree/thin for liberalization if aligning with goals of care, or NPO with TF as most restrictive). SLP also discussed this with Dr. Kathleen Leblanc who stated to continue with puree solids and honey thick liquids.  Patient tolerance: Pt reports tolerating current diet without s/s of aspiration.  Respiratory Status: Pt with SPO2% of 97 on RA with RR of 16     Liquid PO Trials:    IDDSI 3 Moderately Thick (honey):  Assessed via cup: no anterior bolus loss, suspect functional A-P bolus transit, suspect premature bolus loss into pharynx, suspect delayed swallow onset and delayed cough.  Solid PO Trials   IDDSI 4 Puree:   suspect functional A-P bolus transit, suspect premature bolus loss into pharynx, suspect delayed swallow onset, oral clearance grossly WFL and delayed cough followed by a hard swallow- suspect r/t pharyngeal retention as PO intake continues as noted on MBS yesterday.  Education: SLP edu pt re: Role of SLP, Rationale for dysphagia tx, Recommended compensatory strategies, Aspiration precautions, MBS results, POC and Risks of not following recommended diet. Pt verbalized understanding and would benefit from ongoing education   Assessment: Pt grossly tolerating current diet with no clinical s/s of aspiration. Some carryover of recommended compensatory strategies.  MD aware of rec's.     Recommendations: SLP recommends to continue with IDDSI 4 Puree Solids; IDDSI 3 Moderately Thick (honey) Liquids; Meds crushed in puree as able   Risk Management: upright for all intake, stay upright for at least 30 mins after intake, small bites/sips, assist feed, oral care 2-3x/day to reduce adverse affects in the event of aspiration, increase physical mobility as able, slow rate of intake, coordinate PO intake with Sinemet dosage, general aspiration precautions and hold PO and contact SLP if s/s of aspiration or worsening respiratory status develop. Dysphagia Goals:  Short Term Goals:  Timeframe for Short Term Goals: (5 days 06/18/2022)  Goal 1: The patient will tolerate instrumental assessment when able  6/14/2022: Goal addressed, see above. Ongoing, progressing. Goal 2: The patient will tolerate therapeutic diet upgrade trials with no clinical s/s of aspiration 5/5 6/14/2022: Goal not addressed, pt declined. Ongoing, progressing. Goal 3: The patient/caregiver will demonstrate understanding of compensatory swallow strategies, for improved swallow safety  6/14/2022 : Goal addressed, see above. Ongoing, progressing. Long Term Goals:   Timeframe for Long Term Goals: (7 days 06/20/2022)  Goal 1: The patient will tolerate least restrictive diet with no clinical s/s of aspiration or worsening respiratory/pulmonary status  6/14/2022 : Goal addressed, see above. Ongoing, progressing. Speech/Language/Cog Goals: N/A      Recommendations:  Solid Consistency: IDDSI 4 Puree Solids  Liquid Consistency: IDDSI 3 Moderately Thick (honey) Liquids  Medication: Meds crushed in puree as able    Plan:    Continued Dysphagia treatment with goals per plan of care. Discharge Recommendations: TBD    If pt discharges from hospital prior to Speech/Swallowing discharge, this note serves as tx and discharge summary.      Total Treatment Time / Charges     Time in Time out Total Time / units   Cognitive Tx Speech Tx      Dysphagia Tx 1133 1150 17 min/1 unit     Signature:    [address]  Clinician

## 2022-06-14 NOTE — PROGRESS NOTES
Inpatient Occupational Therapy  Evaluation and Treatment    Unit: [facility]  Date:  6/14/2022  Patient Name:    Mayuri Orellana  Admitting diagnosis:  Weakness [R53.1]  General weakness [R53.1]  Closed fracture of left ankle, initial encounter [S8.776K]  Admit Date:  6/12/2022  Precautions/Restrictions/WB Status/ Lines/ Wounds/ Oxygen: fall risk, bed/chair alarm, purewick catheter and WB restrictions (NWB LLE)     Treatment Time:  [id number]  Treatment Number: 1     Billable Treatment Time: 70 minutes   Total Treatment Time:   80   minutes    Patient Goals for Therapy:  \" go home \"      Discharge Recommendations: SNF  DME needs for discharge: defer to facility       Therapy recommendations for staff:   Assist of 2 with use of ANN STEDY for all transfers to/from BSC/chair-assist needed to maintain NWB LLE    History of Present Illness: H & P per Jayson Granados MD's note dated 6/13/2022  \"The patient is a 64 y. o. male with parkinson's disease and arthritis who presents to [facility] with c/o progressive weakness and multiple falls.  Patient is a poor historian and does not know what is going on.  Per review of EMS, he had fallen at home. Tod Rios feels more weak than usual.  Currently denies any pain. Tod Rios states he lives with his daughter.    Vitals stable.  Labs stable.  Found to have left ankle fracture.  Admitted to [place] [id number] surgery consulted. \"  XR ANKLE LEFT (MIN 3 VIEWS)   Final Result   Irregularity about the medial malleolus is noted for which correlation for an   acute nondisplaced fracture is recommended.      CT ABDOMEN PELVIS W IV CONTRAST Additional Contrast? None   Final Result   Moderate formed stool load suggests constipation.  No other acute finding in   the abdomen or pelvis.       Remote T11 and T12 compression fractures.  No acute bone finding.      CT Cervical Spine WO Contrast   Final Result   No acute fracture or traumatic malalignment of the cervical spine.       Remote discectomy and fusion at C6-C7. Home Health S4 Level Recommendation:  Level 3 Safety if going home despite SNF recommendation, will also need 24 hour physical assistance  AM-PAC Score: AM-PAC Inpatient Daily Activity Raw Score: 14    Preadmission Environment    Pt. Lives with family (wife, granddaughter and great granddaughter - 6years old; granddaughter works full time, sometimes at home)  Home environment:  3 story house  Steps to enter first floor:   unknown number steps to enter - reports having handrail on steps but unsure of number  Steps to second floor: Full flight of 12-13 - VA to install stair lift  Bathroom:  Walk-in Shower, Shower Chair  and Raised toilet seat w/ arms  Equipment owned:  EchoStar (RW), small base quad cane (SBQC), manual W/C (recently got this and it is too big-planning to exchange), hospital bed and life alert, LH sponge    Preadmission Status / PLOF:  History of falls   Yes-several falls leading to this admission, caught his foot on threshold while opening storm door  Pt. Able to drive   [facility] or granddaughter assist with transportation; also has weekly visits from [place] home based care staff (MD, RN, PT/OT, etc)  Pt Fully independent with ADL's  Yes  Pt. Required assistance from family for:  Cleaning, Cooking and Laundry     Pt. Fully independent for transfers and gait and walked with: Walker    Pain  No  Rating:NA  Location:  Pain Medicine Status: No request made      Cognition    A&O Person and Place - stated \"June 2020\" for time  Able to follow 1 step commands   Limited by [facility] at times    Subjective  Patient lying supine in bed with no family present   Pt agreeable to this OT eval & tx.      Upper Extremity ROM:    WFL   Function limited by tremors at times    Upper Extremity Strength:    BUE strength impaired but not formally assessed w/ MMT    Upper Extremity Sensation    WFL    Upper Extremity Proprioception:  [facility]    Coordination and Tone  [facility]    Balance  Functional Sitting Balance:  WFL  Functional Standing Balance:Impaired MOD A in STEDY    Bed mobility:    Supine to sit:   Min A  Sit to supine:   Not Tested  Rolling:    Not Tested  Scooting in sitting: Max A seated scoot to EOB  Scooting to head of bed:   Not Tested    Bridging:   Not Tested    Transfers:    Sit to stand: Mod A to STEDY, second person assisting with maintaining NWB LLE ( initially attempted to stand to RW but patient unable to maintiain NWB LLE )  Stand to sit:  Mod A  BSC to chair:   Total A via Stedy  Standard toilet: Not Tested  Bed to [facility]:  Total A via Stedy    Dressing:      UE: Mod A  LE:    Max A    Bathing:    UE:  Not Tested  LE:  Not Tested    Eating:   Min A beverage management    Toileting: Max A hygiene after BM on BSC    Grooming: Not Tested    Activity Tolerance   Pt completed therapy session with No adverse symptoms noted w/activity  SpO2: 97% on RA  HR: 71  BP: 119/74    Positioning Needs:   Up in chair, call light and needs in reach. Alarm Set    Exercise / Activities Initiated:   N/A    Patient/Family Education:   Role of OT  Recommendations for DC  Safe RW use/hand placement    Assessment of Deficits: Pt seen for Occupational therapy evaluation in acute care setting. Pt demonstrated decreased Activity tolerance, ADLs, IADLs, Bed mobility, Safety Awareness, Strength and Transfers. Pt functioning below baseline and will likely benefit from skilled occupational therapy services to maximize safety and independence. Goal(s) : To be met in 3 Visits:  1). Bed to toilet/BSC: Mod A    To be met in 5 Visits:  1). Supine to/from Sit:  Min A  2). Upper Body Bathing:   Min A  3). Lower Body Bathing: Mod A  4). Upper Body Dressing:  Min A  5). Lower Body Dressing: Mod A  6). Pt to demonstrate UE exs x 15 reps with minimal cues    Rehabilitation Potential:  Good for goals listed above.     Strengths for achieving goals include: Pt motivated, PLOF and Pt cooperative  Barriers to achieving goals include:  Complexity of condition     Plan: To be seen 3-5 x/wk while in acute care setting for therapeutic exercises, bed mobility, transfers, dressing, bathing, family/patient education, ADL/IADL retraining, energy conservation training.      Dani Blizzard, OTR/L [id number]            If patient discharges from this facility prior to next visit, this note will serve as the Discharge Summary

## 2022-06-14 NOTE — PROGRESS NOTES
Comprehensive Nutrition Assessment    Type and Reason for Visit:  Initial,Consult (consult for ONS)    Nutrition Recommendations/Plan:   1. Continue ADULT DIET; Dysphagia - Pureed; Moderately Thick (Honey); medication in puree/crushed diet order. 2. Continue magic cups with meals. 3. Monitor appetite, meal intake, and acceptance/intake of ONS. 4. Please re-check patient's weight since patient lost 6# from 6/12/22 to 6/13/22. 5. Monitor nutrition-related labs, bowel function, and weight trends. Malnutrition Assessment:  Malnutrition Status:  Severe malnutrition (06/14/22 1411)    Context:  Acute Illness     Findings of the 6 clinical characteristics of malnutrition:  Energy Intake:  50% or less of estimated energy requirements for 5 or more days  Weight Loss:  Unable to assess     Body Fat Loss: Moderate body fat loss Triceps,Buccal region   Muscle Mass Loss: Moderate muscle mass loss Clavicles (pectoralis & deltoids),Thigh (quadraceps),Calf (gastrocnemius),Hand (interosseous)  Fluid Accumulation:  No significant fluid accumulation     Strength:  Not Performed    Nutrition Assessment:    patient is nutritionally compromised AEB increasing weakness and falls PTA + difficulty chewing and/or swallowing with need for altered po consistencies, however, patient has slightly improved from a nutritional standpoint AEB he consumed 1-25% x 1 meal on 6/13/22 and 26-50% x 1 meal today; will continue ADULT DIET; Dysphagia - Pureed;  Moderately Thick (Honey) Liquids; medication in puree/crushed and continue magic cups with meals    Nutrition Related Findings:    patient is A & O to person; he has a hx of Parkinson's disease and had increasing weakness + falls PTA; he was found to have a L ankle fracture upon admission; last documented BM was on 6/13/22; SLP followed-up with patient today and recommended to continue current po consistencies; + constipation - miralax is ordered; patient's skin is dry and his skin is flaky on his face; + tremors r/t Parkinson's disease - observed more in R hand; patient was worried about his dentures being cleaned; + cast on L ankle/shin/calf area d/t L ankle fracture Wound Type: None       Current Nutrition Intake & Therapies:    Average Meal Intake: 1-25%,26-50%  Average Supplements Intake: Unable to assess  ADULT DIET; Dysphagia - Pureed; Moderately Thick (Honey); medication in puree/crushed  ADULT ORAL NUTRITION SUPPLEMENT; Lunch, Dinner, Breakfast; Frozen Oral Supplement    Anthropometric Measures:  Height: 5' 10\" (177.8 cm)  Ideal Body Weight (IBW): 166 lbs (75 kg)    Admission Body Weight: 128 lb 14.4 oz (58.5 kg) (obtained on 6/12/22; actual weight)  Current Body Weight: 122 lb 6.4 oz (55.5 kg) (obtained on 6/13/22; actual weight), 73.7 % IBW.  Weight Source: Bed Scale  Current BMI (kg/m2): 17.6  Usual Body Weight:  (unknown d/t lack of recent weight hx in EMR)     Weight Adjustment For: No Adjustment                 BMI Categories: Underweight (BMI less than 22) age over 72    Estimated Daily Nutrient Needs:  Energy Requirements Based On: Kcal/kg  Weight Used for Energy Requirements: Current  Energy (kcal/day): 1815 - 1925 kcals based on 33-35 kcals/kg/CBW  Weight Used for Protein Requirements: Current  Protein (g/day): 83 - 88 g protein based on 1.5-1.6 g/kg/CBW  Method Used for Fluid Requirements: 1 ml/kcal  Fluid (ml/day): 1815 - 1925 ml    Nutrition Diagnosis:   · Severe malnutrition related to inadequate protein-energy intake,increase demand for energy/nutrients,psychological cause or life stress,swallowing difficulty as evidenced by poor intake prior to admission,BMI,swallow study results,intake 0-25%,intake 26-50%,lab values,constipation,moderate loss of subcutaneous fat,moderate muscle loss      Nutrition Interventions:   Food and/or Nutrient Delivery: Continue Current Diet,Continue Oral Nutrition Supplement  Nutrition Education/Counseling: No recommendation at this time  Coordination of Nutrition Care: Continue to monitor while inpatient,Coordination of Care,Speech Therapy       Goals:  Previous Goal Met:  (N/A)  Goals: PO intake 50% or greater,by next RD assessment       Nutrition Monitoring and Evaluation:   Behavioral-Environmental Outcomes: None Identified  Food/Nutrient Intake Outcomes: Food and Nutrient Intake,Supplement Intake  Physical Signs/Symptoms Outcomes: Biochemical Data,Chewing or Swallowing,Constipation,GI Status,Meal Time Behavior,Nutrition Focused Physical Findings,Skin,Weight    Discharge Planning:     Too soon to determine     Chaparrita Smith, [address] [place] [address], LD  Contact: [phone]

## 2022-06-14 NOTE — CARE COORDINATION
INTERDISCIPLINARY PLAN OF CARE CONFERENCE    Date/Time: 6/14/2022 1:49 PM  Completed by: Obie Edwards RN, Case Management      Patient Name:  Elida Montero  YOB: 1945  Admitting Diagnosis: Weakness [R53.1]  General weakness [R53.1]  Closed fracture of left ankle, initial encounter [S82.892A]     Admit Date/Time:  6/12/2022  3:25 PM    Chart reviewed. Interdisciplinary team contacted or reviewed plan related to patient progress and discharge plans. Disciplines included Case Management, Nursing, and Dietitian. Current Status:INPT   PT/OT recommendation for discharge plan of care: SNF    Expected D/C Disposition:  Skilled nursing facility  Confirmed plan with patient and/or family Yes confirmed with: (name) per pt dtr, Sree Ceballos    Discharge Plan Comments: Referral called to [facility] with Lorie JUAREZ per dtr, Aiden Coy, request and [facility] states they are out of network for pt insurance. Sree Ceballos requests referral to [facility]. Referral called to [place] [facility] at [facility] at this time. 1510: Rec'd return call from [facility] with [facility] who states they can accept and will start precert today. 1600: CM spoke with Hien with [facility] who states that pt VA benefits do not include skilled rehab, however, pt has a [facility] policy that does. precert started.      Home O2 in place on admit: No  Pt informed of need to bring portable home O2 tank on day of discharge for nursing to connect prior to leaving:  Not Indicated  Verbalized agreement/Understanding:  Not Indicated

## 2022-06-14 NOTE — PROGRESS NOTES
Admit: 2022    Name:  Vidya Suggs  Room:  0211/0211-02  MRN:    [id number]   Daily Progress Note for 2022     A 70-year-old male with history of Parkinson's disease admitted with weakness multiple falls and a left ankle fracture    Interval History:       Scheduled Meds:   gabapentin  300 mg Oral TID    carbidopa-levodopa  1 tablet Oral 4x Daily    vitamin B-6  50 mg Oral Daily    sodium chloride flush  5-40 mL IntraVENous 2 times per day    enoxaparin  40 mg SubCUTAneous Daily    polyethylene glycol  17 g Oral Daily    traZODone  50 mg Oral Nightly    sertraline  100 mg Oral Nightly    carbidopa-levodopa  1 tablet Oral Nightly    lidocaine  1 patch TransDERmal Daily       Continuous Infusions:   sodium chloride         PRN Meds:  sodium chloride flush, sodium chloride, potassium chloride **OR** potassium alternative oral replacement **OR** potassium chloride, magnesium sulfate, ondansetron **OR** ondansetron, acetaminophen **OR** acetaminophen, senna, HYDROcodone 5 mg - acetaminophen                  Objective:     Temp  Av.2 °F (36.8 °C)  Min: 97.9 °F (36.6 °C)  Max: 98.4 °F (36.9 °C)  Pulse  Av.8  Min: 52  Max: 84  BP  Min: 106/64  Max: 122/79  SpO2  Av.8 %  Min: 97 %  Max: 98 %  No data found. Intake/Output Summary (Last 24 hours) at 2022 1306  Last data filed at 2022 1224  Gross per 24 hour   Intake 480 ml   Output 1200 ml   Net -720 ml       Physical Exam:  Gen: No distress. Alert. Eyes: PERRL. No sclera icterus. No conjunctival injection. ENT: No discharge. Pharynx clear. Neck: Trachea midline. Normal thyroid. Resp: No accessory muscle use. No crackles. No wheezes. No rhonchi. No dullness on percussion. CV: Regular rate. Regular rhythm. No murmur or rub. No edema. GI: Non-tender. Non-distended. No masses. No organomegaly. Normal bowel sounds. No hernia. Skin: Warm and dry. No nodule on exposed extremities. No rash on exposed extremities. Lymph: No cervical LAD. No supraclavicular LAD. M/S: ace bandage left ankle   Neuro: Awake. Moves all 4 extremities, non focal  Psych: Oriented x 3. No anxiety or agitation. Lab Data:  CBC:   Recent Labs     06/12/22  1546 06/13/22  0518   WBC 9.1 6.5   RBC 4.62 4.31   HGB 13.4* 12.4*   HCT 39.9* 37.8*   MCV 86.4 87.9   RDW 15.0 15.0    203     BMP:   Recent Labs     06/12/22  1546 06/13/22  0518    140   K 4.1 4.0   CL 98* 105   CO2 29 23   BUN 16 16   CREATININE 0.8 0.7*     BNP: No results for input(s): BNP in the last 72 hours. PT/INR:   Recent Labs     06/12/22  1546   PROTIME 13.7   INR 1.07     APTT:No results for input(s): APTT in the last 72 hours. CARDIAC ENZYMES:   Recent Labs     06/12/22  1546   TROPONINI <0.01     FASTING LIPID PANEL:No results found for: CHOL, HDL, TRIG  LIVER PROFILE:   Recent Labs     06/12/22  1546   AST 17   ALT 8*   BILITOT 0.5   ALKPHOS 153*         FL MODIFIED BARIUM SWALLOW W VIDEO   Final Result   Penetration and subglottic tracheal aspiration with thin and nectar thick   barium. There also significant residuals leading to subsequent episodes of   penetration with thicker barium substances. Please see separate speech pathology report for full discussion of findings   and recommendations. CT ABDOMEN PELVIS W IV CONTRAST Additional Contrast? None   Final Result   Moderate formed stool load suggests constipation. No other acute finding in   the abdomen or pelvis. Remote T11 and T12 compression fractures. No acute bone finding. RECOMMENDATIONS:   2.5 cm infrarenal mild aortic dilatation, not meeting criteria for aneurysm. No follow-up imaging is recommended. Reference: J Am Tasha Radiol 0379;13:894-220. CT Lumbar Spine WO Contrast   Final Result   No acute osseous abnormality identified in the thoracic or lumbar spine.          CT THORACIC SPINE WO CONTRAST   Final Result   No acute osseous abnormality identified in the thoracic or lumbar spine. CT Cervical Spine WO Contrast   Final Result   No acute fracture or traumatic malalignment of the cervical spine. Remote discectomy and fusion at C6-C7. CT HEAD WO CONTRAST   Final Result   No acute intracranial abnormality. XR ANKLE LEFT (MIN 3 VIEWS)   Final Result   Irregularity about the medial malleolus is noted for which correlation for an   acute nondisplaced fracture is recommended. XR CHEST PORTABLE   Final Result   No acute airspace disease identified. No discrete fracture identified. Assessment & Plan:     Patient Active Problem List    Diagnosis Date Noted    Closed fracture of left ankle     Constipation     General weakness 06/12/2022    Fever     Hemoptysis     Acute hypoxemic respiratory failure (HCC)     Severe sepsis ([address]     COVID-19 virus infection     Pneumonia of both lower lobes due to infectious organism     Thrombocytopenia ([address]     Lymphopenia     Acute encephalopathy     Sepsis ([address] 09/04/2020    Parkinson's disease ([facility] 03/19/2014    Depressive disorder, not elsewhere classified 03/19/2014    Mild cognitive impairment 03/19/2014     Weakness  Multiple falls  -admit to med-surg  -PT/OT     Left ankle fracture  -Orthopedic surgery consulted  -Norco prn  Continue splinting.   Nonweightbearing on left side   F/u with ortho 2 weeks      Parkinson's disease  -supportive care measures  -continue home medication regimen     Depression, anxiety  -continue home regimen     UA with large amount of blood  -unsure if the ED straight catheterized the patient for UA  reepat u/a     Constipation  miralax daily   -patient states he had a BM this morning.      DVT Prophylaxis: Lovenox  Diet:   Code Status: Full Code    Ot/pt   needs SNF     Kelechi Harrington MD

## 2022-06-14 NOTE — PLAN OF CARE
Problem: Discharge Planning  Goal: Discharge to home or other facility with appropriate resources  Outcome: Progressing     Problem: Safety - Adult  Goal: Free from fall injury  Outcome: Progressing     Problem: Skin/Tissue Integrity  Goal: Absence of new skin breakdown  Description: 1. Monitor for areas of redness and/or skin breakdown  2. Assess vascular access sites hourly  3. Every 4-6 hours minimum:  Change oxygen saturation probe site  4. Every 4-6 hours:  If on nasal continuous positive airway pressure, respiratory therapy assess nares and determine need for appliance change or resting period.   Outcome: Progressing     Problem: Neurosensory - Adult  Goal: Achieves stable or improved neurological status  Outcome: Progressing     Problem: Skin/Tissue Integrity - Adult  Goal: Skin integrity remains intact  Outcome: Progressing     Problem: Musculoskeletal - Adult  Goal: Return mobility to safest level of function  Outcome: Progressing     Problem: Genitourinary - Adult  Goal: Absence of urinary retention  Outcome: Progressing

## 2022-06-15 VITALS
WEIGHT: 122.3 LBS | BODY MASS INDEX: 17.51 KG/M2 | TEMPERATURE: 98.4 F | RESPIRATION RATE: 18 BRPM | HEART RATE: 84 BPM | DIASTOLIC BLOOD PRESSURE: 79 MMHG | OXYGEN SATURATION: 97 % | SYSTOLIC BLOOD PRESSURE: 124 MMHG | HEIGHT: 70 IN

## 2022-06-15 PROBLEM — R53.1 GENERALIZED WEAKNESS: Status: ACTIVE | Noted: 2022-06-15

## 2022-06-15 PROCEDURE — 99238 HOSP IP/OBS DSCHRG MGMT 30/<: CPT | Performed by: INTERNAL MEDICINE

## 2022-06-15 PROCEDURE — 96372 THER/PROPH/DIAG INJ SC/IM: CPT

## 2022-06-15 PROCEDURE — 6360000002 HC RX W HCPCS: Performed by: INTERNAL MEDICINE

## 2022-06-15 PROCEDURE — G0378 HOSPITAL OBSERVATION PER HR: HCPCS

## 2022-06-15 PROCEDURE — 2580000003 HC RX 258: Performed by: INTERNAL MEDICINE

## 2022-06-15 PROCEDURE — 6370000000 HC RX 637 (ALT 250 FOR IP): Performed by: INTERNAL MEDICINE

## 2022-06-15 RX ORDER — POLYETHYLENE GLYCOL 3350 17 G/17G
17 POWDER, FOR SOLUTION ORAL DAILY
Qty: 527 G | Refills: 1
Start: 2022-06-15 | End: 2022-07-15

## 2022-06-15 RX ORDER — TRAMADOL HYDROCHLORIDE 50 MG/1
50 TABLET ORAL EVERY 8 HOURS PRN
Qty: 6 TABLET | Refills: 0 | Status: SHIPPED | OUTPATIENT
Start: 2022-06-15 | End: 2022-06-17

## 2022-06-15 RX ADMIN — GABAPENTIN 300 MG: 300 CAPSULE ORAL at 13:57

## 2022-06-15 RX ADMIN — PYRIDOXINE HCL TAB 50 MG 50 MG: 50 TAB at 08:29

## 2022-06-15 RX ADMIN — GABAPENTIN 300 MG: 300 CAPSULE ORAL at 08:29

## 2022-06-15 RX ADMIN — CARBIDOPA AND LEVODOPA 1 TABLET: 25; 100 TABLET ORAL at 16:41

## 2022-06-15 RX ADMIN — CARBIDOPA AND LEVODOPA 1 TABLET: 25; 100 TABLET ORAL at 13:59

## 2022-06-15 RX ADMIN — Medication 10 ML: at 08:32

## 2022-06-15 RX ADMIN — ENOXAPARIN SODIUM 40 MG: 100 INJECTION SUBCUTANEOUS at 08:29

## 2022-06-15 RX ADMIN — CARBIDOPA AND LEVODOPA 1 TABLET: 25; 100 TABLET ORAL at 08:29

## 2022-06-15 RX ADMIN — POLYETHYLENE GLYCOL (3350) 17 G: 17 POWDER, FOR SOLUTION ORAL at 08:30

## 2022-06-15 NOTE — PLAN OF CARE
Problem: Discharge Planning  Goal: Discharge to home or other facility with appropriate resources  6/15/2022 0046 by Urban Beauchamp RN  Outcome: Progressing  6/14/2022 1142 by Vicky Murphy RN  Outcome: Progressing     Problem: Safety - Adult  Goal: Free from fall injury  6/15/2022 0046 by Urban Beauchamp RN  Outcome: Progressing  6/14/2022 1142 by Vicky Murphy RN  Outcome: Progressing  Flowsheets (Taken 6/14/2022 1138)  Free From Fall Injury:   Kristin Canela family/caregiver on patient safety   Based on caregiver fall risk screen, instruct family/caregiver to ask for assistance with transferring infant if caregiver noted to have fall risk factors     Problem: Skin/Tissue Integrity  Goal: Absence of new skin breakdown  Description: 1. Monitor for areas of redness and/or skin breakdown  2. Assess vascular access sites hourly  3. Every 4-6 hours minimum:  Change oxygen saturation probe site  4. Every 4-6 hours:  If on nasal continuous positive airway pressure, respiratory therapy assess nares and determine need for appliance change or resting period.   6/15/2022 0046 by Urban Beauchamp RN  Outcome: Progressing  6/14/2022 1142 by Vicky Murphy RN  Outcome: Progressing     Problem: Neurosensory - Adult  Goal: Achieves stable or improved neurological status  6/15/2022 0046 by Urban Beauchamp RN  Outcome: Progressing  6/14/2022 1142 by Vicky Murphy RN  Outcome: Progressing     Problem: Skin/Tissue Integrity - Adult  Goal: Skin integrity remains intact  6/15/2022 0046 by Urban Beauchamp RN  Outcome: Progressing  6/14/2022 1142 by Vicky Murphy RN  Outcome: Progressing  Flowsheets  Taken 6/14/2022 1139 by Vicky Murphy RN  Skin Integrity Remains Intact:   Monitor for areas of redness and/or skin breakdown   Assess vascular access sites hourly   Every 4-6 hours minimum: Change oxygen saturation probe site   Every 4-6 hours: If on nasal continuous positive airway pressure, respiratory therapy assesses nares and determine need for appliance change or resting period  Taken 6/14/2022 0746 by Didier Paiz RN  Skin Integrity Remains Intact: Monitor for areas of redness and/or skin breakdown     Problem: Musculoskeletal - Adult  Goal: Return mobility to safest level of function  6/15/2022 0046 by Jelena Smith RN  Outcome: Progressing  6/14/2022 1142 by Keven Chambers RN  Outcome: Progressing     Problem: Genitourinary - Adult  Goal: Absence of urinary retention  6/15/2022 0046 by Jelena Smith RN  Outcome: Progressing  6/14/2022 1142 by Keven Chambers RN  Outcome: Progressing     Problem: ABCDS Injury Assessment  Goal: Absence of physical injury  6/15/2022 0046 by Jelena Smith RN  Outcome: Progressing  6/14/2022 1142 by Keven Chambers RN  Outcome: Progressing     Problem: Chronic Conditions and Co-morbidities  Goal: Patient's chronic conditions and co-morbidity symptoms are monitored and maintained or improved  6/15/2022 0046 by Jelena Smith RN  Outcome: Progressing  6/14/2022 1142 by Keven Chambers RN  Outcome: Progressing     Problem: Nutrition Deficit:  Goal: Optimize nutritional status  Outcome: Progressing

## 2022-06-15 NOTE — PROGRESS NOTES
Admit: 2022    Name:  Estella Weinstein  Room:  [facility]021-02  MRN:    [id number]   Daily Progress Note for 6/15/2022     A 58-year-old male with history of Parkinson's disease admitted with weakness multiple falls and a left ankle fracture    Interval History:       Scheduled Meds:   gabapentin  300 mg Oral TID    carbidopa-levodopa  1 tablet Oral 4x Daily    vitamin B-6  50 mg Oral Daily    sodium chloride flush  5-40 mL IntraVENous 2 times per day    enoxaparin  40 mg SubCUTAneous Daily    polyethylene glycol  17 g Oral Daily    traZODone  50 mg Oral Nightly    sertraline  100 mg Oral Nightly    carbidopa-levodopa  1 tablet Oral Nightly    lidocaine  1 patch TransDERmal Daily       Continuous Infusions:   sodium chloride         PRN Meds:  sodium chloride flush, sodium chloride, potassium chloride **OR** potassium alternative oral replacement **OR** potassium chloride, magnesium sulfate, ondansetron **OR** ondansetron, acetaminophen **OR** acetaminophen, senna, HYDROcodone 5 mg - acetaminophen                  Objective:     Temp  Av.9 °F (36.6 °C)  Min: 97.2 °F (36.2 °C)  Max: 98.2 °F (36.8 °C)  Pulse  Av.3  Min: 76  Max: 93  BP  Min: 104/66  Max: 114/73  SpO2  Av %  Min: 96 %  Max: 98 %  No data found. Intake/Output Summary (Last 24 hours) at 6/15/2022 0808  Last data filed at 2022 1842  Gross per 24 hour   Intake 540 ml   Output 300 ml   Net 240 ml       Physical Exam:  Gen: No distress. Alert. Eyes: PERRL. No sclera icterus. No conjunctival injection. ENT: No discharge. Pharynx clear. Neck: Trachea midline. Normal thyroid. Resp: No accessory muscle use. No crackles. No wheezes. No rhonchi. No dullness on percussion. CV: Regular rate. Regular rhythm. No murmur or rub. No edema. GI: Non-tender. Non-distended. No masses. No organomegaly. Normal bowel sounds. No hernia. Skin: Warm and dry. No nodule on exposed extremities. No rash on exposed extremities.    Lymph: No cervical LAD. No supraclavicular LAD. M/S: ace bandage left ankle   Neuro: Awake. Moves all 4 extremities, non focal  Psych: Oriented x 3. No anxiety or agitation. Lab Data:  CBC:   Recent Labs     06/12/22  1546 06/13/22  0518   WBC 9.1 6.5   RBC 4.62 4.31   HGB 13.4* 12.4*   HCT 39.9* 37.8*   MCV 86.4 87.9   RDW 15.0 15.0    203     BMP:   Recent Labs     06/12/22  1546 06/13/22  0518    140   K 4.1 4.0   CL 98* 105   CO2 29 23   BUN 16 16   CREATININE 0.8 0.7*     BNP: No results for input(s): BNP in the last 72 hours. PT/INR:   Recent Labs     06/12/22  1546   PROTIME 13.7   INR 1.07     APTT:No results for input(s): APTT in the last 72 hours. CARDIAC ENZYMES:   Recent Labs     06/12/22  1546   TROPONINI <0.01     FASTING LIPID PANEL:No results found for: CHOL, HDL, TRIG  LIVER PROFILE:   Recent Labs     06/12/22  1546   AST 17   ALT 8*   BILITOT 0.5   ALKPHOS 153*         FL MODIFIED BARIUM SWALLOW W VIDEO   Final Result   Penetration and subglottic tracheal aspiration with thin and nectar thick   barium. There also significant residuals leading to subsequent episodes of   penetration with thicker barium substances. Please see separate speech pathology report for full discussion of findings   and recommendations. CT ABDOMEN PELVIS W IV CONTRAST Additional Contrast? None   Final Result   Moderate formed stool load suggests constipation. No other acute finding in   the abdomen or pelvis. Remote T11 and T12 compression fractures. No acute bone finding. RECOMMENDATIONS:   2.5 cm infrarenal mild aortic dilatation, not meeting criteria for aneurysm. No follow-up imaging is recommended. Reference: J Am Tasha Radiol 8166;17:859-355. CT Lumbar Spine WO Contrast   Final Result   No acute osseous abnormality identified in the thoracic or lumbar spine.          CT THORACIC SPINE WO CONTRAST   Final Result   No acute osseous abnormality identified in the thoracic or lumbar spine. CT Cervical Spine WO Contrast   Final Result   No acute fracture or traumatic malalignment of the cervical spine. Remote discectomy and fusion at C6-C7. CT HEAD WO CONTRAST   Final Result   No acute intracranial abnormality. XR ANKLE LEFT (MIN 3 VIEWS)   Final Result   Irregularity about the medial malleolus is noted for which correlation for an   acute nondisplaced fracture is recommended. XR CHEST PORTABLE   Final Result   No acute airspace disease identified. No discrete fracture identified. Assessment & Plan:     Patient Active Problem List    Diagnosis Date Noted    Closed fracture of left ankle     Constipation     General weakness 06/12/2022    Fever     Hemoptysis     Acute hypoxemic respiratory failure (HCC)     Severe sepsis ([address]     COVID-19 virus infection     Pneumonia of both lower lobes due to infectious organism     Thrombocytopenia ([address]     Lymphopenia     Acute encephalopathy     Sepsis ([address] 09/04/2020    Parkinson's disease ([address] 03/19/2014    Depressive disorder, not elsewhere classified 03/19/2014    Mild cognitive impairment 03/19/2014     Weakness  Multiple falls  -admit to med-surg  -PT/OT     Left ankle fracture  -Orthopedic surgery consulted  -Norco prn  Continue splinting.   Nonweightbearing on left side   F/u with ortho 2 weeks      Parkinson's disease  -supportive care measures  -continue home medication regimen     Depression, anxiety  -continue home regimen     UA with large amount of blood  -unsure if the ED straight catheterized the patient for UA  reepat u/a     Constipation  miralax daily   -patient states he had a BM this morning.      DVT Prophylaxis: Lovenox  Diet:   Code Status: Full Code    Ot/pt  Dc to SNF   precert pending    Jules Mari MD

## 2022-06-15 NOTE — CARE COORDINATION
DISCHARGE ORDER  Date/Time 6/15/2022 3:58 PM  Completed by: Eliazar Rivas RN, Case Management    Patient Name: Nereida Gonsales    : 1945      Admit order Date and Status:INPT 22  Noted discharge order. (verify MD's last order for status of admission/Traditional Medicare 3 [place] Inpatient qualifying stay required for SNF)    Confirmed discharge plan with:              Patient:  Yes              When pt confirms DC plan does any support person need to be contacted by CM Yes if yes who_dtr Sheila_____                      Discharge to Facility: [facility] Final phone number for staff giving report: [phone]   Pre-certification completed: yes   Hospital Exemption Notification (HENS) completed: yes   Discharge orders and Continuity of Care faxed to facility:  facility to obtain from [facility]      Transportation:               Medical Transport explained with choice list offered to pt/family. Choice:(no preference)  Agency used: [employer] [facility] up time:   [phone]      Pt/family/Nursing/Facility aware of  time:   Yes Names: pt, nurse, charge nurse, , dtr-rashard, facility  Ambulance form completed:  yes:      Comments: No Covid testing required per Hien with [facility]. Pt is being d/c'd to [facility] today. Pt's O2 sats are 97% on roomair. Discharge timeout done with Brady Marino. All discharge needs and concerns addressed. Discharging nurse to complete MARGOT, reconcile AVS, and place final copy with patient's discharge packet. Discharging RN to ensure that written prescriptions for  Level II medications are sent with patient to the facility as per protocol.

## 2022-06-15 NOTE — FLOWSHEET NOTE
06/15/22 0815   Vital Signs   Temp 97.7 °F (36.5 °C)   Temp Source Oral   Heart Rate 77   Heart Rate Source Monitor   Resp 18   BP (!) 117/91   BP Location Left upper arm   MAP (Calculated) 99.67   Patient Position Semi fowlers   Level of Consciousness Alert (0)   MEWS Score 1   Pain Assessment   Pain Assessment None - Denies Pain   Oxygen Therapy   SpO2 98 %   O2 Device None (Room air)     Patient sitting up in bed feeding self breakfast with good appetite. VSS. Bed in low position. Call bell and bedside table within reach. Will continue to monitor.

## 2022-06-15 NOTE — PROGRESS NOTES
Handoff report and transfer of care given at bedside to [facility]. Patient in stable condition, denies needs/concerns at this time. Call light within reach.

## 2022-06-15 NOTE — DISCHARGE SUMMARY
Name:  Vidya Suggs  Room:  0211/0211-02  MRN:    [id number]    Discharge Summary      This discharge summary is in conjunction with a complete physical exam done on the day of discharge. Attending Physician: Dr. Lisa Arriola  Discharging Physician: Dr. Domenico Rosalese: 6/12/2022  Discharge:   6/15/2022    HPI:    The patient is a 64 y.o. male with parkinson's disease and arthritis who presents to [facility] with c/o progressive weakness and multiple falls. Patient is a poor historian and does not know what is going on. Per review of EMS, he had fallen at home. He feels more weak than usual.  Currently denies any pain. He states he lives with his daughter.       Vitals stable. Labs stable. Found to have left ankle fracture. Admitted to med-surg. Orthopedic surgery consulted. Diagnoses this Admission and Hospital Course   Weakness  Multiple falls  -admitted to med-surg  -PT/OT  D/c to SNF     Left ankle fracture  -Orthopedic surgery consulted  -[facility] prn  Continued splinting.   Nonweightbearing on left side   F/u with ortho 2 weeks      Parkinson's disease  -supportive care measures  -continued home medication regimen     Depression, anxiety  -continued home regimen     UA with large amount of blood  -unsure if the ED straight catheterized the patient for UA  repeat u/a     Constipation  miralax daily   -patient states he had a BM this morning.      DVT Prophylaxis: Lovenox    Procedures (Please Review Full Report for Details)  Modified barium swallow    Consults    Orthopedic surgery      Physical Exam at Discharge:    BP (!) 117/91   Pulse 77   Temp 97.7 °F (36.5 °C) (Oral)   Resp 18   Ht 5' 10\" (1.778 m)   Wt 122 lb 4.8 oz (55.5 kg)   SpO2 98%   BMI 17.55 kg/m²     See today's progress note    CBC: Recent Labs     06/12/22  1546 06/13/22  0518   WBC 9.1 6.5   HGB 13.4* 12.4*   HCT 39.9* 37.8*   MCV 86.4 87.9    203     BMP:   Recent Labs     06/12/22  1546 06/13/22  0518  140   K 4.1 4.0   CL 98* 105   CO2 29 23   BUN 16 16   CREATININE 0.8 0.7*     LIVER PROFILE:   Recent Labs     06/12/22  1546   AST 17   ALT 8*   BILITOT 0.5   ALKPHOS 153*     PT/INR:   Recent Labs     06/12/22  1546   PROTIME 13.7   INR 1.07     UA:  Recent Labs     06/12/22  1759   COLORU LEXA*   PHUR 6.5   WBCUA None seen   RBCUA >100*   MUCUS Rare*   CLARITYU SL CLOUDY*   SPECGRAV 1.010   LEUKOCYTESUR Negative   UROBILINOGEN 1.0   BILIRUBINUR SMALL*   BLOODU LARGE*   GLUCOSEU Negative       CARDIAC ENZYMES  Recent Labs     06/12/22  1546   TROPONINI <0.01         CULTURES  COVID/Influenza: not detected    RADIOLOGY  FL MODIFIED BARIUM SWALLOW W VIDEO   Final Result   Penetration and subglottic tracheal aspiration with thin and nectar thick   barium. There also significant residuals leading to subsequent episodes of   penetration with thicker barium substances. Please see separate speech pathology report for full discussion of findings   and recommendations. CT ABDOMEN PELVIS W IV CONTRAST Additional Contrast? None   Final Result   Moderate formed stool load suggests constipation. No other acute finding in   the abdomen or pelvis. Remote T11 and T12 compression fractures. No acute bone finding. RECOMMENDATIONS:   2.5 cm infrarenal mild aortic dilatation, not meeting criteria for aneurysm. No follow-up imaging is recommended. Reference: J Am Tasha Radiol 4158;98:315-025. CT Lumbar Spine WO Contrast   Final Result   No acute osseous abnormality identified in the thoracic or lumbar spine. CT THORACIC SPINE WO CONTRAST   Final Result   No acute osseous abnormality identified in the thoracic or lumbar spine. CT Cervical Spine WO Contrast   Final Result   No acute fracture or traumatic malalignment of the cervical spine. Remote discectomy and fusion at C6-C7. CT HEAD WO CONTRAST   Final Result   No acute intracranial abnormality.          XR ANKLE LEFT (MIN 3 VIEWS)   Final Result   Irregularity about the medial malleolus is noted for which correlation for an   acute nondisplaced fracture is recommended. XR CHEST PORTABLE   Final Result   No acute airspace disease identified. No discrete fracture identified. Discharge Medications     Medication List      START taking these medications    polyethylene glycol 17 g packet  Commonly known as: GLYCOLAX  Take 17 g by mouth daily        CHANGE how you take these medications    traMADol 50 MG tablet  Commonly known as: ULTRAM  Take 1 tablet by mouth every 8 hours as needed for Pain for up to 2 days. What changed:   · when to take this  · reasons to take this        CONTINUE taking these medications    B-6 50 MG Tabs     bisacodyl 5 MG EC tablet  Commonly known as: DULCOLAX     * carbidopa-levodopa  MG per extended release tablet  Commonly known as: SINEMET CR     * carbidopa-levodopa  MG Tbdp  Commonly known as: PARCOPA     docusate sodium 100 MG capsule  Commonly known as: COLACE     gabapentin 300 MG capsule  Commonly known as: NEURONTIN     ketoconazole 2 % shampoo  Commonly known as: NIZORAL     lidocaine 5 %  Commonly known as: LIDODERM     potassium chloride 10 MEQ extended release capsule  Commonly known as: MICRO-K     sertraline 100 MG tablet  Commonly known as: ZOLOFT     traZODone 50 MG tablet  Commonly known as: DESYREL         * This list has 2 medication(s) that are the same as other medications prescribed for you. Read the directions carefully, and ask your doctor or other care provider to review them with you.                Where to Get Your Medications      You can get these medications from any pharmacy    Bring a paper prescription for each of these medications  · traMADol 50 MG tablet     Information about where to get these medications is not yet available    Ask your nurse or doctor about these medications  · polyethylene glycol 17 g packet Discharged in stable condition to [facility]. Follow Up: Follow up with PCP. Total time spent on discharge is 35 minutes    JOHNSON Coffman.

## 2022-06-15 NOTE — PLAN OF CARE
Problem: Discharge Planning  Goal: Discharge to home or other facility with appropriate resources  6/15/2022 1746 by Leatha Fernandes RN  Outcome: Completed  6/15/2022 1004 by Leatha Fernandes RN  Outcome: Progressing     Problem: Safety - Adult  Goal: Free from fall injury  6/15/2022 1746 by Leatha Fernandes RN  Outcome: Completed  6/15/2022 1004 by Leatha Fernandes RN  Outcome: Progressing  Flowsheets (Taken 6/15/2022 0958)  Free From Fall Injury:   Instruct family/caregiver on patient safety   Based on caregiver fall risk screen, instruct family/caregiver to ask for assistance with transferring infant if caregiver noted to have fall risk factors     Problem: Skin/Tissue Integrity  Goal: Absence of new skin breakdown  6/15/2022 1746 by Leatha Fernandes RN  Outcome: Completed  6/15/2022 1004 by Leatha Fernandes RN  Outcome: Progressing     Problem: Neurosensory - Adult  Goal: Achieves stable or improved neurological status  6/15/2022 1746 by Leatha Fernandes RN  Outcome: Completed  6/15/2022 1004 by Leatha Fernandes RN  Outcome: Progressing     Problem: Skin/Tissue Integrity - Adult  Goal: Skin integrity remains intact  6/15/2022 1746 by Leatha Fernandes RN  Outcome: Completed  6/15/2022 1004 by Leatha Fernandes RN  Outcome: Progressing  Flowsheets (Taken 6/15/2022 0959)  Skin Integrity Remains Intact:   Monitor for areas of redness and/or skin breakdown   Assess vascular access sites hourly   Every 4-6 hours minimum: Change oxygen saturation probe site   Every 4-6 hours: If on nasal continuous positive airway pressure, respiratory therapy assesses nares and determine need for appliance change or resting period     Problem: Musculoskeletal - Adult  Goal: Return mobility to safest level of function  6/15/2022 1746 by Leatha Fernandes RN  Outcome: Completed  6/15/2022 1004 by Leatha Fernandes RN  Outcome: Progressing     Problem: Genitourinary - Adult  Goal: Absence of urinary retention  6/15/2022 1746 by Chinedu Mcgowan RN  Outcome: Completed  6/15/2022 1004 by Chinedu Mcgowan RN  Outcome: Progressing     Problem: ABCDS Injury Assessment  Goal: Absence of physical injury  6/15/2022 1746 by Chinedu Mcgowan RN  Outcome: Completed  6/15/2022 1004 by Chinedu Mcgowan RN  Outcome: Progressing     Problem: Chronic Conditions and Co-morbidities  Goal: Patient's chronic conditions and co-morbidity symptoms are monitored and maintained or improved  6/15/2022 1746 by Chinedu Mcgowan RN  Outcome: Completed  6/15/2022 1004 by Chinedu Mcgowan RN  Outcome: Progressing     Problem: Nutrition Deficit:  Goal: Optimize nutritional status  6/15/2022 1746 by Chinedu Mcgowan RN  Outcome: Completed  6/15/2022 1004 by Chinedu Mcgowan RN  Outcome: Progressing

## 2022-06-15 NOTE — DISCHARGE INSTR - COC
Continuity of Care Form    Patient Name: Roberto Diop   :  1945  MRN:  [id number]    Admit date:  2022  Discharge date:  6/15/22    Code Status Order: Full Code   Advance Directives:      Admitting Physician:  Leonel Vinson MD  PCP: Lissette Coleman MD    Discharging Nurse: [facility] Unit/Room#: 0211/0211-02  Discharging Unit Phone Number: ***    Emergency Contact:   Extended Emergency Contact Information  Primary Emergency Contact: Felagary Sharma  Address: [address]           [address] of [facility] Phone: [phone]  Relation: Spouse  Secondary Emergency Contact: Mayr Pace Phone: [phone]  Relation: Child  Preferred language: English   needed?  No    Past Surgical History:  Past Surgical History:   Procedure Laterality Date    NECK SURGERY      SHOULDER SURGERY         Immunization History:   Immunization History   Administered Date(s) Administered    COVID-19, Pfizer Purple top, DILUTE for use, 12+ yrs, 30mcg/0.3mL dose 2021, 2022    Influenza Virus Vaccine 2013, 2015, 10/05/2017, 10/31/2018, 2019    Pneumococcal Conjugate 13-valent (Edhkleu81) 2017    Pneumococcal Polysaccharide (Zdhqdktwu63) 2009, 2014    Pneumococcal Vaccine 2014    Td vaccine (adult) 2014    Tdap (Boostrix, Adacel) 2020    Zoster Live (Zostavax) 2020    Zoster Recombinant (Shingrix) 2018, 05/10/2019       Active Problems:  Patient Active Problem List   Diagnosis Code    Parkinson's disease ([facility] [address] G20    Depressive disorder, not elsewhere classified F32.9    Mild cognitive impairment G31.84    Sepsis ([address] A41.9    Acute hypoxemic respiratory failure ([facility] [address] J96.01    Severe sepsis (HCC) A41.9, R65.20    COVID-19 virus infection U07.1    Pneumonia of both lower lobes due to infectious organism J18.9    Thrombocytopenia (HCC) D69.6    Lymphopenia D72.810    Acute encephalopathy G93.40    Hemoptysis R04.2    Fever R50.9    General weakness R53.1    Closed fracture of left ankle S82.892A    Constipation K59.00    Generalized weakness R53.1       Isolation/Infection:   Isolation            No Isolation          Patient Infection Status       Infection Onset Added Last Indicated Last Indicated By Review Planned Expiration Resolved Resolved By    None active    Resolved    COVID-19 (Rule Out) 22 COVID-19 & Influenza Combo (Ordered)   22 Rule-Out Test Resulted    COVID-19 (Rule Out) 22 COVID-19, Rapid (Ordered)   22 Rule-Out Test Resulted    COVID-19 20 COVID-19   20     COVID-19 (Rule Out) 20 COVID-19 (Ordered)   20 Rule-Out Test Resulted            Nurse Assessment:  Last Vital Signs: /66   Pulse 93   Temp 98.1 °F (36.7 °C) (Oral)   Resp 18   Ht 5' 10\" (1.778 m)   Wt 122 lb 4.8 oz (55.5 kg)   SpO2 97%   BMI 17.55 kg/m²     Last documented pain score (0-10 scale): Pain Level: 0  Last Weight:   Wt Readings from Last 1 Encounters:   06/15/22 122 lb 4.8 oz (55.5 kg)     Mental Status:  {IP PT MENTAL STATUS:68418}    IV Access:  { MARGOT IV ACCESS:521336514}    Nursing Mobility/ADLs:  Walking   {P DME IUTM:496513811}  Transfer  {P DME BSQM:974393427}  Bathing  {P DME NNBD:103095551}  Dressing  {P DME LEHC:126004600}  Toileting  {P DME DPOS:730615096}  Feeding  {P DME HLNZ:248966142}  Med Admin  {P DME ETEL:993298017}  Med Delivery   { MARGOT MED Delivery:435271272}    Wound Care Documentation and Therapy:        Elimination:  Continence:    Bowel: {YES / GJ:35642}  Bladder: {YES / AP:58318}  Urinary Catheter: {Urinary Catheter:477717017}   Colostomy/Ileostomy/Ileal Conduit: {YES / AP:81895}       Date of Last BM: ***    Intake/Output Summary (Last 24 hours) at 6/15/2022 0821  Last data filed at 2022 1842  Gross per 24 hour Intake 540 ml   Output 300 ml   Net 240 ml     I/O last 3 completed shifts: In: 5 [P.O.:540]  Out: 1500 [Urine:1500]    Safety Concerns:     [address] [employer] Safety Concerns:096920410}    Impairments/Disabilities:      [address] [facility] Impairments/Disabilities:009184943}    Nutrition Therapy:  Current Nutrition Therapy:   [address] MARGOT Diet List:036962475}    Routes of Feeding: {CHP DME Other Feedings:819213077}  Liquids: {Slp liquid thickness:29167}  Daily Fluid Restriction: {CHP DME Yes amt example:352235454}  Last Modified Barium Swallow with Video (Video Swallowing Test): {Done Not Done LAGB:746191068}    Treatments at the Time of Hospital Discharge:   Respiratory Treatments: ***  Oxygen Therapy:  {Therapy; copd oxygen:43582}  Ventilator:    { CC Vent JRMM:519700364}    Rehab Therapies: {THERAPEUTIC INTERVENTION:7142471106}  Weight Bearing Status/Restrictions: [address] [facility] Weight Bearin}  Other Medical Equipment (for information only, NOT a DME order):  {EQUIPMENT:474351407}  Other Treatments: ***    Patient's personal belongings (please select all that are sent with patient):  {[facility] DME Belongings:746679365}    RN SIGNATURE:  {Esignature:441524551}    CASE MANAGEMENT/SOCIAL WORK SECTION    Inpatient Status Date: 22    Readmission Risk Assessment Score:  Readmission Risk              Risk of Unplanned Readmission:  14           Discharging to Facility/ Agency   Name: Name: [facility]  Address: [address] [place] [address]  Phone: [phone]  Fax: [phone]   Address:  Phone:  Fax:    Dialysis Facility (if applicable)   Name:  Address:  Dialysis Schedule:  Phone:  Fax:    / signature: Electronically signed by Jasmin Bardales RN on 6/15/22 at 3:57 PM EDT    PHYSICIAN SECTION    Prognosis: Fair    Condition at Discharge: Stable    Rehab Potential (if transferring to Rehab):  Fair    Recommended Labs or Other Treatments After Discharge: none    Physician Certification: I certify the above information and transfer of Robson Adams  is necessary for the continuing treatment of the diagnosis listed and that he requires [facility] for less 30 days.      Update Admission H&P: No change in H&P    PHYSICIAN SIGNATURE:  Electronically signed by Jaziel Berry MD on 6/15/22 at 8:21 AM EDT

## 2022-06-15 NOTE — PLAN OF CARE
Problem: Discharge Planning  Goal: Discharge to home or other facility with appropriate resources  6/15/2022 1004 by Bernadette Charlton RN  Outcome: Progressing  6/15/2022 0046 by Natalie Dooley RN  Outcome: Progressing     Problem: Safety - Adult  Goal: Free from fall injury  6/15/2022 1004 by Bernadette Charlton RN  Outcome: Progressing  Flowsheets (Taken 6/15/2022 0958)  Free From Fall Injury:   Instruct family/caregiver on patient safety   Based on caregiver fall risk screen, instruct family/caregiver to ask for assistance with transferring infant if caregiver noted to have fall risk factors  6/15/2022 0046 by Natalie Dooley RN  Outcome: Progressing     Problem: Skin/Tissue Integrity  Goal: Absence of new skin breakdown  6/15/2022 1004 by Bernadette Charlton RN  Outcome: Progressing  6/15/2022 0046 by Natalie Dooley RN  Outcome: Progressing     Problem: Neurosensory - Adult  Goal: Achieves stable or improved neurological status  6/15/2022 1004 by Bernadette Charlton RN  Outcome: Progressing  6/15/2022 0046 by Natalie Dooley RN  Outcome: Progressing     Problem: Skin/Tissue Integrity - Adult  Goal: Skin integrity remains intact  6/15/2022 1004 by Bernadtete Charlton RN  Outcome: Progressing  Flowsheets (Taken 6/15/2022 0959)  Skin Integrity Remains Intact:   Monitor for areas of redness and/or skin breakdown   Assess vascular access sites hourly   Every 4-6 hours minimum: Change oxygen saturation probe site   Every 4-6 hours: If on nasal continuous positive airway pressure, respiratory therapy assesses nares and determine need for appliance change or resting period  6/15/2022 0046 by Natalie Dooley RN  Outcome: Progressing     Problem: Musculoskeletal - Adult  Goal: Return mobility to safest level of function  6/15/2022 1004 by Bernadette Charlton RN  Outcome: Progressing  6/15/2022 0046 by Natalie Dooley RN  Outcome: Progressing     Problem: Genitourinary - Adult  Goal: Absence of urinary retention  6/15/2022 1004 by Bay Course, RN  Outcome: Progressing  6/15/2022 0046 by Simeon Noel RN  Outcome: Progressing     Problem: ABCDS Injury Assessment  Goal: Absence of physical injury  6/15/2022 1004 by Bay Mcmahan RN  Outcome: Progressing  6/15/2022 0046 by Simeon Noel RN  Outcome: Progressing     Problem: Chronic Conditions and Co-morbidities  Goal: Patient's chronic conditions and co-morbidity symptoms are monitored and maintained or improved  6/15/2022 1004 by Bay Mcmahan RN  Outcome: Progressing  6/15/2022 0046 by Simeon Noel RN  Outcome: Progressing     Problem: Nutrition Deficit:  Goal: Optimize nutritional status  6/15/2022 1004 by Bay Mcmahan RN  Outcome: Progressing  6/15/2022 0046 by Simeon Noel RN  Outcome: Progressing

## 2022-07-06 ENCOUNTER — HOSPITAL ENCOUNTER (INPATIENT)
Age: 77
LOS: 2 days | Discharge: HOME HEALTH | DRG: 480 | End: 2022-07-08
Attending: EMERGENCY MEDICINE | Admitting: INTERNAL MEDICINE
Payer: OTHER GOVERNMENT

## 2022-07-06 ENCOUNTER — APPOINTMENT (OUTPATIENT)
Dept: GENERAL RADIOLOGY | Age: 77
DRG: 480 | End: 2022-07-06
Payer: OTHER GOVERNMENT

## 2022-07-06 ENCOUNTER — ANESTHESIA (OUTPATIENT)
Dept: OPERATING ROOM | Age: 77
DRG: 480 | End: 2022-07-06
Payer: OTHER GOVERNMENT

## 2022-07-06 ENCOUNTER — APPOINTMENT (OUTPATIENT)
Dept: CT IMAGING | Age: 77
DRG: 480 | End: 2022-07-06
Payer: OTHER GOVERNMENT

## 2022-07-06 ENCOUNTER — ANESTHESIA EVENT (OUTPATIENT)
Dept: OPERATING ROOM | Age: 77
DRG: 480 | End: 2022-07-06
Payer: OTHER GOVERNMENT

## 2022-07-06 DIAGNOSIS — S72.001A CLOSED FRACTURE OF RIGHT HIP, INITIAL ENCOUNTER (HCC): Primary | ICD-10-CM

## 2022-07-06 LAB
ABO/RH: NORMAL
ANION GAP SERPL CALCULATED.3IONS-SCNC: 8 MMOL/L (ref 3–16)
ANISOCYTOSIS: ABNORMAL
ANTIBODY SCREEN: NORMAL
BANDED NEUTROPHILS RELATIVE PERCENT: 7 % (ref 0–7)
BASOPHILS ABSOLUTE: 0 K/UL (ref 0–0.2)
BASOPHILS RELATIVE PERCENT: 0 %
BUN BLDV-MCNC: 13 MG/DL (ref 7–20)
CALCIUM SERPL-MCNC: 9.1 MG/DL (ref 8.3–10.6)
CHLORIDE BLD-SCNC: 102 MMOL/L (ref 99–110)
CO2: 30 MMOL/L (ref 21–32)
CREAT SERPL-MCNC: 0.6 MG/DL (ref 0.8–1.3)
EOSINOPHILS ABSOLUTE: 0.1 K/UL (ref 0–0.6)
EOSINOPHILS RELATIVE PERCENT: 1 %
GFR AFRICAN AMERICAN: >60
GFR NON-AFRICAN AMERICAN: >60
GLUCOSE BLD-MCNC: 161 MG/DL (ref 70–99)
HCT VFR BLD CALC: 33 % (ref 40.5–52.5)
HEMOGLOBIN: 10.7 G/DL (ref 13.5–17.5)
LYMPHOCYTES ABSOLUTE: 1 K/UL (ref 1–5.1)
LYMPHOCYTES RELATIVE PERCENT: 12 %
MACROCYTES: ABNORMAL
MCH RBC QN AUTO: 27.7 PG (ref 26–34)
MCHC RBC AUTO-ENTMCNC: 32.5 G/DL (ref 31–36)
MCV RBC AUTO: 85.4 FL (ref 80–100)
MONOCYTES ABSOLUTE: 0.6 K/UL (ref 0–1.3)
MONOCYTES RELATIVE PERCENT: 7 %
NEUTROPHILS ABSOLUTE: 6.6 K/UL (ref 1.7–7.7)
NEUTROPHILS RELATIVE PERCENT: 73 %
PDW BLD-RTO: 15.2 % (ref 12.4–15.4)
PLATELET # BLD: 324 K/UL (ref 135–450)
PLATELET SLIDE REVIEW: ADEQUATE
PMV BLD AUTO: 6.4 FL (ref 5–10.5)
POIKILOCYTES: ABNORMAL
POLYCHROMASIA: ABNORMAL
POTASSIUM SERPL-SCNC: 4 MMOL/L (ref 3.5–5.1)
RBC # BLD: 3.86 M/UL (ref 4.2–5.9)
SARS-COV-2, NAAT: NOT DETECTED
SLIDE REVIEW: ABNORMAL
SODIUM BLD-SCNC: 140 MMOL/L (ref 136–145)
TROPONIN: <0.01 NG/ML
WBC # BLD: 8.3 K/UL (ref 4–11)

## 2022-07-06 PROCEDURE — 3209999900 FLUORO FOR SURGICAL PROCEDURES

## 2022-07-06 PROCEDURE — 99252 IP/OBS CONSLTJ NEW/EST SF 35: CPT | Performed by: SPECIALIST/TECHNOLOGIST

## 2022-07-06 PROCEDURE — 27245 TREAT THIGH FRACTURE: CPT | Performed by: ORTHOPAEDIC SURGERY

## 2022-07-06 PROCEDURE — 7100000001 HC PACU RECOVERY - ADDTL 15 MIN: Performed by: ORTHOPAEDIC SURGERY

## 2022-07-06 PROCEDURE — APPNB30 APP NON BILLABLE TIME 0-30 MINS: Performed by: SPECIALIST/TECHNOLOGIST

## 2022-07-06 PROCEDURE — 3700000001 HC ADD 15 MINUTES (ANESTHESIA): Performed by: ORTHOPAEDIC SURGERY

## 2022-07-06 PROCEDURE — 99285 EMERGENCY DEPT VISIT HI MDM: CPT

## 2022-07-06 PROCEDURE — 99221 1ST HOSP IP/OBS SF/LOW 40: CPT | Performed by: ORTHOPAEDIC SURGERY

## 2022-07-06 PROCEDURE — 80048 BASIC METABOLIC PNL TOTAL CA: CPT

## 2022-07-06 PROCEDURE — 73502 X-RAY EXAM HIP UNI 2-3 VIEWS: CPT

## 2022-07-06 PROCEDURE — C1769 GUIDE WIRE: HCPCS | Performed by: ORTHOPAEDIC SURGERY

## 2022-07-06 PROCEDURE — 7100000000 HC PACU RECOVERY - FIRST 15 MIN: Performed by: ORTHOPAEDIC SURGERY

## 2022-07-06 PROCEDURE — 93005 ELECTROCARDIOGRAM TRACING: CPT | Performed by: EMERGENCY MEDICINE

## 2022-07-06 PROCEDURE — 6370000000 HC RX 637 (ALT 250 FOR IP): Performed by: EMERGENCY MEDICINE

## 2022-07-06 PROCEDURE — 2709999900 HC NON-CHARGEABLE SUPPLY: Performed by: ORTHOPAEDIC SURGERY

## 2022-07-06 PROCEDURE — 2580000003 HC RX 258: Performed by: EMERGENCY MEDICINE

## 2022-07-06 PROCEDURE — 86850 RBC ANTIBODY SCREEN: CPT

## 2022-07-06 PROCEDURE — 0QS606Z REPOSITION RIGHT UPPER FEMUR WITH INTRAMEDULLARY INTERNAL FIXATION DEVICE, OPEN APPROACH: ICD-10-PCS | Performed by: INTERNAL MEDICINE

## 2022-07-06 PROCEDURE — 70450 CT HEAD/BRAIN W/O DYE: CPT

## 2022-07-06 PROCEDURE — 73501 X-RAY EXAM HIP UNI 1 VIEW: CPT

## 2022-07-06 PROCEDURE — 87635 SARS-COV-2 COVID-19 AMP PRB: CPT

## 2022-07-06 PROCEDURE — 3600000004 HC SURGERY LEVEL 4 BASE: Performed by: ORTHOPAEDIC SURGERY

## 2022-07-06 PROCEDURE — 6370000000 HC RX 637 (ALT 250 FOR IP): Performed by: PHYSICIAN ASSISTANT

## 2022-07-06 PROCEDURE — 86901 BLOOD TYPING SEROLOGIC RH(D): CPT

## 2022-07-06 PROCEDURE — 2500000003 HC RX 250 WO HCPCS: Performed by: ORTHOPAEDIC SURGERY

## 2022-07-06 PROCEDURE — 6360000002 HC RX W HCPCS: Performed by: SPECIALIST/TECHNOLOGIST

## 2022-07-06 PROCEDURE — 2720000010 HC SURG SUPPLY STERILE: Performed by: ORTHOPAEDIC SURGERY

## 2022-07-06 PROCEDURE — 71045 X-RAY EXAM CHEST 1 VIEW: CPT

## 2022-07-06 PROCEDURE — 1200000000 HC SEMI PRIVATE

## 2022-07-06 PROCEDURE — 84484 ASSAY OF TROPONIN QUANT: CPT

## 2022-07-06 PROCEDURE — 73560 X-RAY EXAM OF KNEE 1 OR 2: CPT

## 2022-07-06 PROCEDURE — A4217 STERILE WATER/SALINE, 500 ML: HCPCS | Performed by: ORTHOPAEDIC SURGERY

## 2022-07-06 PROCEDURE — 2580000003 HC RX 258: Performed by: SPECIALIST/TECHNOLOGIST

## 2022-07-06 PROCEDURE — 85025 COMPLETE CBC W/AUTO DIFF WBC: CPT

## 2022-07-06 PROCEDURE — 2500000003 HC RX 250 WO HCPCS: Performed by: NURSE ANESTHETIST, CERTIFIED REGISTERED

## 2022-07-06 PROCEDURE — C1713 ANCHOR/SCREW BN/BN,TIS/BN: HCPCS | Performed by: ORTHOPAEDIC SURGERY

## 2022-07-06 PROCEDURE — 2580000003 HC RX 258: Performed by: ORTHOPAEDIC SURGERY

## 2022-07-06 PROCEDURE — 3600000014 HC SURGERY LEVEL 4 ADDTL 15MIN: Performed by: ORTHOPAEDIC SURGERY

## 2022-07-06 PROCEDURE — 6360000002 HC RX W HCPCS: Performed by: NURSE ANESTHETIST, CERTIFIED REGISTERED

## 2022-07-06 PROCEDURE — 3700000000 HC ANESTHESIA ATTENDED CARE: Performed by: ORTHOPAEDIC SURGERY

## 2022-07-06 PROCEDURE — 86900 BLOOD TYPING SEROLOGIC ABO: CPT

## 2022-07-06 PROCEDURE — 2580000003 HC RX 258: Performed by: NURSE ANESTHETIST, CERTIFIED REGISTERED

## 2022-07-06 DEVICE — LAG SCREW, TI
Type: IMPLANTABLE DEVICE | Site: HIP | Status: FUNCTIONAL
Brand: GAMMA

## 2022-07-06 DEVICE — LONG NAIL KIT R1.5, TI, RIGHT
Type: IMPLANTABLE DEVICE | Site: HIP | Status: FUNCTIONAL
Brand: GAMMA

## 2022-07-06 DEVICE — LOCKING SCREW
Type: IMPLANTABLE DEVICE | Site: HIP | Status: FUNCTIONAL
Brand: T2 ALPHA

## 2022-07-06 RX ORDER — MORPHINE SULFATE 2 MG/ML
2 INJECTION, SOLUTION INTRAMUSCULAR; INTRAVENOUS
Status: DISCONTINUED | OUTPATIENT
Start: 2022-07-06 | End: 2022-07-06

## 2022-07-06 RX ORDER — SODIUM CHLORIDE 9 MG/ML
INJECTION, SOLUTION INTRAVENOUS PRN
Status: CANCELLED | OUTPATIENT
Start: 2022-07-06

## 2022-07-06 RX ORDER — FENTANYL CITRATE 50 UG/ML
INJECTION, SOLUTION INTRAMUSCULAR; INTRAVENOUS PRN
Status: DISCONTINUED | OUTPATIENT
Start: 2022-07-06 | End: 2022-07-06 | Stop reason: SDUPTHER

## 2022-07-06 RX ORDER — PHENYLEPHRINE HCL IN 0.9% NACL 1 MG/10 ML
SYRINGE (ML) INTRAVENOUS PRN
Status: DISCONTINUED | OUTPATIENT
Start: 2022-07-06 | End: 2022-07-06 | Stop reason: SDUPTHER

## 2022-07-06 RX ORDER — DOCUSATE SODIUM 100 MG/1
100 CAPSULE, LIQUID FILLED ORAL DAILY PRN
Status: DISCONTINUED | OUTPATIENT
Start: 2022-07-06 | End: 2022-07-08 | Stop reason: HOSPADM

## 2022-07-06 RX ORDER — GABAPENTIN 300 MG/1
300 CAPSULE ORAL 3 TIMES DAILY
Status: DISCONTINUED | OUTPATIENT
Start: 2022-07-06 | End: 2022-07-08 | Stop reason: HOSPADM

## 2022-07-06 RX ORDER — LANOLIN ALCOHOL/MO/W.PET/CERES
50 CREAM (GRAM) TOPICAL DAILY
Status: DISCONTINUED | OUTPATIENT
Start: 2022-07-06 | End: 2022-07-08 | Stop reason: HOSPADM

## 2022-07-06 RX ORDER — POTASSIUM CHLORIDE 750 MG/1
10 TABLET, EXTENDED RELEASE ORAL DAILY
Status: DISCONTINUED | OUTPATIENT
Start: 2022-07-06 | End: 2022-07-08 | Stop reason: HOSPADM

## 2022-07-06 RX ORDER — ENOXAPARIN SODIUM 100 MG/ML
40 INJECTION SUBCUTANEOUS DAILY
Status: CANCELLED | OUTPATIENT
Start: 2022-07-06

## 2022-07-06 RX ORDER — ENOXAPARIN SODIUM 100 MG/ML
40 INJECTION SUBCUTANEOUS DAILY
Status: DISCONTINUED | OUTPATIENT
Start: 2022-07-07 | End: 2022-07-08 | Stop reason: HOSPADM

## 2022-07-06 RX ORDER — SUCCINYLCHOLINE CHLORIDE 20 MG/ML
INJECTION INTRAMUSCULAR; INTRAVENOUS PRN
Status: DISCONTINUED | OUTPATIENT
Start: 2022-07-06 | End: 2022-07-06 | Stop reason: SDUPTHER

## 2022-07-06 RX ORDER — TRAZODONE HYDROCHLORIDE 50 MG/1
50 TABLET ORAL NIGHTLY
Status: DISCONTINUED | OUTPATIENT
Start: 2022-07-06 | End: 2022-07-08 | Stop reason: HOSPADM

## 2022-07-06 RX ORDER — ONDANSETRON 2 MG/ML
4 INJECTION INTRAMUSCULAR; INTRAVENOUS EVERY 6 HOURS PRN
Status: DISCONTINUED | OUTPATIENT
Start: 2022-07-06 | End: 2022-07-08 | Stop reason: HOSPADM

## 2022-07-06 RX ORDER — POLYETHYLENE GLYCOL 3350 17 G/17G
17 POWDER, FOR SOLUTION ORAL DAILY
Status: DISCONTINUED | OUTPATIENT
Start: 2022-07-06 | End: 2022-07-08 | Stop reason: HOSPADM

## 2022-07-06 RX ORDER — MEPERIDINE HYDROCHLORIDE 50 MG/ML
12.5 INJECTION INTRAMUSCULAR; INTRAVENOUS; SUBCUTANEOUS EVERY 5 MIN PRN
Status: CANCELLED | OUTPATIENT
Start: 2022-07-06

## 2022-07-06 RX ORDER — CARBIDOPA AND LEVODOPA 50; 200 MG/1; MG/1
1 TABLET, EXTENDED RELEASE ORAL NIGHTLY
Status: DISCONTINUED | OUTPATIENT
Start: 2022-07-06 | End: 2022-07-08 | Stop reason: HOSPADM

## 2022-07-06 RX ORDER — OXYCODONE HYDROCHLORIDE AND ACETAMINOPHEN 5; 325 MG/1; MG/1
1 TABLET ORAL ONCE
Status: COMPLETED | OUTPATIENT
Start: 2022-07-06 | End: 2022-07-06

## 2022-07-06 RX ORDER — ONDANSETRON 4 MG/1
4 TABLET, ORALLY DISINTEGRATING ORAL EVERY 8 HOURS PRN
Status: DISCONTINUED | OUTPATIENT
Start: 2022-07-06 | End: 2022-07-08 | Stop reason: HOSPADM

## 2022-07-06 RX ORDER — BUPIVACAINE HYDROCHLORIDE 5 MG/ML
INJECTION, SOLUTION EPIDURAL; INTRACAUDAL PRN
Status: DISCONTINUED | OUTPATIENT
Start: 2022-07-06 | End: 2022-07-06 | Stop reason: ALTCHOICE

## 2022-07-06 RX ORDER — MAGNESIUM HYDROXIDE 1200 MG/15ML
LIQUID ORAL CONTINUOUS PRN
Status: COMPLETED | OUTPATIENT
Start: 2022-07-06 | End: 2022-07-06

## 2022-07-06 RX ORDER — SODIUM CHLORIDE 0.9 % (FLUSH) 0.9 %
5-40 SYRINGE (ML) INJECTION PRN
Status: DISCONTINUED | OUTPATIENT
Start: 2022-07-06 | End: 2022-07-08 | Stop reason: HOSPADM

## 2022-07-06 RX ORDER — ONDANSETRON 2 MG/ML
4 INJECTION INTRAMUSCULAR; INTRAVENOUS
Status: CANCELLED | OUTPATIENT
Start: 2022-07-06 | End: 2022-07-06

## 2022-07-06 RX ORDER — SODIUM CHLORIDE 0.9 % (FLUSH) 0.9 %
5-40 SYRINGE (ML) INJECTION EVERY 12 HOURS SCHEDULED
Status: CANCELLED | OUTPATIENT
Start: 2022-07-06

## 2022-07-06 RX ORDER — LIDOCAINE HYDROCHLORIDE 20 MG/ML
INJECTION, SOLUTION INFILTRATION; PERINEURAL PRN
Status: DISCONTINUED | OUTPATIENT
Start: 2022-07-06 | End: 2022-07-06 | Stop reason: SDUPTHER

## 2022-07-06 RX ORDER — SODIUM CHLORIDE 9 MG/ML
INJECTION, SOLUTION INTRAVENOUS PRN
Status: DISCONTINUED | OUTPATIENT
Start: 2022-07-06 | End: 2022-07-06 | Stop reason: SDUPTHER

## 2022-07-06 RX ORDER — MORPHINE SULFATE 4 MG/ML
4 INJECTION, SOLUTION INTRAMUSCULAR; INTRAVENOUS
Status: DISCONTINUED | OUTPATIENT
Start: 2022-07-06 | End: 2022-07-06

## 2022-07-06 RX ORDER — MORPHINE SULFATE 4 MG/ML
4 INJECTION, SOLUTION INTRAMUSCULAR; INTRAVENOUS
Status: DISCONTINUED | OUTPATIENT
Start: 2022-07-06 | End: 2022-07-07

## 2022-07-06 RX ORDER — PROPOFOL 10 MG/ML
INJECTION, EMULSION INTRAVENOUS PRN
Status: DISCONTINUED | OUTPATIENT
Start: 2022-07-06 | End: 2022-07-06 | Stop reason: SDUPTHER

## 2022-07-06 RX ORDER — 0.9 % SODIUM CHLORIDE 0.9 %
1000 INTRAVENOUS SOLUTION INTRAVENOUS ONCE
Status: COMPLETED | OUTPATIENT
Start: 2022-07-06 | End: 2022-07-06

## 2022-07-06 RX ORDER — SODIUM CHLORIDE 0.9 % (FLUSH) 0.9 %
5-40 SYRINGE (ML) INJECTION EVERY 12 HOURS SCHEDULED
Status: DISCONTINUED | OUTPATIENT
Start: 2022-07-06 | End: 2022-07-08 | Stop reason: HOSPADM

## 2022-07-06 RX ORDER — MORPHINE SULFATE 2 MG/ML
2 INJECTION, SOLUTION INTRAMUSCULAR; INTRAVENOUS
Status: DISCONTINUED | OUTPATIENT
Start: 2022-07-06 | End: 2022-07-07

## 2022-07-06 RX ORDER — SODIUM CHLORIDE 9 MG/ML
INJECTION, SOLUTION INTRAVENOUS CONTINUOUS
Status: DISCONTINUED | OUTPATIENT
Start: 2022-07-06 | End: 2022-07-08 | Stop reason: HOSPADM

## 2022-07-06 RX ORDER — SODIUM CHLORIDE, SODIUM LACTATE, POTASSIUM CHLORIDE, CALCIUM CHLORIDE 600; 310; 30; 20 MG/100ML; MG/100ML; MG/100ML; MG/100ML
INJECTION, SOLUTION INTRAVENOUS CONTINUOUS PRN
Status: DISCONTINUED | OUTPATIENT
Start: 2022-07-06 | End: 2022-07-06 | Stop reason: SDUPTHER

## 2022-07-06 RX ORDER — SODIUM CHLORIDE 0.9 % (FLUSH) 0.9 %
5-40 SYRINGE (ML) INJECTION PRN
Status: CANCELLED | OUTPATIENT
Start: 2022-07-06

## 2022-07-06 RX ORDER — DEXAMETHASONE SODIUM PHOSPHATE 4 MG/ML
INJECTION, SOLUTION INTRA-ARTICULAR; INTRALESIONAL; INTRAMUSCULAR; INTRAVENOUS; SOFT TISSUE PRN
Status: DISCONTINUED | OUTPATIENT
Start: 2022-07-06 | End: 2022-07-06 | Stop reason: SDUPTHER

## 2022-07-06 RX ORDER — ONDANSETRON 2 MG/ML
INJECTION INTRAMUSCULAR; INTRAVENOUS PRN
Status: DISCONTINUED | OUTPATIENT
Start: 2022-07-06 | End: 2022-07-06 | Stop reason: SDUPTHER

## 2022-07-06 RX ORDER — ROCURONIUM BROMIDE 10 MG/ML
INJECTION, SOLUTION INTRAVENOUS PRN
Status: DISCONTINUED | OUTPATIENT
Start: 2022-07-06 | End: 2022-07-06 | Stop reason: SDUPTHER

## 2022-07-06 RX ORDER — ACETAMINOPHEN 325 MG/1
650 TABLET ORAL EVERY 4 HOURS PRN
Status: DISCONTINUED | OUTPATIENT
Start: 2022-07-06 | End: 2022-07-08 | Stop reason: HOSPADM

## 2022-07-06 RX ORDER — OXYCODONE HYDROCHLORIDE 5 MG/1
5 TABLET ORAL EVERY 4 HOURS PRN
Status: DISCONTINUED | OUTPATIENT
Start: 2022-07-06 | End: 2022-07-08 | Stop reason: HOSPADM

## 2022-07-06 RX ORDER — SODIUM CHLORIDE 9 MG/ML
INJECTION, SOLUTION INTRAVENOUS PRN
Status: DISCONTINUED | OUTPATIENT
Start: 2022-07-06 | End: 2022-07-08 | Stop reason: HOSPADM

## 2022-07-06 RX ORDER — OXYCODONE HYDROCHLORIDE 5 MG/1
10 TABLET ORAL EVERY 4 HOURS PRN
Status: DISCONTINUED | OUTPATIENT
Start: 2022-07-06 | End: 2022-07-08 | Stop reason: HOSPADM

## 2022-07-06 RX ADMIN — SUGAMMADEX 50 MG: 100 INJECTION, SOLUTION INTRAVENOUS at 17:42

## 2022-07-06 RX ADMIN — OXYCODONE AND ACETAMINOPHEN 1 TABLET: 5; 325 TABLET ORAL at 13:49

## 2022-07-06 RX ADMIN — Medication 50 MCG: at 17:18

## 2022-07-06 RX ADMIN — PROPOFOL 20 MG: 10 INJECTION, EMULSION INTRAVENOUS at 17:32

## 2022-07-06 RX ADMIN — FENTANYL CITRATE 12.5 MCG: 50 INJECTION INTRAMUSCULAR; INTRAVENOUS at 17:39

## 2022-07-06 RX ADMIN — Medication 50 MCG: at 17:33

## 2022-07-06 RX ADMIN — CEFAZOLIN 2000 MG: 10 INJECTION, POWDER, FOR SOLUTION INTRAVENOUS at 16:33

## 2022-07-06 RX ADMIN — Medication 50 MCG: at 17:27

## 2022-07-06 RX ADMIN — PROPOFOL 70 MG: 10 INJECTION, EMULSION INTRAVENOUS at 16:37

## 2022-07-06 RX ADMIN — FENTANYL CITRATE 12.5 MCG: 50 INJECTION INTRAMUSCULAR; INTRAVENOUS at 17:34

## 2022-07-06 RX ADMIN — SERTRALINE 100 MG: 50 TABLET, FILM COATED ORAL at 20:07

## 2022-07-06 RX ADMIN — ONDANSETRON 4 MG: 2 INJECTION INTRAMUSCULAR; INTRAVENOUS at 16:51

## 2022-07-06 RX ADMIN — ROCURONIUM BROMIDE 40 MG: 10 SOLUTION INTRAVENOUS at 16:46

## 2022-07-06 RX ADMIN — SUGAMMADEX 50 MG: 100 INJECTION, SOLUTION INTRAVENOUS at 17:44

## 2022-07-06 RX ADMIN — METHOCARBAMOL 500 MG: 100 INJECTION, SOLUTION INTRAMUSCULAR; INTRAVENOUS at 16:57

## 2022-07-06 RX ADMIN — DEXAMETHASONE SODIUM PHOSPHATE 4 MG: 4 INJECTION, SOLUTION INTRAMUSCULAR; INTRAVENOUS at 16:51

## 2022-07-06 RX ADMIN — Medication 50 MCG: at 17:36

## 2022-07-06 RX ADMIN — GABAPENTIN 300 MG: 300 CAPSULE ORAL at 20:07

## 2022-07-06 RX ADMIN — FENTANYL CITRATE 12.5 MCG: 50 INJECTION INTRAMUSCULAR; INTRAVENOUS at 17:29

## 2022-07-06 RX ADMIN — CARBIDOPA AND LEVODOPA 1 TABLET: 25; 100 TABLET ORAL at 20:07

## 2022-07-06 RX ADMIN — SUGAMMADEX 100 MG: 100 INJECTION, SOLUTION INTRAVENOUS at 17:46

## 2022-07-06 RX ADMIN — CARBIDOPA AND LEVODOPA 1 TABLET: 50; 200 TABLET, EXTENDED RELEASE ORAL at 20:07

## 2022-07-06 RX ADMIN — SUCCINYLCHOLINE CHLORIDE 120 MG: 20 INJECTION, SOLUTION INTRAMUSCULAR; INTRAVENOUS at 16:37

## 2022-07-06 RX ADMIN — Medication 100 MCG: at 16:36

## 2022-07-06 RX ADMIN — PROPOFOL 20 MG: 10 INJECTION, EMULSION INTRAVENOUS at 17:45

## 2022-07-06 RX ADMIN — FENTANYL CITRATE 12.5 MCG: 50 INJECTION INTRAMUSCULAR; INTRAVENOUS at 17:44

## 2022-07-06 RX ADMIN — SODIUM CHLORIDE: 9 INJECTION, SOLUTION INTRAVENOUS at 19:20

## 2022-07-06 RX ADMIN — SODIUM CHLORIDE 1000 ML: 9 INJECTION, SOLUTION INTRAVENOUS at 13:49

## 2022-07-06 RX ADMIN — PROPOFOL 20 MG: 10 INJECTION, EMULSION INTRAVENOUS at 17:37

## 2022-07-06 RX ADMIN — TRAZODONE HYDROCHLORIDE 50 MG: 50 TABLET ORAL at 20:07

## 2022-07-06 RX ADMIN — LIDOCAINE HYDROCHLORIDE 60 MG: 20 INJECTION, SOLUTION INFILTRATION; PERINEURAL at 16:36

## 2022-07-06 RX ADMIN — PROPOFOL 20 MG: 10 INJECTION, EMULSION INTRAVENOUS at 17:28

## 2022-07-06 RX ADMIN — OXYCODONE 10 MG: 5 TABLET ORAL at 21:38

## 2022-07-06 RX ADMIN — SODIUM CHLORIDE, SODIUM LACTATE, POTASSIUM CHLORIDE, AND CALCIUM CHLORIDE: .6; .31; .03; .02 INJECTION, SOLUTION INTRAVENOUS at 16:34

## 2022-07-06 ASSESSMENT — PAIN DESCRIPTION - DESCRIPTORS: DESCRIPTORS: ACHING;CRAMPING;DISCOMFORT

## 2022-07-06 ASSESSMENT — PAIN DESCRIPTION - ORIENTATION
ORIENTATION: RIGHT
ORIENTATION: RIGHT

## 2022-07-06 ASSESSMENT — ENCOUNTER SYMPTOMS
BACK PAIN: 0
PHOTOPHOBIA: 0
VOICE CHANGE: 0
ABDOMINAL PAIN: 0
STRIDOR: 0
BLOOD IN STOOL: 0
SHORTNESS OF BREATH: 0
NAUSEA: 0
FACIAL SWELLING: 0
COLOR CHANGE: 0
VOMITING: 0
WHEEZING: 0
TROUBLE SWALLOWING: 0

## 2022-07-06 ASSESSMENT — LIFESTYLE VARIABLES: HOW OFTEN DO YOU HAVE A DRINK CONTAINING ALCOHOL: NEVER

## 2022-07-06 ASSESSMENT — PAIN SCALES - GENERAL
PAINLEVEL_OUTOF10: 10
PAINLEVEL_OUTOF10: 2
PAINLEVEL_OUTOF10: 7

## 2022-07-06 ASSESSMENT — PAIN - FUNCTIONAL ASSESSMENT
PAIN_FUNCTIONAL_ASSESSMENT: 0-10
PAIN_FUNCTIONAL_ASSESSMENT: 0-10

## 2022-07-06 ASSESSMENT — PAIN DESCRIPTION - LOCATION
LOCATION: HIP
LOCATION: HIP

## 2022-07-06 NOTE — ED PROVIDER NOTES
[facility]  ED  eMERGENCY dEPARTMENT eNCOUnter      Pt Name: Jameson Hinton  MRN: [id number]  [place] 1945  Date of evaluation: 7/6/2022  Provider: Janet Otto MD    CHIEF COMPLAINT       Chief Complaint   Patient presents with    Fall     pt from home, fell 5 days ago, complaining of right hip pain, has been able to bare weight         HISTORY OF PRESENT ILLNESS   (Location/Symptom, Timing/Onset, Context/Setting, Quality, Duration, Modifying Factors, Severity)  Note limiting factors. Jameson Hinton is a 64 y.o. male with hx of Parkinson's disease and frequent falls reports that he fell 5 days ago onto his right hip. Patient reports extreme difficulty bearing weight on his right hip since this fall. Denies any head or neck trauma. He reports his pain to his right hip is severe constant and worsening. He reports trying to bear weight worsens his pain and nothing improves it. He denies any numbness. HPI    Nursing Notes were reviewed. REVIEW OFSYSTEMS    (2-9 systems for level 4, 10 or more for level 5)     Review of Systems   Constitutional: Negative for appetite change, fever and unexpected weight change. HENT: Negative for facial swelling, trouble swallowing and voice change. Eyes: Negative for photophobia and visual disturbance. Respiratory: Negative for shortness of breath, wheezing and stridor. Cardiovascular: Negative for chest pain and palpitations. Gastrointestinal: Negative for abdominal pain, blood in stool, nausea and vomiting. Genitourinary: Negative for difficulty urinating and dysuria. Musculoskeletal: Positive for arthralgias and gait problem. Negative for back pain and neck pain. Skin: Negative for color change and wound. Neurological: Negative for seizures, syncope and speech difficulty. Psychiatric/Behavioral: Negative for self-injury and suicidal ideas.        Except as noted above the remainder of the review of systems was reviewed and negative. PAST MEDICAL HISTORY     Past Medical History:   Diagnosis Date    Arthritis     Kidney stone     Knee pain     Parkinson disease ([address]          SURGICAL HISTORY       Past Surgical History:   Procedure Laterality Date    NECK SURGERY      SHOULDER SURGERY           CURRENT MEDICATIONS       Previous Medications    BISACODYL (DULCOLAX) 5 MG EC TABLET    Take 5 mg by mouth daily as needed for Constipation    CARBIDOPA-LEVODOPA (PARCOPA)  MG TBDP    Take 1 tablet by mouth 4 times daily     CARBIDOPA-LEVODOPA (SINEMET CR)  MG PER EXTENDED RELEASE TABLET    Take 1 tablet by mouth nightly    DOCUSATE SODIUM (COLACE) 100 MG CAPSULE    Take 100 mg by mouth daily as needed for Constipation    GABAPENTIN (NEURONTIN) 300 MG CAPSULE    Take 300 mg by mouth 3 times daily. KETOCONAZOLE (NIZORAL) 2 % SHAMPOO    Apply to affected areas three times a week (lather, leave on for 5 minutes, then rinse)    LIDOCAINE (LIDODERM) 5 %    Place 1 patch onto the skin daily 12 hours on, 12 hours off. POLYETHYLENE GLYCOL (GLYCOLAX) 17 G PACKET    Take 17 g by mouth daily    POTASSIUM CHLORIDE (MICRO-K) 10 MEQ EXTENDED RELEASE CAPSULE    Take 10 mEq by mouth daily    PYRIDOXINE HCL (B-6) 50 MG TABS    Take 50 mg by mouth daily     SERTRALINE (ZOLOFT) 100 MG TABLET    Take 100 mg by mouth nightly    TRAZODONE (DESYREL) 50 MG TABLET    Take 50 mg by mouth nightly       ALLERGIES     Patient has no known allergies. FAMILY HISTORY     History reviewed. No pertinent family history.        SOCIAL HISTORY       Social History     Socioeconomic History    Marital status:      Spouse name: None    Number of children: None    Years of education: None    Highest education level: None   Occupational History    None   Tobacco Use    Smoking status: Former Smoker     Quit date: 3/19/2008     Years since quittin.3    Smokeless tobacco: Never Used   Substance and Sexual Activity    Alcohol use: No    Drug use: No    Sexual activity: Never   Other Topics Concern    None   Social History Narrative    None     Social Determinants of Health     Financial Resource Strain:     Difficulty of Paying Living Expenses: Not on file   Food Insecurity:     Worried About Running Out of Food in the Last Year: Not on file    Negro of Food in the Last Year: Not on file   Transportation Needs:     Lack of Transportation (Medical): Not on file    Lack of Transportation (Non-Medical): Not on file   Physical Activity:     Days of Exercise per Week: Not on file    Minutes of Exercise per Session: Not on file   Stress:     Feeling of Stress : Not on file   Social Connections:     Frequency of Communication with Friends and Family: Not on file    Frequency of Social Gatherings with Friends and Family: Not on file    Attends [employer] Services: Not on file    Active Member of [address] [place] [address] [email or web address] or Organizations: Not on file    Attends Club or Organization Meetings: Not on file    Marital Status: Not on file   Intimate Partner Violence:     Fear of Current or Ex-Partner: Not on file    Emotionally Abused: Not on file    Physically Abused: Not on file    Sexually Abused: Not on file   Housing Stability:     Unable to Pay for Housing in the Last Year: Not on file    Number of [place] in the Last Year: Not on file    Unstable Housing in the Last Year: Not on file         PHYSICAL EXAM    (up to 7 for level 4, 8 or more for level 5)     ED Triage Vitals [07/06/22 1223]   BP Temp Temp src Heart Rate Resp SpO2 Height Weight   103/63 98.3 °F (36.8 °C) -- 83 17 98 % -- 122 lb (55.3 kg)       Physical Exam  Vitals and nursing note reviewed. Constitutional:       General: He is not in acute distress. Appearance: He is well-developed. Comments: Chronically ill-appearing elderly  male   HENT:      Head: Normocephalic and atraumatic.       Right Ear: External ear normal.      Left Ear: External ear normal. Eyes:      Conjunctiva/sclera: Conjunctivae normal.   Neck:      Vascular: No JVD. Trachea: No tracheal deviation. Cardiovascular:      Rate and Rhythm: Normal rate. Pulses: Normal pulses. Comments: 2+ DP pulses to right lower extremity  Pulmonary:      Effort: Pulmonary effort is normal. No respiratory distress. Breath sounds: Normal breath sounds. No wheezing. Abdominal:      General: There is no distension. Palpations: Abdomen is soft. Tenderness: There is no abdominal tenderness. There is no guarding or rebound. Musculoskeletal:         General: Tenderness and deformity present. Normal range of motion. Cervical back: Neck supple. Comments: Shortening of right lower extremity compared to left. Tenderness to the right acetabulum. Bruising noted over right hip and anterior thigh. No laceration or skin injury. Skin:     General: Skin is warm and dry. Neurological:      Mental Status: He is alert. Cranial Nerves: No cranial nerve deficit. Comments: Patient awake and alert. Very slow quiet speech. DIAGNOSTIC RESULTS     EKG:All EKG's are interpreted by the Emergency Department Physician who either signs or Co-signs this chart in the absence of a cardiologist.    The Ekg interpreted by me shows  normal sinus rhythm with a rate of 71  Axis is   Normal  QTc is  417ms  Intervals and Durations are unremarkable. ST Segments: nonspecific changes  No significant change from prior EKG dated 6/12/22      RADIOLOGY:     Interpretation per the Radiologist below, if available at the time of this note:    CT HEAD WO CONTRAST   Final Result   No acute intracranial abnormality. Atrophy and small-vessel ischemic change, similar to prior      RECOMMENDATIONS:   Unavailable         XR CHEST PORTABLE   Final Result   No acute cardiopulmonary disease.          XR HIP 2-3 VW W PELVIS RIGHT   Final Result   Angulated, displaced, comminuted intertrochanteric fracture of the right hip. LABS:  Labs Reviewed   COVID-19, RAPID   CBC WITH AUTO DIFFERENTIAL   BASIC METABOLIC PANEL   URINALYSIS WITH REFLEX TO CULTURE   TROPONIN       All otherlabs were within normal range or not returned as of this dictation. EMERGENCY DEPARTMENT COURSE and DIFFERENTIAL DIAGNOSIS/MDM:   Vitals:    Vitals:    07/06/22 1245 07/06/22 1300 07/06/22 1330 07/06/22 1345   BP:    101/66   Pulse: 82 79 75 74   Resp: 23 23 19 25   Temp:       SpO2: 98% 98% 99% 97%   Weight:             Is this patient to be included in the SEP-1 Core Measure due to severe sepsis or septic shock? No   Exclusion criteria - the patient is NOT to be included for SEP-1 Core Measure due to: Infection is not suspected      MDM  Vital signs are within normal limits. X-ray does show an angulated displaced comminuted and trochanteric fracture of the right hip. Injury is closed and the patient is neurovascularly intact. Orthopedic surgery and hospital service are consulted and the patient is admitted for surgical fixation.    :  IP CONSULT TO ORTHOPEDIC SURGERY    PROCEDURES:  Unless otherwise noted below, none     Procedures    FINAL IMPRESSION      1. Closed fracture of right hip, initial encounter [facility])          DISPOSITION/PLAN   DISPOSITION Decision To Admit 07/06/2022 01:58:08 PM           (Please note that portions of this note were completed with a voice recognition program.  Efforts were made to edit the dictations but occasionally words aremis-transcribed. )    Kristina Vines MD (electronically signed)  Attending Emergency Physician           Kristina Vines MD  07/06/22 5356

## 2022-07-06 NOTE — CARE COORDINATION
CASE MANAGEMENT INITIAL ASSESSMENT      Reviewed chart and completed assessment with patient and daughter via phone  Family present: NA  Explained Case Management role/services. yes    Primary contact information:daughter, [facility] Decision Maker :   Primary Decision Maker: Rosalba Alas - Child - [phone]    Secondary Decision Maker: Cassidy Salvador - Spouse - [phone]          Can this person be reached and be able to respond quickly, such as within a few minutes or hours? Yes      Admit date/status:7/6/22   Diagnosis:hip fx   Is this a Readmission?:  Yes      Insurance:VA   Precert required for SNF: Yes       3 night stay required: No    Living arrangements, Adls, care needs, prior to admission:home with daughter who provides 24 hour assist with all ADLs    Durable Medical Equipment at home:  Walker_x_Cane_x_RTS__ BSC__Shower Chair_x_  02__ HHN__ CPAP__  BiPap__  Hospital Bed_x_ W/C___ Other_____    Services in the home and/or outpatient, prior to [address] [facility]    Current PCP:Dr. Tia Higginbotham                                Medications:yes Prescription coverage? Yes Will pt require financial assistance with medications No     [facility]     Dialysis Facility (if applicable)   · Name:  · Address:  · Dialysis Schedule:  · Phone:  · Fax:    PT/OT recs:    Hospital Exemption Notification (HEN):    Barriers to discharge:medical complications    Plan/comments:Referred to patient for d/c planning. Spoke to patient and daughter Mickeal Speaks. Patient is a 64year old male admitted for hip fx. Patient lives at home with daughter. Daughter is able to provide 24 hour assist and her daughter can assist also. They do NOT want SNF. They prefer to return home with home care. Denies other needs at this time.   Electronically signed by MADISON Molina LISW-S on 7/6/2022 at 3:18 PM      [facility] on chart for MD signature

## 2022-07-06 NOTE — PROGRESS NOTES
Patient in PACU for Preop. Patient is poor historian. Was not able to answer some questions but knows why he is here and that he broke his hip. Having pain in hip. Denies nausea.

## 2022-07-06 NOTE — BRIEF OP NOTE
Brief Postoperative Note      Patient: Lauren Fine  YOB: 1945  MRN: [id number]    Date of Procedure: 7/6/2022    Pre-Op Diagnosis: right intertrochanteric hip fracture    Post-Op Diagnosis: Same       Procedure(s):  RIGHT HIP INTRAMEDULLARY NAILING    Surgeon(s):  Morena Drake MD    Assistant:  Surgical Assistant: Mariana Askew    Anesthesia: General    Estimated Blood Loss (mL): <23LZ    Complications: None    Specimens:   * No specimens in log *    Implants:  Implant Name Type Inv.  Item Serial No.  Lot No. LRB No. Used Action   NAIL IM L400MM [id number] 125DEG LNG R TROCHANTERIC FEM TI VEENA - [id number]  NAIL IM L400MM [id number] 125DEG LNG R TROCHANTERIC FEM TI VEENA  [employer] HOW-WD [id number] Right 1 Implanted   SCREW BNE L105MM DIA10.5MM CANC HIP TI ST DIVYA AIMEE - [id number]  SCREW BNE L105MM DIA10.5MM CANC HIP TI ST DIVYA AIMEE  [employer] HOW-WD [id number] Right 1 Implanted   SCREW BNE L475MM DIA5MM AIMEE FOR T2 ALPHA NAILING SYS - [id number]  SCREW BNE L475MM DIA5MM AIMEE FOR T2 ALPHA NAILING SYS  MENG TONY-WD [id number] Right 1 Implanted         Drains:   [REMOVED] External Urinary Catheter (Removed)   Site Assessment Clean,dry & intact 06/15/22 0838   Placement Replaced 06/15/22 1102   Securement Method Securing device (Describe) 06/14/22 1638   Catheter Care Catheter/Wick replaced;Suction Canister/Tubing changed 06/14/22 1638   Perineal Care Yes 06/14/22 1638   Suction 40 mmgHg continuous 06/15/22 0838   Urine Color Malia 06/15/22 0309   Urine Appearance Cloudy 06/15/22 0838   Output (mL) 400 mL 06/15/22 1102           Electronically signed by Morena Drake MD on 7/6/2022 at 5:27 PM

## 2022-07-06 NOTE — CONSULTS
Department of Orthopedic Surgery  Physician Assistant   Consult Note        Reason for Consult:  Right hip fracture  Requesting Physician: Simi Shearer MD  Date of Service: 7/6/2022 3:55 PM    CHIEF COMPLAINT:  As Above    History Obtained From:  patient, electronic medical record    HISTORY OF PRESENT ILLNESS:                The patient is a 64 y.o. male with PMHx of parkinsons, anxiety, and depression who presents with above chief complaint. Pts daughter states that approximately 5 days ago the patient was up unassisted out of bed at approximately 3am and climbed into the bathtub. He tripped lifting his leg over the side of the tub and fell onto his right side. Originally he was complaining of medial right knee pain, but progressively had difficulty with ambulating. When the pt's family noticed bruising and due to his decline in mobility, they decided to bring him to the ED for evaluation. ED noted shortening of the right leg and intertrochanteric fracture was noted on xray imaging. Orthopedics was consulted for further evaluation. The pt lives at home with his wife, uses a walker at baseline. Pain is exacerbated with movement, pain medication in the ED has helped alleviate some pain. Denies any numbness or tingling. Past Medical History:        Diagnosis Date    Arthritis     Kidney stone     Knee pain     Parkinson disease ([address]      Past Surgical History:        Procedure Laterality Date    NECK SURGERY      SHOULDER SURGERY           Medications Prior to Admission:   Prior to Admission medications    Medication Sig Start Date End Date Taking?  Authorizing Provider   polyethylene glycol (GLYCOLAX) 17 g packet Take 17 g by mouth daily 6/15/22 7/15/22  Margaret Wright MD   Pyridoxine HCl (B-6) 50 MG TABS Take 50 mg by mouth daily     Historical Provider, MD   carbidopa-levodopa (SINEMET CR)  MG per extended release tablet Take 1 tablet by mouth nightly    Historical Provider, MD bisacodyl (DULCOLAX) 5 MG EC tablet Take 5 mg by mouth daily as needed for Constipation    Historical Provider, MD   docusate sodium (COLACE) 100 mg capsule Take 100 mg by mouth daily as needed for Constipation    Historical Provider, MD   ketoconazole (NIZORAL) 2 % shampoo Apply to affected areas three times a week (lather, leave on for 5 minutes, then rinse)    Historical Provider, MD   lidocaine (LIDODERM) 5 % Place 1 patch onto the skin daily 12 hours on, 12 hours off. Historical Provider, MD   potassium chloride (MICRO-K) 10 MEQ extended release capsule Take 10 mEq by mouth daily    Historical Provider, MD   carbidopa-levodopa (PARCOPA)  MG TBDP Take 1 tablet by mouth 4 times daily     Historical Provider, MD   traZODone (DESYREL) 50 MG tablet Take 50 mg by mouth nightly    Historical Provider, MD   sertraline (ZOLOFT) 100 MG tablet Take 100 mg by mouth nightly    Historical Provider, MD   gabapentin (NEURONTIN) 300 MG capsule Take 300 mg by mouth 3 times daily. Historical Provider, MD       Allergies:  Patient has no known allergies. Social History:    Tobacco:  reports that he quit smoking about 14 years ago. He has never used smokeless tobacco.   Alcohol:  reports no history of alcohol use. Illicit Drug: No  Family History:   History reviewed. No pertinent family history.     REVIEW OF SYSTEMS:    CONSTITUTIONAL:  negative  MUSCULOSKELETAL:  positive for  pain  All other systems reviewed and negative    PHYSICAL EXAM:    awake, alert, cooperative, no moderate distress, constant restlessness, and appears stated age  MUSCULOSKELETAL:  there is no redness, warmth, or swelling of the joints  full range of motion noted  motor strength is 5 out of 5 all extremities bilaterally  with exception of  RIGHT HIP:  No obvious bony deformity at the right hip, right leg is shortened but maintained neutral alignment  There is moderate swelling of the right thigh, depended ecchymosis noted on the posterior

## 2022-07-06 NOTE — ACP (ADVANCE CARE PLANNING)
Advance Care Planning     General Advance Care Planning (ACP) Conversation    Date of Conversation: 7/6/2022  Conducted with: Patient with Decision Making Capacity   Healthcare Decision Maker: Next of Kin by law (only applies in absence of above) (name) daughter plans to complete Zhao Decision Maker:    Primary Decision Maker: Mily Never - Spouse - [phone]    Secondary Decision Maker: Kei Dies - Child - [phone]  Click here to complete Healthcare Decision Makers including selection of the Healthcare Decision Maker Relationship (ie \"Primary\"). Today we documented desired Decision Maker(s), who is (are) NOT Legal Next of Kin. ACP documents are required for decision maker authority. Content/Action Overview:  Has NO ACP documents/care preferences - information provided, considering goals and options  Reviewed DNR/DNI and patient elects DNR order - referred to ACP Clinical Specialist & placed order  ventilation preferences  would not want ventilation    Length of Voluntary ACP Conversation in minutes:  20 minutes    MADISON Wisdom    Referral to [address]. Daughter interested in completing. Daughter also reports patient is [facility]. MD updated.

## 2022-07-06 NOTE — PROGRESS NOTES
Telephone report taken from Belen Guadarrama, ED RN. Anticipating transport for patient from ED to C5 at this time. Primary RN, Sheron Singer, aware of patient.

## 2022-07-06 NOTE — H&P
Hospital Medicine History & Physical      PCP: Max Thompson MD    Date of Admission: 7/6/2022    Date of Service: Pt seen/examined on 7/6/2022 and Admitted to Inpatient with expected LOS greater than two midnights due to medical therapy. Chief Complaint:   Fall, Right hip pain       History Of Present Illness:    64 y.o. male with a PMHx significant for Parkinson's and anxiety/depression who presented to [facility] with complaints of right hip pain. Patient states he fell 5 days ago when walking into his house. States he landed on his right hip and experienced immediate pain. Denies LOC striking his head. Denies blood thinner use. States he was not lightheaded or dizzy before falling only that he trip over steps. He states his pain has been constant since the fall and progressively gotten worse. States pain is dull and rates it a 6/10 at rest and 10/10 with movement. States pain medication in the ED help relieve his pain. Denies fever, chills, chest pain, shortness of breath, abdominal pain, N/V/D, numbness/tingling in any extremity, or urinary symptoms. Patient has not eaten today. He will be admitted to hospital service for further evaluation and treatment. Orthopedics consulted in the ED and plan to take patient to OR this afternoon. Past Medical History:          Diagnosis Date    Arthritis     Kidney stone     Knee pain     Parkinson disease ([address]        Past Surgical History:          Procedure Laterality Date    NECK SURGERY      SHOULDER SURGERY         Medications Prior to Admission:      Prior to Admission medications    Medication Sig Start Date End Date Taking?  Authorizing Provider   polyethylene glycol (GLYCOLAX) 17 g packet Take 17 g by mouth daily 6/15/22 7/15/22  Chapis Jeffery MD   Pyridoxine HCl (B-6) 50 MG TABS Take 50 mg by mouth daily     Historical Provider, MD   carbidopa-levodopa (SINEMET CR)  MG per extended release tablet Take 1 tablet by mouth nightly    Historical Provider, MD   bisacodyl (DULCOLAX) 5 MG EC tablet Take 5 mg by mouth daily as needed for Constipation    Historical Provider, MD   docusate sodium (COLACE) 100 mg capsule Take 100 mg by mouth daily as needed for Constipation    Historical Provider, MD   ketoconazole (NIZORAL) 2 % shampoo Apply to affected areas three times a week (lather, leave on for 5 minutes, then rinse)    Historical Provider, MD   lidocaine (LIDODERM) 5 % Place 1 patch onto the skin daily 12 hours on, 12 hours off. Historical Provider, MD   potassium chloride (MICRO-K) 10 MEQ extended release capsule Take 10 mEq by mouth daily    Historical Provider, MD   carbidopa-levodopa (PARCOPA)  MG TBDP Take 1 tablet by mouth 4 times daily     Historical Provider, MD   traZODone (DESYREL) 50 MG tablet Take 50 mg by mouth nightly    Historical Provider, MD   sertraline (ZOLOFT) 100 MG tablet Take 100 mg by mouth nightly    Historical Provider, MD   gabapentin (NEURONTIN) 300 MG capsule Take 300 mg by mouth 3 times daily. Historical Provider, MD       Allergies:  Patient has no known allergies. Social History:      The patient currently lives home    TOBACCO:   reports that he quit smoking about 14 years ago. He has never used smokeless tobacco.  ETOH:   reports no history of alcohol use. E-cigarette/Vaping     Questions Responses    E-cigarette/Vaping Use     Start Date     Passive Exposure     Quit Date     Counseling Given     Comments             Family History:      Reviewed and negative in regards to presenting illness/complaint. History reviewed. No pertinent family history. REVIEW OF SYSTEMS COMPLETED:   Pertinent positives as noted in the HPI. All other systems reviewed and negative.     PHYSICAL EXAM PERFORMED:    BP 94/73   Pulse 67   Temp 98.3 °F (36.8 °C)   Resp 14   Wt 122 lb (55.3 kg)   SpO2 97%   BMI 17.51 kg/m²     General appearance:  No apparent distress, appears stated age and EKG:  I have reviewed the EKG with the following interpretation: Nonacute     CT HEAD WO CONTRAST   Final Result   No acute intracranial abnormality. Atrophy and small-vessel ischemic change, similar to prior      RECOMMENDATIONS:   Unavailable         XR CHEST PORTABLE   Final Result   No acute cardiopulmonary disease. XR HIP 2-3 VW W PELVIS RIGHT   Final Result   Angulated, displaced, comminuted intertrochanteric fracture of the right hip. Consults:    IP CONSULT TO ORTHOPEDIC SURGERY    ASSESSMENT:    Active Hospital Problems    Diagnosis Date Noted    Closed right hip fracture, initial encounter [facility]) Rupali Santacruz 07/06/2022     Priority: Medium         PLAN:    Closed displaced comminuted right IT hip fracture secondary to mechanical fall   Ortho consulted in ED. Patient has not eaten today. Surgery planned for this afternoon. No absolute contraindication to surgery, patient does not have active ischemia or heart failure. EKG reviewed, NSR. May proceed to OR without further cardiopulmonary evaluation. Pain control with bowel regimen, PT/OT evaluations after OR. Patient is NVI. Parkinson's   Continue home medications     Anxiety and depression, mood stable  Continue home regimen    DVT Prophylaxis: Lovenox   Diet: Diet NPO Exceptions are: Sips of Water with Meds, Ice Chips  Code Status: Prior    PT/OT Eval Status: Pending, will need evals after OR     Dispo - OR this afternoon, likely 2 more days        CHACORTA Mcginnis    Thank you Briana Monzon MD for the opportunity to be involved in this patient's care. If you have any questions or concerns please feel free to contact me at [phone].

## 2022-07-06 NOTE — ANESTHESIA PRE PROCEDURE
Department of Anesthesiology  Preprocedure Note       Name:  Angelique Moore   Age:  64 y.o.  :  1945                                          MRN:  [id number]         Date:  2022      Surgeon: Keron Tadeo):  Army Mert MD    Procedure: Procedure(s):  HIP IM GAMMA NAIL TINO INSERTION    Medications prior to admission:   Prior to Admission medications    Medication Sig Start Date End Date Taking? Authorizing Provider   polyethylene glycol (GLYCOLAX) 17 g packet Take 17 g by mouth daily 6/15/22 7/15/22  Sharath Arias MD   Pyridoxine HCl (B-6) 50 MG TABS Take 50 mg by mouth daily     Historical Provider, MD   carbidopa-levodopa (SINEMET CR)  MG per extended release tablet Take 1 tablet by mouth nightly    Historical Provider, MD   bisacodyl (DULCOLAX) 5 MG EC tablet Take 5 mg by mouth daily as needed for Constipation    Historical Provider, MD   docusate sodium (COLACE) 100 mg capsule Take 100 mg by mouth daily as needed for Constipation    Historical Provider, MD   ketoconazole (NIZORAL) 2 % shampoo Apply to affected areas three times a week (lather, leave on for 5 minutes, then rinse)    Historical Provider, MD   lidocaine (LIDODERM) 5 % Place 1 patch onto the skin daily 12 hours on, 12 hours off. Historical Provider, MD   potassium chloride (MICRO-K) 10 MEQ extended release capsule Take 10 mEq by mouth daily    Historical Provider, MD   carbidopa-levodopa (PARCOPA)  MG TBDP Take 1 tablet by mouth 4 times daily     Historical Provider, MD   traZODone (DESYREL) 50 MG tablet Take 50 mg by mouth nightly    Historical Provider, MD   sertraline (ZOLOFT) 100 MG tablet Take 100 mg by mouth nightly    Historical Provider, MD   gabapentin (NEURONTIN) 300 MG capsule Take 300 mg by mouth 3 times daily.     Historical Provider, MD       Current medications:    Current Facility-Administered Medications   Medication Dose Route Frequency Provider Last Rate Last Admin    morphine (PF) injection 2 mg  2 mg IntraVENous Q2H PRN CHACORTA Uribe        Or    morphine sulfate (PF) injection 4 mg  4 mg IntraVENous Q2H PRN CHACORTA Uribe         Current Outpatient Medications   Medication Sig Dispense Refill    polyethylene glycol (GLYCOLAX) 17 g packet Take 17 g by mouth daily 527 g 1    Pyridoxine HCl (B-6) 50 MG TABS Take 50 mg by mouth daily       carbidopa-levodopa (SINEMET CR)  MG per extended release tablet Take 1 tablet by mouth nightly      bisacodyl (DULCOLAX) 5 MG EC tablet Take 5 mg by mouth daily as needed for Constipation      docusate sodium (COLACE) 100 mg capsule Take 100 mg by mouth daily as needed for Constipation      ketoconazole (NIZORAL) 2 % shampoo Apply to affected areas three times a week (lather, leave on for 5 minutes, then rinse)      lidocaine (LIDODERM) 5 % Place 1 patch onto the skin daily 12 hours on, 12 hours off.  potassium chloride (MICRO-K) 10 MEQ extended release capsule Take 10 mEq by mouth daily      carbidopa-levodopa (PARCOPA)  MG TBDP Take 1 tablet by mouth 4 times daily       traZODone (DESYREL) 50 MG tablet Take 50 mg by mouth nightly      sertraline (ZOLOFT) 100 MG tablet Take 100 mg by mouth nightly      gabapentin (NEURONTIN) 300 MG capsule Take 300 mg by mouth 3 times daily.          Allergies:  No Known Allergies    Problem List:    Patient Active Problem List   Diagnosis Code    Parkinson's disease ([facility] G20    Depressive disorder, not elsewhere classified F32.9    Mild cognitive impairment G31.84    Sepsis ([facility] A41.9    Acute hypoxemic respiratory failure ([address] J96.01    Severe sepsis ([facility] A41.9, R65.20    COVID-19 virus infection U07.1    Pneumonia of both lower lobes due to infectious organism J18.9    Thrombocytopenia (HCC) D69.6    Lymphopenia D72.810    Acute encephalopathy G93.40    Hemoptysis R04.2    Fever R50.9    General weakness R53.1    Closed fracture of left ankle S82.892A    Constipation K59.00    Generalized weakness R53.1    Closed right hip fracture, initial encounter ([facility] S72.001A       Past Medical History:        Diagnosis Date    Arthritis     Kidney stone     Knee pain     Parkinson disease ([facility]        Past Surgical History:        Procedure Laterality Date    NECK SURGERY      SHOULDER SURGERY         Social History:    Social History     Tobacco Use    Smoking status: Former Smoker     Quit date: 3/19/2008     Years since quittin.3    Smokeless tobacco: Never Used   Substance Use Topics    Alcohol use: No                                Counseling given: Not Answered      Vital Signs (Current):   Vitals:    22 1345 22 1400 22 1415 22 1430   BP: 101/66 108/68 107/65 94/73   Pulse: 74 70 76 67   Resp:  14   Temp:       SpO2: 97% 95% 97%    Weight:                                                  BP Readings from Last 3 Encounters:   22 94/73   06/15/22 124/79   22 98/65       NPO Status:                                                                                 BMI:   Wt Readings from Last 3 Encounters:   22 122 lb (55.3 kg)   06/15/22 122 lb 4.8 oz (55.5 kg)   22 149 lb (67.6 kg)     Body mass index is 17.51 kg/m².     CBC:   Lab Results   Component Value Date/Time    WBC 8.3 2022 01:45 PM    RBC 3.86 2022 01:45 PM    HGB 10.7 2022 01:45 PM    HCT 33.0 2022 01:45 PM    MCV 85.4 2022 01:45 PM    RDW 15.2 2022 01:45 PM     2022 01:45 PM       CMP:   Lab Results   Component Value Date/Time     2022 01:45 PM    K 4.0 2022 01:45 PM    K 4.0 2022 05:18 AM     2022 01:45 PM    CO2 30 2022 01:45 PM    BUN 13 2022 01:45 PM    CREATININE 0.6 2022 01:45 PM    GFRAA >60 2022 01:45 PM    GFRAA >60 10/14/2011 07:40 PM    AGRATIO 1.0 2022 03:46 PM    LABGLOM >60 2022 01:45 PM    GLUCOSE 161 07/06/2022 01:45 PM    PROT 6.7 06/12/2022 03:46 PM    PROT 6.9 10/14/2011 07:40 PM    CALCIUM 9.1 07/06/2022 01:45 PM    BILITOT 0.5 06/12/2022 03:46 PM    ALKPHOS 153 06/12/2022 03:46 PM    AST 17 06/12/2022 03:46 PM    ALT 8 06/12/2022 03:46 PM       POC Tests: No results for input(s): POCGLU, POCNA, POCK, POCCL, POCBUN, POCHEMO, POCHCT in the last 72 hours. Coags:   Lab Results   Component Value Date/Time    PROTIME 13.7 06/12/2022 03:46 PM    INR 1.07 06/12/2022 03:46 PM    APTT 31.3 09/04/2020 10:10 PM       HCG (If Applicable): No results found for: PREGTESTUR, PREGSERUM, HCG, HCGQUANT     ABGs: No results found for: PHART, PO2ART, NTV0WKP, JDF9GRJ, BEART, X6ACOQJY     Type & Screen (If Applicable):  No results found for: LABABO, LABRH    Drug/Infectious Status (If Applicable):  No results found for: HIV, HEPCAB    COVID-19 Screening (If Applicable):   Lab Results   Component Value Date/Time    COVID19 NOT DETECTED 06/12/2022 05:52 PM    COVID19 DETECTED 09/03/2020 06:07 PM           Anesthesia Evaluation  Patient summary reviewed and Nursing notes reviewed  Airway: Mallampati: II          Dental:          Pulmonary:   (+) COPD:                             Cardiovascular:  Exercise tolerance: poor (<4 METS),                     Neuro/Psych:   (+) neuromuscular disease: Parkinson's disease, psychiatric history:            GI/Hepatic/Renal:             Endo/Other:                     Abdominal:             Vascular: Other Findings:           Anesthesia Plan      general     ASA 3 - emergent       Induction: intravenous. MIPS: Postoperative opioids intended, Prophylactic antiemetics administered and Postoperative trial extubation. Anesthetic plan and risks discussed with patient. Use of blood products discussed with patient whom consented to blood products. Plan discussed with CRNA.                     Savannah Cooper MD   7/6/2022

## 2022-07-06 NOTE — PROGRESS NOTES
Patient arrived in PACU at this time and placed on monitor. Report received from [id number] [facility] and [address] [place] [address]. Will continue to monitor. Patient has oral airway in place with 4 L/min O2 via nasal cannula.

## 2022-07-06 NOTE — H&P
Preoperative H&P Update    The patient's History and Physical in the medical record from 7/6/22 was reviewed by me today. I reviewed the HPI, medications, allergies, reason for surgery, diagnosis and treatment plan and there has been no interval change. The patient was examined by me today.  Physical exam findings for this update include:    BP 97/63   Pulse 73   Temp 98.7 °F (37.1 °C) (Temporal)   Resp 16   Wt 122 lb (55.3 kg)   SpO2 98%   BMI 17.51 kg/m²   Airway is intact  Chest: breathing comfortably  Heart: regular rate  Findings on exam of the body region where surgery is to be performed include: see last office and/or consult note      Electronically signed by Netta Jauregui MD on 7/6/2022 at 4:23 PM

## 2022-07-06 NOTE — PLAN OF CARE
Patient seen/examined in ED. Acute R hip fx 2nd to mechanical fall. Ortho consulted and appreciated in advance. Patient denies eating today, RN at bedside when verbalized. No absolute contraindication to surgery w/out evidence of active ischemia/failure. OK to proceed to OR w/out further cardiopulmonary evaluation. EKG reviewed in ED.

## 2022-07-06 NOTE — OP NOTE
DATE OF SURGERY:  7/6/22    PREOPERATIVE DIAGNOSIS: Right intertrochanteric hip fracture. POSTOPERATIVE DIAGNOSIS: Right intertrochanteric hip fracture. PROCEDURE: Right hip intramedullary nailing. ASSISTANT: Ángela Jones    ANESTHESIA: General.    ESTIMATED BLOOD LOSS: <50 mL. COMPLICATIONS: None. DISPOSITION: To PACU in good condition. INDICATIONS FOR PROCEDURE: The patient is a 64 y.o. male  who sustained a fall onto the right hip. The patient was brought to the [facility] ER and was discovered to have a right intertrochanteric hip fracture. The patient was subsequently admitted to the hospital for definitive treatment. An orthopedic consultation was obtained. I recommended operative fixation of the hip. The patient and /or family was explained the risks, benefits,  complications, alternatives of the procedure and they subsequently provided  written informed consent for the procedure. DESCRIPTION OF PROCEDURE: The patient was seen in the preoperative holding  area. The right hip was marked. The patient was seen by the Anesthesia service. The patient was then brought to the operating room. The patient was induced under general anesthesia on the bed. The patient was given  prophylactic preoperative IV antibiotics. The patient was then transferred onto the fracture table in the supine position. Care was taken to ensure that all  bony prominences were well padded. The nonoperative leg was placed in the well leg  betancourt. The operative leg was placed in a traction boot. We then performed a closed  reduction on the fracture table with fluoroscopy. Once we had anatomic  reduction, we then sterilely prepped and draped the operative hip in the usual  fashion. A time-out was taken where the patient, the operative extremity and  the operative procedure were once again verified.  We then made an  approximately 3 cm incision just superior to the tip of the greater  trochanter along the lateral aspect of the hip. Sharp dissection was carried  down through the skin. Blunt dissection was carried down to the tip of the  greater trochanter. We then inserted the curved awl. The awl was placed at  the tip of the greater trochanter and position was verified via fluoroscopy. The awl was then inserted into the intramedullary canal. The ball-tip  guidewire was then passed down into the intramedullary canal to the level of  the superior pole of the patella. We then removed the awl. We then measured  the length of our wire using the measuring device. This measured for a 400 mm  nail. We then inserted the opening reamer. Used the flexible reamers to ream up to 11 mm. We then inserted a Fixya 10 x 125 degree x 400mm gamma nail. The nail was inserted until adequate positioning was verified via fluoroscopy. We then inserted the drill sleeve along the lateral aspect of the thigh. An approximately 1.5 cm incision was made. Blunt dissection was carried down to the lateral aspect of the femur. The drill sleeve was then placed along the lateral aspect of the femur. The guide  pin was then placed through the femoral neck into the head after confirming  position on fluoroscopy. We then used a step drill to drill for a lag screw. We measured for a 105mm lag screw. We then inserted the lag screw until it  was fully seated and near the subchondral bone. We then inserted the set screw. The lag screw was not allowed to slide. We then removed the lag screw insertion handle. We then attached the distal aiming guide onto the insertion handle. Fluoroscopy was used to verify position of the drill sleeve. A small incision was made along the lateral distal femur. We then drilled through the femur and measured the length for our distal locking screw. A 47.5mm screw was inserted. We then took final fluoroscopy pictures. The wounds were then copiously irrigated with normal saline solution.  The subcutaneous tissue was closed with 2-0 Vicryl suture in a simple inverted interrupted fashion. The skin was then closed with staples. Xeroform gauze was then applied. Marcaine 0.5% was injected for local anesthesia. A 4 x 4 gauze, ABD pads and tape were then applied. The patient was then awakened from general anesthesia and transferred onto  her hospital bed and transported to the PACU for recovery. The patient  tolerated the procedure well and without any complications. PLANS: The patient will be recovered in the PACU and then be readmitted to  the floor. The patient is Weight bearing as tolerated on the operative leg. The patient will have PT and OT consult. The patient will have 24 hours of prophylactic IV antibiotics. The patient will have DVT prophylaxis.         Electronically signed by Baron Ndiaye MD on 7/6/2022 at 5:28 PM

## 2022-07-06 NOTE — ANESTHESIA POSTPROCEDURE EVALUATION
Department of Anesthesiology  Postprocedure Note    Patient: Collin Knott  MRN: [id number]  YOB: 1945  Date of evaluation: 7/6/2022      Procedure Summary     Date: 07/06/22 Room / Location: [address] [place] [address] Royal Zhu / Liane Tejada    Anesthesia Start: 1634 Anesthesia Stop:     Procedure: RIGHT HIP INTRAMEDULLARY GAMMA NAIL INSERTION (Right Hip) Diagnosis:       Closed fracture of right hip, initial encounter ([facility]      (right hip fracture)    Surgeons: Kaden Hatch MD Responsible Provider: Vidya Bobby MD    Anesthesia Type: general ASA Status: 3 - Emergent          Anesthesia Type: No value filed.     Roselyn Phase I:      Roselyn Phase II:        Anesthesia Post Evaluation    Patient location during evaluation: PACU  Patient participation: complete - patient participated  Level of consciousness: awake  Airway patency: patent  Nausea & Vomiting: no vomiting and no nausea  Complications: no  Cardiovascular status: hemodynamically stable  Respiratory status: acceptable  Hydration status: stable  Multimodal analgesia pain management approach

## 2022-07-07 PROBLEM — E43 SEVERE MALNUTRITION (HCC): Chronic | Status: ACTIVE | Noted: 2022-07-07

## 2022-07-07 LAB
ANION GAP SERPL CALCULATED.3IONS-SCNC: 7 MMOL/L (ref 3–16)
ANISOCYTOSIS: ABNORMAL
BASOPHILS ABSOLUTE: 0 K/UL (ref 0–0.2)
BASOPHILS RELATIVE PERCENT: 0 %
BUN BLDV-MCNC: 13 MG/DL (ref 7–20)
CALCIUM SERPL-MCNC: 8.9 MG/DL (ref 8.3–10.6)
CHLORIDE BLD-SCNC: 103 MMOL/L (ref 99–110)
CO2: 31 MMOL/L (ref 21–32)
CREAT SERPL-MCNC: 0.6 MG/DL (ref 0.8–1.3)
EKG ATRIAL RATE: 71 BPM
EKG DIAGNOSIS: NORMAL
EKG P AXIS: 60 DEGREES
EKG P-R INTERVAL: 178 MS
EKG Q-T INTERVAL: 384 MS
EKG QRS DURATION: 96 MS
EKG QTC CALCULATION (BAZETT): 417 MS
EKG R AXIS: -8 DEGREES
EKG T AXIS: 40 DEGREES
EKG VENTRICULAR RATE: 71 BPM
EOSINOPHILS ABSOLUTE: 0.1 K/UL (ref 0–0.6)
EOSINOPHILS RELATIVE PERCENT: 1 %
GFR AFRICAN AMERICAN: >60
GFR NON-AFRICAN AMERICAN: >60
GLUCOSE BLD-MCNC: 103 MG/DL (ref 70–99)
HCT VFR BLD CALC: 31.1 % (ref 40.5–52.5)
HEMOGLOBIN: 10.3 G/DL (ref 13.5–17.5)
LYMPHOCYTES ABSOLUTE: 0.9 K/UL (ref 1–5.1)
LYMPHOCYTES RELATIVE PERCENT: 11 %
MCH RBC QN AUTO: 28.3 PG (ref 26–34)
MCHC RBC AUTO-ENTMCNC: 33.1 G/DL (ref 31–36)
MCV RBC AUTO: 85.7 FL (ref 80–100)
MONOCYTES ABSOLUTE: 0.9 K/UL (ref 0–1.3)
MONOCYTES RELATIVE PERCENT: 10 %
NEUTROPHILS ABSOLUTE: 6.7 K/UL (ref 1.7–7.7)
NEUTROPHILS RELATIVE PERCENT: 78 %
PDW BLD-RTO: 15.6 % (ref 12.4–15.4)
PLATELET # BLD: 313 K/UL (ref 135–450)
PLATELET SLIDE REVIEW: ADEQUATE
PMV BLD AUTO: 6.3 FL (ref 5–10.5)
POTASSIUM REFLEX MAGNESIUM: 4.6 MMOL/L (ref 3.5–5.1)
RBC # BLD: 3.63 M/UL (ref 4.2–5.9)
SLIDE REVIEW: ABNORMAL
SODIUM BLD-SCNC: 141 MMOL/L (ref 136–145)
VITAMIN D 25-HYDROXY: 32.7 NG/ML
WBC # BLD: 8.6 K/UL (ref 4–11)

## 2022-07-07 PROCEDURE — 97110 THERAPEUTIC EXERCISES: CPT

## 2022-07-07 PROCEDURE — 97530 THERAPEUTIC ACTIVITIES: CPT

## 2022-07-07 PROCEDURE — 6370000000 HC RX 637 (ALT 250 FOR IP): Performed by: SPECIALIST/TECHNOLOGIST

## 2022-07-07 PROCEDURE — 2580000003 HC RX 258: Performed by: PHYSICIAN ASSISTANT

## 2022-07-07 PROCEDURE — 85025 COMPLETE CBC W/AUTO DIFF WBC: CPT

## 2022-07-07 PROCEDURE — 93010 ELECTROCARDIOGRAM REPORT: CPT | Performed by: INTERNAL MEDICINE

## 2022-07-07 PROCEDURE — 94761 N-INVAS EAR/PLS OXIMETRY MLT: CPT

## 2022-07-07 PROCEDURE — 6360000002 HC RX W HCPCS: Performed by: ORTHOPAEDIC SURGERY

## 2022-07-07 PROCEDURE — 92610 EVALUATE SWALLOWING FUNCTION: CPT

## 2022-07-07 PROCEDURE — APPNB45 APP NON BILLABLE 31-45 MINUTES: Performed by: SPECIALIST/TECHNOLOGIST

## 2022-07-07 PROCEDURE — 92526 ORAL FUNCTION THERAPY: CPT

## 2022-07-07 PROCEDURE — 1200000000 HC SEMI PRIVATE

## 2022-07-07 PROCEDURE — 2700000000 HC OXYGEN THERAPY PER DAY

## 2022-07-07 PROCEDURE — 82306 VITAMIN D 25 HYDROXY: CPT

## 2022-07-07 PROCEDURE — 6370000000 HC RX 637 (ALT 250 FOR IP): Performed by: PHYSICIAN ASSISTANT

## 2022-07-07 PROCEDURE — 99024 POSTOP FOLLOW-UP VISIT: CPT | Performed by: SPECIALIST/TECHNOLOGIST

## 2022-07-07 PROCEDURE — 2580000003 HC RX 258: Performed by: ORTHOPAEDIC SURGERY

## 2022-07-07 PROCEDURE — 97166 OT EVAL MOD COMPLEX 45 MIN: CPT

## 2022-07-07 PROCEDURE — 80048 BASIC METABOLIC PNL TOTAL CA: CPT

## 2022-07-07 PROCEDURE — 97162 PT EVAL MOD COMPLEX 30 MIN: CPT

## 2022-07-07 PROCEDURE — 36415 COLL VENOUS BLD VENIPUNCTURE: CPT

## 2022-07-07 RX ORDER — ACETAMINOPHEN 325 MG/1
650 TABLET ORAL EVERY 6 HOURS
Status: DISCONTINUED | OUTPATIENT
Start: 2022-07-07 | End: 2022-07-08 | Stop reason: HOSPADM

## 2022-07-07 RX ORDER — METHOCARBAMOL 500 MG/1
250 TABLET, FILM COATED ORAL 2 TIMES DAILY PRN
Status: DISCONTINUED | OUTPATIENT
Start: 2022-07-07 | End: 2022-07-08 | Stop reason: HOSPADM

## 2022-07-07 RX ADMIN — SODIUM CHLORIDE, PRESERVATIVE FREE 10 ML: 5 INJECTION INTRAVENOUS at 21:10

## 2022-07-07 RX ADMIN — ACETAMINOPHEN 650 MG: 325 TABLET ORAL at 16:39

## 2022-07-07 RX ADMIN — PYRIDOXINE HCL TAB 50 MG 50 MG: 50 TAB at 08:19

## 2022-07-07 RX ADMIN — POLYETHYLENE GLYCOL 3350 17 G: 17 POWDER, FOR SOLUTION ORAL at 08:18

## 2022-07-07 RX ADMIN — GABAPENTIN 300 MG: 300 CAPSULE ORAL at 13:49

## 2022-07-07 RX ADMIN — CARBIDOPA AND LEVODOPA 1 TABLET: 25; 100 TABLET ORAL at 16:39

## 2022-07-07 RX ADMIN — OXYCODONE 5 MG: 5 TABLET ORAL at 08:17

## 2022-07-07 RX ADMIN — TRAZODONE HYDROCHLORIDE 50 MG: 50 TABLET ORAL at 21:09

## 2022-07-07 RX ADMIN — ACETAMINOPHEN 650 MG: 325 TABLET ORAL at 21:09

## 2022-07-07 RX ADMIN — GABAPENTIN 300 MG: 300 CAPSULE ORAL at 08:18

## 2022-07-07 RX ADMIN — Medication 10 ML: at 21:09

## 2022-07-07 RX ADMIN — CARBIDOPA AND LEVODOPA 1 TABLET: 50; 200 TABLET, EXTENDED RELEASE ORAL at 21:08

## 2022-07-07 RX ADMIN — Medication 10 ML: at 08:17

## 2022-07-07 RX ADMIN — SERTRALINE 100 MG: 50 TABLET, FILM COATED ORAL at 21:09

## 2022-07-07 RX ADMIN — GABAPENTIN 300 MG: 300 CAPSULE ORAL at 21:08

## 2022-07-07 RX ADMIN — CARBIDOPA AND LEVODOPA 1 TABLET: 25; 100 TABLET ORAL at 21:09

## 2022-07-07 RX ADMIN — CARBIDOPA AND LEVODOPA 1 TABLET: 25; 100 TABLET ORAL at 08:17

## 2022-07-07 RX ADMIN — CEFAZOLIN 2000 MG: 10 INJECTION, POWDER, FOR SOLUTION INTRAVENOUS at 08:19

## 2022-07-07 RX ADMIN — ENOXAPARIN SODIUM 40 MG: 100 INJECTION SUBCUTANEOUS at 08:18

## 2022-07-07 RX ADMIN — SODIUM CHLORIDE, PRESERVATIVE FREE 10 ML: 5 INJECTION INTRAVENOUS at 08:18

## 2022-07-07 RX ADMIN — CEFAZOLIN 2000 MG: 10 INJECTION, POWDER, FOR SOLUTION INTRAVENOUS at 00:00

## 2022-07-07 RX ADMIN — CARBIDOPA AND LEVODOPA 1 TABLET: 25; 100 TABLET ORAL at 13:49

## 2022-07-07 RX ADMIN — METHOCARBAMOL 250 MG: 500 TABLET ORAL at 11:57

## 2022-07-07 RX ADMIN — POTASSIUM CHLORIDE 10 MEQ: 10 TABLET, EXTENDED RELEASE ORAL at 08:17

## 2022-07-07 RX ADMIN — OXYCODONE 5 MG: 5 TABLET ORAL at 11:57

## 2022-07-07 RX ADMIN — OXYCODONE 5 MG: 5 TABLET ORAL at 03:33

## 2022-07-07 RX ADMIN — ACETAMINOPHEN 650 MG: 325 TABLET ORAL at 10:37

## 2022-07-07 ASSESSMENT — PAIN SCALES - GENERAL
PAINLEVEL_OUTOF10: 6
PAINLEVEL_OUTOF10: 6
PAINLEVEL_OUTOF10: 8
PAINLEVEL_OUTOF10: 6
PAINLEVEL_OUTOF10: 6

## 2022-07-07 ASSESSMENT — PAIN DESCRIPTION - LOCATION
LOCATION: HIP

## 2022-07-07 ASSESSMENT — PAIN DESCRIPTION - ORIENTATION
ORIENTATION: RIGHT

## 2022-07-07 ASSESSMENT — PAIN DESCRIPTION - DESCRIPTORS
DESCRIPTORS: ACHING

## 2022-07-07 ASSESSMENT — PAIN DESCRIPTION - PAIN TYPE: TYPE: SURGICAL PAIN

## 2022-07-07 NOTE — PROGRESS NOTES
Hospitalist Progress Note      PCP: Karla Cobb MD    Date of Admission: 7/6/2022    Chief Complaint:   Fall, right hip pain    Hospital Course:   64 y.o. male with a PMHx significant for Parkinson's and anxiety/depression who presented to [facility] with complaints of right hip pain. Patient states he fell 5 days ago when walking into his house. States he landed on his right hip and experienced immediate pain. Denies LOC striking his head. Denies blood thinner use. States he was not lightheaded or dizzy before falling only that he trip over steps. He states his pain has been constant since the fall and progressively gotten worse. States pain is dull and rates it a 6/10 at rest and 10/10 with movement. States pain medication in the ED help relieve his pain. Denies fever, chills, chest pain, shortness of breath, abdominal pain, N/V/D, numbness/tingling in any extremity, or urinary symptoms. Patient has not eaten today. He will be admitted to hospital service for further evaluation and treatment. Orthopedics consulted in the ED and plan to take patient to OR this afternoon. Subjective:   Seen resting in bed, reports right hip pain/spasm, no other new complaints, VSS. Afebrile.         Medications:  Reviewed    Infusion Medications    sodium chloride      sodium chloride 75 mL/hr at 07/06/22 1920     Scheduled Medications    acetaminophen  650 mg Oral Q6H    carbidopa-levodopa  1 tablet Oral 4x Daily    carbidopa-levodopa  1 tablet Oral Nightly    gabapentin  300 mg Oral TID    polyethylene glycol  17 g Oral Daily    potassium chloride  10 mEq Oral Daily    vitamin B-6  50 mg Oral Daily    sertraline  100 mg Oral Nightly    traZODone  50 mg Oral Nightly    sodium chloride flush  5-40 mL IntraVENous 2 times per day    sodium chloride flush  5-40 mL IntraVENous 2 times per day    enoxaparin  40 mg SubCUTAneous Daily     PRN Meds: methocarbamol, docusate sodium, sodium chloride flush, ondansetron **OR** ondansetron, acetaminophen, oxyCODONE **OR** oxyCODONE, sodium chloride flush, sodium chloride, bisacodyl      Intake/Output Summary (Last 24 hours) at 7/7/2022 1520  Last data filed at 7/6/2022 1756  Gross per 24 hour   Intake 500 ml   Output --   Net 500 ml       Physical Exam Performed:    /69   Pulse 87   Temp 98.3 °F (36.8 °C)   Resp 16   Ht 5' 10.98\" (1.803 m)   Wt 122 lb (55.3 kg)   SpO2 93%   BMI 17.02 kg/m²     General appearance: No apparent distress, appears stated age and cooperative. HEENT: Pupils equal, round, and reactive to light. Conjunctivae/corneas clear. Neck: Supple, with full range of motion. No jugular venous distention. Trachea midline. Respiratory:  Normal respiratory effort. Clear to auscultation, bilaterally without Rales/Wheezes/Rhonchi. Cardiovascular: Regular rate and rhythm with normal S1/S2 without murmurs, rubs or gallops. Abdomen: Soft, non-tender, non-distended with normal bowel sounds. Musculoskeletal: RLE surgical incision C/D/I, DP and PT pulses 2+, sensation intact, able to flex knee but overall decreased ROM. No clubbing, cyanosis or edema bilaterally. Full range of motion without deformity. Skin: Skin color, texture, turgor normal.  No rashes or lesions. Neurologic:  Neurovascularly intact without any focal sensory/motor deficits. Cranial nerves: II-XII intact, grossly non-focal.  Psychiatric: Alert and oriented, thought content appropriate, normal insight  Capillary Refill: Brisk,3 seconds, normal   Peripheral Pulses: +2 palpable, equal bilaterally       Labs:   Recent Labs     07/06/22  1345 07/07/22  0609   WBC 8.3 8.6   HGB 10.7* 10.3*   HCT 33.0* 31.1*    313     Recent Labs     07/06/22  1345 07/07/22  0609    141   K 4.0 4.6    103   CO2 30 31   BUN 13 13   CREATININE 0.6* 0.6*   CALCIUM 9.1 8.9     No results for input(s): AST, ALT, BILIDIR, BILITOT, ALKPHOS in the last 72 hours.   No results for input(s): INR in the last 72 hours. Recent Labs     07/06/22  1345   TROPONINI <0.01       Urinalysis:      Lab Results   Component Value Date/Time    NITRU Negative 06/12/2022 05:59 PM    WBCUA None seen 06/12/2022 05:59 PM    RBCUA >100 06/12/2022 05:59 PM    BLOODU LARGE 06/12/2022 05:59 PM    SPECGRAV 1.010 06/12/2022 05:59 PM    GLUCOSEU Negative 06/12/2022 05:59 PM       Radiology:  XR HIP RIGHT (1 VIEW)   Final Result   Intraprocedural fluoroscopic spot images as above. See separate procedure   report for more information. FLUORO FOR SURGICAL PROCEDURES   Final Result      XR KNEE RIGHT (1-2 VIEWS)   Final Result   No acute fracture or dislocation in the right knee. Tricompartmental degenerative changes, moderate to severe in the medial   compartment. Mild suprapatellar joint effusion. CT HEAD WO CONTRAST   Final Result   No acute intracranial abnormality. Atrophy and small-vessel ischemic change, similar to prior      RECOMMENDATIONS:   Unavailable         XR CHEST PORTABLE   Final Result   No acute cardiopulmonary disease. XR HIP 2-3 VW W PELVIS RIGHT   Final Result   Angulated, displaced, comminuted intertrochanteric fracture of the right hip. Consults  IP CONSULT TO ORTHOPEDIC SURGERY  IP CONSULT TO SPIRITUAL SERVICES  IP CONSULT TO SOCIAL WORK      Assessment/Plan:    Active Hospital Problems    Diagnosis     Severe malnutrition ([address] [E43]      Priority: Medium    Closed fracture of right hip ([address] [S72.001A]      Priority: Medium     Closed displaced comminuted right IT hip fracture secondary to mechanical fall   Ortho consulted in ED. Patient has not eaten today. Surgery planned for this afternoon. No absolute contraindication to surgery, patient does not have active ischemia or heart failure. EKG reviewed, NSR. May proceed to OR without further cardiopulmonary evaluation. Pain control with bowel regimen, PT/OT evaluations  Trend CBC, hgb stable. Low dose Robaxin added due to spasms.      Parkinson's   Continue home medications      Anxiety and depression, mood stable  Continue home regimen    Spoke with daughter, Urban Perez over the phone this morning about patient's code status, she states she is his POA and she is a RN. She would like patient status changed to [facility] given decline with parkinson's . She expresses understanding that life-saving treatment will be provided up to arrest.      DVT Prophylaxis: Lovenox   Diet: ADULT DIET; Regular  ADULT ORAL NUTRITION SUPPLEMENT; Breakfast, Dinner; Standard High Calorie/High Protein Oral Supplement  Code Status: DNR-CCA    PT/OT Eval Status: Recommending SNF, spoke with daughter this AM, has been to SNF and did not tolerate it well, she is a RN and feels comfortable caring for patient at home.       Dispo - Possibly tomorrow     Nelson Avalos

## 2022-07-07 NOTE — PROGRESS NOTES
Comprehensive Nutrition Assessment    Type and Reason for Visit:  Positive Nutrition Screen,Initial    Nutrition Recommendations/Plan:   1. Continue current diet order and monitor per SLP eval  2. Add ONS BID to promote intake  3. Monitor nutrition adequacy, pertinent labs, bowel habits, wt changes, and clinical progress     Malnutrition Assessment:  Malnutrition Status:  Severe malnutrition (07/07/22 1417)    Context:  Chronic Illness     Findings of the 6 clinical characteristics of malnutrition:  Energy Intake:  Unable to assess  Weight Loss:  5% over 1 month     Body Fat Loss:  Mild body fat loss Buccal region   Muscle Mass Loss:  Severe muscle mass loss Clavicles (pectoralis & deltoids)    Nutrition Assessment:    Positive nutrition screen: Pt w/ right hip fracture, pt on Regular diet w/ no PO intake recorded per EMR. Pt reports constipation x3 weeks, no N/V. Will add ONS BID to promote intake. Pt w/ severe chronic malnutrition indicated by w/ 5% weight loss in 1 month period per EMR, and severe muscle mass loss observed per NFPE. Pt awaiting SLP eval. Will continue to monitor. Nutrition Related Findings:    . Non pitting RLE edema. Wound Type: Surgical Incision       Current Nutrition Intake & Therapies:    Average Meal Intake: Unable to assess  Average Supplements Intake: Unable to assess      Anthropometric Measures:  Height: 5' 10.98\" (180.3 cm)  Ideal Body Weight (IBW): 172 lbs (78 kg)       Current Body Weight: 121 lb 14.6 oz (55.3 kg), 70.9 % IBW.  Weight Source: Stated  Current BMI (kg/m2): 17        Weight Adjustment For: No Adjustment                 BMI Categories: Underweight (BMI less than 22) age over 72    Estimated Daily Nutrient Needs:  Energy Requirements Based On: Kcal/kg  Weight Used for Energy Requirements: Current  Energy (kcal/day): 7926-5612  Weight Used for Protein Requirements: Current  Protein (g/day): 55-66  Method Used for Fluid Requirements: 1 ml/kcal  Fluid (ml/day): Nutrition Diagnosis:   · Severe malnutrition related to inadequate protein-energy intake as evidenced by Criteria as identified in malnutrition assessment,weight loss greater than or equal to 5% in 1 month,severe muscle loss      Nutrition Interventions:   Food and/or Nutrient Delivery: Continue Current Diet,Start Oral Nutrition Supplement  Nutrition Education/Counseling: No recommendation at this time  Coordination of Nutrition Care: Continue to monitor while inpatient  Plan of Care discussed with: Pt    Goals:  Previous Goal Met: Progressing toward Goal(s)  Goals: PO intake 50% or greater,prior to discharge       Nutrition Monitoring and Evaluation:   Behavioral-Environmental Outcomes: None Identified  Food/Nutrient Intake Outcomes: Food and Nutrient Intake,Supplement Intake  Physical Signs/Symptoms Outcomes: Chewing or Swallowing,Nutrition Focused Physical Findings,Weight,Biochemical Data    Discharge Planning:     Too soon to determine     Walden Gosselin, [address] [place] [address],   Contact: [phone]

## 2022-07-07 NOTE — PROGRESS NOTES
Physical Therapy  Facility/Department: [facility] - MED SURG/ORTHO  Physical Therapy Initial Assessment    Name: Collin Knott  : 1945  MRN: [id number]  Date of Service: 2022    Discharge Recommendations:  Subacute/Skilled Nursing Facility   PT Equipment Recommendations  Equipment Needed: No  Other: defer to next level of care    If pt discharges prior to next PT session this note will serve as discharge summary. Patient Diagnosis(es): The encounter diagnosis was Closed fracture of right hip, initial encounter ([facility] Past Medical History:  has a past medical history of Arthritis, Kidney stone, Knee pain, and Parkinson disease ([facility] Past Surgical History:  has a past surgical history that includes Neck surgery; shoulder surgery; and hip surgery (Right, 2022). Assessment   Body Structures, Functions, Activity Limitations Requiring Skilled Therapeutic Intervention: Decreased functional mobility ; Decreased strength;Decreased balance; Increased pain;Decreased endurance  Assessment: 65 yo male adm with R hip fx, s/p IM Nail, allowed FWB RLE. Hx parkinsons, amb with RW short distances, uses w/c longer distances with wife pushing at baseline. Today pt required max assist of 2 supine><sit><stand. Due to low BP (83/53) did not progress to chair. Pt will benefit from skilled PT to address deficits.  Recommend SNF at discharge  Treatment Diagnosis: RLE pain, weakness, decreased mob post hip fx with IM Nail  Therapy Prognosis: Good  Decision Making: Medium Complexity  Barriers to Learning: none  Requires PT Follow-Up: Yes  Activity Tolerance  Activity Tolerance: Patient tolerated evaluation without incident;Patient tolerated treatment well     Plan   Plan: 1 time a day 7 days a week  Current Treatment Recommendations: Strengthening,ROM,Balance training,Functional mobility training,Transfer training,Endurance training,Gait training,Pain management,Home exercise program,Safety education & training,Therapeutic activities  Safety Devices  Type of Devices: All michael prominences offloaded,All fall risk precautions in place,Patient at risk for falls,Bed alarm in place,Call light within reach,Gait belt,Nurse notified,Left in bed     Restrictions  Restrictions/Precautions: Weight Bearing  Lower Extremity Weight Bearing Restrictions  Right Lower Extremity Weight Bearing: Weight Bearing As Tolerated  Position Activity Restriction  Other position/activity restrictions: 1)  Up to bedside chair at least daily as tolerated. 2)  Ambulate in room progressing to hallway with assistive device four times daily (including PT) when PT advises. 3)  Bathroom privileges with assistance. Subjective   Chart Reviewed: Yes  Patient assessed for rehabilitation services?: Yes  Family / Caregiver Present: No  Referring Practitioner: Marla Saunders MD  Referral Date : 07/06/22  Diagnosis: R hip fx, s/p IM Nail  Follows Commands: Within Functional Limits  Comments: RN cleared pt for therapy, pt resting in bed on approach  Subjective  Subjective: Pt agrees to therapy. He has low voice projection. Pain: 6/10 L hip. Pt given emotional support, cold pack and repositioned at end of session         Social/Functional History  Social/Functional History  Lives With: Spouse  Type of Home: House  Home Layout: One level  Home Access: Level entry  Bathroom Shower/Tub: Tub/Shower unit  Bathroom Toilet: Standard  Home Equipment: Cane,Walker, 4 wheeled  Has the patient had two or more falls in the past year or any fall with injury in the past year?: No  Receives Help From: Family  ADL Assistance: Independent  Homemaking Assistance: Independent  Ambulation Assistance: Needs assistance (Pt initially states he is unsure if he gets help, later states wife assists.  He uses w/c longer distances with wife pushing)  Transfer Assistance:  (pt states he is unsure whether or not he requires assistance)  Vision/Hearing     Glasses, [place]- one hearing aide, the other broken  Cognition   Overall Orientation Status: Within Functional Limits  Orientation Level: Oriented X4  Overall Cognitive Status: WFL     Objective   Heart Rate: 74  Heart Rate Source: Monitor  BP: 113/72  BP Location: Right upper arm  BP Method: Automatic  Patient Position: Sitting  MAP (Calculated): 85.67  Resp: 18  SpO2: 96 %  O2 Device: Nasal cannula     RLE General AROM: limited at hip due to post op pain, knee and ankle WFL  LLE AROM : WFL  Strength RLE: Fair glute set, poor quad set, indep AP  Strength LLE: grossly 3+/5 at hip, 4-/5 knee and ankle        Bed mobility  Supine to Sit: Maximum assistance;2 Person assistance  Sit to Supine: 2 Person assistance;Maximum assistance    Transfers  Sit to Stand: 2 Person Assistance;Maximum Assistance (elevated EOB to STEDY)  Stand to sit: Moderate Assistance     Ambulation  WB Status: WBAT RLE  Ambulation  Comments: Pt stood in STEDY x 1 minute with mod assist of 2.  Did not progress to chair due to symptomatic low BP     Balance  Sitting - Static: Good  Standing - Static: Fair  Comments: Difficulty straightening hips and knees standing in STEDY, leans forward onto STEDY for support    Exercise Treatment:   Ankle pumps: 10 x B     Quad sets: 10 x B    Glut sets: 10 x B    Heel slides 10 x B, assisted RLE     SAQ: 10 x RLE assisted    LAQ: 10 x LLE indep     Hip abd: 10 x RLE assisted, 5 x LLE    Alternate UE row 10 x B with therapist assist      AM-PAC Score     AM-PAC Inpatient Mobility without Stair Climbing Raw Score : 8 (07/07/22 0844)  AM-PAC Inpatient without Stair Climbing T-Scale Score : 30.65 (07/07/22 0844)  Mobility Inpatient CMS 0-100% Score: 80.91 (07/07/22 0844)  Mobility Inpatient without Stair CMS G-Code Modifier : CM (07/07/22 8803)     Goals  Short Term Goals  Time Frame for Short term goals: 1 week ( 7/14) unless otherwise specified  Short term goal 1: pt to perform bed mob with min assist of 1-2  Short term goal 2: pt to perform transfers sit to stand min assist of 1-2  Short term goal 3: pt to amb with walker and min assist of 1-2, 25 ft  Short term goal 4: pt to participate in Therapeutic ex 12-15 reps by 7/11  Patient Goals   Patient goals : \"to know how much wt I can put on my leg\" - MET     Education  Patient Education  Education Given To: Patient  Education Provided: Role of Therapy;Plan of Care;Home Exercise Program;Transfer Training  Education Provided Comments: Disease specific: Pt educated in VARKAUS, pain control, use of CP, general safety, DVT prevention, breathing technique: he voices understanding.  Recommend reinforcement  Barriers to Learning: None  Therapy Time   Individual Concurrent Group Co-treatment   Time In 0810         Time Out 0845         Minutes 35         Timed Code Treatment Minutes: [address], PT

## 2022-07-07 NOTE — PROGRESS NOTES
training,Positioning,Self-Care / ADL     Restrictions  Restrictions/Precautions  Restrictions/Precautions: Weight Bearing  Lower Extremity Weight Bearing Restrictions  Right Lower Extremity Weight Bearing: Weight Bearing As Tolerated  Position Activity Restriction  Other position/activity restrictions: 1)  Up to bedside chair at least daily as tolerated. 2)  Ambulate in room progressing to hallway with assistive device four times daily (including PT) when PT advises. 3)  Bathroom privileges with assistance. Subjective   General  Chart Reviewed: Yes  Patient assessed for rehabilitation services?: Yes  Subjective  Subjective: Pt alert and agreeable to treatment. Pt very soft speaking, reports it is similiar to voice at baseline     Social/Functional History  Social/Functional History  Lives With: Spouse  Type of Home: House  Home Layout: One level  Home Access: Level entry  Bathroom Shower/Tub: Tub/Shower unit  Bathroom Toilet: Standard  Home Equipment: Cane,Walker, 4 wheeled  Has the patient had two or more falls in the past year or any fall with injury in the past year?: No  Receives Help From: Family  ADL Assistance: Independent  Homemaking Assistance: Independent  Ambulation Assistance: Needs assistance (Pt initially states he is unsure if he gets help, later states wife assists. He uses w/c longer distances with wife pushing)  Transfer Assistance:  (pt states he is unsure whether or not he requires assistance)     Objective   Heart Rate: 74  Heart Rate Source: Monitor  BP: 84/40 (seated EOB), 93/58 (seated EOB), 116/60 (supine)  BP Location: Right upper arm  BP Method: Automatic  Patient Position: Sitting  MAP (Calculated): 85.67  Resp: 18  SpO2: 96 %  O2 Device: Nasal cannula        Safety Devices  Type of Devices: All michael prominences offloaded; All fall risk precautions in place; Patient at risk for falls; Bed alarm in place;Call light within reach;Gait belt;Nurse notified; Left in bed  Bed Mobility Score: 74.7 (07/07/22 8045)  ADL Inpatient CMS G-Code Modifier : CL (07/07/22 7274)    Goals  Short Term Goals  Time Frame for Short term goals: 1 week (7/14) unless otherwise specified  Short Term Goal 1: Pt will complete feeding w/ modA needed for beverage management  Short Term Goal 2: Pt will complete toilet transfer w/ Aashish and use of Marinell Kluver stedy  Short Term Goal 3: Pt will complete STS transfer with Aashish to RW in prep for functional mobility (7/9)  Short Term Goal 4: Pt will complete UE exercises focusing on shoulder and elbow mobility x10 reps w/ no evidence of fatigue  Patient Goals   Patient goals : pt did not state       Therapy Time   Individual Concurrent Group Co-treatment   Time In 0810         Time Out 0845         Minutes 35         Timed Code Treatment Minutes: 25 Minutes (10 minute eval)     If pt is unable to be seen after this session, please let this note serve as discharge summary. Please see case management note for discharge disposition. Thank you.     Slava Buck, OTR/L

## 2022-07-07 NOTE — PROGRESS NOTES
4 Eyes Skin Assessment     NAME:  Kelley Doyle OF BIRTH:  1945  MEDICAL RECORD NUMBER:  [id number]    The patient is being assess for  Admission    I agree that 2 RN's have performed a thorough Head to Toe Skin Assessment on the patient. ALL assessment sites listed below have been assessed. Areas assessed by both nurses:    Head, Face, Ears, Shoulders, Back, Chest, Arms, Elbows, Hands, Sacrum. Buttock, Coccyx, Ischium and Legs. Feet and Heels        Does the Patient have a Wound?  No noted wound(s)      Red blanchable coccyx, Incision from R hip nailing, bruising RLE   Brock Prevention initiated:  Yes   Wound Care Orders initiated:  No    Pressure Injury (Stage 3,4, Unstageable, DTI, NWPT, and Complex wounds) if present place referral/consult order under [de-identified] No    New and Established Ostomies if present place consult order under : No      Nurse 1 eSignature: Electronically signed by Soco Hannah RN on 7/6/22 at 10:09 PM EDT    **SHARE this note so that the co-signing nurse is able to place an eSignature**    Nurse 2 eSignature: Electronically signed by Kamlesh Hinkle RN on 7/7/22 at 4:04 AM EDT

## 2022-07-07 NOTE — PROGRESS NOTES
Department of Orthopedic Surgery  Physician Assistant   Progress Note    Subjective:       Systemic or Specific Complaints:No Complaints and pain is well controlled, was able to sit at EOB and stand with therapy.  No family at bedisde    Objective:     Patient Vitals for the past 24 hrs:   BP Temp Temp src Pulse Resp SpO2 Height Weight   07/07/22 0800 -- 98.3 °F (36.8 °C) Oral 74 18 96 % -- --   07/07/22 0406 -- -- -- -- 18 -- -- --   07/07/22 0403 -- -- -- -- 18 -- -- --   07/07/22 0333 -- -- -- -- 18 -- -- --   07/07/22 0329 113/72 97.8 °F (36.6 °C) Oral 91 18 95 % -- --   07/06/22 2352 (!) 92/50 98 °F (36.7 °C) Oral 82 18 93 % -- --   07/06/22 2208 -- -- -- -- 16 -- -- --   07/06/22 2138 -- -- -- -- 18 -- -- --   07/06/22 1954 -- -- -- -- -- -- 5' 11\" (1.803 m) 122 lb (55.3 kg)   07/06/22 1949 (!) 142/80 97.7 °F (36.5 °C) Oral 71 18 93 % -- --   07/06/22 1852 136/79 97.3 °F (36.3 °C) Axillary 75 16 92 % -- --   07/06/22 1840 128/85 -- -- 77 -- 92 % -- --   07/06/22 1835 130/86 -- -- 81 -- 94 % -- --   07/06/22 1830 136/85 -- -- 77 -- 97 % -- --   07/06/22 1825 137/89 -- -- 78 -- 97 % -- --   07/06/22 1820 125/82 -- -- 78 -- 98 % -- --   07/06/22 1815 134/83 -- -- 76 -- 98 % -- --   07/06/22 1810 108/67 -- -- 69 -- 98 % -- --   07/06/22 1805 98/64 -- -- 60 -- 96 % -- --   07/06/22 1800 91/62 -- -- 62 -- 96 % -- --   07/06/22 1756 88/60 97.7 °F (36.5 °C) Temporal 63 16 95 % -- --   07/06/22 1755 88/60 -- -- 66 -- 94 % -- --   07/06/22 1750 (!) 86/58 -- -- -- -- -- -- --   07/06/22 1604 -- 98.7 °F (37.1 °C) Temporal -- 16 98 % -- --   07/06/22 1515 97/63 -- -- 73 15 99 % -- --   07/06/22 1509 -- -- -- 66 22 98 % -- --   07/06/22 1430 94/73 -- -- 67 14 -- -- --   07/06/22 1415 107/65 -- -- 76 23 97 % -- --   07/06/22 1400 108/68 -- -- 70 21 95 % -- --   07/06/22 1345 101/66 -- -- 74 25 97 % -- --   07/06/22 1330 -- -- -- 75 19 99 % -- --   07/06/22 1300 -- -- -- 79 23 98 % -- --   07/06/22 1245 -- -- -- 82 23 98 % -- --   07/06/22 1223 103/63 98.3 °F (36.8 °C) -- 83 17 98 % -- 122 lb (55.3 kg)       General: alert, appears stated age, cooperative and no distress   Wound: Wound clean and dry no evidence of infection. , No Erythema, No Drainage and Positive for Edema   Motion: Painful range of Motion in affected extremity   DVT Exam: No evidence of DVT seen on physical exam.  No cords or calf tenderness. No significant calf/ankle edema. Additional exam: Pt seen laying in bed, resting calmly. Bilateral SCD sleeves applied  Dependent edema and ecchymosis along entirety of posterior aspect of right leg  Leg lengths equal, rotational alignment neutral  Thigh mildly swollen, compartments soft and compressible  Mildly tender to palpation along anterior and lateral hip  EHL, FHL, gastroc, ant tib motor intact  Distal pulses 2+      Data Review  CBC:   Lab Results   Component Value Date/Time    WBC 8.6 07/07/2022 06:09 AM    RBC 3.63 07/07/2022 06:09 AM    HGB 10.3 07/07/2022 06:09 AM    HCT 31.1 07/07/2022 06:09 AM     07/07/2022 06:09 AM       Renal:   Lab Results   Component Value Date/Time     07/07/2022 06:09 AM    K 4.6 07/07/2022 06:09 AM     07/07/2022 06:09 AM    CO2 31 07/07/2022 06:09 AM    BUN 13 07/07/2022 06:09 AM    CREATININE 0.6 07/07/2022 06:09 AM    GLUCOSE 103 07/07/2022 06:09 AM    CALCIUM 8.9 07/07/2022 06:09 AM            Assessment:     s/p right hip IMN, POD 1.    Date of surgery 7/6/2022 with Dr. Susann Angelucci:      1: Weightbearing as tolerated to right lower extremity with assistive device  2:  Continue Deep venous thrombosis prophylaxis-Lovenox 40 mg daily  3:  Continue Pain Control- oxycodone as needed, scheduled Tylenol  4: 2 doses IV Ancef completed postoperatively  5: PT/OT eval ordered  6: Anticipate discharge in 1 to 2 days pending medical stability, pain control, need for placement    CHACORTA Katz

## 2022-07-07 NOTE — CARE COORDINATION
I spoke to the pt and his daughter Alona Caban they have both declined SNF despite PT/OT recs. Pt's daughter is a nurse and has a very good set up for the pt at home as well as the support and knowledge needed. Alona Caban states they have a hospital bed, a bell by the pt's bed at home to ring if he needs anything, they have a bed alarm for him now and a baby monitor to watch him, they also have gait belts at home to assist. They have non-skilled [facility] with [address] and skilled services with [address] [facility]. Alona Caban states when her dad went to the nursing home he lost a significant amount of weight and declined, at home he has gained weight and has been slowly doing better. I agree that the pt is likely better off at home. Pt will receive more care and monitoring at home, several family members are medical professionals and will assist. Pt's daughter also tells me that he is orthostatic at home and they have been dealing with this issue for awhile, they give him fluids and sit him on the side of the bed for a few minutes before standing.  Plan will be home with family support

## 2022-07-07 NOTE — FLOWSHEET NOTE
7. 7.2022/Physician's Assistant said patient wants a DNR. PA wanted to cancel full code and talk to daughter about DNR for patient. 07/07/22 1129   Encounter Summary   Encounter Overview/Reason  Attempted Encounter; Advance Care Planning   Service Provided For: Patient   Referral/Consult From: Nini   Last Encounter  07/07/22   Complexity of Encounter Moderate   Begin Time 1140   End Time  1155   Total Time Calculated 15 min   Spiritual/Emotional needs   Type Spiritual Support   Advance Care Planning   Type Refused ACP conversation   Assessment/Intervention/Outcome   Assessment   ([de-identified] Assistant said patient wants a DR)   Outcome   (Physician's Assistant said patient wants a DR)

## 2022-07-07 NOTE — PROGRESS NOTES
Speech Language Pathology    Speech Language Pathology  Clinical Bedside Swallow Assessment  Facility/Department: [facility] C5 - MED SURG/ORTHO        Recommendations:  Diet recommendation: IDDSI 5 Minced and moist Solids; IDDSI 3 Moderately Thick (honey) Liquids;  Meds crushed in puree as able  Instrumentation: not indicated at this time; MBS completed on 22  Risk management: upright for all intake, stay upright for at least 30 mins after intake, small bites/sips, 1:1 supervision with intake and general aspiration precautions      NAME:eMrvin Warner  : 1945 (64 y.o.)   MRN: [id number]  ROOM: [address] [place] [address]  ADMISSION DATE: 2022  PATIENT DIAGNOSIS(ES): Closed fracture of right hip, [facility] [S72.001A]  Closed right hip fracture, initial encounter [facility]) [S72.001A]  Chief Complaint   [facility]   [facility]     pt from home, fell 5 days ago, complaining of right hip pain, has been able to bare weight     Patient Active Problem List    Diagnosis Date Noted    Severe malnutrition ([address] 2022    Closed fracture of right hip ([address] 2022    Generalized weakness 06/15/2022    Closed fracture of left ankle     Constipation     General weakness 2022    Fever     Hemoptysis     Acute hypoxemic respiratory failure (HCC)     Severe sepsis ([address]     COVID-19 virus infection     Pneumonia of both lower lobes due to infectious organism     Thrombocytopenia (HCC)     Lymphopenia     Acute encephalopathy     Sepsis ([address] 2020    Parkinson's disease ([address] 2014    Depressive disorder, not elsewhere classified 2014    Mild cognitive impairment 2014     Past Medical History:   Diagnosis Date    Arthritis     Kidney stone     Knee pain     Parkinson disease ([address]      Past Surgical History:   Procedure Laterality Date    HIP SURGERY Right 2022    RIGHT HIP INTRAMEDULLARY GAMMA NAIL INSERTION performed by Baron Ndiaye MD at [place] [address]  NECK SURGERY      SHOULDER SURGERY       No Known Allergies    DATE ONSET: admit date 07/06/22    Date of Evaluation: 7/7/2022   Evaluating Therapist: JYOTHI Perea    Chart Reviewed: : [x] Yes [] No    Current Diet: ADULT DIET; Regular  ADULT ORAL NUTRITION SUPPLEMENT; Breakfast, Dinner; Standard High Calorie/High Protein Oral Supplement    Recent Chest Radiography: [x] Chest XR   [] CT of Chest  Date: 07/06/22  Impressions \"No acute cardiopulmonary disease. \"    Pain: 10/10, right hip pain; RN aware and in room to administer meds to pt    Reason for Referral  Lauren Fine was referred for a bedside swallow evaluation to assess the efficiency of their swallow function, identify signs and symptoms of aspiration and make recommendations regarding safe dietary consistencies, effective compensatory strategies, and safe eating environment. Assessment    Medical record review/interview: Per MD H&P: \"68 y.o. male with a PMHx significant for Parkinson's and anxiety/depression who presented to [facility] with complaints of right hip pain. Patient states he fell 5 days ago when walking into his house. States he landed on his right hip and experienced immediate pain. Denies LOC striking his head. Denies blood thinner use. States he was not lightheaded or dizzy before falling only that he trip over steps. He states his pain has been constant since the fall and progressively gotten worse. States pain is dull and rates it a 6/10 at rest and 10/10 with movement. States pain medication in the ED help relieve his pain. Denies fever, chills, chest pain, shortness of breath, abdominal pain, N/V/D, numbness/tingling in any extremity, or urinary symptoms. Patient has not eaten today. He will be admitted to hospital service for further evaluation and treatment. Orthopedics consulted in the ED and plan to take patient to OR this afternoon. \"      Predisposing dysphagia risk factors: Hx of neurological disease , Parkinson's Disease, Hx of dysphagia and Hx of aspiration  Clinical signs of possible chronic dysphagia: hx of dysphagia and hx of aspiration  Precipitating dysphagia risk factors: reduced physical mobility and increased O2 demands    Patient Complaints:  Odynophagia: [] Yes [x] No  Globus Sensation: [] Yes [x] No  SOB with PO intake: [] Yes [x] No  Increased WOB with PO intake: [] Yes [x] No  Reflux Sx's: [] Yes [x] No  Weight loss: [] Yes [x] No  Coughing/Choking with PO intake: [x] Yes [x] No  Reduced Appetite: [] Yes [x] No    Additional Reported Symptoms/Complaints/Hospital Course: Pt found to have \"Angulated, displaced, comminuted intertrochanteric fracture of the right hip. \" Pt had right hip nailing on 07/6/22. MBS completed at [facility] on 06/13/22 revealed: \"Reduced oral bolus control resulting in premature spillage to the valleculae and UES with all consistencies. Functional A-P bolus transit. Lingual fasciculations. Anterior munch chew pattern. Impaired hyolaryngeal excursion resulting in reduced duration and extent of UES opening leading to only partial bolus passage as evidenced by residue at the UES with all consistencies and the pyriform sinuses with puree and soft solids. Impaired hyolaryngeal excursion also resulting in incomplete epiglottic inversion resulting in deep penetration that does not clear with thin teaspoon and thin cup with aspiration during the swallow with thin straw, nectar cup, nectar controlled sip via 10cc. This also results in post-swallow residue in the valleculae with puree and soft solids. Of note, did note postprandial aspiration of pharyngeal retention throughout study. \"    Pt's goals: \"to get better\"    Vitals/labs:   Temp:  n/a  SpO2: 89-89% with pt on RA, then RN put pt on 1lpm and O2 sat increased to 95%  RR: 24/min  BP: n/a  HR: 85  O2 device: 1lpm O2 via NC    CBC:   Recent Labs     07/07/22  0609   WBC 8.6   HGB 10.3*         BMP:  Recent Labs     07/07/22  0609    K 4.6      CO2 31   BUN 13   CREATININE 0.6*   GLUCOSE 103*          Cranial nerve exam:   CN V (trigeminal): ophthalmic, maxillary, and mandibular facial sensation- WFL  CN VII (facial): Impaired  CN IX/X (glossopharyngeal/vagus): MPT: Reduced; pitch range: Reduced; vocal quality: weak; cough: Weak- perceptually  CN XII (hypoglossal): Lingual fasciculations upon protrusion    Laryngeal function exam:   Secretions: WFL  Vocal quality: See CN exam above  MPT: See CN exam above  S/Z ratio: DNT  Pitch range: See CN exam above  Cough: See CN exam above    Oral Care Status:    [x] Oral Care [facility]  [] Poor oral care status  [] Edentulous  [] Upper Dentures  [] Lower Dentures  [x] Missing/Broken Teeth - few natural teeth; only lower anterior dentition present  [] Evidence of dental cavities/carries    PO trials:     IDDSI 3 (moderately thick): no anterior bolus loss , suspect functional A-P bolus transit, swallow timing subjectively appears timely, oral clearance grossly WFL, no clinical s/s of aspiration and vitals stable    IDDSI 4 (puree): no anterior bolus loss , suspect functional A-P bolus transit, swallow timing subjectively appears timely, oral clearance grossly WFL, no clinical s/s of aspiration and vitals stable    IDDSI 5 (minced and moist): no anterior bolus loss , suspect functional A-P bolus transit, swallow timing subjectively appears timely, oral clearance grossly WFL, no clinical s/s of aspiration and vitals stable    3 oz water: DNT    Impressions:  Severe pharyngeal phase dysphagia persists, as noted during recent MBS. Per pt and RN, pt takes honey thick liquids at home per recent MBS recommendations. Recommendations:  Diet recommendation: IDDSI 5 Minced and moist Solids; IDDSI 3 Moderately Thick (honey) Liquids;  Meds crushed in puree as able  Instrumentation: not indicated at this time; MBS completed on 06/13/22  Risk management: upright for all intake, stay upright for at least 30 Consulted:   [x]  Patient     []  NP         [x]  RN   []  RD                   []  MD      []  Family Member                        []  PA    []  Other:      Safety Devices / Report:  [x]  All fall risk precautions in place []  Safety handoff completed with RN  [x]  Bed alarm in place  []  Left in bed     []  Chair alarm in place  []  Left in chair   [x]  Call light in reach   []  Other: Total Treatment Time / Charges       Time in Time out Total Time / units   Swallow Eval/Tx Time  [id number]  29 min / 2 units     Signature:    Laura Alonso, [address] [id number]  Speech-Language Pathologist

## 2022-07-08 VITALS
TEMPERATURE: 97.8 F | DIASTOLIC BLOOD PRESSURE: 68 MMHG | RESPIRATION RATE: 18 BRPM | HEIGHT: 71 IN | BODY MASS INDEX: 17.08 KG/M2 | OXYGEN SATURATION: 97 % | SYSTOLIC BLOOD PRESSURE: 117 MMHG | HEART RATE: 80 BPM | WEIGHT: 122 LBS

## 2022-07-08 LAB
HCT VFR BLD CALC: 27.3 % (ref 40.5–52.5)
HEMOGLOBIN: 9 G/DL (ref 13.5–17.5)
MCH RBC QN AUTO: 28.3 PG (ref 26–34)
MCHC RBC AUTO-ENTMCNC: 33.1 G/DL (ref 31–36)
MCV RBC AUTO: 85.7 FL (ref 80–100)
PDW BLD-RTO: 15.9 % (ref 12.4–15.4)
PLATELET # BLD: 286 K/UL (ref 135–450)
PMV BLD AUTO: 6 FL (ref 5–10.5)
RBC # BLD: 3.18 M/UL (ref 4.2–5.9)
WBC # BLD: 6.5 K/UL (ref 4–11)

## 2022-07-08 PROCEDURE — 6360000002 HC RX W HCPCS: Performed by: ORTHOPAEDIC SURGERY

## 2022-07-08 PROCEDURE — 36415 COLL VENOUS BLD VENIPUNCTURE: CPT

## 2022-07-08 PROCEDURE — 2700000000 HC OXYGEN THERAPY PER DAY

## 2022-07-08 PROCEDURE — 97110 THERAPEUTIC EXERCISES: CPT

## 2022-07-08 PROCEDURE — 94761 N-INVAS EAR/PLS OXIMETRY MLT: CPT

## 2022-07-08 PROCEDURE — 97530 THERAPEUTIC ACTIVITIES: CPT

## 2022-07-08 PROCEDURE — 85027 COMPLETE CBC AUTOMATED: CPT

## 2022-07-08 PROCEDURE — 2580000003 HC RX 258: Performed by: PHYSICIAN ASSISTANT

## 2022-07-08 PROCEDURE — 99024 POSTOP FOLLOW-UP VISIT: CPT | Performed by: SPECIALIST/TECHNOLOGIST

## 2022-07-08 PROCEDURE — 2580000003 HC RX 258: Performed by: ORTHOPAEDIC SURGERY

## 2022-07-08 PROCEDURE — 6370000000 HC RX 637 (ALT 250 FOR IP): Performed by: SPECIALIST/TECHNOLOGIST

## 2022-07-08 PROCEDURE — APPNB45 APP NON BILLABLE 31-45 MINUTES: Performed by: SPECIALIST/TECHNOLOGIST

## 2022-07-08 PROCEDURE — 6370000000 HC RX 637 (ALT 250 FOR IP): Performed by: PHYSICIAN ASSISTANT

## 2022-07-08 RX ORDER — OXYCODONE HYDROCHLORIDE 5 MG/1
5 TABLET ORAL EVERY 4 HOURS PRN
Qty: 42 TABLET | Refills: 0 | Status: SHIPPED | OUTPATIENT
Start: 2022-07-08 | End: 2022-07-15

## 2022-07-08 RX ORDER — ENOXAPARIN SODIUM 100 MG/ML
40 INJECTION SUBCUTANEOUS DAILY
Qty: 10.4 ML | Refills: 0 | Status: SHIPPED | OUTPATIENT
Start: 2022-07-09 | End: 2022-08-04

## 2022-07-08 RX ORDER — METHOCARBAMOL 500 MG/1
250 TABLET, FILM COATED ORAL 2 TIMES DAILY PRN
Qty: 10 TABLET | Refills: 0 | Status: SHIPPED | OUTPATIENT
Start: 2022-07-08 | End: 2022-07-18

## 2022-07-08 RX ADMIN — ENOXAPARIN SODIUM 40 MG: 100 INJECTION SUBCUTANEOUS at 09:13

## 2022-07-08 RX ADMIN — SODIUM CHLORIDE, PRESERVATIVE FREE 10 ML: 5 INJECTION INTRAVENOUS at 09:13

## 2022-07-08 RX ADMIN — POTASSIUM CHLORIDE 10 MEQ: 10 TABLET, EXTENDED RELEASE ORAL at 09:12

## 2022-07-08 RX ADMIN — GABAPENTIN 300 MG: 300 CAPSULE ORAL at 09:11

## 2022-07-08 RX ADMIN — GABAPENTIN 300 MG: 300 CAPSULE ORAL at 13:48

## 2022-07-08 RX ADMIN — ACETAMINOPHEN 650 MG: 325 TABLET ORAL at 13:49

## 2022-07-08 RX ADMIN — POLYETHYLENE GLYCOL 3350 17 G: 17 POWDER, FOR SOLUTION ORAL at 09:13

## 2022-07-08 RX ADMIN — ACETAMINOPHEN 650 MG: 325 TABLET ORAL at 03:33

## 2022-07-08 RX ADMIN — SODIUM CHLORIDE: 9 INJECTION, SOLUTION INTRAVENOUS at 03:35

## 2022-07-08 RX ADMIN — OXYCODONE 5 MG: 5 TABLET ORAL at 13:48

## 2022-07-08 RX ADMIN — ACETAMINOPHEN 650 MG: 325 TABLET ORAL at 09:12

## 2022-07-08 RX ADMIN — Medication 10 ML: at 09:13

## 2022-07-08 RX ADMIN — CARBIDOPA AND LEVODOPA 1 TABLET: 25; 100 TABLET ORAL at 13:49

## 2022-07-08 RX ADMIN — OXYCODONE 5 MG: 5 TABLET ORAL at 17:13

## 2022-07-08 RX ADMIN — OXYCODONE 5 MG: 5 TABLET ORAL at 09:12

## 2022-07-08 RX ADMIN — METHOCARBAMOL 250 MG: 500 TABLET ORAL at 17:18

## 2022-07-08 RX ADMIN — PYRIDOXINE HCL TAB 50 MG 50 MG: 50 TAB at 09:13

## 2022-07-08 RX ADMIN — CARBIDOPA AND LEVODOPA 1 TABLET: 25; 100 TABLET ORAL at 17:13

## 2022-07-08 RX ADMIN — CARBIDOPA AND LEVODOPA 1 TABLET: 25; 100 TABLET ORAL at 09:12

## 2022-07-08 ASSESSMENT — PAIN DESCRIPTION - DESCRIPTORS
DESCRIPTORS: ACHING

## 2022-07-08 ASSESSMENT — PAIN DESCRIPTION - LOCATION
LOCATION: HIP

## 2022-07-08 ASSESSMENT — PAIN DESCRIPTION - ORIENTATION
ORIENTATION: RIGHT

## 2022-07-08 ASSESSMENT — PAIN DESCRIPTION - PAIN TYPE: TYPE: SURGICAL PAIN

## 2022-07-08 ASSESSMENT — PAIN SCALES - GENERAL
PAINLEVEL_OUTOF10: 6

## 2022-07-08 NOTE — PROGRESS NOTES
Physical Therapy  Facility/Department: [facility] C5 - MED SURG/ORTHO  Daily Treatment Note  NAME: Lynn Trujillo  : 1945  MRN: [id number]    Date of Service: 2022    Discharge Recommendations:  Subacute/Skilled Nursing Facility   PT Equipment Recommendations  Equipment Needed: No  Other: defer to next level of care  [facility] 6 Clicks Inpatient Mobility:  AM-PAC Mobility Inpatient   How much difficulty turning over in bed?: A Lot  How much difficulty sitting down on / standing up from a chair with arms?: A Lot  How much difficulty moving from lying on back to sitting on side of bed?: A Lot  How much help from another person moving to and from a bed to a chair?: A Lot  How much help from another person needed to walk in hospital room?: Total  How much help from another person for climbing 3-5 steps with a railing?: Total  AM-PAC Inpatient Mobility Raw Score : 10  AM-PAC Inpatient T-Scale Score : 32.29  Mobility Inpatient CMS 0-100% Score: 76.75  Mobility Inpatient CMS G-Code Modifier : CL    Patient Diagnosis(es): The encounter diagnosis was Closed fracture of right hip, initial encounter ([facility] Assessment   Assessment: Today pt required max assist of 2 supine><sit><stand. Pt was able to transfer to a chair w/ mod A x 2 rw. Pt will benefit from skilled PT to address deficits. Recommend SNF at discharge. Activity Tolerance: Patient tolerated treatment well;Patient limited by endurance  Equipment Needed: No  Other: defer to next level of care     Plan    Plan  Plan: 1 time a day 7 days a week  Current Treatment Recommendations: Strengthening;ROM;Balance training;Functional mobility training;Transfer training; Endurance training;Gait training;Pain management;Home exercise program;Safety education & training; Therapeutic activities     Restrictions  Restrictions/Precautions  Restrictions/Precautions: Weight Bearing,Fall Risk  Lower Extremity Weight Bearing Restrictions  Right Lower Extremity Weight Bearing: Weight Bearing As Tolerated  Position Activity Restriction  Other position/activity restrictions: 1)  Up to bedside chair at least daily as tolerated. 2)  Ambulate in room progressing to hallway with assistive device four times daily (including PT) when PT advises. 3)  Bathroom privileges with assistance. Subjective    Subjective  Subjective: Pt agrees to PT session; supine in bed at approach. Pain: Pt reports pain at 0/10 at rest  Orientation  Overall Orientation Status: Within Functional Limits  Orientation Level: Oriented X4  Cognition  Overall Cognitive Status: WFL     Objective   Vitals  (103/63  HR 64  O2 97% 1 L)  Bed Mobility Training  Bed Mobility Training: Yes  Overall Level of Assistance: Assist X2;Maximum assistance  Interventions: Manual cues; Tactile cues; Safety awareness training;Verbal cues  Supine to Sit: Assist X2;Maximum assistance  Sit to Supine: Other (comment) (pt left up in chair at EOS)  Scooting: Maximum assistance  Balance  Sitting: Impaired  Sitting - Static: Fair (occasional)  Sitting - Dynamic: Occasional  Standing: Impaired  Standing - Static: Occasional  Standing - Dynamic: Occasional  Transfer Training  Transfer Training: Yes  Overall Level of Assistance: Maximum assistance;Assist X2 (to RW)  Interventions: Tactile cues; Verbal cues; Weight shifting training/pressure relief; Safety awareness training  Sit to Stand: Maximum assistance;Assist X2  Stand to Sit: Maximum assistance;Assist X2  Bed to Chair: Assist X2;Moderate assistance (rw)     PT Exercises  Exercise Treatment: Ankle pumps: 10 x B   Quad sets: 10 x B  Glut sets: 10 x B  Heel slides 10 x B, assisted RLE   SAQ: 10 x RLE assisted  LAQ: 10 x LLE indep    Hip abd: 10 x RLE assisted, 5 x LLE  Alternate UE row 10 x B with therapist assist   Practiced sit<>stand 3 times in a row. Standing balance in rw by chair for 90 secs min A. Safety Devices  Type of Devices: All michael prominences offloaded; All fall risk precautions in place; Patient at risk for falls;Call light within reach;Gait belt;Nurse notified; Left in chair;Chair alarm in place       Goals  Short Term Goals  Time Frame for Short term goals: 1 week ( 7/14) unless otherwise specified  Short term goal 1: pt to perform bed mob with min assist of 1-2  7/08 max 2  Short term goal 2: pt to perform transfers sit to stand min assist of 1-2   7/08  max 2  Short term goal 3: pt to amb with walker and min assist of 1-2, 25 ft   7/08  CASEY  Short term goal 4: pt to participate in Therapeutic ex 12-15 reps by 7/11 7/08  on-going  Patient Goals   Patient goals : \"to know how much wt I can put on my leg\" - MET    Education  Patient Education  Education Given To: Patient  Education Provided: Role of Therapy;Plan of Care;Home Exercise Program;Transfer Training  Education Provided Comments: Disease specific: Pt educated in VARKAUS, pain control, use of CP, general safety, DVT prevention, breathing technique: he voices understanding.  Recommend reinforcement  Barriers to Learning: None    Therapy Time   Individual Concurrent Group Co-treatment   Time In 1050         Time Out 1144         Minutes [place]

## 2022-07-08 NOTE — DISCHARGE INSTR - COC
Continuity of Care Form    Patient Name: Tres Lawrence   :  1945  MRN:  [id number]    Admit date:  2022  Discharge date:  ***    Code Status Order: DNR-CCA   Advance Directives:      Admitting Physician:  Shamar Pierce MD  PCP: Yfn Sánchez MD    Discharging Nurse: [facility] Unit/Room#: 8313/5374-57  Discharging Unit Phone Number: ***    Emergency Contact:   Extended Emergency Contact Information  Primary Emergency Contact: Cassidy Madeleine  Address: [address] [facility]           Brad Welsh [address] of [address] [place] [address] Phone: [phone]  Relation: Spouse  Secondary Emergency Contact: Rosalba Alas  Home Phone: [phone]  Relation: Child  Preferred language: English   needed?  No    Past Surgical History:  Past Surgical History:   Procedure Laterality Date    HIP SURGERY Right 2022    RIGHT HIP INTRAMEDULLARY GAMMA NAIL INSERTION performed by Salina Hudson MD at [facility]         Immunization History:   Immunization History   Administered Date(s) Administered    COVID-19, PFIZER PURPLE top, DILUTE for use, (age 15 y+), 30mcg/0.3mL 2021, 2022    Influenza Virus Vaccine 2013, 2015, 10/05/2017, 10/31/2018, 2019    Pneumococcal Conjugate 13-valent (Scoefaq50) 2017    Pneumococcal Polysaccharide (Nlpcqzocw41) 2009, 2014    Pneumococcal Vaccine 2014    Td vaccine (adult) 2014    Tdap (Boostrix, Adacel) 2020    Zoster Live (Zostavax) 2020    Zoster Recombinant (Shingrix) 2018, 05/10/2019       Active Problems:  Patient Active Problem List   Diagnosis Code    Parkinson's disease ([facility] [address] G20    Depressive disorder, not elsewhere classified F32.9    Mild cognitive impairment G31.84    Sepsis ([address] A41.9    Acute hypoxemic respiratory failure ([facility] [address] J96.01    Severe sepsis (HCC) A41.9, R65.20    COVID-19 virus infection U07.1    Pneumonia of both lower lobes due to infectious organism J18.9    Thrombocytopenia (HCC) D69.6    Lymphopenia D72.810    Acute encephalopathy G93.40    Hemoptysis R04.2    Fever R50.9    General weakness R53.1    Closed fracture of left ankle S82.892A    Constipation K59.00    Generalized weakness R53.1    Closed fracture of right hip (HCC) S72.001A    Severe malnutrition (HCC) E43       Isolation/Infection:   Isolation            No Isolation          Patient Infection Status       Infection Onset Added Last Indicated Last Indicated By Review Planned Expiration Resolved Resolved By    None active    Resolved    COVID-19 (Rule Out) 22 COVID-19 & Influenza Combo (Ordered)   22 Rule-Out Test Resulted    COVID-19 (Rule Out) 22 COVID-19, Rapid (Ordered)   22 Rule-Out Test Resulted    COVID-19 20 COVID-19   20     COVID-19 (Rule Out) 20 COVID-19 (Ordered)   20 Rule-Out Test Resulted            Nurse Assessment:  Last Vital Signs: /65   Pulse 72   Temp 97.8 °F (36.6 °C) (Oral)   Resp 18   Ht 5' 10.98\" (1.803 m)   Wt 122 lb (55.3 kg)   SpO2 97%   BMI 17.02 kg/m²     Last documented pain score (0-10 scale): Pain Level: 6  Last Weight:   Wt Readings from Last 1 Encounters:   22 122 lb (55.3 kg)     Mental Status:  {IP PT MENTAL STATUS:}    IV Access:  { [facility] IV ACCESS:940217348}    Nursing Mobility/ADLs:  Walking   {[facility] DME BEZY:237986067}  Transfer  {[facility] DME OGVE:792338498}  Bathing  {[facility] DME YTPL:827299976}  Dressing  {[facility] DME YTIO:642804635}  Toileting  {[facility] DME EMHT:358868274}  Feeding  {[facility] DME PKXC:749029609}  Med Admin  {[facility] DME GPHN:742631564}  Med Delivery   {[facility] MED Delivery:710657218}    Wound Care Documentation and Therapy:  Incision 22 Hip Proximal;Right (Active)   Dressing Status Clean;Dry; Intact 22 0800   Dressing/Treatment Adhesive bandage; Xeroform 22 0800   Closure Staples 22 0800   Incision Assessment Other (Comment) 22 0800   Drainage Amount None 22 0800   Odor None 22 0800   Number of days: 1        Elimination:  Continence: Bowel: {YES / QC:38769}  Bladder: {YES / HL:85830}  Urinary Catheter: {Urinary Catheter:913530892}   Colostomy/Ileostomy/Ileal Conduit: {YES / EK:06362}       Date of Last BM: ***    Intake/Output Summary (Last 24 hours) at 2022 1000  Last data filed at 2022 1200  Gross per 24 hour   Intake 200 ml   Output --   Net 200 ml     I/O last 3 completed shifts:   In: 200 [P.O.:200]  Out: -     Safety Concerns:     [facility] [employer] Safety Concerns:073920582}    Impairments/Disabilities:      508 [employer] Impairments/Disabilities:265466941}    Nutrition Therapy:  Current Nutrition Therapy:   508 [employer] Core Diet List:820309595}    Routes of Feeding: {[facility] DME Other Feedings:970974067}  Liquids: {Slp liquid thickness:06868}  Daily Fluid Restriction: {[facility]P DME Yes amt example:150323783}  Last Modified Barium Swallow with Video (Video Swallowing Test): {Done Not Done RBPQ:307538333}    Treatments at the Time of Hospital Discharge:   Respiratory Treatments: ***  Oxygen Therapy:  {Therapy; copd oxygen:04404}  Ventilator:    { CC Vent OVRD:678043611}    Rehab Therapies: {THERAPEUTIC INTERVENTION:0524673985}  Weight Bearing Status/Restrictions: [employer] Weight Bearin}  Other Medical Equipment (for information only, NOT a DME order):  {EQUIPMENT:484952933}  Other Treatments: ***    Patient's personal belongings (please select all that are sent with patient):  {[facility]P DME Belongings:931184447}    RN SIGNATURE:  {Esignature:851487933}    CASE MANAGEMENT/SOCIAL WORK SECTION    Inpatient Status Date: ***    Readmission Risk Assessment Score:  Readmission Risk              Risk of Unplanned Readmission:  15           Discharging to Facility/ Agency   Name: [facility]  [address] [facility], [address] [facility] [place] , [place]          Phone: [phone]       Fax: [phone]          / signature: Electronically signed by MADISON Perea on 7/8/22 at 2:04 PM EDT    PHYSICIAN SECTION    Prognosis: Fair    Condition at Discharge: Stable    Rehab Potential (if transferring to Rehab): Good    Recommended Follow-up, Labs or Other Treatments After Discharge:    PT/OT-weightbearing as tolerated to right lower extremity with assistive device  Lovenox 40 mg daily for 4 weeks postop as DVT prophylaxis  Follow-up with Dr. Radha Thomas 2 weeks postop. Call [facility] orthopedics at [phone] to schedule an appointment or with any questions  Follow-up with PCP as needed. Physician Certification: I certify the above information and transfer of Sissy Kin  is necessary for the continuing treatment of the diagnosis listed and that he requires [address] for less 30 days.      Update Admission H&P: No change in H&P    PHYSICIAN SIGNATURE:  Electronically signed by CHACORTA Garza on 7/8/22 at 1:34 PM EDT

## 2022-07-08 NOTE — CARE COORDINATION
CASE MANAGEMENT DISCHARGE SUMMARY      Discharge to: home with daughterErna completed: [facility] Exemption Notification (HENS) completed: NA    IMM given: NA     New Durable Medical Equipment ordered/agency: NA    Transportation:    Family/car: daughter to transport       Confirmed discharge plan with:     Patient: yes     Family:  Yes daughter and son     Facility/Agency, name:  [facility] faxed, to obtain from Epic        RN, name: Cory Leavitt    Note: Discharging nurse to complete [facility], reconcile AVS, and place final copy with patient's discharge packet. RN to ensure that written prescriptions for  Level II medications are sent with patient to the facility as per protocol. Per family they have all DME. Family requesting information on [place] [facility]. Information left in room.  Electronically signed by MADISON Rosario, SUZANNA-S on 7/8/2022 at 2:26 PM

## 2022-07-08 NOTE — PLAN OF CARE
Problem: Discharge Planning  Goal: Discharge to home or other facility with appropriate resources  Outcome: Progressing     Problem: Pain  Goal: Verbalizes/displays adequate comfort level or baseline comfort level  7/8/2022 0020 by Dillon Anderson RN  Outcome: Progressing    Problem: Skin/Tissue Integrity  Goal: Absence of new skin breakdown  Description: 1. Monitor for areas of redness and/or skin breakdown  2. Assess vascular access sites hourly  3. Every 4-6 hours minimum:  Change oxygen saturation probe site  4. Every 4-6 hours:  If on nasal continuous positive airway pressure, respiratory therapy assess nares and determine need for appliance change or resting period.   Outcome: Progressing     Problem: Safety - Adult  Goal: Free from fall injury  7/8/2022 0020 by Dillon Anderson RN  Outcome: Progressing    Problem: ABCDS Injury Assessment  Goal: Absence of physical injury  Outcome: Progressing     Problem: Nutrition Deficit:  Goal: Optimize nutritional status  Outcome: Progressing

## 2022-07-08 NOTE — PROGRESS NOTES
Pt D/C'd home per MD order. D/C'd IV site per MD order. Printed and reviewed AVS with pt.'s daughter Candy Norton. Rx's in room per outpatient pharmacy. All pt.'s belongings packed. Will transport pt to [facility] by wheelchair at this time.

## 2022-07-08 NOTE — DISCHARGE SUMMARY
Hospital Medicine Discharge Summary    Patient ID: Jameson Kirks      Patient's PCP: Rosa Isela Wisdom MD    Admit Date: 7/6/2022     Discharge Date: 7/8/2022      Admitting Provider: Hannah Gerard MD     Discharge Provider: CHACORTA Rdz     Discharge Diagnoses: Active Hospital Problems    Diagnosis     Severe malnutrition ([facility] [address] [E43]      Priority: Medium    Closed fracture of right hip ([facility] [address] [S72.001A]      Priority: Medium       The patient was seen and examined on day of discharge and this discharge summary is in conjunction with any daily progress note from day of discharge. Hospital Course:   64 y. o. male with a PMHx significant for Parkinson's and anxiety/depression who presented to [facility] with complaints of right hip pain. Patient states he fell 5 days ago when walking into his house. States he landed on his right hip and experienced immediate pain. Denies LOC striking his head. Denies blood thinner use. States he was not lightheaded or dizzy before falling only that he trip over steps. He states his pain has been constant since the fall and progressively gotten worse. States pain is dull and rates it a 6/10 at rest and 10/10 with movement. States pain medication in the ED help relieve his pain.  Denies fever, chills, chest pain, shortness of breath, abdominal pain, N/V/D, numbness/tingling in any extremity, or urinary symptoms. Patient has not eaten today. He will be admitted to hospital service for further evaluation and treatment. Orthopedics consulted in the ED and plan to take patient to OR 7/6/2022. Closed displaced comminuted right IT hip fracture secondary to mechanical fall   Ortho consulted in ED. Patient has not eaten today. Right IM nail 7/6/2022. Pain control with bowel regimen, PT/OT evaluations recommended SNF, however per discussion with daughter patient does not do well in facilities given his comorbidities.  Daughter and granddaughter are RN and CNA. Have hospital bed and other equipment for patient at home, feel comfortable managing at home. Also established with [facility]. WBAT RLE. Follow-up with Orthopedics in 2 weeks. Lovenox 40 mg for 3 weeks.      Parkinson's   Continue home medications      Anxiety and depression, mood stable  Continue home regimen     Spoke with daughter, Allegra Peters over the phone this morning about patient's code status, she states she is his POA and she is a RN. She would like patient status changed to [facility] given decline with parkinson's . She expresses understanding that life-saving treatment will be provided up to arrest.      Physical Exam Performed:     /68   Pulse 80   Temp 97.8 °F (36.6 °C) (Axillary)   Resp 18   Ht 5' 10.98\" (1.803 m)   Wt 122 lb (55.3 kg)   SpO2 97%   BMI 17.02 kg/m²       General appearance:  No apparent distress, appears stated age and cooperative. HEENT:  Normal cephalic, atraumatic without obvious deformity. Pupils equal, round, and reactive to light. Extra ocular muscles intact. Conjunctivae/corneas clear. Neck: Supple, with full range of motion. No jugular venous distention. Trachea midline. Respiratory:  Normal respiratory effort. Clear to auscultation, bilaterally without Rales/Wheezes/Rhonchi. Cardiovascular:  Regular rate and rhythm with normal S1/S2 without murmurs, rubs or gallops. Abdomen: Soft, non-tender, non-distended with normal bowel sounds. Musculoskeletal: RLE surgical incision C/D/I, DP and PT pulses 2+, sensation intact, able to flex knee but overall decreased ROM. No clubbing, cyanosis or edema bilaterally. Full range of motion without deformity. Skin: Skin color, texture, turgor normal.  No rashes or lesions. Neurologic:  Neurovascularly intact without any focal sensory/motor deficits.  Cranial nerves: II-XII intact, grossly non-focal.  Psychiatric:  Alert and oriented, thought content appropriate, normal insight  Capillary Refill: Brisk,< 3 seconds Peripheral Pulses: +2 palpable, equal bilaterally       Labs: For convenience and continuity at follow-up the following most recent labs are provided:      CBC:    Lab Results   Component Value Date/Time    WBC 6.5 07/08/2022 05:23 AM    HGB 9.0 07/08/2022 05:23 AM    HCT 27.3 07/08/2022 05:23 AM     07/08/2022 05:23 AM       Renal:    Lab Results   Component Value Date/Time     07/07/2022 06:09 AM    K 4.6 07/07/2022 06:09 AM     07/07/2022 06:09 AM    CO2 31 07/07/2022 06:09 AM    BUN 13 07/07/2022 06:09 AM    CREATININE 0.6 07/07/2022 06:09 AM    CALCIUM 8.9 07/07/2022 06:09 AM    PHOS 3.2 09/09/2020 05:19 AM         Significant Diagnostic Studies    Radiology:   XR HIP RIGHT (1 VIEW)   Final Result   Intraprocedural fluoroscopic spot images as above. See separate procedure   report for more information. FLUORO FOR SURGICAL PROCEDURES   Final Result      XR KNEE RIGHT (1-2 VIEWS)   Final Result   No acute fracture or dislocation in the right knee. Tricompartmental degenerative changes, moderate to severe in the medial   compartment. Mild suprapatellar joint effusion. CT HEAD WO CONTRAST   Final Result   No acute intracranial abnormality. Atrophy and small-vessel ischemic change, similar to prior      RECOMMENDATIONS:   Unavailable         XR CHEST PORTABLE   Final Result   No acute cardiopulmonary disease. XR HIP 2-3 VW W PELVIS RIGHT   Final Result   Angulated, displaced, comminuted intertrochanteric fracture of the right hip.                 Consults:     IP CONSULT TO ORTHOPEDIC SURGERY  IP CONSULT TO SPIRITUAL SERVICES  IP CONSULT TO SOCIAL WORK  IP CONSULT TO HOME CARE NEEDS    Disposition:  Home [place] [address]     Condition at Discharge: Stable    Discharge Instructions/Follow-up:    Follow-up with PCP as needed   Follow-up with Ortho in 2 weeks  Lovenox 40 mg daily for 3 weeks  Home PT/OT    Code Status:  DNR-CCA     Activity: activity as tolerated    Diet: regular diet      Discharge Medications:     Discharge Medication List as of 7/8/2022  2:18 PM           Details   oxyCODONE (ROXICODONE) 5 MG immediate release tablet Take 1 tablet by mouth every 4 hours as needed for Pain for up to 7 days. , Disp-42 tablet, R-0Normal      enoxaparin (LOVENOX) 40 MG/0.4ML Inject 0.4 mLs into the skin daily for 26 days, Disp-10.4 mL, R-0Normal      methocarbamol (ROBAXIN) 500 MG tablet Take 0.5 tablets by mouth 2 times daily as needed (muscle spasms), Disp-10 tablet, R-0Normal              Details   polyethylene glycol (GLYCOLAX) 17 g packet Take 17 g by mouth daily, Disp-527 g, R-1NO PRINT      Pyridoxine HCl (B-6) 50 MG TABS Take 50 mg by mouth daily Historical Med      carbidopa-levodopa (SINEMET CR)  MG per extended release tablet Take 1 tablet by mouth nightlyHistorical Med      bisacodyl (DULCOLAX) 5 MG EC tablet Take 5 mg by mouth daily as needed for ConstipationHistorical Med      docusate sodium (COLACE) 100 mg capsule Take 100 mg by mouth daily as needed for ConstipationHistorical Med      ketoconazole (NIZORAL) 2 % shampoo Apply to affected areas three times a week (lather, leave on for 5 minutes, then rinse), Historical Med      lidocaine (LIDODERM) 5 % Place 1 patch onto the skin daily 12 hours on, 12 hours off. Historical Med      potassium chloride (MICRO-K) 10 MEQ extended release capsule Take 10 mEq by mouth dailyHistorical Med      carbidopa-levodopa (PARCOPA)  MG TBDP Take 1 tablet by mouth 4 times daily Historical Med      traZODone (DESYREL) 50 MG tablet Take 50 mg by mouth nightlyHistorical Med      sertraline (ZOLOFT) 100 MG tablet Take 100 mg by mouth nightlyHistorical Med      gabapentin (NEURONTIN) 300 MG capsule Take 300 mg by mouth 3 times daily. Time Spent on discharge is more than 30 minutes in the examination, evaluation, counseling and review of medications and discharge plan.       Signed:    Jaxson Forte Chandana Castellano [place]   7/8/2022      Thank you Hannah Friedman MD for the opportunity to be involved in this patient's care. If you have any questions or concerns, please feel free to contact me at [phone].

## 2022-07-08 NOTE — PROGRESS NOTES
Department of Orthopedic Surgery  Physician Assistant   Progress Note    Subjective:       Systemic or Specific Complaints: Some hip pain this morning, just had pain meds administered. Has not worked with therapy yet today. No family at bedside, spoke with pt's daughter over the phone, family is wanting to take him home with [facility]    Objective:     Patient Vitals for the past 24 hrs:   BP Temp Temp src Pulse Resp SpO2 Height   07/08/22 0900 106/65 97.8 °F (36.6 °C) Oral 72 18 97 % --   07/07/22 2332 (!) 92/48 99 °F (37.2 °C) Oral 74 16 94 % --   07/07/22 2108 123/65 -- -- 70 -- -- --   07/07/22 1937 (!) 93/40 99 °F (37.2 °C) Oral 79 16 95 % --   07/07/22 1415 108/69 98.3 °F (36.8 °C) -- -- -- -- --   07/07/22 1358 108/69 98.4 °F (36.9 °C) Oral 87 16 93 % 5' 10.98\" (1.803 m)       General: alert, appears stated age, cooperative and no distress   Wound: Wound clean and dry no evidence of infection. , No Erythema, No Drainage and Positive for Edema   Motion: Painful range of Motion in affected extremity   DVT Exam: No evidence of DVT seen on physical exam.  No cords or calf tenderness. No significant calf/ankle edema. Additional exam: Pt seen laying in bed, resting calmly.  Bilateral SCD sleeves applied  Dependent edema and ecchymosis along entirety of posterior aspect of right leg  Leg lengths equal, rotational alignment neutral  Thigh mildly swollen, compartments soft and compressible  Mildly tender to palpation along anterior and lateral hip  EHL, FHL, gastroc, ant tib motor intact  Distal pulses 2+      Data Review  CBC:   Lab Results   Component Value Date/Time    WBC 6.5 07/08/2022 05:23 AM    RBC 3.18 07/08/2022 05:23 AM    HGB 9.0 07/08/2022 05:23 AM    HCT 27.3 07/08/2022 05:23 AM     07/08/2022 05:23 AM       Renal:   Lab Results   Component Value Date/Time     07/07/2022 06:09 AM    K 4.6 07/07/2022 06:09 AM     07/07/2022 06:09 AM    CO2 31 07/07/2022 06:09 AM    BUN 13 07/07/2022 06:09 AM    CREATININE 0.6 07/07/2022 06:09 AM    GLUCOSE 103 07/07/2022 06:09 AM    CALCIUM 8.9 07/07/2022 06:09 AM            Assessment:     s/p right hip IMN, POD 2. Date of surgery 7/6/2022 with Dr. Marietta Collins:      1: Weightbearing as tolerated to right lower extremity with assistive device  2:  Continue Deep venous thrombosis prophylaxis-Lovenox 40 mg daily  3:  Continue Pain Control- oxycodone as needed, scheduled Tylenol  4: 2 doses IV Ancef completed postoperatively  5: PT/OT recommending skilled nursing facility, patient's family wishes to bring him home. Case management following. [facility] orders placed. DME order placed for 3-in-1 commode  6: OK to DC from ortho standpoint. DCP updated, scripts sent to [place].  Follow up with Dr Kaycee Faust 2 weeks post-op    [facility] [place]

## 2022-07-08 NOTE — PLAN OF CARE
Problem: Discharge Planning  Goal: Discharge to home or other facility with appropriate resources  Outcome: Adequate for Discharge     Problem: Pain  Goal: Verbalizes/displays adequate comfort level or baseline comfort level  Outcome: Adequate for Discharge     Problem: Skin/Tissue Integrity  Goal: Absence of new skin breakdown  Description: 1. Monitor for areas of redness and/or skin breakdown  2. Assess vascular access sites hourly  3. Every 4-6 hours minimum:  Change oxygen saturation probe site  4. Every 4-6 hours:  If on nasal continuous positive airway pressure, respiratory therapy assess nares and determine need for appliance change or resting period.   Outcome: Adequate for Discharge     Problem: Safety - Adult  Goal: Free from fall injury  Outcome: Adequate for Discharge     Problem: ABCDS Injury Assessment  Goal: Absence of physical injury  Outcome: Adequate for Discharge     Problem: Nutrition Deficit:  Goal: Optimize nutritional status  Outcome: Adequate for Discharge

## 2022-07-08 NOTE — PROGRESS NOTES
Occupational Therapy  Facility/Department: [facility] C5 - MED SURG/ORTHO  Daily Treatment Note  NAME: Bettina Singh  : 1945  MRN: [id number]    Date of Service: 2022    Discharge Recommendations:  Subacute/Skilled Nursing Facility  OT Equipment Recommendations  Equipment Needed: Yes  Mobility Devices:  (Bedside commode, tub transfer bench) - Noted DME will enable to patient to remain as independent as possible when completing ADLs in the home environment while ensuring safety during activity. Patient Diagnosis(es): The encounter diagnosis was Closed fracture of right hip, initial encounter ([facility] [address] Assessment    Assessment: Pt tolerated therapy well this date and made progress towards his goals. Pt was recieved supine in bed and was agreeable to participate. Co-tx collaboration this date to safely meet goals and maximize occupational performance outcomes greater within a co-treatment than 1:1 session. He completed bed mobility w/ maxAx2 and bed>chair transfer w/ RW w/ maxAx2. Pt made great strides in his endurance and strength during activity. He continues to present below his PLOF and would benefit from continued services while inpatient as well as skilled OT services in a SNF setting upon d/c to maximize occupational engagement. Activity Tolerance: Patient tolerated treatment well;Patient limited by endurance  Discharge Recommendations: Subacute/Skilled Nursing Facility  Equipment Needed: Yes  Mobility Devices:  (Bedside commode, tub transfer bench) - see above. Plan   Plan  Times per Week: 4-6x/wk  Current Treatment Recommendations: Strengthening;ROM;Balance training;Functional mobility training; Endurance training; Safety education & training;Patient/Caregiver education & training;Positioning;Self-Care / ADL     Restrictions  Restrictions/Precautions  Restrictions/Precautions: Weight Bearing; Fall Risk  Lower Extremity Weight Bearing Restrictions  Right Lower Extremity Weight Bearing: Weight Bearing As Tolerated  Position Activity Restriction  Other position/activity restrictions: 1)  Up to bedside chair at least daily as tolerated. 2)  Ambulate in room progressing to hallway with assistive device four times daily (including PT) when PT advises. 3)  Bathroom privileges with assistance. Subjective   Subjective  Subjective: Pt recieved supine in bed and was agreeable to participate. Pt w/ increased engagement in session and motivated  Orientation  Overall Orientation Status: Within Functional Limits  Orientation Level: Oriented X4  Pain: Pt reports pain at 0/10  Cognition  Overall Cognitive Status: WFL      Objective    Vitals  Vitals  Heart Rate: 63  Heart Rate Source: Monitor  BP: 103/63  BP Location: Right upper arm  BP Method: Automatic  Patient Position: Semi fowlers  MAP (Calculated): 76.33  SpO2: 97 %  O2 Device: Nasal cannula (1 L)  Bed Mobility Training  Bed Mobility Training: Yes  Overall Level of Assistance: Assist X2;Maximum assistance  Interventions: Manual cues; Tactile cues; Safety awareness training;Verbal cues  Supine to Sit: Assist X2;Maximum assistance  Sit to Supine: Other (comment) (pt left up in chair at EOS)  Balance  Sitting: High guard  Standing: Impaired (w/ RW. Pt tolerated ~90sec of standing at chair)  Standing - Static: Occasional  Standing - Dynamic: Occasional  Transfer Training  Transfer Training: Yes  Overall Level of Assistance: Maximum assistance;Assist X2 (to RW)  Interventions: Tactile cues; Verbal cues; Weight shifting training/pressure relief; Safety awareness training  Sit to Stand: Maximum assistance;Assist X2 (w/ RW)  Stand to Sit: Maximum assistance;Assist X2 (w/ RW)  Bed to Chair: Assist X2;Moderate assistance (w/ RW)     ADL  Feeding: Maximum assistance;Scoop assist;Thickened liquids (pt able to grasp cup w/ BUE)  OT Exercises  A/AROM Exercises: Pt completed x10 reps BUE shoulder and elbow flexion/extension exercises to increase flexibility; pt w/ noticeable fatigue by completion of sets     Safety Devices  Type of Devices: All michael prominences offloaded; All fall risk precautions in place; Patient at risk for falls;Call light within reach;Gait belt;Nurse notified; Left in chair;Chair alarm in place     Patient Education  Education Given To: Patient  Education Provided: Role of Therapy;Plan of Care;Home Exercise Program;Precautions; ADL Adaptive Strategies;Transfer Training;Energy Conservation  Education Method: Verbal;Demonstration  Barriers to Learning: None  Education Outcome: Demonstrated understanding;Continued education needed;Verbalized understanding    Disease Specific Education: Pt educated on weight bearing status, post-op precautions, appropriate DME, and safe mobility with AD.  Pt verbalized understanding    AM-PAC Daily Activity Inpatient   How much help for putting on and taking off regular lower body clothing?: A Lot  How much help for Bathing?: A Lot  How much help for Toileting?: A Lot  How much help for putting on and taking off regular upper body clothing?: A Lot  How much help for taking care of personal grooming?: A Lot  How much help for eating meals?: A Lot  AM-[facility] Inpatient Daily Activity Raw Score: 12  AM-PAC Inpatient ADL T-Scale Score : 30.6  ADL Inpatient CMS 0-100% Score: 66.57  ADL Inpatient CMS G-Code Modifier : CL    Goals  Short Term Goals  Time Frame for Short term goals: 1 week (7/14) unless otherwise specified  Short Term Goal 1: Pt will complete feeding w/ modA needed for beverage management  Short Term Goal 2: Pt will complete toilet transfer w/ Aashish and use of Marinell Kluver stedy  Short Term Goal 3: Pt will complete STS transfer with Aashish to RW in prep for functional mobility (7/9)  Short Term Goal 4: Pt will complete UE exercises focusing on shoulder and elbow mobility x10 reps w/ no evidence of fatigue  Patient Goals   Patient goals : pt did not state       Therapy Time   Individual Concurrent Group Co-treatment   Time In 1050         Time Out 1144         Minutes 54         Timed Code Treatment Minutes: 47 Minutes     If pt is unable to be seen after this session, please let this note serve as discharge summary. Please see case management note for discharge disposition. Thank you.     CECY Jean/KENNETH

## 2022-07-19 NOTE — PROGRESS NOTES
Physician Progress Note      Emili Pereira  CSN #:                  [id number]  :                       1945  ADMIT DATE:       2022 12:21 PM  [address] [place] DATE:        2022 5:32 PM  RESPONDING  PROVIDER #:        Jenn PRICE          QUERY TEXT:    Pt admitted with right hip fx. Noted documentation of severe malnutrition per   RD consultant on 22. If possible, please document in progress notes and   discharge summary:    The medical record reflects the following:  Risk Factors: Parkinson's, hip fx    Clinical Indicators: per RD consult-\"Severe malnutrition (22 1417)   Context:  Chronic Illness  Findings of the 6 clinical characteristics of malnutrition: Energy Intake:    Unable to assess Weight Loss:  5% over 1 month  Body Fat Loss:  Mild body fat loss Buccal region Muscle Mass Loss:  Severe   muscle mass loss Clavicles (pectoralis & deltoids)  BMI 17/ wt 122 lb    Treatment: Regular diet, ONS added BID, SLP kali, RD consult, supportive care    Thank you,  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     Query created by: Pj Medellin on 2022 12:02 PM      Electronically signed by:  Symone Kumari 2022 5:04 PM

## 2022-07-20 ENCOUNTER — OFFICE VISIT (OUTPATIENT)
Dept: ORTHOPEDIC SURGERY | Age: 77
End: 2022-07-20

## 2022-07-20 VITALS — BODY MASS INDEX: 17.02 KG/M2 | HEIGHT: 71 IN

## 2022-07-20 DIAGNOSIS — S72.001A CLOSED FRACTURE OF RIGHT HIP, INITIAL ENCOUNTER (HCC): Primary | ICD-10-CM

## 2022-07-20 PROCEDURE — 99024 POSTOP FOLLOW-UP VISIT: CPT | Performed by: ORTHOPAEDIC SURGERY

## 2022-07-20 NOTE — PROGRESS NOTES
DIAGNOSIS:  Right intertrochanteric femur fracture, status post Gamma nail. DATE OF SURGERY:  7/6/22    HISTORY OF PRESENT ILLNESS: Mr. Filomena Burger 64 y.o. male  who comes in today for postoperative visit. The patient rates pain 6/10. He has been weight bearing as tolerated. No numbness or tingling sensation. No fever or chills. He is at home now. He lives with his granddaughter. He is doing home physical therapy. He did use or should have been using a walker prior to his fracture. PHYSICAL EXAMINATION:    Ht 5' 11\" (1.803 m)   BMI 17.02 kg/m²    Patient is awake, alert, and in no acute distress. The incisions are healed well, all staples were removed . No signs of any erythema or drainage. He has no pain with the active or passive range of motion of the right hip. He has intact sensation to light touch distally in bilateral legs. Bilateral legs are warm and well perfused. IMAGING:    Two views (AP and lateral) right  Hip, and AP pelvis were obtained and reviewed. Showed anatomic alignment of the intertrochanteric fracture, Gamma nail in good position, no loosening, or hardware failure. IMPRESSION:    2 weeks status post right Gamma nail of intertrochanteric femur fracture. PLAN:    Will have the patient continue to work on ROM and strengthening with PT. Weight bearing as tolerated. He was on Ultram at baseline. The patient will come back for a follow up in 6 weeks. At that time, we will repeat xrays with two views right hip, and AP pelvis. Willa Riley. Therobert Del Rio MD  Orthopaedic Surgery and Sports Medicine     Disclaimer: This note was generated with use of a verbal recognition program and an attempt was made to check for errors. It is possible that there are still dictated errors within this office note. If so, please bring any significant errors to my attention for an addendum. All efforts were made to ensure that this office note is accurate.

## 2022-10-06 ENCOUNTER — APPOINTMENT (OUTPATIENT)
Dept: GENERAL RADIOLOGY | Age: 77
End: 2022-10-06
Payer: OTHER GOVERNMENT

## 2022-10-06 ENCOUNTER — HOSPITAL ENCOUNTER (EMERGENCY)
Age: 77
Discharge: HOME | End: 2022-10-06
Payer: OTHER GOVERNMENT

## 2022-10-06 ENCOUNTER — APPOINTMENT (OUTPATIENT)
Dept: CT IMAGING | Age: 77
End: 2022-10-06
Payer: OTHER GOVERNMENT

## 2022-10-06 VITALS
HEART RATE: 65 BPM | WEIGHT: 130 LBS | OXYGEN SATURATION: 100 % | TEMPERATURE: 98.5 F | HEIGHT: 71 IN | RESPIRATION RATE: 21 BRPM | BODY MASS INDEX: 18.2 KG/M2 | SYSTOLIC BLOOD PRESSURE: 135 MMHG | DIASTOLIC BLOOD PRESSURE: 73 MMHG
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
## 2022-10-06 DIAGNOSIS — M25.561 ACUTE PAIN OF BOTH KNEES: ICD-10-CM

## 2022-10-06 DIAGNOSIS — M25.562 ACUTE PAIN OF BOTH KNEES: ICD-10-CM

## 2022-10-06 LAB
A/G RATIO: 1.3 (ref 1.1–2.2)
ALBUMIN SERPL-MCNC: 3.3 G/DL (ref 3.4–5)
ALP BLD-CCNC: 308 U/L (ref 40–129)
ALT SERPL-CCNC: <5 U/L (ref 10–40)
ANION GAP SERPL CALCULATED.3IONS-SCNC: 6 MMOL/L (ref 3–16)
AST SERPL-CCNC: 16 U/L (ref 15–37)
BASOPHILS ABSOLUTE: 0.1 K/UL (ref 0–0.2)
BASOPHILS RELATIVE PERCENT: 1.2 %
BILIRUB SERPL-MCNC: <0.2 MG/DL (ref 0–1)
BILIRUBIN URINE: NEGATIVE
BLOOD, URINE: NEGATIVE
BUN BLDV-MCNC: 18 MG/DL (ref 7–20)
CALCIUM SERPL-MCNC: 8.8 MG/DL (ref 8.3–10.6)
CHLORIDE BLD-SCNC: 103 MMOL/L (ref 99–110)
CLARITY: CLEAR
CO2: 31 MMOL/L (ref 21–32)
COLOR: YELLOW
CREAT SERPL-MCNC: 0.8 MG/DL (ref 0.8–1.3)
EKG ATRIAL RATE: 70 BPM
EKG DIAGNOSIS: NORMAL
EKG P AXIS: 67 DEGREES
EKG P-R INTERVAL: 194 MS
EKG Q-T INTERVAL: 390 MS
EKG QRS DURATION: 88 MS
EKG QTC CALCULATION (BAZETT): 421 MS
EKG R AXIS: -32 DEGREES
EKG T AXIS: 70 DEGREES
EKG VENTRICULAR RATE: 70 BPM
EOSINOPHILS ABSOLUTE: 0.1 K/UL (ref 0–0.6)
EOSINOPHILS RELATIVE PERCENT: 1.5 %
GFR AFRICAN AMERICAN: >60
GFR NON-AFRICAN AMERICAN: >60
GLUCOSE BLD-MCNC: 115 MG/DL (ref 70–99)
GLUCOSE URINE: NEGATIVE MG/DL
HCT VFR BLD CALC: 37.7 % (ref 40.5–52.5)
HEMOGLOBIN: 12.7 G/DL (ref 13.5–17.5)
INFLUENZA A: NOT DETECTED
INFLUENZA B: NOT DETECTED
KETONES, URINE: NEGATIVE MG/DL
LEUKOCYTE ESTERASE, URINE: NEGATIVE
LYMPHOCYTES ABSOLUTE: 1.4 K/UL (ref 1–5.1)
LYMPHOCYTES RELATIVE PERCENT: 19.3 %
MCH RBC QN AUTO: 28.6 PG (ref 26–34)
MCHC RBC AUTO-ENTMCNC: 33.5 G/DL (ref 31–36)
MCV RBC AUTO: 85.4 FL (ref 80–100)
MICROSCOPIC EXAMINATION: NORMAL
MONOCYTES ABSOLUTE: 0.6 K/UL (ref 0–1.3)
MONOCYTES RELATIVE PERCENT: 8.8 %
NEUTROPHILS ABSOLUTE: 5.1 K/UL (ref 1.7–7.7)
NEUTROPHILS RELATIVE PERCENT: 69.2 %
NITRITE, URINE: NEGATIVE
PDW BLD-RTO: 16.1 % (ref 12.4–15.4)
PH UA: 6.5 (ref 5–8)
PLATELET # BLD: 330 K/UL (ref 135–450)
PMV BLD AUTO: 5.7 FL (ref 5–10.5)
POTASSIUM REFLEX MAGNESIUM: 4 MMOL/L (ref 3.5–5.1)
PROTEIN UA: NEGATIVE MG/DL
RBC # BLD: 4.42 M/UL (ref 4.2–5.9)
SARS-COV-2 RNA, RT PCR: NOT DETECTED
SODIUM BLD-SCNC: 140 MMOL/L (ref 136–145)
SPECIFIC GRAVITY UA: 1.01 (ref 1–1.03)
TOTAL PROTEIN: 5.9 G/DL (ref 6.4–8.2)
TROPONIN: <0.01 NG/ML
URINE REFLEX TO CULTURE: NORMAL
URINE TYPE: NORMAL
UROBILINOGEN, URINE: 1 E.U./DL
WBC # BLD: 7.3 K/UL (ref 4–11)

## 2022-10-06 PROCEDURE — 93010 ELECTROCARDIOGRAM REPORT: CPT | Performed by: INTERNAL MEDICINE

## 2022-10-06 PROCEDURE — 80053 COMPREHEN METABOLIC PANEL: CPT

## 2022-10-06 PROCEDURE — 73562 X-RAY EXAM OF KNEE 3: CPT

## 2022-10-06 PROCEDURE — 36415 COLL VENOUS BLD VENIPUNCTURE: CPT

## 2022-10-06 PROCEDURE — 73502 X-RAY EXAM HIP UNI 2-3 VIEWS: CPT

## 2022-10-06 PROCEDURE — 70450 CT HEAD/BRAIN W/O DYE: CPT

## 2022-10-06 PROCEDURE — 93005 ELECTROCARDIOGRAM TRACING: CPT | Performed by: NURSE PRACTITIONER

## 2022-10-06 PROCEDURE — 87636 SARSCOV2 & INF A&B AMP PRB: CPT

## 2022-10-06 PROCEDURE — 99285 EMERGENCY DEPT VISIT HI MDM: CPT

## 2022-10-06 PROCEDURE — 81003 URINALYSIS AUTO W/O SCOPE: CPT

## 2022-10-06 PROCEDURE — 85025 COMPLETE CBC W/AUTO DIFF WBC: CPT

## 2022-10-06 PROCEDURE — 84484 ASSAY OF TROPONIN QUANT: CPT

## 2022-10-06 PROCEDURE — 71045 X-RAY EXAM CHEST 1 VIEW: CPT

## 2022-10-06 ASSESSMENT — ENCOUNTER SYMPTOMS
SORE THROAT: 0
EYE PAIN: 0
DIARRHEA: 0
NAUSEA: 0
BACK PAIN: 0
RHINORRHEA: 0
VOMITING: 0
ABDOMINAL PAIN: 0
COUGH: 0

## 2022-10-06 ASSESSMENT — PAIN DESCRIPTION - ORIENTATION: ORIENTATION: LEFT

## 2022-10-06 ASSESSMENT — PAIN - FUNCTIONAL ASSESSMENT: PAIN_FUNCTIONAL_ASSESSMENT: 0-10

## 2022-10-06 ASSESSMENT — PAIN DESCRIPTION - LOCATION: LOCATION: KNEE

## 2022-10-06 ASSESSMENT — PAIN SCALES - GENERAL: PAINLEVEL_OUTOF10: 8

## 2022-10-06 NOTE — ED NOTES
Pt. Ambulated with a walker at this time with minimal assistance. Family at bedside states he seems to be ambulating at his baseline.      Hermila Martino RN  10/06/22 0802

## 2022-10-06 NOTE — ED NOTES
1741-12 Lead ECG completed and given to Dr. Ramona Mcgowan for review.       [facility]  10/06/22 1751

## 2022-10-06 NOTE — DISCHARGE INSTRUCTIONS
Fall Prevention and Home Safety  Falls cause injuries and can affect all age groups. It is possible to prevent falls. HOW TO PREVENT FALLS  Wear shoes with rubber soles that do not have an opening for your toes. Keep the inside and outside of your house well lit. Use night lights throughout your home. Remove clutter from floors. Clean up floor spills. Remove throw rugs or fasten them to the floor with carpet tape. Do not place electrical cords across pathways. Put grab bars by your tub, shower, and toilet. Do not use towel bars as grab bars. Put handrails on both sides of the stairway. Fix loose handrails. Do not climb on stools or stepladders, if possible. Do not wax your floors. Repair uneven or unsafe sidewalks, walkways, or stairs. Keep items you use a lot within reach. Be aware of pets. Keep emergency numbers next to the telephone. Put smoke detectors in your home and near bedrooms. Ask your doctor what other things you can do to prevent falls.

## 2022-10-06 NOTE — ED PROVIDER NOTES
[address] ED  EMERGENCY DEPARTMENT ENCOUNTER        Pt Name: Minh Lundy  MRN: [id number]  [place] 1945  Date of evaluation: 10/6/2022  Provider: ZULEYMA Ortega - KAREN  PCP: Grace Garzon MD  Note Started: 4:48 PM EDT      IDA. I have evaluated this patient. My supervising physician was available for consultation. Triage CHIEF COMPLAINT       Chief Complaint   Patient presents with    Fall     Frequent falls at home, recent hip replacement, family requesting bilat knee rads. HISTORY OF PRESENT ILLNESS   (Location/Symptom, Timing/Onset, Context/Setting, Quality, Duration, Modifying Factors, Severity)  Note limiting factors. Chief Complaint: Loki Flower is a 64 y.o. male who presents to the emergency room with symptoms of bilateral knee pain. Patient reported that he had a fall today. Apparently after speaking with the patient's daughter who cares for him the majority the time and reported that he and went to sit down in his chair and realize he was going missed his chair and then went forward landing on both knees. Denied hitting his head. No further injury. Patient has no chest pain or abdominal pain. Does not appear to be syncopal or presyncopal in nature. Requesting x-rays of both knees. He most recently had a right hip replacement about a month ago. Does have a history of Parkinson's disease and altered gait at baseline. He is receiving 24-hour care with family. Nursing Notes were all reviewed and agreed with or any disagreements were addressed in the HPI. REVIEW OF SYSTEMS    (2-9 systems for level 4, 10 or more for level 5)     Review of Systems   Constitutional:  Negative for chills, diaphoresis and fever. HENT:  Negative for congestion, ear pain, rhinorrhea and sore throat. Eyes:  Negative for pain and visual disturbance. Respiratory:  Negative for cough. Cardiovascular:  Negative for chest pain. Gastrointestinal:  Negative for abdominal pain, diarrhea, nausea and vomiting. Genitourinary:  Negative for difficulty urinating, flank pain and frequency. Musculoskeletal:  Negative for back pain and neck pain. Both knees having mild pain   Skin:  Negative for rash and wound. Neurological:  Negative for dizziness and light-headedness. PAST MEDICAL HISTORY     Past Medical History:   Diagnosis Date    Arthritis     Kidney stone     Knee pain     Parkinson disease ([address]        SURGICAL HISTORY     Past Surgical History:   Procedure Laterality Date    HIP SURGERY Right 7/6/2022    RIGHT HIP INTRAMEDULLARY GAMMA NAIL INSERTION performed by Phoenix Amaral MD at [address] [place] [address]       Previous Medications    BISACODYL (DULCOLAX) 5 MG EC TABLET    Take 5 mg by mouth daily as needed for Constipation    CARBIDOPA-LEVODOPA (PARCOPA)  MG TBDP    Take 1 tablet by mouth 4 times daily     CARBIDOPA-LEVODOPA (SINEMET CR)  MG PER EXTENDED RELEASE TABLET    Take 1 tablet by mouth nightly    DOCUSATE SODIUM (COLACE) 100 MG CAPSULE    Take 100 mg by mouth daily as needed for Constipation    GABAPENTIN (NEURONTIN) 300 MG CAPSULE    Take 300 mg by mouth 3 times daily. KETOCONAZOLE (NIZORAL) 2 % SHAMPOO    Apply to affected areas three times a week (lather, leave on for 5 minutes, then rinse)    LIDOCAINE (LIDODERM) 5 %    Place 1 patch onto the skin daily 12 hours on, 12 hours off. POTASSIUM CHLORIDE (MICRO-K) 10 MEQ EXTENDED RELEASE CAPSULE    Take 10 mEq by mouth daily    PYRIDOXINE HCL (B-6) 50 MG TABS    Take 50 mg by mouth daily     SERTRALINE (ZOLOFT) 100 MG TABLET    Take 100 mg by mouth nightly    TRAZODONE (DESYREL) 50 MG TABLET    Take 50 mg by mouth nightly       ALLERGIES     Patient has no known allergies. FAMILYHISTORY     History reviewed. No pertinent family history.      SOCIAL HISTORY       Social History     Socioeconomic History    Marital status:      Spouse name: None    Number of children: None    Years of education: None    Highest education level: None   Tobacco Use    Smoking status: Former     Types: Cigarettes     Quit date: 3/19/2008     Years since quittin.5    Smokeless tobacco: Never   Substance and Sexual Activity    Alcohol use: No    Drug use: No    Sexual activity: Never       SCREENINGS    [place] Coma Scale  Eye Opening: Spontaneous  Best Verbal Response: Oriented  Best Motor Response: Obeys commands  [place] Coma Scale Score: 15        PHYSICAL EXAM    (up to 7 for level 4, 8 or more for level 5)     ED Triage Vitals [10/06/22 1546]   BP Temp Temp Source Heart Rate Resp SpO2 Height Weight   116/74 98.1 °F (36.7 °C) Oral 64 20 98 % 5' 11\" (1.803 m) 130 lb (59 kg)       Physical Exam  Vitals and nursing note reviewed. Constitutional:       Appearance: Normal appearance. He is not toxic-appearing or diaphoretic. HENT:      Head: Normocephalic and atraumatic. Nose: Nose normal.   Eyes:      General: No visual field deficit. Right eye: No discharge. Left eye: No discharge. Cardiovascular:      Rate and Rhythm: Normal rate and regular rhythm. Pulses: Normal pulses. Heart sounds: No murmur heard. Pulmonary:      Effort: Pulmonary effort is normal. No respiratory distress. Breath sounds: No wheezing or rhonchi. Abdominal:      Palpations: Abdomen is soft. Tenderness: There is no abdominal tenderness. There is no guarding or rebound. Musculoskeletal:         General: Normal range of motion. Cervical back: Normal range of motion and neck supple. Skin:     General: Skin is warm and dry. Neurological:      General: No focal deficit present. Mental Status: He is alert. Mental status is at baseline. GCS: GCS eye subscore is 4. GCS verbal subscore is 5. GCS motor subscore is 6. Cranial Nerves: No dysarthria. Sensory: Sensation is intact. No sensory deficit. Motor: Motor function is intact. No pronator drift. Coordination: Finger-Nose-Finger Test normal.      Comments: No focal weakness but general weakness in the upper and lower extremities consistent with his baseline. 4 out of 5 strength in the upper and lower extremities. Psychiatric:         Mood and Affect: Mood normal.         Behavior: Behavior normal.       DIAGNOSTIC RESULTS   LABS:    Labs Reviewed   CBC WITH AUTO DIFFERENTIAL - Abnormal; Notable for the following components:       Result Value    Hemoglobin 12.7 (*)     Hematocrit 37.7 (*)     RDW 16.1 (*)     All other components within normal limits   COMPREHENSIVE METABOLIC PANEL W/ REFLEX TO MG FOR LOW K - Abnormal; Notable for the following components:    Glucose 115 (*)     Total Protein 5.9 (*)     Albumin 3.3 (*)     Alkaline Phosphatase 308 (*)     ALT <5 (*)     All other components within normal limits   COVID-19 & INFLUENZA COMBO   TROPONIN   URINALYSIS WITH REFLEX TO CULTURE       When ordered, only abnormal lab results are displayed. All other labs were within normal range or not returned as of this dictation. EKG: When ordered, EKG's are interpreted by the Emergency Department Physician in the absence of a cardiologist.  Please see their note for interpretation of EKG. RADIOLOGY:   Non-plain film images such as CT, Ultrasound and MRI are read by the radiologist. Plain radiographic images are visualized andpreliminarily interpreted by the  ED Provider with the below findings:        Interpretation perthe Radiologist below, if available at the time of this note:    CT HEAD WO CONTRAST   Final Result   Mild atrophy and mild chronic microischemic changes scattered in the deep   white matter which is unchanged with no acute abnormality seen. XR HIP RIGHT (2-3 VIEWS)   Final Result   Postsurgical changes of the right hip. No evidence of acute fracture or   dislocation.          XR CHEST PORTABLE Final Result   Stable chest with no acute abnormality seen         XR KNEE RIGHT (3 VIEWS)   Final Result   Postop changes along the distal femur with no acute bony abnormality      Moderate osteoarthritic changes in the knee and associated mild   chondrocalcinosis in the menisci which is more apparent. Diffuse osteopenia with no acute bony abnormality. Small suprapatellar effusion         XR KNEE LEFT (3 VIEWS)   Final Result   Moderate osteoarthritic changes in the knee and associated chondrocalcinosis   throughout menisci which is more prominent with no acute bony abnormality. Mild patellofemoral degenerative changes. Diffuse osteopenia           No results found. PROCEDURES   Unless otherwise noted below, none     Procedures    CRITICAL CARE TIME   N/A    CONSULTS:  None      EMERGENCY DEPARTMENT COURSE and DIFFERENTIAL DIAGNOSIS/MDM:   Vitals:    Vitals:    10/06/22 1546 10/06/22 1800   BP: 116/74 119/81   Pulse: 64 79   Resp: 20    Temp: 98.1 °F (36.7 °C)    TempSrc: Oral    SpO2: 98% 100%   Weight: 130 lb (59 kg)    Height: 5' 11\" (1.803 m)        Patient was given thefollowing medications:  Medications - No data to display      Is this patient to be included in the SEP-1 Core Measure due to severe sepsis or septic shock? No   Exclusion criteria - the patient is NOT to be included for SEP-1 Core Measure due to: Infection is not suspected    Had a detailed discussion with the patient's daughter who requests that he be discharged home if possible. Plan is if his x-rays are read as negative and he is stable for discharge they would request and be at home. They state that he has 24-hour care at home and they want him at home as long as possible. Patient at this time appears well. General laboratory findings were quite unremarkable. Currently awaiting on x-rays of both knees, right hip, chest, and CT scan of the brain to be read.   If those are read as negative and the patient is able to ambulate with a walker and standby assistance then he can be discharged. Family has been notified and they agree with the treatment plan and weekly discharge. I am the Primary Clinician of Record. FINAL IMPRESSION      1. Fall, initial encounter    2.  Acute pain of both knees          DISPOSITION/PLAN   DISPOSITION        PATIENT REFERREDTO:  Callum Strong MD  [address] [facility]  [phone]    Schedule an appointment as soon as possible for a visit       [facility]  [address] [address] [phone]  Go to   If symptoms worsen    DISCHARGE MEDICATIONS:  New Prescriptions    No medications on file       DISCONTINUED MEDICATIONS:  Discontinued Medications    No medications on file              (Please note that portions ofthis note were completed with a voice recognition program.  Efforts were made to edit the dictations but occasionally words are mis-transcribed.)    Desiderio Dakins, APRN - CNP (electronically signed)             Desiderio Dakins, APRN - CNP  10/06/22 8697

## 2022-10-06 NOTE — ED PROVIDER NOTES
I have reviewed the below EKG. I was not otherwise involved in this patient's care. EKG  The Ekg interpreted by myself  normal sinus rhythm with a rate of 70  Axis is   Left axis deviation  QTc is  normal  Intervals and Durations are unremarkable. No specific ST-T wave changes appreciated. No evidence of acute ischemia.    No significant change from prior EKG dated July 6, 2022        Jazzy Wang MD  10/06/22 Bee Alberto

## 2022-10-08 NOTE — ED PROVIDER NOTES
I resumed care of this patient as signout from my colleague JAZMIN McneillC please see his note for full record of this patient's visit. Reviewed patient's results and discussed his blood work and imaging with him. At this time he will be discharged home.      Nelson Quinones  10/08/22 0130

## 2022-12-28 ENCOUNTER — APPOINTMENT (OUTPATIENT)
Dept: GENERAL RADIOLOGY | Age: 77
DRG: 194 | End: 2022-12-28
Payer: OTHER GOVERNMENT

## 2022-12-28 ENCOUNTER — HOSPITAL ENCOUNTER (INPATIENT)
Age: 77
LOS: 7 days | Discharge: HOSPICE | DRG: 194 | End: 2023-01-05
Attending: STUDENT IN AN ORGANIZED HEALTH CARE EDUCATION/TRAINING PROGRAM | Admitting: INTERNAL MEDICINE
Payer: OTHER GOVERNMENT

## 2022-12-28 ENCOUNTER — APPOINTMENT (OUTPATIENT)
Dept: CT IMAGING | Age: 77
DRG: 194 | End: 2022-12-28
Payer: OTHER GOVERNMENT

## 2022-12-28 DIAGNOSIS — J18.9 MULTIFOCAL PNEUMONIA: Primary | ICD-10-CM

## 2022-12-28 DIAGNOSIS — A41.9 SEPSIS, DUE TO UNSPECIFIED ORGANISM, UNSPECIFIED WHETHER ACUTE ORGAN DYSFUNCTION PRESENT (HCC): ICD-10-CM

## 2022-12-28 LAB
A/G RATIO: 0.7 (ref 1.1–2.2)
ALBUMIN SERPL-MCNC: 2.4 G/DL (ref 3.4–5)
ALP BLD-CCNC: 274 U/L (ref 40–129)
ALT SERPL-CCNC: 18 U/L (ref 10–40)
ANION GAP SERPL CALCULATED.3IONS-SCNC: 8 MMOL/L (ref 3–16)
ANISOCYTOSIS: ABNORMAL
AST SERPL-CCNC: 104 U/L (ref 15–37)
ATYPICAL LYMPHOCYTE RELATIVE PERCENT: 4 % (ref 0–6)
BANDED NEUTROPHILS RELATIVE PERCENT: 6 % (ref 0–7)
BASOPHILS ABSOLUTE: 0 K/UL (ref 0–0.2)
BASOPHILS RELATIVE PERCENT: 0 %
BILIRUB SERPL-MCNC: 0.3 MG/DL (ref 0–1)
BUN BLDV-MCNC: 14 MG/DL (ref 7–20)
CALCIUM SERPL-MCNC: 8.5 MG/DL (ref 8.3–10.6)
CHLORIDE BLD-SCNC: 106 MMOL/L (ref 99–110)
CO2: 26 MMOL/L (ref 21–32)
CREAT SERPL-MCNC: 0.7 MG/DL (ref 0.8–1.3)
EOSINOPHILS ABSOLUTE: 0 K/UL (ref 0–0.6)
EOSINOPHILS RELATIVE PERCENT: 0 %
GFR SERPL CREATININE-BSD FRML MDRD: >60 ML/MIN/{1.73_M2}
GLUCOSE BLD-MCNC: 124 MG/DL (ref 70–99)
HCT VFR BLD CALC: 34.8 % (ref 40.5–52.5)
HEMOGLOBIN: 11.4 G/DL (ref 13.5–17.5)
INFLUENZA A: NOT DETECTED
INFLUENZA B: NOT DETECTED
LACTIC ACID: 1 MMOL/L (ref 0.4–2)
LYMPHOCYTES ABSOLUTE: 1.4 K/UL (ref 1–5.1)
LYMPHOCYTES RELATIVE PERCENT: 5 %
MACROCYTES: ABNORMAL
MCH RBC QN AUTO: 28.9 PG (ref 26–34)
MCHC RBC AUTO-ENTMCNC: 32.8 G/DL (ref 31–36)
MCV RBC AUTO: 88.2 FL (ref 80–100)
MICROCYTES: ABNORMAL
MONOCYTES ABSOLUTE: 1 K/UL (ref 0–1.3)
MONOCYTES RELATIVE PERCENT: 6 %
NEUTROPHILS ABSOLUTE: 13.5 K/UL (ref 1.7–7.7)
NEUTROPHILS RELATIVE PERCENT: 79 %
OVALOCYTES: ABNORMAL
PDW BLD-RTO: 14.9 % (ref 12.4–15.4)
PLATELET # BLD: 460 K/UL (ref 135–450)
PLATELET SLIDE REVIEW: ABNORMAL
PMV BLD AUTO: 7.3 FL (ref 5–10.5)
POIKILOCYTES: ABNORMAL
POLYCHROMASIA: ABNORMAL
POTASSIUM SERPL-SCNC: 3.9 MMOL/L (ref 3.5–5.1)
RBC # BLD: 3.95 M/UL (ref 4.2–5.9)
SARS-COV-2 RNA, RT PCR: NOT DETECTED
SLIDE REVIEW: ABNORMAL
SODIUM BLD-SCNC: 140 MMOL/L (ref 136–145)
TOTAL PROTEIN: 5.8 G/DL (ref 6.4–8.2)
TROPONIN: <0.01 NG/ML
WBC # BLD: 15.9 K/UL (ref 4–11)

## 2022-12-28 PROCEDURE — 80053 COMPREHEN METABOLIC PANEL: CPT

## 2022-12-28 PROCEDURE — 99285 EMERGENCY DEPT VISIT HI MDM: CPT

## 2022-12-28 PROCEDURE — 85025 COMPLETE CBC W/AUTO DIFF WBC: CPT

## 2022-12-28 PROCEDURE — 74174 CTA ABD&PLVS W/CONTRAST: CPT

## 2022-12-28 PROCEDURE — 87636 SARSCOV2 & INF A&B AMP PRB: CPT

## 2022-12-28 PROCEDURE — 84484 ASSAY OF TROPONIN QUANT: CPT

## 2022-12-28 PROCEDURE — 96365 THER/PROPH/DIAG IV INF INIT: CPT

## 2022-12-28 PROCEDURE — 83605 ASSAY OF LACTIC ACID: CPT

## 2022-12-28 PROCEDURE — 87040 BLOOD CULTURE FOR BACTERIA: CPT

## 2022-12-28 PROCEDURE — 6360000004 HC RX CONTRAST MEDICATION: Performed by: PHYSICIAN ASSISTANT

## 2022-12-28 PROCEDURE — 93005 ELECTROCARDIOGRAM TRACING: CPT | Performed by: STUDENT IN AN ORGANIZED HEALTH CARE EDUCATION/TRAINING PROGRAM

## 2022-12-28 PROCEDURE — 71045 X-RAY EXAM CHEST 1 VIEW: CPT

## 2022-12-28 RX ADMIN — IOPAMIDOL 75 ML: 755 INJECTION, SOLUTION INTRAVENOUS at 23:15

## 2022-12-28 ASSESSMENT — PAIN DESCRIPTION - LOCATION: LOCATION: CHEST

## 2022-12-28 ASSESSMENT — PAIN - FUNCTIONAL ASSESSMENT: PAIN_FUNCTIONAL_ASSESSMENT: 0-10

## 2022-12-28 ASSESSMENT — PAIN SCALES - GENERAL: PAINLEVEL_OUTOF10: 4

## 2022-12-29 PROBLEM — K76.9 CHRONIC LIVER DISEASE: Status: ACTIVE | Noted: 2022-12-29

## 2022-12-29 PROBLEM — J15.9 COMMUNITY ACQUIRED BACTERIAL PNEUMONIA: Status: ACTIVE | Noted: 2022-12-29

## 2022-12-29 PROBLEM — D64.9 CHRONIC ANEMIA: Status: ACTIVE | Noted: 2022-12-29

## 2022-12-29 LAB
A/G RATIO: 0.7 (ref 1.1–2.2)
ALBUMIN SERPL-MCNC: 2.5 G/DL (ref 3.4–5)
ALP BLD-CCNC: 245 U/L (ref 40–129)
ALT SERPL-CCNC: 29 U/L (ref 10–40)
ANION GAP SERPL CALCULATED.3IONS-SCNC: 9 MMOL/L (ref 3–16)
AST SERPL-CCNC: 72 U/L (ref 15–37)
BASOPHILS ABSOLUTE: 0.1 K/UL (ref 0–0.2)
BASOPHILS RELATIVE PERCENT: 0.8 %
BILIRUB SERPL-MCNC: 0.4 MG/DL (ref 0–1)
BUN BLDV-MCNC: 11 MG/DL (ref 7–20)
CALCIUM SERPL-MCNC: 8.5 MG/DL (ref 8.3–10.6)
CHLORIDE BLD-SCNC: 104 MMOL/L (ref 99–110)
CO2: 26 MMOL/L (ref 21–32)
CREAT SERPL-MCNC: 0.7 MG/DL (ref 0.8–1.3)
EKG ATRIAL RATE: 80 BPM
EKG DIAGNOSIS: NORMAL
EKG P AXIS: 65 DEGREES
EKG P-R INTERVAL: 166 MS
EKG Q-T INTERVAL: 374 MS
EKG QRS DURATION: 84 MS
EKG QTC CALCULATION (BAZETT): 431 MS
EKG R AXIS: -18 DEGREES
EKG T AXIS: 37 DEGREES
EKG VENTRICULAR RATE: 80 BPM
EOSINOPHILS ABSOLUTE: 0.1 K/UL (ref 0–0.6)
EOSINOPHILS RELATIVE PERCENT: 0.7 %
GAMMA GLUTAMYL TRANSFERASE: 47 U/L (ref 8–61)
GFR SERPL CREATININE-BSD FRML MDRD: >60 ML/MIN/{1.73_M2}
GLUCOSE BLD-MCNC: 79 MG/DL (ref 70–99)
HCT VFR BLD CALC: 35.9 % (ref 40.5–52.5)
HEMOGLOBIN: 11.9 G/DL (ref 13.5–17.5)
LYMPHOCYTES ABSOLUTE: 1.1 K/UL (ref 1–5.1)
LYMPHOCYTES RELATIVE PERCENT: 7.3 %
MCH RBC QN AUTO: 29.3 PG (ref 26–34)
MCHC RBC AUTO-ENTMCNC: 33.2 G/DL (ref 31–36)
MCV RBC AUTO: 88.4 FL (ref 80–100)
MONOCYTES ABSOLUTE: 1.1 K/UL (ref 0–1.3)
MONOCYTES RELATIVE PERCENT: 7.7 %
NEUTROPHILS ABSOLUTE: 12.4 K/UL (ref 1.7–7.7)
NEUTROPHILS RELATIVE PERCENT: 83.5 %
PDW BLD-RTO: 14.9 % (ref 12.4–15.4)
PLATELET # BLD: 517 K/UL (ref 135–450)
PMV BLD AUTO: 7 FL (ref 5–10.5)
POTASSIUM REFLEX MAGNESIUM: 3.6 MMOL/L (ref 3.5–5.1)
PROCALCITONIN: 0.14 NG/ML (ref 0–0.15)
RBC # BLD: 4.06 M/UL (ref 4.2–5.9)
SODIUM BLD-SCNC: 139 MMOL/L (ref 136–145)
TOTAL PROTEIN: 5.9 G/DL (ref 6.4–8.2)
TROPONIN: <0.01 NG/ML
WBC # BLD: 14.8 K/UL (ref 4–11)

## 2022-12-29 PROCEDURE — 36415 COLL VENOUS BLD VENIPUNCTURE: CPT

## 2022-12-29 PROCEDURE — 2580000003 HC RX 258: Performed by: INTERNAL MEDICINE

## 2022-12-29 PROCEDURE — 2580000003 HC RX 258: Performed by: PHYSICIAN ASSISTANT

## 2022-12-29 PROCEDURE — 1200000000 HC SEMI PRIVATE

## 2022-12-29 PROCEDURE — 6370000000 HC RX 637 (ALT 250 FOR IP): Performed by: INTERNAL MEDICINE

## 2022-12-29 PROCEDURE — 87449 NOS EACH ORGANISM AG IA: CPT

## 2022-12-29 PROCEDURE — 97167 OT EVAL HIGH COMPLEX 60 MIN: CPT

## 2022-12-29 PROCEDURE — 92526 ORAL FUNCTION THERAPY: CPT

## 2022-12-29 PROCEDURE — 97535 SELF CARE MNGMENT TRAINING: CPT

## 2022-12-29 PROCEDURE — 92610 EVALUATE SWALLOWING FUNCTION: CPT

## 2022-12-29 PROCEDURE — 6360000002 HC RX W HCPCS: Performed by: INTERNAL MEDICINE

## 2022-12-29 PROCEDURE — 93010 ELECTROCARDIOGRAM REPORT: CPT | Performed by: INTERNAL MEDICINE

## 2022-12-29 PROCEDURE — 87641 MR-STAPH DNA AMP PROBE: CPT

## 2022-12-29 PROCEDURE — 97530 THERAPEUTIC ACTIVITIES: CPT

## 2022-12-29 PROCEDURE — 80053 COMPREHEN METABOLIC PANEL: CPT

## 2022-12-29 PROCEDURE — 85025 COMPLETE CBC W/AUTO DIFF WBC: CPT

## 2022-12-29 PROCEDURE — 84145 PROCALCITONIN (PCT): CPT

## 2022-12-29 PROCEDURE — 6360000002 HC RX W HCPCS: Performed by: NURSE PRACTITIONER

## 2022-12-29 PROCEDURE — 6360000002 HC RX W HCPCS: Performed by: PHYSICIAN ASSISTANT

## 2022-12-29 PROCEDURE — C9113 INJ PANTOPRAZOLE SODIUM, VIA: HCPCS | Performed by: NURSE PRACTITIONER

## 2022-12-29 PROCEDURE — 84484 ASSAY OF TROPONIN QUANT: CPT

## 2022-12-29 PROCEDURE — 97162 PT EVAL MOD COMPLEX 30 MIN: CPT

## 2022-12-29 PROCEDURE — 6370000000 HC RX 637 (ALT 250 FOR IP): Performed by: NURSE PRACTITIONER

## 2022-12-29 PROCEDURE — 2580000003 HC RX 258: Performed by: NURSE PRACTITIONER

## 2022-12-29 PROCEDURE — 82977 ASSAY OF GGT: CPT

## 2022-12-29 RX ORDER — GABAPENTIN 300 MG/1
300 CAPSULE ORAL 3 TIMES DAILY
Status: DISCONTINUED | OUTPATIENT
Start: 2022-12-29 | End: 2023-01-03

## 2022-12-29 RX ORDER — ALBUTEROL SULFATE 2.5 MG/3ML
2.5 SOLUTION RESPIRATORY (INHALATION)
Status: DISCONTINUED | OUTPATIENT
Start: 2022-12-29 | End: 2022-12-29

## 2022-12-29 RX ORDER — PANTOPRAZOLE SODIUM 40 MG/10ML
40 INJECTION, POWDER, LYOPHILIZED, FOR SOLUTION INTRAVENOUS DAILY
Status: DISCONTINUED | OUTPATIENT
Start: 2022-12-29 | End: 2023-01-05 | Stop reason: HOSPADM

## 2022-12-29 RX ORDER — POTASSIUM CHLORIDE 750 MG/1
10 TABLET, EXTENDED RELEASE ORAL DAILY
Status: DISCONTINUED | OUTPATIENT
Start: 2022-12-29 | End: 2023-01-05 | Stop reason: HOSPADM

## 2022-12-29 RX ORDER — DOXYCYCLINE HYCLATE 100 MG
100 TABLET ORAL EVERY 12 HOURS SCHEDULED
Status: DISCONTINUED | OUTPATIENT
Start: 2022-12-29 | End: 2023-01-03

## 2022-12-29 RX ORDER — ACETAMINOPHEN 650 MG/1
650 SUPPOSITORY RECTAL EVERY 6 HOURS PRN
Status: DISCONTINUED | OUTPATIENT
Start: 2022-12-29 | End: 2023-01-05 | Stop reason: HOSPADM

## 2022-12-29 RX ORDER — SODIUM CHLORIDE 0.9 % (FLUSH) 0.9 %
5-40 SYRINGE (ML) INJECTION PRN
Status: DISCONTINUED | OUTPATIENT
Start: 2022-12-29 | End: 2023-01-05 | Stop reason: HOSPADM

## 2022-12-29 RX ORDER — SODIUM CHLORIDE 9 MG/ML
INJECTION, SOLUTION INTRAVENOUS PRN
Status: DISCONTINUED | OUTPATIENT
Start: 2022-12-29 | End: 2023-01-05 | Stop reason: HOSPADM

## 2022-12-29 RX ORDER — CARBIDOPA AND LEVODOPA 50; 200 MG/1; MG/1
1 TABLET, EXTENDED RELEASE ORAL NIGHTLY
Status: DISCONTINUED | OUTPATIENT
Start: 2022-12-29 | End: 2023-01-05 | Stop reason: HOSPADM

## 2022-12-29 RX ORDER — ALBUTEROL SULFATE 2.5 MG/3ML
2.5 SOLUTION RESPIRATORY (INHALATION) EVERY 4 HOURS PRN
Status: DISCONTINUED | OUTPATIENT
Start: 2022-12-29 | End: 2023-01-05 | Stop reason: HOSPADM

## 2022-12-29 RX ORDER — 0.9 % SODIUM CHLORIDE 0.9 %
1000 INTRAVENOUS SOLUTION INTRAVENOUS ONCE
Status: COMPLETED | OUTPATIENT
Start: 2022-12-29 | End: 2022-12-29

## 2022-12-29 RX ORDER — POLYETHYLENE GLYCOL 3350 17 G/17G
17 POWDER, FOR SOLUTION ORAL DAILY PRN
Status: DISCONTINUED | OUTPATIENT
Start: 2022-12-29 | End: 2023-01-05 | Stop reason: HOSPADM

## 2022-12-29 RX ORDER — SODIUM CHLORIDE 9 MG/ML
INJECTION, SOLUTION INTRAVENOUS CONTINUOUS
Status: DISCONTINUED | OUTPATIENT
Start: 2022-12-29 | End: 2023-01-05 | Stop reason: HOSPADM

## 2022-12-29 RX ORDER — TRAZODONE HYDROCHLORIDE 50 MG/1
50 TABLET ORAL NIGHTLY
Status: DISCONTINUED | OUTPATIENT
Start: 2022-12-29 | End: 2023-01-03

## 2022-12-29 RX ORDER — ONDANSETRON 2 MG/ML
4 INJECTION INTRAMUSCULAR; INTRAVENOUS EVERY 6 HOURS PRN
Status: DISCONTINUED | OUTPATIENT
Start: 2022-12-29 | End: 2023-01-05 | Stop reason: HOSPADM

## 2022-12-29 RX ORDER — ACETAMINOPHEN 325 MG/1
650 TABLET ORAL EVERY 6 HOURS PRN
Status: DISCONTINUED | OUTPATIENT
Start: 2022-12-29 | End: 2023-01-05 | Stop reason: HOSPADM

## 2022-12-29 RX ORDER — ONDANSETRON 4 MG/1
4 TABLET, ORALLY DISINTEGRATING ORAL EVERY 8 HOURS PRN
Status: DISCONTINUED | OUTPATIENT
Start: 2022-12-29 | End: 2023-01-05 | Stop reason: HOSPADM

## 2022-12-29 RX ORDER — ENOXAPARIN SODIUM 100 MG/ML
40 INJECTION SUBCUTANEOUS DAILY
Status: DISCONTINUED | OUTPATIENT
Start: 2022-12-29 | End: 2023-01-05 | Stop reason: HOSPADM

## 2022-12-29 RX ORDER — SODIUM CHLORIDE 0.9 % (FLUSH) 0.9 %
5-40 SYRINGE (ML) INJECTION EVERY 12 HOURS SCHEDULED
Status: DISCONTINUED | OUTPATIENT
Start: 2022-12-29 | End: 2023-01-05 | Stop reason: HOSPADM

## 2022-12-29 RX ORDER — AZITHROMYCIN 250 MG/1
500 TABLET, FILM COATED ORAL EVERY 24 HOURS
Status: DISCONTINUED | OUTPATIENT
Start: 2022-12-29 | End: 2022-12-29

## 2022-12-29 RX ADMIN — POTASSIUM CHLORIDE 10 MEQ: 750 TABLET, EXTENDED RELEASE ORAL at 09:21

## 2022-12-29 RX ADMIN — AZITHROMYCIN MONOHYDRATE 500 MG: 500 INJECTION, POWDER, LYOPHILIZED, FOR SOLUTION INTRAVENOUS at 00:37

## 2022-12-29 RX ADMIN — TRAZODONE HYDROCHLORIDE 50 MG: 50 TABLET ORAL at 21:28

## 2022-12-29 RX ADMIN — PIPERACILLIN AND TAZOBACTAM 3375 MG: 3; .375 INJECTION, POWDER, FOR SOLUTION INTRAVENOUS at 16:52

## 2022-12-29 RX ADMIN — DOXYCYCLINE HYCLATE 100 MG: 100 TABLET, COATED ORAL at 09:21

## 2022-12-29 RX ADMIN — ENOXAPARIN SODIUM 40 MG: 100 INJECTION SUBCUTANEOUS at 09:21

## 2022-12-29 RX ADMIN — DOXYCYCLINE HYCLATE 100 MG: 100 TABLET, COATED ORAL at 21:28

## 2022-12-29 RX ADMIN — CEFTRIAXONE SODIUM 1000 MG: 1 INJECTION, POWDER, FOR SOLUTION INTRAMUSCULAR; INTRAVENOUS at 00:29

## 2022-12-29 RX ADMIN — CARBIDOPA AND LEVODOPA 1 TABLET: 50; 200 TABLET, EXTENDED RELEASE ORAL at 21:28

## 2022-12-29 RX ADMIN — GABAPENTIN 300 MG: 300 CAPSULE ORAL at 09:21

## 2022-12-29 RX ADMIN — PANTOPRAZOLE SODIUM 40 MG: 40 INJECTION, POWDER, FOR SOLUTION INTRAVENOUS at 09:22

## 2022-12-29 RX ADMIN — GABAPENTIN 300 MG: 300 CAPSULE ORAL at 21:29

## 2022-12-29 RX ADMIN — GABAPENTIN 300 MG: 300 CAPSULE ORAL at 14:56

## 2022-12-29 RX ADMIN — SERTRALINE HYDROCHLORIDE 100 MG: 50 TABLET ORAL at 09:21

## 2022-12-29 RX ADMIN — PIPERACILLIN AND TAZOBACTAM 3375 MG: 3; .375 INJECTION, POWDER, FOR SOLUTION INTRAVENOUS at 09:23

## 2022-12-29 RX ADMIN — SODIUM CHLORIDE 1000 ML: 9 INJECTION, SOLUTION INTRAVENOUS at 00:36

## 2022-12-29 RX ADMIN — SODIUM CHLORIDE: 9 INJECTION, SOLUTION INTRAVENOUS at 08:28

## 2022-12-29 RX ADMIN — SODIUM CHLORIDE, PRESERVATIVE FREE 10 ML: 5 INJECTION INTRAVENOUS at 09:21

## 2022-12-29 NOTE — ED NOTES
Daughter concerned about aspiration b/c pt \"choked on a rib\" a week ago. Will maintain NPO status and will updated admitting MD and RN.       Catia Daniel RN  12/29/22 5788

## 2022-12-29 NOTE — RT PROTOCOL NOTE
RT Inhaler-Nebulizer Bronchodilator Protocol Note    There is a bronchodilator order in the chart from a provider indicating to follow the RT Bronchodilator Protocol and there is an Initiate RT Inhaler-Nebulizer Bronchodilator Protocol order as well (see protocol at bottom of note). CXR Findings:  XR CHEST PORTABLE    Result Date: 12/28/2022  New airspace consolidation the right middle lobe favored to represent pneumonia. Radiographic follow-up to resolution is recommended. The findings from the last RT Protocol Assessment were as follows:   History Pulmonary Disease: None or smoker <15 pack years  Respiratory Pattern: Regular pattern and RR 12-20 bpm  Breath Sounds: Slightly diminished and/or crackles  Cough: Strong, productive  Indication for Bronchodilator Therapy:    Bronchodilator Assessment Score: 3    Aerosolized bronchodilator medication orders have been revised according to the RT Inhaler-Nebulizer Bronchodilator Protocol below. Respiratory Therapist to perform RT Therapy Protocol Assessment initially then follow the protocol. Repeat RT Therapy Protocol Assessment PRN for score 0-3 or on second treatment, BID, and PRN for scores above 3. No Indications - adjust the frequency to every 6 hours PRN wheezing or bronchospasm, if no treatments needed after 48 hours then discontinue using Per Protocol order mode. If indication present, adjust the RT bronchodilator orders based on the Bronchodilator Assessment Score as indicated below. Use Inhaler orders unless patient has one or more of the following: on home nebulizer, not able to hold breath for 10 seconds, is not alert and oriented, cannot activate and use MDI correctly, or respiratory rate 25 breaths per minute or more, then use the equivalent nebulizer order(s) with same Frequency and PRN reasons based on the score. If a patient is on this medication at home then do not decrease Frequency below that used at home.     0-3 - enter or revise RT bronchodilator order(s) to equivalent RT Bronchodilator order with Frequency of every 4 hours PRN for wheezing or increased work of breathing using Per Protocol order mode. 4-6 - enter or revise RT Bronchodilator order(s) to two equivalent RT bronchodilator orders with one order with BID Frequency and one order with Frequency of every 4 hours PRN wheezing or increased work of breathing using Per Protocol order mode. 7-10 - enter or revise RT Bronchodilator order(s) to two equivalent RT bronchodilator orders with one order with TID Frequency and one order with Frequency of every 4 hours PRN wheezing or increased work of breathing using Per Protocol order mode. 11-13 - enter or revise RT Bronchodilator order(s) to one equivalent RT bronchodilator order with QID Frequency and an Albuterol order with Frequency of every 4 hours PRN wheezing or increased work of breathing using Per Protocol order mode. Greater than 13 - enter or revise RT Bronchodilator order(s) to one equivalent RT bronchodilator order with every 4 hours Frequency and an Albuterol order with Frequency of every 2 hours PRN wheezing or increased work of breathing using Per Protocol order mode. RT to enter RT Home Evaluation for COPD & MDI Assessment order using Per Protocol order mode.     Electronically signed by Miri Dhillon RCP on 12/29/2022 at 4:09 AM

## 2022-12-29 NOTE — PROGRESS NOTES
Physical Therapy  Facility/Department: [place] [id number] - MED SURG  Physical Therapy Initial Assessment/Treatment     Name: Ananya Easley  : 1945  MRN: [id number]  Date of Service: 2022    Discharge Recommendations:  24 hour supervision or assist, Home with Home health PT   PT Equipment Recommendations  Equipment Needed: No      Patient Diagnosis(es): The primary encounter diagnosis was Multifocal pneumonia. A diagnosis of Sepsis, due to unspecified organism, unspecified whether acute organ dysfunction present [facility]) was also pertinent to this visit. Past Medical History:  has a past medical history of Arthritis, Dementia ([facility] [address] Kidney stone, Knee pain, and Parkinson disease ([facility] [address] Past Surgical History:  has a past surgical history that includes Neck surgery; shoulder surgery; and hip surgery (Right, 2022). Assessment   Body Structures, Functions, Activity Limitations Requiring Skilled Therapeutic Intervention: Decreased functional mobility ; Decreased cognition;Decreased endurance; Increased pain;Decreased balance;Decreased posture;Decreased strength;Decreased safe awareness;Decreased ROM  Assessment: Pt admitted to [facility] with diagnosis of community acquired bacterial pneumonia. Pt with baseline dementia and no family present during initial eval, unclear what baseline function is. per CM pt lives with granddaughter and also has a room at his daughters [facility]. It is anticipated pt is slightly below baseline function. Pt require min to modAx2 for bed mobility and sit<>stand transfers to RW. pt unsteady on feet and with posterior lean and narrow TIESHA. Per CM note pt family will refuse SNF. If pt DC to home it is recomended pt have 24/7 assist and HHPT. Will continue to follow in acure care to address the above functional deficits. Seen as co-eval with OT in order to safely assess highest level of function and to optimize functional outcomes.      Therapy Prognosis: Fair  Decision Making: Medium Complexity  Barriers to Learning: Cognition  Requires PT Follow-Up: Yes  Activity Tolerance  Activity Tolerance: Treatment limited secondary to decreased cognition;Patient tolerated evaluation without incident     Plan   Physcial Therapy Plan  General Plan: 2-3 times per week  Current Treatment Recommendations: Strengthening, ROM, Balance training, Gait training, Safety education & training, Stair training, Functional mobility training, Home exercise program, Therapeutic activities, Patient/Caregiver education & training, Neuromuscular re-education, Transfer training, Endurance training, Pain management, Positioning, Cognitive reorientation  Safety Devices  Type of Devices: Left in bed, All fall risk precautions in place, Patient at risk for falls, Bed alarm in place, Call light within reach, Gait belt, Nurse notified, Heels elevated for pressure relief  Restraints  Restraints Initially in Place: No     Restrictions  Restrictions/Precautions  Restrictions/Precautions: Fall Risk  Required Braces or Orthoses?: No  Position Activity Restriction  Other position/activity restrictions: R UE IV, purewick     Subjective   Pain: Pt reports when he moves he has 10/10 pin in R knee and hip  General  Chart Reviewed: Yes  Patient assessed for rehabilitation services?: Yes  Response To Previous Treatment: Not applicable  Family / Caregiver Present: No  Referring Practitioner: Rosette Mcclelland MD  Referral Date : 12/29/22  Diagnosis: Community acquired bacterial pneumonia  Follows Commands: Impaired  General Comment  Comments: Pt in bed upon arrival, RN cleared for therapy  Subjective  Subjective: Pt pleasantly confused with baseline dementia, agreeable to therapy eval.         Social/Functional History  Social/Functional History  Lives With: Family (Granddaughter)  Type of Home: House  Home Layout: Multi-level (Has chair lift)  Home Access: Stairs to enter without rails  Entrance Stairs - Number of Steps: 4 CLAIR  Bathroom Shower/Tub: Walk-in shower, Shower chair with back  Bathroom Toilet: Standard  Bathroom Equipment: Grab bars in shower, [id number]: [address] [facility] bed, Electric scooter, Wheelchair-manual, Rollator  Has the patient had two or more falls in the past year or any fall with injury in the past year?: Unknown (States he as fallen and \"messed up my shoudler\")  Receives Help From: Family, Home health  ADL Assistance: Needs assistance (Assistance with bathing, dressing, toileting, and eating. Diffiuclty holding silverwear)  Homemaking Responsibilities: No  Ambulation Assistance: Needs assistance (With rollator, \"someone holds on to me\")  Transfer Assistance: Needs assistance (States he normally doesnt need help but Sandovaldo Cavanaugh he is needing help getting in and out of bed and a chair)  Active : No  Additional Comments: Pt questionable historian with baseline dementia, information above needs to be confirmed.     Vision/Hearing  Vision  Vision: Impaired  Vision Exceptions: Wears glasses at all times  Hearing  Hearing: Exceptions to [facility]  Hearing Exceptions: Bilateral hearing aid (Pt states neither hearing aid is working)      Cognition   Orientation  Overall Orientation Status: Impaired  Orientation Level: Oriented to place;Oriented to person;Disoriented to situation;Disoriented to time     Objective   Heart Rate: 77  Heart Rate Source: Monitor  BP: (!) 123/55  BP Location: Right upper arm  BP Method: Automatic  Patient Position: Semi fowlers  MAP (Calculated): 78  Resp: 16  SpO2: 97 %  O2 Device: None (Room air)        Gross Assessment  AROM: Generally decreased, functional (R grossly limited more than the L due to pain and stiffness)  PROM: Generally decreased, functional (R grossly limited more than the L due to pain and stiffness)  Strength: Generally decreased, functional     Bed Mobility Training  Bed Mobility Training: Yes  Supine to Sit: Moderate assistance;Assist X2;Adaptive equipment (HOB elevated)  Sit to Supine: Minimum assistance;Assist X2;Adaptive equipment  Balance  Sitting: Impaired  Sitting - Static: Fair (occasional)  Sitting - Dynamic: Fair (occasional)  Standing: Impaired (With RW)  Standing - Static: Constant support; Fair  Standing - Dynamic: Poor;Constant support  Transfer Training  Transfer Training: Yes  Sit to Stand: Minimum assistance;Assist X2;Adaptive equipment (With RW)  Stand to Sit: Moderate assistance;Assist X2;Adaptive equipment (With RW, poor eccentric control and posterior lean)  Gait Training  Gait Training: No (Not safe to assess today due to poor standing balance and pain in R LE)     OutComes Score  AM-PAC Score  AM-PAC Inpatient Mobility Raw Score : 12 (12/29/22 1241)  AM-PAC Inpatient T-Scale Score : 35.33 (12/29/22 1241)  Mobility Inpatient CMS 0-100% Score: 68.66 (12/29/22 1241)  Mobility Inpatient CMS G-Code Modifier : CL (12/29/22 1241)        Goals  Short Term Goals  Time Frame for Short Term Goals: 1/5/23  Short Term Goal 1: pt will complete bed mobility with min A  Short Term Goal 2: pt will transfers with min A  Short Term Goal 3: pt will ambulate 20ft with LRAD and min A  Short Term Goal 4: pt will navigate up<>down 3 steps with LRAD and min A  Short Term Goal 5: pt will participate in 10-12 reps of BLE exercises by 1/1/23  Patient Goals   Patient Goals : unable to state goal due to cognition       Education  Patient Education  Education Given To: Patient  Education Provided: Role of Therapy;Plan of Care;Transfer Training;Equipment;Orientation  Education Method: Verbal  Barriers to Learning: Cognition; Hearing  Education Outcome: Continued education needed      Therapy Time   Individual Concurrent Group Co-treatment   Time In 1057         Time Out 1131         Minutes 34         Timed Code Treatment Minutes: 24 Minutes (10 min eval)       Cosmo Cristina PT, DPT    If pt is unable to be seen after this session, please let this note serve as discharge summary.   Please see case management note for discharge disposition.  Thank you.

## 2022-12-29 NOTE — RT PROTOCOL NOTE
RT Inhaler-Nebulizer Bronchodilator Protocol Note    There is a bronchodilator order in the chart from a provider indicating to follow the RT Bronchodilator Protocol and there is an Initiate RT Inhaler-Nebulizer Bronchodilator Protocol order as well (see protocol at bottom of note). CXR Findings:  XR CHEST PORTABLE    Result Date: 12/28/2022  New airspace consolidation the right middle lobe favored to represent pneumonia. Radiographic follow-up to resolution is recommended. The findings from the last RT Protocol Assessment were as follows:   History Pulmonary Disease: None or smoker <15 pack years  Respiratory Pattern: Regular pattern and RR 12-20 bpm  Breath Sounds: Slightly diminished and/or crackles  Cough: Strong, productive  Indication for Bronchodilator Therapy:    Bronchodilator Assessment Score: 3    Aerosolized bronchodilator medication orders have been revised according to the RT Inhaler-Nebulizer Bronchodilator Protocol below. Respiratory Therapist to perform RT Therapy Protocol Assessment initially then follow the protocol. Repeat RT Therapy Protocol Assessment PRN for score 0-3 or on second treatment, BID, and PRN for scores above 3. No Indications - adjust the frequency to every 6 hours PRN wheezing or bronchospasm, if no treatments needed after 48 hours then discontinue using Per Protocol order mode. If indication present, adjust the RT bronchodilator orders based on the Bronchodilator Assessment Score as indicated below. Use Inhaler orders unless patient has one or more of the following: on home nebulizer, not able to hold breath for 10 seconds, is not alert and oriented, cannot activate and use MDI correctly, or respiratory rate 25 breaths per minute or more, then use the equivalent nebulizer order(s) with same Frequency and PRN reasons based on the score. If a patient is on this medication at home then do not decrease Frequency below that used at home.     0-3 - enter or revise RT bronchodilator order(s) to equivalent RT Bronchodilator order with Frequency of every 4 hours PRN for wheezing or increased work of breathing using Per Protocol order mode. 4-6 - enter or revise RT Bronchodilator order(s) to two equivalent RT bronchodilator orders with one order with BID Frequency and one order with Frequency of every 4 hours PRN wheezing or increased work of breathing using Per Protocol order mode. 7-10 - enter or revise RT Bronchodilator order(s) to two equivalent RT bronchodilator orders with one order with TID Frequency and one order with Frequency of every 4 hours PRN wheezing or increased work of breathing using Per Protocol order mode. 11-13 - enter or revise RT Bronchodilator order(s) to one equivalent RT bronchodilator order with QID Frequency and an Albuterol order with Frequency of every 4 hours PRN wheezing or increased work of breathing using Per Protocol order mode. Greater than 13 - enter or revise RT Bronchodilator order(s) to one equivalent RT bronchodilator order with every 4 hours Frequency and an Albuterol order with Frequency of every 2 hours PRN wheezing or increased work of breathing using Per Protocol order mode. RT to enter RT Home Evaluation for COPD & MDI Assessment order using Per Protocol order mode.     Electronically signed by Elizabeth Ramos RCP on 12/29/2022 at 4:09 AM

## 2022-12-29 NOTE — PROGRESS NOTES
Occupational Therapy  Facility/Department: [facility] - MED SURG  Occupational Therapy Initial Assessment and Daily Treatment Note    Name: Mervin Moore  : 1945  MRN: [id number]  Date of Service: 2022    Discharge Recommendations:  24 hour supervision or assist, Home with Home health OT (Family declines SNF per Case mgmt note)      Patient Diagnosis(es): The primary encounter diagnosis was Multifocal pneumonia. A diagnosis of Sepsis, due to unspecified organism, unspecified whether acute organ dysfunction present (HCC) was also pertinent to this visit.  Past Medical History:  has a past medical history of Arthritis, Dementia (HCC), Kidney stone, Knee pain, and Parkinson disease (HCC).  Past Surgical History:  has a past surgical history that includes Neck surgery; shoulder surgery; and hip surgery (Right, 2022).     Assessment   Performance deficits / Impairments: Decreased functional mobility ;Decreased ADL status;Decreased safe awareness;Decreased cognition;Decreased balance;Decreased coordination;Decreased ROM;Decreased endurance;Decreased posture  Assessment: OT eval and tx completed. Pt admitted for pneumonia, has hx of Parkinsons disease and dementia. He lives with family who provide 24/7 assist for BADLs and transfers. He currently requires min/mod x2 for bed mobility and sit <>stand with walker. Demo's poor standing balance and low endurance. Per case management note, family plan for pt to d/c home. Recommend HHOT. Cont OT in acute care.  Completed co-tx with PT to safely address ADLs and mobility d/t extent of performance deficits.    Prognosis: Guarded  Decision Making: High Complexity  REQUIRES OT FOLLOW-UP: Yes  Activity Tolerance  Activity Tolerance: Patient Tolerated treatment well  Activity Tolerance Comments: Pt c/o dizziness upon sitting EOB but was able to participate in mobility. Returned to bed at end of session.        Plan   Occupational Therapy Plan  Times Per Week:  2-3x/wk  Current Treatment Recommendations: Self-Care / ADL, Patient/Caregiver education & training, Equipment evaluation, education, & procurement, Balance training, Functional mobility training, Safety education & training     Restrictions  Restrictions/Precautions  Restrictions/Precautions: Fall Risk  Required Braces or Orthoses?: No  Position Activity Restriction  Other position/activity restrictions: R UE RICK purejoanne    Subjective   General  Chart Reviewed: Yes  Patient assessed for rehabilitation services?: Yes  Additional Pertinent Hx: hx Parkinsons disease, dementia, R hip fx 7/2022  Family / Caregiver Present: No  Referring Practitioner: Monserrat Nash  Diagnosis: CAP  Subjective  Subjective: Pt agreeable to OT. Reports chronic L shoulder pain from past injury. Pt repositioned in bed for comfort at end of session  General Comment  Comments: RN approved therapy     Social/Functional History  Social/Functional History  Lives With: Family (Granddaughter)  Type of Home: House  Home Layout: Multi-level (Has chair lift)  Home Access: Stairs to enter without rails  Entrance Stairs - Number of Steps: 4 CLAIR  Bathroom Shower/Tub: Walk-in shower, Shower chair with back  Bathroom Toilet: Standard  Bathroom Equipment: Grab bars in shower, Toilet raiser  Home Equipment: [address] [facility] bed, Electric scooter, Wheelchair-manual, Rollator  Has the patient had two or more falls in the past year or any fall with injury in the past year?: Unknown (States he as fallen and \"messed up my shoudler\")  Receives Help From: Family, Home health  ADL Assistance: Needs assistance (Assistance with bathing, dressing, toileting, and eating.  Diffiuclty holding silverwear)  Homemaking Responsibilities: No  Ambulation Assistance: Needs assistance (With rollator, \"someone holds on to me\")  Transfer Assistance: Needs assistance (States he normally doesnt need help but Merla Beer he is needing help getting in and out of bed and a chair)  Active : No  Additional Comments: Pt questionable historian with baseline dementia, information above needs to be confirmed. Objective   Heart Rate: 77  Heart Rate Source: Monitor  BP: (!) 123/55  BP Location: Right upper arm  BP Method: Automatic  Patient Position: Semi fowlers  MAP (Calculated): 78  Resp: 16  SpO2: 97 %  O2 Device: None (Room air)         Safety Devices  Type of Devices: Left in bed; All fall risk precautions in place; Patient at risk for falls; Bed alarm in place;Call light within reach;Gait belt;Nurse notified; Heels elevated for pressure relief  Restraints  Restraints Initially in Place: No     AROM: Generally decreased, functional (Noted L shoulder AROM limitation which pt attributes to past injury. Pt has 0-60* L shoulder AAROM. L elbow to hand WFL)  PROM: Generally decreased, functional  Strength: Generally decreased, functional  Coordination: Grossly decreased, non-functional  Tone: Normal (tremulous)  Sensation: Intact  ADL  Feeding: Maximum assistance; Beverage management  Feeding Skilled Clinical Factors: Limited by tremors  UE Dressing: Maximum assistance  UE Dressing Skilled Clinical Factors: to pull up and tie gown  LE Dressing: Dependent/Total  LE Dressing Skilled Clinical Factors: socks and brief  Toileting: Dependent/Total  Additional Comments: Pt requires assistance for ADLs at baseline.      Activity Tolerance  Activity Tolerance: Treatment limited secondary to decreased cognition;Patient tolerated evaluation without incident  Bed mobility  Supine to Sit: Moderate assistance;2 Person assistance  Sit to Supine: Minimal assistance;2 Person assistance  Transfers  Sit to stand: Minimal assistance;2 Person assistance (leans against bed for support)  Stand to sit: Moderate assistance;2 Person assistance (poor eccentric control, posterior lean)  Transfer Comments: Pt took 4 steps forward and backward from bed with min/mod x2 and RW  Vision  Vision: Impaired  Vision Exceptions: Wears glasses at all times  Hearing  Hearing: Exceptions to [facility]  Hearing Exceptions: Bilateral hearing aid (Reports that neither hearing aid works.)  Cognition  Overall Cognitive Status: Exceptions  Arousal/Alertness: Delayed responses to stimuli  Following Commands: Follows one step commands with increased time  Attention Span: Attends with cues to redirect  Memory: Decreased recall of recent events;Decreased short term memory  Safety Judgement: Decreased awareness of need for safety  Problem Solving: Decreased awareness of errors  Insights: Decreased awareness of deficits  Initiation: Requires cues for some  Sequencing: Requires cues for some  Orientation  Overall Orientation Status: Impaired  Orientation Level: Oriented to place;Oriented to person;Disoriented to situation;Disoriented to time      Education Given To: Patient  Education Provided: Role of Therapy;Plan of Care  Education Method: Verbal  Barriers to Learning: Cognition  Education Outcome: Continued education needed      Disease Specific Education: Pt educated on importance of OOB mobility, prevention of complications of bedrest, and general safety during hospitalization. Pt verbalized understanding     AM-PAC Score      AM-[facility] Inpatient Daily Activity Raw Score: 9 (12/29/22 [facility])  AM-PAC Inpatient ADL T-Scale Score : 25.33 (12/29/22 1413)  ADL Inpatient CMS 0-100% Score: 79.59 (12/29/22 1413)  ADL Inpatient CMS G-Code Modifier : CL (12/29/22 1413)  Goals  Short Term Goals  Time Frame for Short Term Goals: 1 week (1/05) unless noted  Short Term Goal 1: Perform functional transfers with mod A and AD  Short Term Goal 2: Perform UE exer 10-15x each for endurance by 1/03  Short Term Goal 3: Perform 1-2 grooming tasks with min A  Patient Goals   Patient goals :  \"Go home\"     Therapy Time   Individual Concurrent Group Co-treatment   Time In 1056         Time Out 1131         Minutes 35         Timed Code Treatment Minutes: 25 Minutes (10 min eval)   If pt is discharged prior to next OT session, this note will serve as the discharge summary.   Jamel Dacosta OT

## 2022-12-29 NOTE — PROGRESS NOTES
Hospitalist Progress Note      PCP: Shamar Head MD    Date of Admission: 12/28/2022    Chief Complaint:       Subjective:       Medications:  Reviewed    Infusion Medications    sodium chloride      sodium chloride Stopped (12/29/22 0401)     Scheduled Medications    [START ON 12/30/2022] carbidopa-levodopa  1 tablet Oral 4x Daily    carbidopa-levodopa  1 tablet Oral Nightly    gabapentin  300 mg Oral TID    potassium chloride  10 mEq Oral Daily    sertraline  100 mg Oral Daily    traZODone  50 mg Oral Nightly    sodium chloride flush  5-40 mL IntraVENous 2 times per day    enoxaparin  40 mg SubCUTAneous Daily    pantoprazole  40 mg IntraVENous Daily    cefTRIAXone (ROCEPHIN) IV  1,000 mg IntraVENous Q24H    And    azithromycin  500 mg Oral Q24H     PRN Meds: sodium chloride flush, sodium chloride, ondansetron **OR** ondansetron, polyethylene glycol, acetaminophen **OR** acetaminophen, albuterol      Intake/Output Summary (Last 24 hours) at 12/29/2022 0824  Last data filed at 12/29/2022 0311  Gross per 24 hour   Intake 120 ml   Output --   Net 120 ml       Physical Exam Performed:    /66   Pulse 66   Temp 98.1 °F (36.7 °C) (Oral)   Resp 20   Ht 5' 11\" (1.803 m)   Wt 120 lb (54.4 kg)   SpO2 97%   BMI 16.74 kg/m²     General appearance: No apparent distress, appears stated age and cooperative. HEENT: Pupils equal, round, and reactive to light. Conjunctivae/corneas clear. Neck: Supple, with full range of motion. No jugular venous distention. Trachea midline. Respiratory:  Normal respiratory effort. Clear to auscultation, bilaterally without Rales/Wheezes/Rhonchi. Cardiovascular: Regular rate and rhythm with normal S1/S2 without murmurs, rubs or gallops. Abdomen: Soft, non-tender, non-distended with normal bowel sounds. Musculoskeletal: No clubbing, cyanosis or edema bilaterally. Full range of motion without deformity.   Skin: Skin color, texture, turgor normal.  No rashes or lesions. Neurologic:  Neurovascularly intact without any focal sensory/motor deficits. Cranial nerves: II-XII intact, grossly non-focal.  Psychiatric: Alert and oriented, thought content appropriate, normal insight  Capillary Refill: Brisk,< 3 seconds   Peripheral Pulses: +2 palpable, equal bilaterally       Labs:   Recent Labs     12/28/22 1922   WBC 15.9*   HGB 11.4*   HCT 34.8*   *     Recent Labs     12/28/22 1922      K 3.9      CO2 26   BUN 14   CREATININE 0.7*   CALCIUM 8.5     Recent Labs     12/28/22 1922   *   ALT 18   BILITOT 0.3   ALKPHOS 274*     No results for input(s): INR in the last 72 hours. Recent Labs     12/28/22 1922   TROPONINI <0.01       Urinalysis:      Lab Results   Component Value Date/Time    NITRU Negative 10/06/2022 07:12 PM    WBCUA None seen 06/12/2022 05:59 PM    RBCUA >100 06/12/2022 05:59 PM    BLOODU Negative 10/06/2022 07:12 PM    SPECGRAV 1.015 10/06/2022 07:12 PM    GLUCOSEU Negative 10/06/2022 07:12 PM       Radiology:  CTA CHEST ABDOMEN PELVIS W CONTRAST   Final Result   1. No thoracic or abdominal aortic aneurysm or dissection. 2. Dependent airspace disease likely rib representing atelectasis, with right   upper lobe and middle lobe consolidation felt to represent pneumonia. Recommend follow-up radiographs to document resolution. 3. Diffuse atherosclerosis including coronary artery involvement. 4. No acute inflammatory process seen within the abdomen or pelvis. XR CHEST PORTABLE   Final Result   New airspace consolidation the right middle lobe favored to represent   pneumonia. Radiographic follow-up to resolution is recommended.                  Assessment/Plan:    Active Hospital Problems    Diagnosis Date Noted    Community acquired bacterial pneumonia [J15.9] 12/29/2022     Priority: Medium    Chronic liver disease [K76.9] 12/29/2022     Priority: Medium    Chronic anemia [D64.9] 12/29/2022     Priority: Medium Parkinson's disease ([facility] [address] Irina Peng 03/19/2016     This is a 38-year-old male with history of Parkinson's disease and unspecified liver disease, who presents to [facility] emergency department due to concerns of chest pain radiating into the shoulder and back. He is unable to provide much subjective data and daughter was at bedside previously, but not for this exam.  ED documentation and ED nurse was utilized to collect subjective data. Onset of symptoms have been last few days. Daughter mentioned to ED provider that his chest pain and dyspnea began after choking on some food. It is unclear whether or not he has been febrile at home. His evaluation here included laboratory studies, EKG, chest x-ray, and CT of the chest, abdomen, and pelvis with IV contrast.  CT showed right upper and middle lobe consolidation probably pneumonia. Pertinent abnormal lab values include stable but chronic elevation of alkaline phosphatase at 274 and . Additionally, cell count showed WBC of 15.9, hemoglobin 11.4, and platelets 676. Patient was given IV fluids, Zithromax, and Rocephin in the ED. Possible Aspiration Pneumonia  - reported chest pain and dyspnea after choking on some foods. - adm WBC 15.9K  - adm EKG showed NSR, HR 80, possible left atrial enlargement, Qtc 431 ms   - CTA Chest/Abdomen/Pelvis 12/28 showed dependent airspace disease likely rib representing atelectasis with right upper lobe and middle lobe consolidation. No thoracic or abdominal aortic aneurysm or dissection. Diffuse atherosclerosis including coronary artery involvement. No acute inflammatory process within the abdomen or pelvis. - Received IV ceftriaxone, azithromycin (12/29). - Change to IV zosyn, doxycycline (12/29-). - Urine Legionella and strep pneumoniae antigens, MRSA nasal swab.   - SLP evaluation.  - IS    History of COVID 19 infection    Mild cognitive impairment    Parkinson's disease    Depression    Malnutrition          DVT Prophylaxis:   Diet: ADULT DIET; Dysphagia - Soft and Bite Sized  Code Status: DNR-CCA    PT/OT Eval Status:     Dispo - once medically stable.     Eliana Smith MD

## 2022-12-29 NOTE — PLAN OF CARE
SLP completed evaluation. Please refer to notes in EMR.         Reena Gao MA [address] [facility]  Speech Language Pathologist

## 2022-12-29 NOTE — H&P
Hospital Medicine History & Physical      PCP: Fernando Siegel MD    Date of Admission: 12/29/2022    Time of Service: 0040    Date of Service: Pt seen/examined on 12/29/2022 and admitted to Inpatient with expected LOS greater than two midnights due to medical therapy. Historian: Self, ED documentation    Chief Concern:    Chief Complaint   Patient presents with    Chest Pain     Chest pain with points of pressure/stabbing. Shoulder/back pain, family states patient may have pneumonia due to cough. Took 324 mg ASA prior to EMS arrival.     History Of Present Illness:      Larissa Bennett is a 77-year-old male with significant past medical history of Parkinson's disease and unspecified liver disease who presents to [facility] emergency department due to concerns of chest pain radiating into the shoulder and back. He is unable to provide much subjective data and daughter was at bedside previously, but not for this exam.  ED documentation and ED nurse was utilized to collect subjective data. Onset of symptoms have been last few days. Daughter mentioned to ED provider that his chest pain and dyspnea began after choking on some food. It is unclear whether or not he has been febrile at home. His evaluation here included laboratory studies, EKG, chest x-ray, and CT of the chest, abdomen, and pelvis with IV contrast.  CT showed right upper and middle lobe consolidation probably pneumonia. Pertinent abnormal lab values include stable but chronic elevation of alkaline phosphatase at 274 and . Additionally, cell count showed WBC of 15.9, hemoglobin 11.4, and platelets 999. Patient was given IV fluids, Zithromax, and Rocephin in the ED. Hospital team was consulted to admit this patient for community-acquired pneumonia.     Past Medical History:          Diagnosis Date    Arthritis     Dementia ([address]     Kidney stone     Knee pain     Parkinson disease ([facility] [address]      Past Surgical History: Procedure Laterality Date    HIP SURGERY Right 7/6/2022    RIGHT HIP INTRAMEDULLARY GAMMA NAIL INSERTION performed by Claudetta Caster, MD at [address]       Medications Prior to Admission:      Prior to Admission medications    Medication Sig Start Date End Date Taking? Authorizing Provider   Pyridoxine HCl (B-6) 50 MG TABS Take 50 mg by mouth daily     Historical Provider, MD   carbidopa-levodopa (SINEMET CR)  MG per extended release tablet Take 1 tablet by mouth nightly    Historical Provider, MD   bisacodyl (DULCOLAX) 5 MG EC tablet Take 5 mg by mouth daily as needed for Constipation    Historical Provider, MD   docusate sodium (COLACE) 100 mg capsule Take 100 mg by mouth daily as needed for Constipation    Historical Provider, MD   ketoconazole (NIZORAL) 2 % shampoo Apply to affected areas three times a week (lather, leave on for 5 minutes, then rinse)    Historical Provider, MD   lidocaine (LIDODERM) 5 % Place 1 patch onto the skin daily 12 hours on, 12 hours off. Historical Provider, MD   potassium chloride (MICRO-K) 10 MEQ extended release capsule Take 10 mEq by mouth daily    Historical Provider, MD   carbidopa-levodopa (PARCOPA)  MG TBDP Take 1 tablet by mouth 4 times daily     Historical Provider, MD   traZODone (DESYREL) 50 MG tablet Take 50 mg by mouth nightly    Historical Provider, MD   sertraline (ZOLOFT) 100 MG tablet Take 100 mg by mouth nightly    Historical Provider, MD   gabapentin (NEURONTIN) 300 MG capsule Take 300 mg by mouth 3 times daily. Historical Provider, MD     Allergies:  Patient has no known allergies. Social History:      The patient currently lives at home    TOBACCO:   reports that he quit smoking about 14 years ago. His smoking use included cigarettes. He has never used smokeless tobacco.  ETOH:   reports no history of alcohol use.   E-cigarette/Vaping       Questions Responses    E-cigarette/Vaping Use Never User Start Date     Passive Exposure     Quit Date     Counseling Given     Comments           Family History:      Reviewed in detail at bedside with patient; cannot provide any details about family history        Family history unknown: Yes     REVIEW OF SYSTEMS COMPLETED:   Pertinent positives as noted in the HPI. All other systems reviewed and negative. PHYSICAL EXAM PERFORMED:    /66   Pulse 66   Temp 98.1 °F (36.7 °C) (Oral)   Resp 20   Ht 5' 11\" (1.803 m)   Wt 120 lb (54.4 kg)   SpO2 97%   BMI 16.74 kg/m²     General appearance:  No apparent distress, frail elderly, soft-spoken, appears stated age and cooperative. HEENT:  Normal cephalic, atraumatic without obvious deformity. Pupils equal, round, and reactive to light. Extra ocular muscles intact. Conjunctivae/corneas clear. Neck: Supple, with full range of motion. No jugular venous distention. Trachea midline. Respiratory:  Normal respiratory effort, no accessory muscle use, bilateral breath sounds auscultated, poor inspiratory effort, generally diminished, fine crackles noted to right posterior base. Cardiovascular:  Regular rate and rhythm with normal S1/S2 without murmurs, rubs or gallops. No LE edema. Abdomen: Soft, non-tender, non-distended with normal bowel sounds. Musculoskeletal:  No clubbing, cyanosis or edema bilaterally. Full range of motion without deformity. Skin: Skin color, texture, turgor normal.  No rashes or lesions. Neurologic:  Neurovascularly intact without any focal sensory/motor deficits.  Cranial nerves: II-XII intact, grossly non-focal.  Psychiatric:  Alert and oriented to self only  Capillary Refill: Brisk,3 seconds, normal  Peripheral Pulses: +2 palpable, equal bilaterally     Labs:     Recent Labs     12/28/22 1922   WBC 15.9*   HGB 11.4*   HCT 34.8*   *     Recent Labs     12/28/22 1922      K 3.9      CO2 26   BUN 14   CREATININE 0.7*   CALCIUM 8.5     Recent Labs     12/28/22 1922 *   ALT 18   BILITOT 0.3   ALKPHOS 274*     No results for input(s): INR in the last 72 hours. Recent Labs     12/28/22 1922   TROPONINI <0.01     Urinalysis:      Lab Results   Component Value Date/Time    NITRU Negative 10/06/2022 07:12 PM    WBCUA None seen 06/12/2022 05:59 PM    RBCUA >100 06/12/2022 05:59 PM    BLOODU Negative 10/06/2022 07:12 PM    SPECGRAV 1.015 10/06/2022 07:12 PM    GLUCOSEU Negative 10/06/2022 07:12 PM     Radiology:     I have reviewed the radiographic results for this encounter with the following interpretation(s); see report(s) below:    CTA CHEST ABDOMEN PELVIS W CONTRAST   Final Result   1. No thoracic or abdominal aortic aneurysm or dissection. 2. Dependent airspace disease likely rib representing atelectasis, with right   upper lobe and middle lobe consolidation felt to represent pneumonia. Recommend follow-up radiographs to document resolution. 3. Diffuse atherosclerosis including coronary artery involvement. 4. No acute inflammatory process seen within the abdomen or pelvis. XR CHEST PORTABLE   Final Result   New airspace consolidation the right middle lobe favored to represent   pneumonia. Radiographic follow-up to resolution is recommended.              EKG:  I have reviewed the EKG with the following interpretation in the absence of a cardiologist: Nonacute EKG    Consults:    None    ASSESSMENT:    Active Hospital Problems    Diagnosis Date Noted    Community acquired bacterial pneumonia [J15.9] 12/29/2022     Priority: High    Chronic liver disease [K76.9] 12/29/2022     Priority: Medium    Chronic anemia [D64.9] 12/29/2022     Priority: Medium    Parkinson's disease ([facility] [address] Nishant Marroquin 03/19/2016     Priority: Medium     PLAN:    Community-acquired Pneumonia, Right Lung  - RML and RLL on CT  - Continue Zithromax and Rocephin  - SIRS/sepsis criteria not met; blood and sputum cultures pending  - Supportive care     Chronic LFT Elevation  - No clear indication, receives care at [facility], unable to determine etiology, but values are stable  - Slightly elevated AST from previous  - Monitor CMP, no evidence of acute decompensation    Parkinson's Disease  - Continue Sinemet, trazodone, Zoloft    Chronic Anemia  - Normocytic/-chromic   - Hgb 11.4 tonight, baseline 10-12  - No overt bleeding      DVT Prophylaxis: Lovenox PPx  SRMB Prophylaxis: Protonix  Diet: ADULT DIET; Dysphagia - Soft and Bite Sized  Code Status: DNR-CCA    PT/OT Eval Status: N/A at this time    Dispo - 2-3 days per clinical [address] [employer], APRN - CNP    Thank you Burgess Kirti MD for the opportunity to be involved in this patient's care.  If you have any questions or concerns please feel free to contact me at [phone].---Anticipated co-signer, Dr. Andi Velez    (Please note that portions of this note were completed with a voice recognition program. Efforts were made to edit the dictations but occasionally words are mis-transcribed.)

## 2022-12-29 NOTE — PROGRESS NOTES
Speech Language Pathology  Attempt Note      Name: Hannah Beverly  : 1945  Medical Diagnosis: Community acquired bacterial pneumonia [J15.9]  Multifocal pneumonia [J18.9]  Sepsis, due to unspecified organism, unspecified whether acute organ dysfunction present [facility]) [A41.9]    Order acknowledged and chart reviewed for completion of bedside swallow evaluation (BSE). Pt currently working with PT/OT. SLP will re-attempt later this afternoon as pt's schedule allows. No charges.     Thank you,    No Edmond MA [address] [facility]  Speech Language Pathologist

## 2022-12-29 NOTE — PROGRESS NOTES
Patient admitted to room 356 from ED. Patient oriented to room, call light, bed rails, phone, lights and bathroom. Patient instructed about the schedule of the day including: vital sign frequency, lab draws, possible tests, frequency of MD and staff rounds, including RN/MD rounding together at bedside, daily weights, and I &O's. Patient instructed about prescribed diet, how to use 8MENU, and television. Bed alarm in place, patient aware of placement and reason. Bed locked, in lowest position, side rails up 2/4, call light within reach. Will continue to monitor.

## 2022-12-29 NOTE — ED PROVIDER NOTES
[address] [facility]  ED  Emergency Department Encounter    Patient Name: Kyle Chapman  MRN: [id number]  YOB: 1945  Date of Evaluation: 12/28/2022  Provider: Fauzia Bernal MD  Note Started: 7:55 PM EST 12/28/22    CHIEF COMPLAINT  Chest Pain (Chest pain with points of pressure/stabbing. Shoulder/back pain, family states patient may have pneumonia due to cough. Took 324 mg ASA prior to EMS arrival.)    [address]  I have seen and evaluated this patient with my supervising physician, Dr. Adry Basurto. HISTORY OF PRESENT ILLNESS  Kyle Chapman is a 76 y.o. male who presents to the ED several week history of chest pain. Patient brought in by squad today for evaluation. Patient reports symptoms have been ongoing for the past several weeks. Do appear worse when up and moving. States that he has not been walking much. History of Parkinson's disease. Reports chest pain bilateral.  Reports that he has also had cough. Productive for yellow sputum. No hemoptysis. Non-smoker. He denies fevers chills. No pain with deep breaths. No headache, lightheadedness, dizziness confusion. No neck, arm, jaw or back pain. He denies any abdominal discomfort. No nausea or vomiting. No diarrhea or constipation. No leg pain or swelling. No recent travel, trauma or surgery. Denies sick contacts. No other complaints, modifying factors or associated symptoms. Nursing notes reviewed were all reviewed and agreed with or any disagreements were addressed in the HPI. PMH:  Past Medical History:   Diagnosis Date    Arthritis     Kidney stone     Knee pain     Parkinson disease [facility])      Surgical History:  Past Surgical History:   Procedure Laterality Date    HIP SURGERY Right 7/6/2022    RIGHT HIP INTRAMEDULLARY GAMMA NAIL INSERTION performed by Isidra Sorto MD at [facility]       Family History:  History reviewed.  No pertinent family history.   Social History:  Social History     Socioeconomic History    Marital status:      Spouse name: Not on file    Number of children: Not on file    Years of education: Not on file    Highest education level: Not on file   Occupational History    Not on file   Tobacco Use    Smoking status: Former     Types: Cigarettes     Quit date: 3/19/2008     Years since quittin.7    Smokeless tobacco: Never   Substance and Sexual Activity    Alcohol use: No    Drug use: No    Sexual activity: Never   Other Topics Concern    Not on file   Social History Narrative    Not on file     Social Determinants of Health     Financial Resource Strain: Not on file   Food Insecurity: Not on file   Transportation Needs: Not on file   Physical Activity: Not on file   Stress: Not on file   Social Connections: Not on file   Intimate Partner Violence: Not on file   Housing Stability: Not on file     Current Medications:  Current Facility-Administered Medications   Medication Dose Route Frequency Provider Last Rate Last Admin    0.9 % sodium chloride bolus  1,000 mL IntraVENous Once Margaux Hernandez .6 mL/hr at 22 0036 1,000 mL at 22 0036    azithromycin (ZITHROMAX) 500 mg in D5W 250ml addavial  500 mg IntraVENous Once CHACORTA Rodriguez 250 mL/hr at 22 0037 500 mg at 22 0037     Current Outpatient Medications   Medication Sig Dispense Refill    Pyridoxine HCl (B-6) 50 MG TABS Take 50 mg by mouth daily       carbidopa-levodopa (SINEMET CR)  MG per extended release tablet Take 1 tablet by mouth nightly      bisacodyl (DULCOLAX) 5 MG EC tablet Take 5 mg by mouth daily as needed for Constipation      docusate sodium (COLACE) 100 mg capsule Take 100 mg by mouth daily as needed for Constipation      ketoconazole (NIZORAL) 2 % shampoo Apply to affected areas three times a week (lather, leave on for 5 minutes, then rinse)      lidocaine (LIDODERM) 5 % Place 1 patch onto the skin daily 12 hours on, 12 hours off.      potassium chloride (MICRO-K) 10 MEQ extended release capsule Take 10 mEq by mouth daily      carbidopa-levodopa (PARCOPA)  MG TBDP Take 1 tablet by mouth 4 times daily       traZODone (DESYREL) 50 MG tablet Take 50 mg by mouth nightly      sertraline (ZOLOFT) 100 MG tablet Take 100 mg by mouth nightly      gabapentin (NEURONTIN) 300 MG capsule Take 300 mg by mouth 3 times daily. Allergies:  No Known Allergies    Screenings:     Shravan Coma Scale  Eye Opening: Spontaneous  Best Verbal Response: Oriented  Best Motor Response: Obeys commands  Shravan Coma Scale Score: 15           CIWA Assessment  BP: 123/72  Heart Rate: 80          REVIEW OF SYSTEMS  10 systems reviewed, positives and Pertinent Negatives as per HPI. PHYSICAL EXAM  /72   Pulse 80   Temp 98.2 °F (36.8 °C) (Oral)   Resp 17   Ht 5' 11\" (1.803 m)   Wt 120 lb (54.4 kg)   SpO2 98%   BMI 16.74 kg/m²   GENERAL APPEARANCE: Awake and alert. Cooperative. No acute distress. HEAD: Normocephalic. Atraumatic. EYES:  EOM's grossly intact. ENT: Mucous membranes are moist.   NECK: Supple. No JVD. No tracheal tenderness or deviation. No crepitus. HEART: Regular rate and rhythm. No murmurs, rubs, or gallops. LUNGS: Clear to auscultation bilaterally. No wheezing, rhonchi, rales. Yani Danes Respirations unlabored. Good air exchange. ABDOMEN: Soft. Non-distended. Non-tender. No midline pulsatile mass. EXTREMITIES: No peripheral edema. No unilateral calf pain, redness or swelling. Moves all extremities equally. All extremities neurovascularly intact. SKIN: Warm and dry. No acute rashes. NEUROLOGICAL: Alert and oriented. No gross facial drooping. Strength 5/5, sensation intact. EKG  When ordered, EKG's are interpreted by the ED Physician in the absence of a Cardiologist.  Please see their note for interpretation of EKG.     LABS  Labs Reviewed   CBC WITH AUTO DIFFERENTIAL - Abnormal; Notable for the following components:       Result Value    WBC 15.9 (*)     RBC 3.95 (*)     Hemoglobin 11.4 (*)     Hematocrit 34.8 (*)     Platelets 250 (*)     Neutrophils Absolute 13.5 (*)     Anisocytosis Occasional (*)     Macrocytes Occasional (*)     Microcytes Occasional (*)     Polychromasia Occasional (*)     Poikilocytes Occasional (*)     Ovalocytes Occasional (*)     All other components within normal limits   COMPREHENSIVE METABOLIC PANEL - Abnormal; Notable for the following components:    Glucose 124 (*)     Creatinine 0.7 (*)     Total Protein 5.8 (*)     Albumin 2.4 (*)     Albumin/Globulin Ratio 0.7 (*)     Alkaline Phosphatase 274 (*)      (*)     All other components within normal limits   COVID-19 & INFLUENZA COMBO   CULTURE, BLOOD 1   CULTURE, BLOOD 2   TROPONIN   LACTIC ACID     When ordered, only abnormal lab results are displayed. All other labs were within normal range or not returned as of this dictation. RADIOLOGY  Non-plain film images such as CT, U/S, and MRI are read by the radiologist.  Plain radiographic images are visualized and preliminarily interpreted by the ED Provider with the below findings:     No acute cardiopulmonary disease. Interpretation per the Radiologist below, if available at the time of this note:  CTA CHEST [address]   Final Result   1. No thoracic or abdominal aortic aneurysm or dissection. 2. Dependent airspace disease likely rib representing atelectasis, with right   upper lobe and middle lobe consolidation felt to represent pneumonia. Recommend follow-up radiographs to document resolution. 3. Diffuse atherosclerosis including coronary artery involvement. 4. No acute inflammatory process seen within the abdomen or pelvis. XR CHEST PORTABLE   Final Result   New airspace consolidation the right middle lobe favored to represent   pneumonia. Radiographic follow-up to resolution is recommended.            ED COURSE  Pain control was not required while here in the emergency department. Triage vitals stable. CBC does show a leukocytosis at 15.9.  6% bandemia. Lactate negative. Blood cultures x2 pending. CMP without evidence for acute Findings. Troponin less than 0.01. Rapid COVID and flu swabs negative. Chest x-ray appears consistent with right middle lobe pneumonia. Patient developed some hypotension. Given a 1 L IV fluid bolus. On subsequent evaluation blood pressure has improved. CTA chest abdomen pelvis redemonstrating airspace disease in right upper and middle lobe which appears consistent with again pneumonia. No acute findings otherwise. Despite SIRS criteria being met with source of infection patient not in severe sepsis or septic shock. He was given a dose of azithromycin and Rocephin here in the emergency department. Case discussed with hospital medicine regarding admission and orders placed. A discussion was had with Mr. Taye Knox regarding cough and shortness of breath, ED findings, recommendations for admission. Risk management discussed and shared decision making had with patient and/or surrogate. All questions were answered. Patient in agreement. CRITICAL CARE TIME  40 Minutes of critical care time spent not including separately billable procedures.     MDM  Results for orders placed or performed during the hospital encounter of 12/28/22   COVID-19 & Influenza Combo    Specimen: Nasopharyngeal Swab   Result Value Ref Range    SARS-CoV-2 RNA, RT PCR NOT DETECTED NOT DETECTED    INFLUENZA A NOT DETECTED NOT DETECTED    INFLUENZA B NOT DETECTED NOT DETECTED   CBC with Auto Differential   Result Value Ref Range    WBC 15.9 (H) 4.0 - 11.0 K/uL    RBC 3.95 (L) 4.20 - 5.90 M/uL    Hemoglobin 11.4 (L) 13.5 - 17.5 g/dL    Hematocrit 34.8 (L) 40.5 - 52.5 %    MCV 88.2 80.0 - 100.0 fL    MCH 28.9 26.0 - 34.0 pg    MCHC 32.8 31.0 - 36.0 g/dL    RDW 14.9 12.4 - 15.4 %    Platelets 996 (H) 560 - 450 K/uL    MPV 7.3 5.0 - 10.5 fL PLATELET SLIDE REVIEW Increased     SLIDE REVIEW see below     Neutrophils % 79.0 %    Lymphocytes % 5.0 %    Monocytes % 6.0 %    Eosinophils % 0.0 %    Basophils % 0.0 %    Neutrophils Absolute 13.5 (H) 1.7 - 7.7 K/uL    Lymphocytes Absolute 1.4 1.0 - 5.1 K/uL    Monocytes Absolute 1.0 0.0 - 1.3 K/uL    Eosinophils Absolute 0.0 0.0 - 0.6 K/uL    Basophils Absolute 0.0 0.0 - 0.2 K/uL    Bands Relative 6 0 - 7 %    Atypical Lymphocytes Relative 4 0 - 6 %    Anisocytosis Occasional (A)     Macrocytes Occasional (A)     Microcytes Occasional (A)     Polychromasia Occasional (A)     Poikilocytes Occasional (A)     Ovalocytes Occasional (A)    Comprehensive Metabolic Panel   Result Value Ref Range    Sodium 140 136 - 145 mmol/L    Potassium 3.9 3.5 - 5.1 mmol/L    Chloride 106 99 - 110 mmol/L    CO2 26 21 - 32 mmol/L    Anion Gap 8 3 - 16    Glucose 124 (H) 70 - 99 mg/dL    BUN 14 7 - 20 mg/dL    Creatinine 0.7 (L) 0.8 - 1.3 mg/dL    Est, Glom Filt Rate >60 >60    Calcium 8.5 8.3 - 10.6 mg/dL    Total Protein 5.8 (L) 6.4 - 8.2 g/dL    Albumin 2.4 (L) 3.4 - 5.0 g/dL    Albumin/Globulin Ratio 0.7 (L) 1.1 - 2.2    Total Bilirubin 0.3 0.0 - 1.0 mg/dL    Alkaline Phosphatase 274 (H) 40 - 129 U/L    ALT 18 10 - 40 U/L     (H) 15 - 37 U/L   Troponin   Result Value Ref Range    Troponin <0.01 <0.01 ng/mL   Lactic Acid   Result Value Ref Range    Lactic Acid 1.0 0.4 - 2.0 mmol/L   EKG 12 Lead   Result Value Ref Range    Ventricular Rate 80 BPM    Atrial Rate 80 BPM    P-R Interval 166 ms    QRS Duration 84 ms    Q-T Interval 374 ms    QTc Calculation (Bazett) 431 ms    P Axis 65 degrees    R Axis -18 degrees    T Axis 37 degrees    Diagnosis       Normal sinus rhythmPossible Left atrial enlargementBorderline ECGWhen compared with ECG of 06-OCT-2022 17:41,No significant change was found     I spoke with Dr. Tamika Sandra and NP Francis Tran.  We thoroughly discussed the history, physical exam, laboratory and imaging studies, as well as, emergency department course. Based upon that discussion, we've decided to admit Migel Monsivais to the hospital for further observation, evaluation, and treatment. Final Impression  1. Multifocal pneumonia    2. Sepsis, due to unspecified organism, unspecified whether acute organ dysfunction present ([facility] [address]      Blood pressure 123/72, pulse 80, temperature 98.2 °F (36.8 °C), temperature source Oral, resp. rate 17, height 5' 11\" (1.803 m), weight 120 lb (54.4 kg), SpO2 98 %. DISPOSITION  Patient was admitted to the hospital in stable condition.          Nelson Lawrence  12/29/22 [id number]

## 2022-12-29 NOTE — CARE COORDINATION
CASE MANAGEMENT INITIAL ASSESSMENT      Reviewed chart and completed assessment with patient:bedside and by phone with daughter  Family present: none  Explained Case Management role/services. Primary contact information:Christy Ontiveros/dtr    Health Care Decision Maker :   Primary Decision Maker: Nini Boogie Child - [phone]    Secondary Decision Maker: Kelsey Romano - Spouse - [phone]    Secondary Decision Maker: Filipe Abebe - [phone]    Secondary Decision Maker: Ehsan Franco - [phone]          Can this person be reached and be able to respond quickly, such as within a few minutes or hours? Yes    Admit date/status:12/29/22 INPT  Diagnosis:PNA   Is this a Readmission?:  No      Insurance:[employer]   Precert required for SNF: Yes       3 night stay required: No    Living arrangements, Adls, care needs, prior to admission: pt lives with granddaughter, also stays with daughter. Family provides care. Durable Medical Equipment at home:  Walker__Cane__RTS_x_ BSC__Shower Chair_x_  02__ HHN__ CPAP__  BiPap__  Hospital Bed_x_ W/C___ Other__hand rails, grab bars, chairlift, camera monitoring system___    Services in the home and/or outpatient, prior to admission: [facility] through [address] [place] [address] PCP:Dr. Nathalie Cowart                                Medications: Prescription coverage? Yes Will pt require financial assistance with medications No     Transportation needs: none. private. family provides transportation       PT/OT recs:needed    Hospital Exemption Notification (HEN):needed for SNF    Barriers to discharge:none    Plan/comments: PNA. Management per IM. IV ABX. Pt lives with granddaughter, also has bedroom at daughter's house. Pt is well set up at home with safety equipment. Pt gets [facility] services through [place]. Pt is never left alone. Family provides around the clock care. Family will refuse SNF. Plan to return home with current services.  CM will continue to follow, should needs arise.  Shirley Leigh, RN     ECOC on chart for MD signature

## 2022-12-29 NOTE — PROGRESS NOTES
Speech Language Pathology  Clinical Bedside Swallow Assessment  Facility/Department: [facility] B3 - MED SURG        Recommendations:  Diet recommendation: NPO; Critical meds crushed in puree as able or via alt means  Instrumentation: Recommend Modified Barium Swallow Study (MBSS) to further assess pharyngeal phase of swallow   Risk management: oral care q4 hrs to reduce adverse affects in the event of aspiration    SLP spoke with pt's daughter, Angela Lindsay, at length re: diet recommendations / [place] / wishes:  Pt's daughter / family wishes for pt is to be on a soft and bite sized diet with thin liquids  SLP provided extensive education to daughter re: overt s/s of aspiration at bedside, risk of silent aspiration, concerning chest x-ray for aspiration PNA   SLP also edu daughter re: results from previous MBSS in June 2022 (full details below) that indicated least restrictive oral intake would be pureed / moderately thick (honey) liquids (but pt would remain at high risk of aspiration and malnutrition with this diet given pharyngeal retention noted and minimal PO passage into UES) OR if wishes to eat for pleasure with understood risks, could consider thin liquids and pureed solids. Aspiration on this MBSS was silent. Pt's daughter verbalized understanding of risk of aspiration / recurring PNA if pt's oral diet is soft and bite sized and thin liquids per her request   Daughter with concerns for weight loss, malnutrition, and overall pt's quality of life with a more restricted / modified diet, thus is why she is requesting a soft and bite sized diet with thin liquids (pt is DNR -[facility])   Daughter did agree to repeat MBSS to further assess pharyngeal phase of swallow, determine safest and least restrictive diet, and to trial strategies to reduce risk of aspiration     SLP spoke with RN, Monica Law, on phone re: discussion with daughter, and Perfect Serve sent to Dr. Wendy Menjivar.  Defer diet recs at this time to MD due to pt's family's wishes for PO diet despite recommendations by SLP for NPO until MBSS is completed. Order for MBSS requested by SLP via perfect serve to MD.       Margarito Acosta  : 1945 (76 y.o.)   MRN: [id number]  ROOM: 0356/0356-01  ADMISSION DATE: 2022  PATIENT DIAGNOSIS(ES): Community acquired bacterial pneumonia [J15.9]  Multifocal pneumonia [J18.9]  Sepsis, due to unspecified organism, unspecified whether acute organ dysfunction present [facility]) [A41.9]  Chief Complaint   Patient presents with    Chest Pain     Chest pain with points of pressure/stabbing. Shoulder/back pain, family states patient may have pneumonia due to cough.  Took 324 mg ASA prior to EMS arrival.     Patient Active Problem List    Diagnosis Date Noted    Community acquired bacterial pneumonia 2022    Chronic liver disease 2022    Chronic anemia 2022    Severe malnutrition ([address] 2022    Closed fracture of right hip ([address] 2022    Generalized weakness 06/15/2022    Closed fracture of left ankle     Constipation     General weakness 2022    Fever     Hemoptysis     Acute hypoxemic respiratory failure (HCC)     Severe sepsis ([address]     COVID-19 virus infection     Pneumonia of both lower lobes due to infectious organism     Thrombocytopenia ([address]     Lymphopenia     Acute encephalopathy     Sepsis ([address] 2020    Parkinson's disease ([address] 2014    Depressive disorder, not elsewhere classified 2014    Mild cognitive impairment 2014     Past Medical History:   Diagnosis Date    Arthritis     Dementia ([address]     Kidney stone     Knee pain     Parkinson disease ([address]      Past Surgical History:   Procedure Laterality Date    HIP SURGERY Right 2022    RIGHT HIP INTRAMEDULLARY GAMMA NAIL INSERTION performed by Murvin Hodgkins, MD at [facility]       No Known Allergies    DATE ONSET:  2022    Date of Evaluation: 2022   Evaluating Therapist: Bita Davila, SLP    Chart Reviewed: : [x] Yes [] No    Current Diet: ADULT DIET; Dysphagia - Soft and Bite Sized    Recent Chest Radiography: [] Chest XR   [x] CT of Chest  Date: 12/28/2022  Impression   1. No thoracic or abdominal aortic aneurysm or dissection. 2. Dependent airspace disease likely rib representing atelectasis, with right   upper lobe and middle lobe consolidation felt to represent pneumonia. Recommend follow-up radiographs to document resolution. 3. Diffuse atherosclerosis including coronary artery involvement. 4. No acute inflammatory process seen within the abdomen or pelvis. Pain: none reported     Reason for Referral  Lynn Higuera was referred for a bedside swallow evaluation to assess the efficiency of their swallow function, identify signs and symptoms of aspiration and make recommendations regarding safe dietary consistencies, effective compensatory strategies, and safe eating environment. Assessment    Medical record review/interview: Per MD H&P: Eileen Magana is a 53-year-old male with significant past medical history of Parkinson's disease and unspecified liver disease who presents to [facility] emergency department due to concerns of chest pain radiating into the shoulder and back. He is unable to provide much subjective data and daughter was at bedside previously, but not for this exam.  ED documentation and ED nurse was utilized to collect subjective data. Onset of symptoms have been last few days. Daughter mentioned to ED provider that his chest pain and dyspnea began after choking on some food. It is unclear whether or not he has been febrile at home. His evaluation here included laboratory studies, EKG, chest x-ray, and CT of the chest, abdomen, and pelvis with IV contrast.  CT showed right upper and middle lobe consolidation probably pneumonia.   Pertinent abnormal lab values include stable but chronic elevation of alkaline phosphatase at 274 and . Additionally, cell count showed WBC of 15.9, hemoglobin 11.4, and platelets 702. Patient was given IV fluids, Zithromax, and Rocephin in the ED. Hospital team was consulted to admit this patient for community-acquired pneumonia. \"      Predisposing dysphagia risk factors: Parkinson's Disease, Age, Hx of dysphagia and Hx of aspiration  Clinical signs of possible chronic dysphagia: weight loss, hx of dysphagia and hx of aspiration  Precipitating dysphagia risk factors: reduced physical mobility, current PNA     Patient Complaints:  Odynophagia: [] Yes [x] No  Globus Sensation: [] Yes [x] No  SOB with PO intake: [] Yes [x] No  Increased WOB with PO intake: [] Yes [x] No  Reflux Sx's: [] Yes [x] No  Weight loss: [x] Yes [] No  Coughing/Choking with PO intake: [x] Yes [] No  Reduced Appetite: [] Yes [x] No    Additional Reported Symptoms/Complaints/Hospital Course:  Pt with MBSS completed at [facility] on 6/13/22. Impressions as follows:  \"Oral Phase Severity: Severe  Oral Phase Characteristics : Reduced oral bolus control resulting in premature spillage to the valleculae and UES with all consistencies. Functional A-P bolus transit. Lingual fasciculations. Anterior munch chew pattern. Pharyngeal Phase Severity: Severe  Pharyngeal Phase Characteristics: Impaired hyolaryngeal excursion resulting in reduced duration and extent of UES opening leading to only partial bolus passage as evidenced by residue at the UES with all consistencies and the pyriform sinuses with puree and soft solids. Impaired hyolaryngeal excursion also resulting in incomplete epiglottic inversion resulting in deep penetration that does not clear with thin teaspoon and thin cup with aspiration during the swallow with thin straw, nectar cup, nectar controlled sip via 10cc. This also results in post-swallow residue in the valleculae with puree and soft solids.  Of note, did note postprandial aspiration of pharyngeal retention throughout study. \"    Vitals/labs:   Temp: n/a  SpO2: 97%  RR: 16  BP: 99/68  HR: 64  O2 device: none, room air    CBC:   Recent Labs     12/29/22 0717   WBC 14.8*   HGB 11.9*   *      BMP:  Recent Labs     12/29/22 0717      K 3.6      CO2 26   BUN 11   CREATININE 0.7*   GLUCOSE 79          Cranial nerve exam:   CN V (trigeminal): ophthalmic, maxillary, and mandibular facial sensation- Reduced  CN VII (facial): Reduced  CN IX/X (glossopharyngeal/vagus): MPT: weak; pitch range: Reduced and weak; vocal quality: Reduced and weak; cough: Weak- perceptually  CN XII (hypoglossal): Reduced    Laryngeal function exam:   Secretions:   Vocal quality: See CN exam above  MPT: See CN exam above  S/Z ratio:   Pitch range: See CN exam above  Cough: See CN exam above    Oral Care Status:    [] Oral Care [facility]  [] Poor oral care status  [] Edentulous  [x] Upper Dentures  [x] Lower Dentures - partial  [] Missing/Broken Teeth  [] Evidence of dental cavities/carries    PO trials:   IDDSI 0 (thin): via tsp and cup     IDDSI 2 (mildly thick): via tsp and cup    IDDSI 3 (moderately thick): via tsp and cup     IDDSI 4 (puree): pudding tsp, mashed potatoes tsp     IDDSI 6 (soft and bite sized): mac and cheese     IDDSI 7 (regular): DNT      Impressions:  Pt alert and cooperative, agreeable to eval. Pt eating lunch upon arrival. Pt oriented to self - reported month / year were Jan 2023. Pt aware he is in the hospital. Pt with an extensive dysphagia hx. Previous MBSS completed in June 2022 at [facility] - see above for impressions. Oral [facility] exam indicated generalized oral weakness with reduced lingual and labial strength, ROM, coordination. Pt with upper denture, partial on bottom. Thin liquids, NTL, HTL, puree, and soft solid PO trials presented. Pt with slow and prolonged mastication, reduced A-P transit. Minimal mastication of soft solid with residue in bilateral sulci - required liquid wash to clear.  Suspect delayed trigger of pharyngeal swallow and reduced laryngeal elevation upon palpation of anterior neck. Pt with overt s/s of aspiration at bedside. Post-swallow wet vocal quality with thin tsp. Significant immediate post-swallow coughing and decrease in SPO2 (to 88) with thin cup. Immediate post-swallow coughing with mildly (nectar) thick cup. Delayed post-swallow coughing with HTL tsp and puree. Pt did not cough / throat clearing with NTL tsp or HTL cup, but pt is at a very high risk of silent aspiration. Pt also impulsive, taking large sips and did require mod cues to slow rate / take smaller sips. SLP had an extensive phone conversation with daughter, Osmin Valladares. Pt's daughter verbalized understanding of risk of aspiration, but wants pt to be on a soft and bite sized diet with thin liquids as a LRD and for quality of life. Daughter reported to SLP that pt eats a \"modified diet at home. \" Pt does use a straw with thins at home. Pt's family is always supervising pt during meals, provides him with softer foods and avoids tough meats / hard vegetables. Daughter concerns because following d/c to SNF from [facility] in June of 2022 on a puree / HTL, pt with significant weight loss. For quality of life purposes and wishes (pt is DNR [facility]), pt's daughter wants him on a soft and bite sized diet with thin liquids. Pt's daughter does want a MBSS to be completed to determine safest and least restrictive diet, strategies that could be used. Again, pt's daughter verbalized understanding of risk of aspiration, but wants PO diet for pt for quality of life purposes. Despite verbalizing understanding of risks and wanting pt to eat PO, pt's daughter did report pt had to be given the heimlich maneuver after choking on chicken and dumplings. SLP would recommend pt be NPO pending  MBSS. Per pt's daughter, she wants him on a soft and bite sized diet with thin liquids (following verbalizing understanding of risk of aspiration).  SLP spoke with Mariia Cifuentes, via phone re: discussion with pt's daughter. Perfect Serve sent to MD re: deferring diet recs to MD due to pt's family wishes for PO diet. Also requested a MBSS order via Perfect Serve.    Recommendations:  Diet recommendation: NPO; Critical meds crushed in puree as able or via alt means  Instrumentation: Recommend Modified Barium Swallow Study (MBSS) to further assess pharyngeal phase of swallow   Risk management: oral care q4 hrs to reduce adverse affects in the event of aspiration    SLP spoke with pt's daughter, Christy, at length re: diet recommendations / POC / wishes:  Pt's daughter / family wishes for pt is to be on a soft and bite sized diet with thin liquids  SLP provided extensive education to daughter re: overt s/s of aspiration at bedside, risk of silent aspiration, concerning chest x-ray for aspiration PNA   SLP also edu daughter re: results from previous MBSS in June 2022 (full details below) that indicated least restrictive oral intake would be pureed / moderately thick (honey) liquids (but pt would remain at high risk of aspiration and malnutrition with this diet given pharyngeal retention noted and minimal PO passage into UES) OR if wishes to eat for pleasure with understood risks, could consider thin liquids and pureed solids. Aspiration on this MBSS was silent.   Pt's daughter verbalized understanding of risk of aspiration / reoccurring PNA if pt's oral diet is soft and bites and thin liquids per her request   Daughter with concerns for weight loss, malnutrition, and overall pt's quality of life with a more restricted / modified diet, thus is why she is requesting a soft and bite sized diet with thin liquids (pt is DNR -[facility])   Daughter did agree to repeat MBSS to further assess pharyngeal phase of swallow, determine safest and least restrictive diet, and to trial strategies to reduce risk of aspiration     SLP spoke with RNVijaya, on phone re: discussion with daughter, and Perfect Serve sent to   [facility] [place]. Defer diet recs at this time to MD due to pt's family's wishes for PO diet despite recommendations by SLP for NPO until MBSS is completed. Order for MBSS requested by SLP via perfect serve to MD.     Prognosis: Poor    Recommended Intervention:   [x] Dysphagia tx  [] Videostroboscopy                      [x] NPO   [x] MBS       [] Speech/Cog Eval   [x] Therapeutic PO Trials     [] Ice Chips   [] Other:  [] FEES                                                 Dysphagia Therapeutic Intervention:   []  Bolus control Exercises  []  Oral Motor Exercises  []  Reddy Water Protocol  []  Thermal Stimulation  []  Oral Care    []  Vital Stim/NMES  []  Laryngeal Exercises  [x]  Patient/Family Education  []  Pharyngeal Exercises  [x]  Therapeutic PO trials with SLP  [x]  Diet tolerance monitoring  []  Other:     Referrals:  N/A    Goals:  Short Term Goals:  Timeframe for Short Term Goals: (5 days; 01/03/23)  Goal 1: The patient will tolerate instrumental assessment when able   Goal 2: The patient will tolerate recommended diet with no clinical s/s of aspiration 5/5  Goal 3: The patient/caregiver will demonstrate understanding of compensatory swallow strategies, for improved swallow safety    Long Term Goals:   Timeframe for Long Term Goals: (7 days; 01/05/22)  Goal 1: The patient will tolerate least restrictive diet with no clinical s/s of aspiration or worsening respiratory/pulmonary status    Treatment:  Skilled instruction completed with patient re: evidenced based practice regarding recommendations and POC, importance of oral care to reduce adverse affects in the event of aspiration, and instruction of recommended compensatory strategies developed based upon clinical exam. Pt able to recall/demonstrate compensatory strategies with mod cues.       Pt Education: SLP educated the patient re: Role of SLP, rationale for completion of assessment, results of assessment, recommendations, and POC  Pt Education Response: verbalized understanding and would benefit from ongoing education    Extensive edu also provided to daughter via phone. See above     Duration/Frequency of Tx: 3-5x/week (may change pending results of MBSS)    Individuals Consulted:   [x]  Patient     []  NP         [x]  RN   []  RD                   [x]  MD      [x]  Family Member - daughter, Mitul Smiley                     []  PA    []  Other:      Safety Devices / Report:  [x]  All fall risk precautions in place []  Safety handoff completed with RN  [x]  Bed alarm in place  []  Left in bed     []  Chair alarm in place  []  Left in chair   [x]  Call light in reach   []  Other:                       Total Treatment Time / Charges       Time in Time out Total Time / units   Swallow Eval/Tx Time  1311 1410 59 min / 2 units (DE/DT)     Signature:  Chuckie Chanel MA CCC-SLP #[id number]  Speech Language Pathologist

## 2022-12-30 ENCOUNTER — APPOINTMENT (OUTPATIENT)
Dept: GENERAL RADIOLOGY | Age: 77
DRG: 194 | End: 2022-12-30
Payer: OTHER GOVERNMENT

## 2022-12-30 LAB
A/G RATIO: 0.9 (ref 1.1–2.2)
ALBUMIN SERPL-MCNC: 2.4 G/DL (ref 3.4–5)
ALP BLD-CCNC: 196 U/L (ref 40–129)
ALT SERPL-CCNC: 11 U/L (ref 10–40)
ANION GAP SERPL CALCULATED.3IONS-SCNC: 8 MMOL/L (ref 3–16)
AST SERPL-CCNC: 45 U/L (ref 15–37)
BASOPHILS ABSOLUTE: 0.1 K/UL (ref 0–0.2)
BASOPHILS RELATIVE PERCENT: 0.9 %
BILIRUB SERPL-MCNC: 0.5 MG/DL (ref 0–1)
BUN BLDV-MCNC: 12 MG/DL (ref 7–20)
CALCIUM SERPL-MCNC: 8.6 MG/DL (ref 8.3–10.6)
CHLORIDE BLD-SCNC: 108 MMOL/L (ref 99–110)
CO2: 26 MMOL/L (ref 21–32)
CREAT SERPL-MCNC: 0.7 MG/DL (ref 0.8–1.3)
EOSINOPHILS ABSOLUTE: 0.2 K/UL (ref 0–0.6)
EOSINOPHILS RELATIVE PERCENT: 1.3 %
GFR SERPL CREATININE-BSD FRML MDRD: >60 ML/MIN/{1.73_M2}
GLUCOSE BLD-MCNC: 83 MG/DL (ref 70–99)
HCT VFR BLD CALC: 33.4 % (ref 40.5–52.5)
HEMOGLOBIN: 11 G/DL (ref 13.5–17.5)
L. PNEUMOPHILA SEROGP 1 UR AG: NORMAL
LYMPHOCYTES ABSOLUTE: 1.4 K/UL (ref 1–5.1)
LYMPHOCYTES RELATIVE PERCENT: 11.6 %
MCH RBC QN AUTO: 29.1 PG (ref 26–34)
MCHC RBC AUTO-ENTMCNC: 32.9 G/DL (ref 31–36)
MCV RBC AUTO: 88.6 FL (ref 80–100)
MONOCYTES ABSOLUTE: 1.1 K/UL (ref 0–1.3)
MONOCYTES RELATIVE PERCENT: 9.1 %
MRSA SCREEN RT-PCR: NORMAL
NEUTROPHILS ABSOLUTE: 9.3 K/UL (ref 1.7–7.7)
NEUTROPHILS RELATIVE PERCENT: 77.1 %
PDW BLD-RTO: 14.6 % (ref 12.4–15.4)
PLATELET # BLD: 518 K/UL (ref 135–450)
PMV BLD AUTO: 6.7 FL (ref 5–10.5)
POTASSIUM REFLEX MAGNESIUM: 4.2 MMOL/L (ref 3.5–5.1)
RBC # BLD: 3.77 M/UL (ref 4.2–5.9)
SODIUM BLD-SCNC: 142 MMOL/L (ref 136–145)
STREP PNEUMONIAE ANTIGEN, URINE: NORMAL
TOTAL PROTEIN: 5 G/DL (ref 6.4–8.2)
WBC # BLD: 12 K/UL (ref 4–11)

## 2022-12-30 PROCEDURE — 74230 X-RAY XM SWLNG FUNCJ C+: CPT

## 2022-12-30 PROCEDURE — 92526 ORAL FUNCTION THERAPY: CPT

## 2022-12-30 PROCEDURE — 2580000003 HC RX 258: Performed by: NURSE PRACTITIONER

## 2022-12-30 PROCEDURE — 6360000002 HC RX W HCPCS: Performed by: NURSE PRACTITIONER

## 2022-12-30 PROCEDURE — 36415 COLL VENOUS BLD VENIPUNCTURE: CPT

## 2022-12-30 PROCEDURE — 6360000002 HC RX W HCPCS: Performed by: INTERNAL MEDICINE

## 2022-12-30 PROCEDURE — 92611 MOTION FLUOROSCOPY/SWALLOW: CPT

## 2022-12-30 PROCEDURE — 85025 COMPLETE CBC W/AUTO DIFF WBC: CPT

## 2022-12-30 PROCEDURE — 2580000003 HC RX 258: Performed by: INTERNAL MEDICINE

## 2022-12-30 PROCEDURE — C9113 INJ PANTOPRAZOLE SODIUM, VIA: HCPCS | Performed by: NURSE PRACTITIONER

## 2022-12-30 PROCEDURE — 6370000000 HC RX 637 (ALT 250 FOR IP): Performed by: NURSE PRACTITIONER

## 2022-12-30 PROCEDURE — 80053 COMPREHEN METABOLIC PANEL: CPT

## 2022-12-30 PROCEDURE — 6370000000 HC RX 637 (ALT 250 FOR IP): Performed by: INTERNAL MEDICINE

## 2022-12-30 PROCEDURE — 1200000000 HC SEMI PRIVATE

## 2022-12-30 RX ORDER — SODIUM CHLORIDE, SODIUM LACTATE, POTASSIUM CHLORIDE, AND CALCIUM CHLORIDE .6; .31; .03; .02 G/100ML; G/100ML; G/100ML; G/100ML
500 INJECTION, SOLUTION INTRAVENOUS ONCE
Status: COMPLETED | OUTPATIENT
Start: 2022-12-30 | End: 2022-12-30

## 2022-12-30 RX ADMIN — SODIUM CHLORIDE: 9 INJECTION, SOLUTION INTRAVENOUS at 09:34

## 2022-12-30 RX ADMIN — CARBIDOPA AND LEVODOPA 1 TABLET: 50; 200 TABLET, EXTENDED RELEASE ORAL at 22:24

## 2022-12-30 RX ADMIN — PIPERACILLIN AND TAZOBACTAM 3375 MG: 3; .375 INJECTION, POWDER, FOR SOLUTION INTRAVENOUS at 18:06

## 2022-12-30 RX ADMIN — SODIUM CHLORIDE, POTASSIUM CHLORIDE, SODIUM LACTATE AND CALCIUM CHLORIDE 500 ML: 600; 310; 30; 20 INJECTION, SOLUTION INTRAVENOUS at 15:42

## 2022-12-30 RX ADMIN — CARBIDOPA AND LEVODOPA 1 TABLET: 25; 100 TABLET ORAL at 13:58

## 2022-12-30 RX ADMIN — GABAPENTIN 300 MG: 300 CAPSULE ORAL at 09:13

## 2022-12-30 RX ADMIN — ENOXAPARIN SODIUM 40 MG: 100 INJECTION SUBCUTANEOUS at 09:13

## 2022-12-30 RX ADMIN — PIPERACILLIN AND TAZOBACTAM 3375 MG: 3; .375 INJECTION, POWDER, FOR SOLUTION INTRAVENOUS at 09:34

## 2022-12-30 RX ADMIN — CARBIDOPA AND LEVODOPA 1 TABLET: 25; 100 TABLET ORAL at 18:04

## 2022-12-30 RX ADMIN — GABAPENTIN 300 MG: 300 CAPSULE ORAL at 13:58

## 2022-12-30 RX ADMIN — SERTRALINE HYDROCHLORIDE 100 MG: 50 TABLET ORAL at 09:13

## 2022-12-30 RX ADMIN — PANTOPRAZOLE SODIUM 40 MG: 40 INJECTION, POWDER, FOR SOLUTION INTRAVENOUS at 09:16

## 2022-12-30 RX ADMIN — DOXYCYCLINE HYCLATE 100 MG: 100 TABLET, COATED ORAL at 09:14

## 2022-12-30 RX ADMIN — CARBIDOPA AND LEVODOPA 1 TABLET: 25; 100 TABLET ORAL at 09:13

## 2022-12-30 RX ADMIN — TRAZODONE HYDROCHLORIDE 50 MG: 50 TABLET ORAL at 19:46

## 2022-12-30 RX ADMIN — DOXYCYCLINE HYCLATE 100 MG: 100 TABLET, COATED ORAL at 19:45

## 2022-12-30 RX ADMIN — GABAPENTIN 300 MG: 300 CAPSULE ORAL at 19:46

## 2022-12-30 RX ADMIN — SODIUM CHLORIDE, PRESERVATIVE FREE 10 ML: 5 INJECTION INTRAVENOUS at 09:21

## 2022-12-30 RX ADMIN — PIPERACILLIN AND TAZOBACTAM 3375 MG: 3; .375 INJECTION, POWDER, FOR SOLUTION INTRAVENOUS at 02:05

## 2022-12-30 RX ADMIN — CARBIDOPA AND LEVODOPA 1 TABLET: 25; 100 TABLET ORAL at 19:46

## 2022-12-30 RX ADMIN — POTASSIUM CHLORIDE 10 MEQ: 750 TABLET, EXTENDED RELEASE ORAL at 09:13

## 2022-12-30 NOTE — PLAN OF CARE
Problem: Skin/Tissue Integrity  Goal: Absence of new skin breakdown  Description: 1. Monitor for areas of redness and/or skin breakdown  2. Assess vascular access sites hourly  3. Every 4-6 hours minimum:  Change oxygen saturation probe site  4. Every 4-6 hours:  If on nasal continuous positive airway pressure, respiratory therapy assess nares and determine need for appliance change or resting period. Outcome: Progressing  Note: Patient has remained free from skin break down this shift. With help repositioning from staff. Problem: Skin/Tissue Integrity  Goal: Absence of new skin breakdown  Description: 1. Monitor for areas of redness and/or skin breakdown  2. Assess vascular access sites hourly  3. Every 4-6 hours minimum:  Change oxygen saturation probe site  4. Every 4-6 hours:  If on nasal continuous positive airway pressure, respiratory therapy assess nares and determine need for appliance change or resting period. Outcome: Progressing  Note: Patient has remained free from skin break down this shift. With help repositioning from staff.      Problem: Respiratory - Adult  Goal: Achieves optimal ventilation and oxygenation  Outcome: Progressing     Problem: Cardiovascular - Adult  Goal: Absence of cardiac dysrhythmias or at baseline  Outcome: Progressing

## 2022-12-30 NOTE — PLAN OF CARE
Problem: Discharge Planning  Goal: Discharge to home or other facility with appropriate resources  Outcome: Progressing  Flowsheets (Taken 12/30/2022 0921)  Discharge to home or other facility with appropriate resources:   Identify barriers to discharge with patient and caregiver   Arrange for needed discharge resources and transportation as appropriate   Identify discharge learning needs (meds, wound care, etc)     Problem: Skin/Tissue Integrity  Goal: Absence of new skin breakdown  Description: 1. Monitor for areas of redness and/or skin breakdown  2. Assess vascular access sites hourly  3. Every 4-6 hours minimum:  Change oxygen saturation probe site  4. Every 4-6 hours:  If on nasal continuous positive airway pressure, respiratory therapy assess nares and determine need for appliance change or resting period.   12/30/2022 1423 by Xavire Estes RN  Outcome: Progressing     Problem: Respiratory - Adult  Goal: Achieves optimal ventilation and oxygenation  12/30/2022 1423 by Xavier Estes RN  Outcome: Progressing  Flowsheets (Taken 12/30/2022 7293)  Achieves optimal ventilation and oxygenation: Assess for changes in respiratory status     Problem: Cardiovascular - Adult  Goal: Maintains optimal cardiac output and hemodynamic stability  Outcome: Progressing  Flowsheets (Taken 12/30/2022 0921)  Maintains optimal cardiac output and hemodynamic stability: Monitor blood pressure and heart rate     Problem: Cardiovascular - Adult  Goal: Absence of cardiac dysrhythmias or at baseline  12/30/2022 1423 by Xavier Estes RN  Outcome: Progressing  Flowsheets (Taken 12/30/2022 0921)  Absence of cardiac dysrhythmias or at baseline: Monitor cardiac rate and rhythm     Problem: Musculoskeletal - Adult  Goal: Return mobility to safest level of function  Outcome: Progressing  Flowsheets (Taken 12/30/2022 0921)  Return Mobility to Safest Level of Function: Assess patient stability and activity tolerance for standing, transferring and ambulating with or without assistive devices     Problem: Musculoskeletal - Adult  Goal: Maintain proper alignment of affected body part  Outcome: Progressing  Flowsheets (Taken 12/30/2022 0921)  Maintain proper alignment of affected body part: Support and protect limb and body alignment per provider's orders     Problem: Gastrointestinal - Adult  Goal: Maintains adequate nutritional intake  Outcome: Progressing  Flowsheets (Taken 12/30/2022 0921)  Maintains adequate nutritional intake: Monitor percentage of each meal consumed

## 2022-12-30 NOTE — PROCEDURES
Speech Language Pathology  Modified Barium Swallow Study  Facility/Department: [facility] B3 - MED SURG        Recommendations:  Diet recommendation: NPO; Meds via alt means of nutrition  Risk management: Control risk factors for aspiration PNA by completing oral care 3-4x/day and increasing physical mobility as is medically feasible    MBSS completed. SLP recommending pt remain NPO with alternative means of nutrition. However, after discussions with daughter I know she wants patient to remain on diet despite speech therapy clinical recommendations. With that being said and pt/family pursuing their own wishes/ goals of care for pt, SLP to sign off on patient at this time.    If family wishes to have pt intake PO SLP recommends hospital bed due to aspiration risks.       NAME:Mervin Moore  : 1945 (77 y.o.)   MRN: [id number]  ROOM: [id number]  ADMISSION DATE: 2022  PATIENT DIAGNOSIS(ES): Community acquired bacterial pneumonia [J15.9]  Multifocal pneumonia [J18.9]  Sepsis, due to unspecified organism, unspecified whether acute organ dysfunction present (HCC) [A41.9]  Chief Complaint   Patient presents with    Chest Pain     Chest pain with points of pressure/stabbing. Shoulder/back pain, family states patient may have pneumonia due to cough. Took 324 mg ASA prior to EMS arrival.     Patient Active Problem List    Diagnosis Date Noted    Community acquired bacterial pneumonia 2022    Chronic liver disease 2022    Chronic anemia 2022    Severe malnutrition (HCC) 2022    Closed fracture of right hip (HCC) 2022    Generalized weakness 06/15/2022    Closed fracture of left ankle     Constipation     General weakness 2022    Fever     Hemoptysis     Acute hypoxemic respiratory failure (HCC)     Severe sepsis (HCC)     COVID-19 virus infection     Pneumonia of both lower lobes due to infectious organism     Thrombocytopenia (HCC)     Lymphopenia     Acute encephalopathy   Sepsis ([facility] [address] 09/04/2020    Parkinson's disease ([facility] [address] 03/19/2014    Depressive disorder, not elsewhere classified 03/19/2014    Mild cognitive impairment 03/19/2014     Past Medical History:   Diagnosis Date    Arthritis     Dementia ([facility] [address]     Kidney stone     Knee pain     Parkinson disease ([facility] [address]      Past Surgical History:   Procedure Laterality Date    HIP SURGERY Right 7/6/2022    RIGHT HIP INTRAMEDULLARY GAMMA NAIL INSERTION performed by Bakari Gonzalez MD at [address]       No Known Allergies      Current Diet Solid Consistency: IDDSI 6 Soft and Bite Sized   Current Diet Liquid Consistency: IDDSI 0 Thin     Date of Prior Study: 06/13/2022  Type of Study: VIDEOFLUOROSCOPIC STUDY OF SWALLOWING (MBS)  Results of Prior Study: \"Assessment: Severe oropharyngeal dysphagia, likely chronic related to hx of dysphagia, reduced physical mobility, Parkinson's Disease and the aging swallow. Swallow safety is impaired; swallow efficiency is impaired. Pt appears to be at high risk for aspiration PNA, and high risk for malnutrition/dehydration. Diet modification/non-oral nutrition is indicated. Swallow prognosis is poor given severity of laryngeal dysfunction and silent aspiration noted on instrumental swallow study. Patient appears to be a fair candidate for behavioral swallow rehabilitation. Multiple PO considerations could be made for this patient pending MD discussion with family and pt regarding goals of care. Currently, the patient remains a full-code, thus, safest and least restrictive oral intake would be puree solids and moderately thick (honey) liquids. Pt would remain at high risk of aspiration and malnutrition with this diet given pharyngeal retention noted and minimal PO passage into UES. Family could also consider alternate means of nutrition if this aligns with goals of care.  If family/pt wishes to eat for pleasure with understood risks, could consider thin liquids and pureed solids as the patient has been consuming PO, including thin liquids, and chest radiography is clear. Would crush all meds with puree. \"    Recent CXR Chest:   Impression   New airspace consolidation the right middle lobe favored to represent   pneumonia. Radiographic follow-up to resolution is recommended. Patient Complaints/Reason for Referral:  Ian Mcclain was referred for a MBS to assess the efficiency of his/her swallow function, assess for aspiration, and to make recommendations regarding safe dietary consistencies, effective compensatory strategies, and safe eating environment. Pain   Patient Currently in Pain: No    General Comments: Per MD H&P: \"\"Mervin Gill is a 68-year-old male with significant past medical history of Parkinson's disease and unspecified liver disease who presents to [facility] emergency department due to concerns of chest pain radiating into the shoulder and back. He is unable to provide much subjective data and daughter was at bedside previously, but not for this exam.  ED documentation and ED nurse was utilized to collect subjective data. Onset of symptoms have been last few days. Daughter mentioned to ED provider that his chest pain and dyspnea began after choking on some food. It is unclear whether or not he has been febrile at home. His evaluation here included laboratory studies, EKG, chest x-ray, and CT of the chest, abdomen, and pelvis with IV contrast.  CT showed right upper and middle lobe consolidation probably pneumonia. Pertinent abnormal lab values include stable but chronic elevation of alkaline phosphatase at 274 and . Additionally, cell count showed WBC of 15.9, hemoglobin 11.4, and platelets 092. Patient was given IV fluids, Zithromax, and Rocephin in the ED. Hospital team was consulted to admit this patient for community-acquired pneumonia. \"      Medical record review/interview:   Predisposing dysphagia risk factors: Parkinson's Disease, Age, Hx of dysphagia and Hx of aspiration  Clinical signs of possible chronic dysphagia: weight loss, hx of dysphagia and hx of aspiration  Precipitating dysphagia risk factors: reduced physical mobility, current PNA       Impressions:  Treatment Dx and ICD 10: Oropharyneal Dysphagia R13.12   Radiologist: Dr. Korey Carvalho  Referring MD: Dr. Caity Borrero    Assessment:   Severe-Profound oropharyngeal dysphagia, likely chronic related to hx of dysphagia, reduced physical mobility, Parkinson's Disease, Progressive nature of disease/condition, and the aging swallow. Swallow safety is impaired; swallow efficiency is impaired Pt appears to be at high risk for aspiration PNA, and high risk for malnutrition/dehydration. non-oral nutrition is warranted indicated. Swallow prognosis is poor given severity of laryngeal dysfunction. Patient appears to be a fair candidate for behavioral swallow rehabilitation. SLP attempted to call daughter to discuss MBSS results and diet recommendations as requested- no answer. SLP left a message for daughter to reach out to SLP regarding MBSS results and diet recommendations. SLP perfectserved MD regarding MBSS results and diet recommendations. SLP: \"MBSS completed. SLP recommending pt remain NPO with alternative means of nutrition. However, after discussions with daughter I know she wants patient to remain on diet despite speech therapy clinical recommendations. With that being said and pt/family pursuing their own wishes/ goals of care for pt, SLP to sign off on patient at this time. Thank you! \"  MD: Andres larsen. This help\"       Oral Phase Severity: Severe  Oral Phase Characteristics : Reduced oral bolus control resulting in premature bolus loss to valleculae , pyriform sinus and UES with all consistencies. Uncoordinated lingual movements and functional A-P bolus transit.        Pharyngeal Phase Severity: Profound  Pharyngeal Phase Characteristics: Delayed swallow onset with swallow triggered at pyriform sinuses. Impaired hyolaryngeal excursion resulting in reduced duration and extent of UES opening leading to only partial bolus passage as evidenced by residue at the UES with all consistencies and the pyriform sinuses with puree trial. Incomplete epiglottic retraction resulting in deep penetration during the swallow that does not clear resulting in aspiration with thin cup, mildly thick cup, and moderately thick sip via 5mL. Impaired hyolaryngeal excursion also resulting in incomplete epiglottic inversion resulting in aspiration of mixed trial consistencies (thin liquids, mildly thick, moderately thick liquids and pudding). Inadequate duration and extent of UES opening , reduced pharyngeal peristalsis/constriction as evidenced by post-swallow residue along posterior pharyngeal wall with all consistencies, and reduced lingual base retraction as evidenced by post-swallow residue in valleculae with all consistencies. Of note, post-prandial tracheal aspiration of pharyngeal residue throughout study. Esophageal Phase: Grossly functional     Aspiration Scale    1 Material does not enter the airway    2 Material enters the airway, remains above the vocal folds, and is ejected from the airway    3 Material enters the airway, remains above the vocal folds, and is not ejected from the airway    4 Material enters the airway, contacts the vocal folds, an is ejected from the airway    5 Material enters the airway, contacts the vocal folds, and is not ejected from the airway    6 Material enters the airway, passes below the vocal folds and is ejected into the larynx or out of the airway    7 Material enters the airway, passes below the vocal folds, and is not ejected from the trachea despite effort    X  8 Material enters the airway, passes below the vocal folds, and no effort is made to eject.            Compensatory Swallowing Strategies Attempted: N/a   Postural Changes and/or Swallow Maneuvers Trialed: N/a   Patient Position: Lateral and Patient Degrees: 90 degrees, Seated upright in [facility] chair  Consistencies Administered: Thin liquid, Mildly Thick, Moderately Thick, and Puree       Recommendations:  Diet recommendation: NPO; Meds via alt means of nutrition  Risk management: Control risk factors for aspiration PNA by completing oral care 3-4x/day and increasing physical mobility as is medically feasible      Safe Swallow Protocol:  Supervision: indep   Compensatory Swallowing Strategies: HOB 90* and 30\" after meals; small bites/sips; alternate solids/liquids every 3-5 bites; oral care after every meal      Behavior/Cognition/Vision/Hearing:  Behavior/Cognition: WFL   Vision: WFL   Hearing: WFL     Recommendations/Treatment  Requires SLP Intervention: No further SLP intervention warranted at this time. Pt/family pursuing their own wishes/ goals of care for pt, SLP to sign off on patient at this time. D/C Recommendations: TBD   Postural Changes and/or Swallow Maneuvers: N/a   Referral To: N/a     Recommended Exercises:   Therapeutic Interventions: diet tolerance monitoring, patient/family education, oral care     Education: Images and recommendations were reviewed with pt following this exam.   Patient Education Response: Pt verbalized understanding, would benefit from ongoing ed     Prognosis for safe diet advancement: Poor   Barriers to reach goals: Swallow impairment   Duration/Frequency of Treatment: No further SLP intervention warranted at this time. Pt/family pursuing their own wishes/ goals of care for pt, SLP to sign off on patient at this time.    Safety Devices in place: Yes  Type of devices: Pt left [facility] in no distress; left with transport      Therapy Time:   Individual   Time In 1108   Time Out 1135   Minutes 27        Signature:  Jacquelene Buerger CFY-SLP  Clinical Fellow  [id number]

## 2022-12-30 NOTE — PROGRESS NOTES
Speech Language Pathology      Name: Juli Méndez  : 1945  Medical Diagnosis: Community acquired bacterial pneumonia [J15.9]  Multifocal pneumonia [J18.9]  Sepsis, due to unspecified organism, unspecified whether acute organ dysfunction present ([facility] [address] [A41.9]    SLP spoke with radiology. Planned for MBSS at 1100.        Emma JACOBSON-SLP  Clinical Fellow  [id number]

## 2022-12-30 NOTE — CARE COORDINATION
Chart reviewed. PNA, likely aspiration. Management per IM. IV ABX. SLP following, MBS completed. SLP recommends NPO, however, family wishes pt to have diet. Pt lives with granddaughter, also has bedroom at daughter's house. Pt is well set up at home with safety equipment. Pt gets [facility] services through [place]. Pt is never left alone. Family provides around the clock care. Family will refuse SNF. Plan to return home with current services. CM will continue to follow, should needs arise.  Tammy Martell Duane, RN

## 2022-12-30 NOTE — PROGRESS NOTES
Hospitalist Progress Note      PCP: Duglas Tucker MD    Date of Admission: 12/28/2022    Chief Complaint:       Subjective:       Medications:  Reviewed    Infusion Medications    sodium chloride      sodium chloride 75 mL/hr at 12/29/22 3652     Scheduled Medications    carbidopa-levodopa  1 tablet Oral 4x Daily    carbidopa-levodopa  1 tablet Oral Nightly    gabapentin  300 mg Oral TID    potassium chloride  10 mEq Oral Daily    sertraline  100 mg Oral Daily    traZODone  50 mg Oral Nightly    sodium chloride flush  5-40 mL IntraVENous 2 times per day    enoxaparin  40 mg SubCUTAneous Daily    pantoprazole  40 mg IntraVENous Daily    piperacillin-tazobactam  3,375 mg IntraVENous Q8H    doxycycline hyclate  100 mg Oral 2 times per day     PRN Meds: sodium chloride flush, sodium chloride, ondansetron **OR** ondansetron, polyethylene glycol, acetaminophen **OR** acetaminophen, albuterol      Intake/Output Summary (Last 24 hours) at 12/30/2022 0125  Last data filed at 12/29/2022 2134  Gross per 24 hour   Intake 290 ml   Output 1300 ml   Net -1010 ml       Physical Exam Performed:    BP (!) 101/41   Pulse 70   Temp 97.8 °F (36.6 °C) (Oral)   Resp 14   Ht 5' 11\" (1.803 m)   Wt 120 lb (54.4 kg)   SpO2 95%   BMI 16.74 kg/m²     General appearance: No apparent distress, appears stated age and cooperative. HEENT: Pupils equal, round, and reactive to light. Conjunctivae/corneas clear. Neck: Supple, with full range of motion. No jugular venous distention. Trachea midline. Respiratory:  Normal respiratory effort. Clear to auscultation, bilaterally without Rales/Wheezes/Rhonchi. Cardiovascular: Regular rate and rhythm with normal S1/S2 without murmurs, rubs or gallops. Abdomen: Soft, non-tender, non-distended with normal bowel sounds. Musculoskeletal: No clubbing, cyanosis or edema bilaterally. Full range of motion without deformity.   Skin: Skin color, texture, turgor normal.  No rashes or lesions. Neurologic:  Neurovascularly intact without any focal sensory/motor deficits. Cranial nerves: II-XII intact, grossly non-focal.  Psychiatric: Alert and oriented, thought content appropriate, normal insight  Capillary Refill: Brisk,< 3 seconds   Peripheral Pulses: +2 palpable, equal bilaterally       Labs:   Recent Labs     12/28/22 1922 12/29/22 0717   WBC 15.9* 14.8*   HGB 11.4* 11.9*   HCT 34.8* 35.9*   * 517*     Recent Labs     12/28/22 1922 12/29/22 0717    139   K 3.9 3.6    104   CO2 26 26   BUN 14 11   CREATININE 0.7* 0.7*   CALCIUM 8.5 8.5     Recent Labs     12/28/22 1922 12/29/22 0717   * 72*   ALT 18 29   BILITOT 0.3 0.4   ALKPHOS 274* 245*     No results for input(s): INR in the last 72 hours. Recent Labs     12/28/22 1922 12/29/22 0717   TROPONINI <0.01 <0.01       Urinalysis:      Lab Results   Component Value Date/Time    NITRU Negative 10/06/2022 07:12 PM    WBCUA None seen 06/12/2022 05:59 PM    RBCUA >100 06/12/2022 05:59 PM    BLOODU Negative 10/06/2022 07:12 PM    SPECGRAV 1.015 10/06/2022 07:12 PM    GLUCOSEU Negative 10/06/2022 07:12 PM       Radiology:  CTA CHEST ABDOMEN PELVIS W CONTRAST   Final Result   1. No thoracic or abdominal aortic aneurysm or dissection. 2. Dependent airspace disease likely rib representing atelectasis, with right   upper lobe and middle lobe consolidation felt to represent pneumonia. Recommend follow-up radiographs to document resolution. 3. Diffuse atherosclerosis including coronary artery involvement. 4. No acute inflammatory process seen within the abdomen or pelvis. XR CHEST PORTABLE   Final Result   New airspace consolidation the right middle lobe favored to represent   pneumonia. Radiographic follow-up to resolution is recommended.          FL MODIFIED BARIUM SWALLOW W VIDEO    (Results Pending)           Assessment/Plan:    Active Hospital Problems    Diagnosis Date Noted    Community acquired bacterial pneumonia [J15.9] 12/29/2022     Priority: Medium    Chronic liver disease [K76.9] 12/29/2022     Priority: Medium    Chronic anemia [D64.9] 12/29/2022     Priority: Medium    Parkinson's disease ([facility] [address] Jessica Anderson 03/19/2014         DVT Prophylaxis:   Diet: ADULT DIET;  Dysphagia - Soft and Bite Sized  Code Status: DNR-CCA    PT/OT Eval Stat    Dispo -    Drusilla Mohs, MD

## 2022-12-31 LAB
A/G RATIO: 1 (ref 1.1–2.2)
ALBUMIN SERPL-MCNC: 2.3 G/DL (ref 3.4–5)
ALP BLD-CCNC: 197 U/L (ref 40–129)
ALT SERPL-CCNC: 8 U/L (ref 10–40)
ANION GAP SERPL CALCULATED.3IONS-SCNC: 6 MMOL/L (ref 3–16)
AST SERPL-CCNC: 55 U/L (ref 15–37)
BASOPHILS ABSOLUTE: 0.1 K/UL (ref 0–0.2)
BASOPHILS RELATIVE PERCENT: 0.6 %
BILIRUB SERPL-MCNC: 0.3 MG/DL (ref 0–1)
BUN BLDV-MCNC: 14 MG/DL (ref 7–20)
CALCIUM SERPL-MCNC: 8.4 MG/DL (ref 8.3–10.6)
CHLORIDE BLD-SCNC: 106 MMOL/L (ref 99–110)
CO2: 27 MMOL/L (ref 21–32)
CREAT SERPL-MCNC: 0.7 MG/DL (ref 0.8–1.3)
EOSINOPHILS ABSOLUTE: 0.2 K/UL (ref 0–0.6)
EOSINOPHILS RELATIVE PERCENT: 1.8 %
GFR SERPL CREATININE-BSD FRML MDRD: >60 ML/MIN/{1.73_M2}
GLUCOSE BLD-MCNC: 84 MG/DL (ref 70–99)
HCT VFR BLD CALC: 32.6 % (ref 40.5–52.5)
HEMOGLOBIN: 11.1 G/DL (ref 13.5–17.5)
LYMPHOCYTES ABSOLUTE: 1.2 K/UL (ref 1–5.1)
LYMPHOCYTES RELATIVE PERCENT: 12.2 %
MCH RBC QN AUTO: 30.1 PG (ref 26–34)
MCHC RBC AUTO-ENTMCNC: 34.1 G/DL (ref 31–36)
MCV RBC AUTO: 88.4 FL (ref 80–100)
MONOCYTES ABSOLUTE: 0.9 K/UL (ref 0–1.3)
MONOCYTES RELATIVE PERCENT: 9.1 %
NEUTROPHILS ABSOLUTE: 7.4 K/UL (ref 1.7–7.7)
NEUTROPHILS RELATIVE PERCENT: 76.3 %
PDW BLD-RTO: 14.8 % (ref 12.4–15.4)
PLATELET # BLD: 501 K/UL (ref 135–450)
PMV BLD AUTO: 6.5 FL (ref 5–10.5)
POTASSIUM REFLEX MAGNESIUM: 4.3 MMOL/L (ref 3.5–5.1)
RBC # BLD: 3.69 M/UL (ref 4.2–5.9)
SODIUM BLD-SCNC: 139 MMOL/L (ref 136–145)
TOTAL PROTEIN: 4.7 G/DL (ref 6.4–8.2)
WBC # BLD: 9.7 K/UL (ref 4–11)

## 2022-12-31 PROCEDURE — 6370000000 HC RX 637 (ALT 250 FOR IP): Performed by: NURSE PRACTITIONER

## 2022-12-31 PROCEDURE — 2580000003 HC RX 258: Performed by: NURSE PRACTITIONER

## 2022-12-31 PROCEDURE — 6370000000 HC RX 637 (ALT 250 FOR IP): Performed by: INTERNAL MEDICINE

## 2022-12-31 PROCEDURE — 1200000000 HC SEMI PRIVATE

## 2022-12-31 PROCEDURE — 85025 COMPLETE CBC W/AUTO DIFF WBC: CPT

## 2022-12-31 PROCEDURE — 6360000002 HC RX W HCPCS: Performed by: INTERNAL MEDICINE

## 2022-12-31 PROCEDURE — 6360000002 HC RX W HCPCS: Performed by: NURSE PRACTITIONER

## 2022-12-31 PROCEDURE — C9113 INJ PANTOPRAZOLE SODIUM, VIA: HCPCS | Performed by: NURSE PRACTITIONER

## 2022-12-31 PROCEDURE — 2580000003 HC RX 258: Performed by: INTERNAL MEDICINE

## 2022-12-31 PROCEDURE — 36415 COLL VENOUS BLD VENIPUNCTURE: CPT

## 2022-12-31 PROCEDURE — 80053 COMPREHEN METABOLIC PANEL: CPT

## 2022-12-31 RX ADMIN — PIPERACILLIN AND TAZOBACTAM 3375 MG: 3; .375 INJECTION, POWDER, FOR SOLUTION INTRAVENOUS at 18:27

## 2022-12-31 RX ADMIN — GABAPENTIN 300 MG: 300 CAPSULE ORAL at 21:53

## 2022-12-31 RX ADMIN — PANTOPRAZOLE SODIUM 40 MG: 40 INJECTION, POWDER, FOR SOLUTION INTRAVENOUS at 09:33

## 2022-12-31 RX ADMIN — GABAPENTIN 300 MG: 300 CAPSULE ORAL at 09:32

## 2022-12-31 RX ADMIN — PIPERACILLIN AND TAZOBACTAM 3375 MG: 3; .375 INJECTION, POWDER, FOR SOLUTION INTRAVENOUS at 09:51

## 2022-12-31 RX ADMIN — SERTRALINE HYDROCHLORIDE 100 MG: 50 TABLET ORAL at 09:32

## 2022-12-31 RX ADMIN — SODIUM CHLORIDE, PRESERVATIVE FREE 10 ML: 5 INJECTION INTRAVENOUS at 12:29

## 2022-12-31 RX ADMIN — SODIUM CHLORIDE, PRESERVATIVE FREE 10 ML: 5 INJECTION INTRAVENOUS at 21:59

## 2022-12-31 RX ADMIN — GABAPENTIN 300 MG: 300 CAPSULE ORAL at 15:59

## 2022-12-31 RX ADMIN — CARBIDOPA AND LEVODOPA 1 TABLET: 25; 100 TABLET ORAL at 09:32

## 2022-12-31 RX ADMIN — CARBIDOPA AND LEVODOPA 1 TABLET: 25; 100 TABLET ORAL at 21:53

## 2022-12-31 RX ADMIN — CARBIDOPA AND LEVODOPA 1 TABLET: 50; 200 TABLET, EXTENDED RELEASE ORAL at 23:55

## 2022-12-31 RX ADMIN — PIPERACILLIN AND TAZOBACTAM 3375 MG: 3; .375 INJECTION, POWDER, FOR SOLUTION INTRAVENOUS at 02:19

## 2022-12-31 RX ADMIN — ENOXAPARIN SODIUM 40 MG: 100 INJECTION SUBCUTANEOUS at 09:33

## 2022-12-31 RX ADMIN — DOXYCYCLINE HYCLATE 100 MG: 100 TABLET, COATED ORAL at 21:53

## 2022-12-31 RX ADMIN — DOXYCYCLINE HYCLATE 100 MG: 100 TABLET, COATED ORAL at 09:32

## 2022-12-31 RX ADMIN — POTASSIUM CHLORIDE 10 MEQ: 750 TABLET, EXTENDED RELEASE ORAL at 09:33

## 2022-12-31 RX ADMIN — CARBIDOPA AND LEVODOPA 1 TABLET: 25; 100 TABLET ORAL at 16:00

## 2022-12-31 RX ADMIN — TRAZODONE HYDROCHLORIDE 50 MG: 50 TABLET ORAL at 21:53

## 2022-12-31 ASSESSMENT — PAIN SCALES - GENERAL: PAINLEVEL_OUTOF10: 8

## 2022-12-31 NOTE — PLAN OF CARE
Problem: Gastrointestinal - Adult  Goal: Maintains adequate nutritional intake  12/31/2022 0122 by Delia Morris RN  Outcome: Not Progressing    Problem: Discharge Planning  Goal: Discharge to home or other facility with appropriate resources  12/31/2022 0122 by Delia Morris RN  Outcome: Progressing  12/30/2022 1423 by Apoorva Baxter RN  Outcome: Progressing  Flowsheets (Taken 12/30/2022 8202)  Discharge to home or other facility with appropriate resources:   Identify barriers to discharge with patient and caregiver   Arrange for needed discharge resources and transportation as appropriate   Identify discharge learning needs (meds, wound care, etc)     Problem: Respiratory - Adult  Goal: Achieves optimal ventilation and oxygenation  12/31/2022 0122 by Delia Morris RN  Outcome: Progressing  12/30/2022 1423 by Apoorva Baxter RN  Outcome: Progressing  Flowsheets (Taken 12/30/2022 9597)  Achieves optimal ventilation and oxygenation: Assess for changes in respiratory status     Problem: Cardiovascular - Adult  Goal: Maintains optimal cardiac output and hemodynamic stability  12/31/2022 0122 by Delia Morris RN  Outcome: Progressing  12/30/2022 1423 by Apoorva Baxter RN  Outcome: Progressing  Flowsheets (Taken 12/30/2022 9570)  Maintains optimal cardiac output and hemodynamic stability: Monitor blood pressure and heart rate  Goal: Absence of cardiac dysrhythmias or at baseline  12/30/2022 1423 by Apoorva Baxter RN  Outcome: Progressing  Flowsheets (Taken 12/30/2022 0921)  Absence of cardiac dysrhythmias or at baseline: Monitor cardiac rate and rhythm     Problem: Musculoskeletal - Adult  Goal: Return mobility to safest level of function  12/31/2022 0122 by Delia Morris RN  Outcome: Progressing  12/30/2022 1423 by Apoorva Baxter RN  Outcome: Progressing  Flowsheets (Taken 12/30/2022 4591)  Return Mobility to Safest Level of Function: Assess patient stability and activity tolerance for standing, transferring and ambulating with or without assistive devices  Goal: Maintain proper alignment of affected body part  12/30/2022 1423 by Macel Denver, RN  Outcome: Progressing  Flowsheets (Taken 12/30/2022 2927)  Maintain proper alignment of affected body part: Support and protect limb and body alignment per provider's orders

## 2022-12-31 NOTE — CONSULTS
Consult placed    Who:AMINAH GARCIA  Date:12/31/2022,  Time:4:24 PM        Electronically signed by Magda Salmeron on 12/31/2022 at 4:24 PM

## 2022-12-31 NOTE — PROGRESS NOTES
Hospitalist Progress Note      PCP: Ian Rankin MD    Date of Admission: 12/28/2022    Chief Complaint: This is a 28-year-old male with significant past medical history of Parkinson's disease and unspecified liver disease who [place] [facility] [place] to concerns of chest pain radiating into the shoulder and back. He is unable to provide much subjective data and daughter was at bedside previously, but not for this exam.  ED documentation and ED nurse was utilized to collect subjective data. Onset of symptoms have been last few days. Daughter mentioned to ED provider that his chest pain and dyspnea began after choking on some food. It is unclear whether or not he has been febrile at home. His evaluation here included laboratory studies, EKG, chest x-ray, and CT of the chest, abdomen, and pelvis with IV contrast.  CT showed right upper and middle lobe consolidation probably pneumonia. Pertinent abnormal lab values include stable but chronic elevation of alkaline phosphatase at 274 and . Additionally, cell count showed WBC of 15.9, hemoglobin 11.4, and platelets 842. Patient was given IV fluids, Zithromax, and Rocephin in the ED. Hospital team was consulted to admit this patient for community-acquired pneumonia. Subjective:   family continues to request foods for comfort despite informed of patient's aspiration. He denies choking on his foods. He denies lightheadedness, shortness of breath, chest pain, palpitations. He also denies nausea, abdominal pain, dysuria, diarrhea.       Medications:  Reviewed    Infusion Medications    sodium chloride      sodium chloride Stopped (12/30/22 1413)     Scheduled Medications    carbidopa-levodopa  1 tablet Oral 4x Daily    carbidopa-levodopa  1 tablet Oral Nightly    gabapentin  300 mg Oral TID    potassium chloride  10 mEq Oral Daily    sertraline  100 mg Oral Daily    traZODone  50 mg Oral Nightly sodium chloride flush  5-40 mL IntraVENous 2 times per day    enoxaparin  40 mg SubCUTAneous Daily    pantoprazole  40 mg IntraVENous Daily    piperacillin-tazobactam  3,375 mg IntraVENous Q8H    doxycycline hyclate  100 mg Oral 2 times per day     PRN Meds: sodium chloride flush, sodium chloride, ondansetron **OR** ondansetron, polyethylene glycol, acetaminophen **OR** acetaminophen, albuterol      Intake/Output Summary (Last 24 hours) at 12/30/2022 2040  Last data filed at 12/30/2022 1846  Gross per 24 hour   Intake 2067.33 ml   Output 1150 ml   Net 917.33 ml         Physical Exam Performed:    /66   Pulse 75   Temp 98 °F (36.7 °C) (Oral)   Resp 19   Ht 5' 11\" (1.803 m)   Wt 120 lb (54.4 kg)   SpO2 95%   BMI 16.74 kg/m²   GEN thin, in no distress  HEENT normocephalic, anicteric sclera, EOMI, mucosa moist, no stridor  NECK supple, trachea midline  CHEST  RESP on RA, in no distress, clear to auscultation  CARDS RRR, S1, S2, no murmurs, no edema, radial pulse 2+, DP pulse 2+  ABD +BS, soft nontender  MSK no cyanosis, no clubbing  SKIN warm, dry  NEURO alert, oriented x 3, no facial asymmetry, no dysarthria, moving spontaneously  PSYCH normal mood      Labs:   Recent Labs     12/28/22 1922 12/29/22  0717 12/30/22  0648   WBC 15.9* 14.8* 12.0*   HGB 11.4* 11.9* 11.0*   HCT 34.8* 35.9* 33.4*   * 517* 518*       Recent Labs     12/28/22 1922 12/29/22  0717 12/30/22  0648    139 142   K 3.9 3.6 4.2    104 108   CO2 26 26 26   BUN 14 11 12   CREATININE 0.7* 0.7* 0.7*   CALCIUM 8.5 8.5 8.6       Recent Labs     12/28/22 1922 12/29/22  0717 12/30/22  0648   * 72* 45*   ALT 18 29 11   BILITOT 0.3 0.4 0.5   ALKPHOS 274* 245* 196*       No results for input(s): INR in the last 72 hours.   Recent Labs     12/28/22 1922 12/29/22  0717   TROPONINI <0.01 <0.01         Urinalysis:      Lab Results   Component Value Date/Time    NITRU Negative 10/06/2022 07:12 PM    WBCUA None seen 06/12/2022 05:59 PM    RBCUA >100 06/12/2022 05:59 PM    BLOODU Negative 10/06/2022 07:12 PM    SPECGRAV 1.015 10/06/2022 07:12 PM    GLUCOSEU Negative 10/06/2022 07:12 PM       Radiology:  Nash Augustine MODIFIED BARIUM SWALLOW W VIDEO   Final Result   Aspiration was seen      Please see separate speech pathology report for full discussion of findings   and recommendations. CTA CHEST ABDOMEN PELVIS W CONTRAST   Final Result   1. No thoracic or abdominal aortic aneurysm or dissection. 2. Dependent airspace disease likely rib representing atelectasis, with right   upper lobe and middle lobe consolidation felt to represent pneumonia. Recommend follow-up radiographs to document resolution. 3. Diffuse atherosclerosis including coronary artery involvement. 4. No acute inflammatory process seen within the abdomen or pelvis. XR CHEST PORTABLE   Final Result   New airspace consolidation the right middle lobe favored to represent   pneumonia. Radiographic follow-up to resolution is recommended. Assessment/Plan:    Active Hospital Problems    Diagnosis Date Noted    Community acquired bacterial pneumonia [J15.9] 12/29/2022     Priority: Medium    Chronic liver disease [K76.9] 12/29/2022     Priority: Medium    Chronic anemia [D64.9] 12/29/2022     Priority: Medium    Parkinson's disease ([facility] [address] Axel Mail 03/19/2014     RUL and RML Pneumonia, Concern for Aspiration Pneumonia  - CTA Chest 12/28 no thoracic or abdominal aortic aneurysm or dissection. Dependent airspace disease likely rib representing atelectasis with right upper lobe and middle lobe consolidation felt to represent pneumonia. - MBSS 12/30 showed aspiration.  - Continue IV zosyn and doxycycline (12/29-) for 7-10 days. - start probiotics. Aspiration  - SLP recommended NPO. However, family insists on diet. So SLP recommended pureed diet with thick liquids. -- Family agreed to PEG placement. -- GI consult for PEG placement.      Parkinson's Disease  - Continue home sinemet. Underwent Weight  Protein Calorie Malnutrition  - PEG placement pending.  - CT A/P with contrast 12/28 showed no acute inflammatory process. - Continue IVF's. Normocytic Anemia  - adm HgB 11.4, MCV 88.2. Baseline HgB 9-12.  - monitor       DVT Prophylaxis:   Diet: ADULT DIET;  Dysphagia - Soft and Bite Sized  Code Status: DNR-CCA    PT/OT Eval Stat    Dispo -    Antonio Newman MD

## 2022-12-31 NOTE — FLOWSHEET NOTE
Spoke with pt's daughter Fozia Steven regarding pt's MBS results with recommendations for NPO status with alternate means of nutrition. Per Fozia Harris [facility]) she would like to follow recommendations and pursue feeding tube. She would like patient to continue with oral diet until feeding tube could be placed. Will update diet with SLP recommendations. Notified MD. Erik Seth for a GI consult placed.

## 2023-01-01 LAB
ANION GAP SERPL CALCULATED.3IONS-SCNC: 7 MMOL/L (ref 3–16)
BASOPHILS ABSOLUTE: 0.1 K/UL (ref 0–0.2)
BASOPHILS RELATIVE PERCENT: 0.8 %
BUN BLDV-MCNC: 13 MG/DL (ref 7–20)
CALCIUM SERPL-MCNC: 8.7 MG/DL (ref 8.3–10.6)
CHLORIDE BLD-SCNC: 105 MMOL/L (ref 99–110)
CO2: 28 MMOL/L (ref 21–32)
CREAT SERPL-MCNC: 0.8 MG/DL (ref 0.8–1.3)
EOSINOPHILS ABSOLUTE: 0.2 K/UL (ref 0–0.6)
EOSINOPHILS RELATIVE PERCENT: 1.8 %
GFR SERPL CREATININE-BSD FRML MDRD: >60 ML/MIN/{1.73_M2}
GLUCOSE BLD-MCNC: 75 MG/DL (ref 70–99)
HCT VFR BLD CALC: 34.7 % (ref 40.5–52.5)
HEMOGLOBIN: 11.9 G/DL (ref 13.5–17.5)
LYMPHOCYTES ABSOLUTE: 1.4 K/UL (ref 1–5.1)
LYMPHOCYTES RELATIVE PERCENT: 14.9 %
MAGNESIUM: 2.2 MG/DL (ref 1.8–2.4)
MCH RBC QN AUTO: 30.4 PG (ref 26–34)
MCHC RBC AUTO-ENTMCNC: 34.2 G/DL (ref 31–36)
MCV RBC AUTO: 88.7 FL (ref 80–100)
MONOCYTES ABSOLUTE: 0.8 K/UL (ref 0–1.3)
MONOCYTES RELATIVE PERCENT: 9.2 %
NEUTROPHILS ABSOLUTE: 6.7 K/UL (ref 1.7–7.7)
NEUTROPHILS RELATIVE PERCENT: 73.3 %
PDW BLD-RTO: 14.8 % (ref 12.4–15.4)
PHOSPHORUS: 3.4 MG/DL (ref 2.5–4.9)
PLATELET # BLD: 511 K/UL (ref 135–450)
PMV BLD AUTO: 6.2 FL (ref 5–10.5)
POTASSIUM SERPL-SCNC: 4.5 MMOL/L (ref 3.5–5.1)
RBC # BLD: 3.92 M/UL (ref 4.2–5.9)
SODIUM BLD-SCNC: 140 MMOL/L (ref 136–145)
WBC # BLD: 9.1 K/UL (ref 4–11)

## 2023-01-01 PROCEDURE — 2580000003 HC RX 258: Performed by: NURSE PRACTITIONER

## 2023-01-01 PROCEDURE — 84100 ASSAY OF PHOSPHORUS: CPT

## 2023-01-01 PROCEDURE — 6370000000 HC RX 637 (ALT 250 FOR IP): Performed by: NURSE PRACTITIONER

## 2023-01-01 PROCEDURE — 83735 ASSAY OF MAGNESIUM: CPT

## 2023-01-01 PROCEDURE — 6360000002 HC RX W HCPCS: Performed by: INTERNAL MEDICINE

## 2023-01-01 PROCEDURE — 80048 BASIC METABOLIC PNL TOTAL CA: CPT

## 2023-01-01 PROCEDURE — 1200000000 HC SEMI PRIVATE

## 2023-01-01 PROCEDURE — 6370000000 HC RX 637 (ALT 250 FOR IP): Performed by: INTERNAL MEDICINE

## 2023-01-01 PROCEDURE — 36415 COLL VENOUS BLD VENIPUNCTURE: CPT

## 2023-01-01 PROCEDURE — C9113 INJ PANTOPRAZOLE SODIUM, VIA: HCPCS | Performed by: NURSE PRACTITIONER

## 2023-01-01 PROCEDURE — 85025 COMPLETE CBC W/AUTO DIFF WBC: CPT

## 2023-01-01 PROCEDURE — 2580000003 HC RX 258: Performed by: INTERNAL MEDICINE

## 2023-01-01 PROCEDURE — 6360000002 HC RX W HCPCS: Performed by: NURSE PRACTITIONER

## 2023-01-01 RX ADMIN — GABAPENTIN 300 MG: 300 CAPSULE ORAL at 09:24

## 2023-01-01 RX ADMIN — GABAPENTIN 300 MG: 300 CAPSULE ORAL at 13:46

## 2023-01-01 RX ADMIN — GABAPENTIN 300 MG: 300 CAPSULE ORAL at 19:51

## 2023-01-01 RX ADMIN — PIPERACILLIN AND TAZOBACTAM 3375 MG: 3; .375 INJECTION, POWDER, FOR SOLUTION INTRAVENOUS at 09:34

## 2023-01-01 RX ADMIN — SERTRALINE HYDROCHLORIDE 100 MG: 50 TABLET ORAL at 09:24

## 2023-01-01 RX ADMIN — POTASSIUM CHLORIDE 10 MEQ: 750 TABLET, EXTENDED RELEASE ORAL at 09:24

## 2023-01-01 RX ADMIN — PIPERACILLIN AND TAZOBACTAM 3375 MG: 3; .375 INJECTION, POWDER, FOR SOLUTION INTRAVENOUS at 05:00

## 2023-01-01 RX ADMIN — PANTOPRAZOLE SODIUM 40 MG: 40 INJECTION, POWDER, FOR SOLUTION INTRAVENOUS at 09:24

## 2023-01-01 RX ADMIN — DOXYCYCLINE HYCLATE 100 MG: 100 TABLET, COATED ORAL at 09:24

## 2023-01-01 RX ADMIN — SODIUM CHLORIDE, PRESERVATIVE FREE 10 ML: 5 INJECTION INTRAVENOUS at 09:25

## 2023-01-01 RX ADMIN — ENOXAPARIN SODIUM 40 MG: 100 INJECTION SUBCUTANEOUS at 09:24

## 2023-01-01 RX ADMIN — CARBIDOPA AND LEVODOPA 1 TABLET: 25; 100 TABLET ORAL at 19:51

## 2023-01-01 RX ADMIN — TRAZODONE HYDROCHLORIDE 50 MG: 50 TABLET ORAL at 19:51

## 2023-01-01 RX ADMIN — CARBIDOPA AND LEVODOPA 1 TABLET: 25; 100 TABLET ORAL at 17:49

## 2023-01-01 RX ADMIN — PIPERACILLIN AND TAZOBACTAM 3375 MG: 3; .375 INJECTION, POWDER, FOR SOLUTION INTRAVENOUS at 17:54

## 2023-01-01 RX ADMIN — CARBIDOPA AND LEVODOPA 1 TABLET: 25; 100 TABLET ORAL at 09:24

## 2023-01-01 RX ADMIN — CARBIDOPA AND LEVODOPA 1 TABLET: 25; 100 TABLET ORAL at 13:46

## 2023-01-01 RX ADMIN — DOXYCYCLINE HYCLATE 100 MG: 100 TABLET, COATED ORAL at 19:51

## 2023-01-01 RX ADMIN — CARBIDOPA AND LEVODOPA 1 TABLET: 50; 200 TABLET, EXTENDED RELEASE ORAL at 22:33

## 2023-01-01 NOTE — CONSULTS
Consult Note     Patient: Milderd Frankel MRN: [id number]   YOB: 1945 Age: 76 y.o. Sex: male   Unit: [place] [address] MED SURG Room/Bed: 0356/0356-01 Location: [address] [facility]    Admitting Physician: Delisa Palma    Primary Care Physician: Hoda Fernández MD   Admission Date: 12/28/2022   Consult Date: 1/1/2023     Assessment/Plan:  Principal Problem:    Community acquired bacterial pneumonia  Active Problems:    Chronic liver disease    Chronic anemia    Parkinson's disease ([address]  Resolved Problems:    * No resolved hospital problems. *       Assessment  OP dysphagia on the basis of parkinsonism and waxing and waning mental status with pna. PEG will not prevent aspiration since pt can still regurgitate and can still aspirate. However, PEG will facilitate feeding. I did discuss limitiations of PEG with POA. In brief, she prefers he has pleasure feeding with puree diet until peg, then again after PEG she would like him to eat as tolerated. Plan:  Last dose lovenox for DVT proph Monday morning, then resume after PEG per Dr Isha Coleman. Will leave puree diet on for him since that seems to be what he wants, and it is what his POA wants for him. PEG Tuesday if family in agreement. Pt on zosyn. Would suggest ancef 2 gm to be on call to endoscopy. Dr Isha Coleman will be here tomorrow. Subjective:    History of Present Illness: Milderd Frankel is a 76 y.o. male who is seen in consultation at the request of Delisa Palma for PEG for dysphagia. Mattie Colunga has history of parkinson's disease, dementia, and unspecified liver disease all followed at the [place] so limited records available. He was admited 3 days ago with cheast pain and CAP. He is limited historian. He failed swallow study but family noted he was [facility] and wanted him to have soft and bite sized with thin liquids.   After he failed MBS, pts family wanted him changed to NPO, therefore PEG consult placed. BMP have been unremarkable. Most recent CBC today with platelets of 880, hemoglobin of 12, WBC 9.1. Alk phos has been 200-2 70 over the last several days, with AST/ALT of about 70/20. Blood cultures are no growth to date. Flu negative. MBS:  Aspiration was seen. There is prominent pooling of substances in the   piriform sinuses   CT CAP: Upper and middle lobe pneumonia. No GI/liver pathology seen of significance. 6/22 CT AP: No mention of splenomegaly, ascites nodular liver. On seeing the patient this morning, I find the PCA feeding him puree diet. Apparently, he is much more alert and isn't having any trouble eating with assistance this AM.  He is a very limited historian, speaking quietly. Daughter Fatemeh Smith is POA, and I called him to review history and plan. Review of Systems:    Unable to obtain due to patient factors. Past Medical History:   Diagnosis Date    Arthritis     Dementia ([address]     Kidney stone     Knee pain     Parkinson disease [facility])      Past Surgical History:   Procedure Laterality Date    HIP SURGERY Right 7/6/2022    RIGHT HIP INTRAMEDULLARY GAMMA NAIL INSERTION performed by Leonardo Swann MD at [address]       No Known Allergies   Prior to Admission medications    Medication Sig Start Date End Date Taking?  Authorizing Provider   Pyridoxine HCl (B-6) 50 MG TABS Take 50 mg by mouth daily     Historical Provider, MD   carbidopa-levodopa (SINEMET CR)  MG per extended release tablet Take 1 tablet by mouth nightly    Historical Provider, MD   bisacodyl (DULCOLAX) 5 MG EC tablet Take 5 mg by mouth daily as needed for Constipation    Historical Provider, MD   docusate sodium (COLACE) 100 mg capsule Take 100 mg by mouth daily as needed for Constipation    Historical Provider, MD   ketoconazole (NIZORAL) 2 % shampoo Apply to affected areas three times a week (lather, leave on for 5 minutes, then rinse)    Historical Provider, MD   lidocaine (LIDODERM) 5 % Place 1 patch onto the skin daily 12 hours on, 12 hours off. Historical Provider, MD   potassium chloride (MICRO-K) 10 MEQ extended release capsule Take 10 mEq by mouth daily    Historical Provider, MD   carbidopa-levodopa (PARCOPA)  MG TBDP Take 1 tablet by mouth 4 times daily     Historical Provider, MD   traZODone (DESYREL) 50 MG tablet Take 50 mg by mouth nightly    Historical Provider, MD   sertraline (ZOLOFT) 100 MG tablet Take 100 mg by mouth nightly    Historical Provider, MD   gabapentin (NEURONTIN) 300 MG capsule Take 300 mg by mouth 3 times daily. Historical Provider, MD      Scheduled Meds:   carbidopa-levodopa  1 tablet Oral 4x Daily    carbidopa-levodopa  1 tablet Oral Nightly    gabapentin  300 mg Oral TID    potassium chloride  10 mEq Oral Daily    sertraline  100 mg Oral Daily    traZODone  50 mg Oral Nightly    sodium chloride flush  5-40 mL IntraVENous 2 times per day    enoxaparin  40 mg SubCUTAneous Daily    pantoprazole  40 mg IntraVENous Daily    piperacillin-tazobactam  3,375 mg IntraVENous Q8H    doxycycline hyclate  100 mg Oral 2 times per day     Continuous Infusions:   sodium chloride      sodium chloride Stopped (22 1413)     PRN Meds:sodium chloride flush, sodium chloride, ondansetron **OR** ondansetron, polyethylene glycol, acetaminophen **OR** acetaminophen, albuterol  Family History   Family history unknown: Yes     Social History     Tobacco Use    Smoking status: Former     Types: Cigarettes     Quit date: 3/19/2008     Years since quittin.7    Smokeless tobacco: Never   Substance Use Topics    Alcohol use: No       Objective:    Physical Exam:  BP 87/61   Pulse 63   Temp 97.6 °F (36.4 °C) (Oral)   Resp 17   Ht 5' 11\" (1.803 m)   Wt 120 lb (54.4 kg)   SpO2 96%   BMI 16.74 kg/m²    General:  comfortable  Heent: There is no scleral icterus. Cardiovascular: The heart is regular rate and rhythm. Respiratory:   The patient's breathing is non-labored with normal chest wall excursion and normal muscle movement. Abdomen: The abdomen is nondistended, soft, and nontender. :  There are active bowel sounds. There are no masses or organomegaly  Rectal:  deferred   Extremities:  No edema. Neurological:  Patient is alert and oriented to hospital.      Labs and Imaging reviewed.   See HPI  Recent Labs     12/30/22  0648 12/31/22  0555 01/01/23  0630   HGB 11.0* 11.1* 11.9*   WBC 12.0* 9.7 9.1   PROT 5.0* 4.7*  --    LABALBU 2.4* 2.3*  --    ALKPHOS 196* 197*  --    ALT 11 8*  --    AST 45* 55*  --    BILITOT 0.5 0.3  --         Signed By: Bertha Jones MD   January 1, 2023

## 2023-01-01 NOTE — PLAN OF CARE
Problem: Discharge Planning  Goal: Discharge to home or other facility with appropriate resources  Outcome: Progressing  Flowsheets (Taken 1/1/2023 0936)  Discharge to home or other facility with appropriate resources:   Identify barriers to discharge with patient and caregiver   Arrange for needed discharge resources and transportation as appropriate   Identify discharge learning needs (meds, wound care, etc)     Problem: Skin/Tissue Integrity  Goal: Absence of new skin breakdown  Description: 1. Monitor for areas of redness and/or skin breakdown  2. Assess vascular access sites hourly  3. Every 4-6 hours minimum:  Change oxygen saturation probe site  4. Every 4-6 hours:  If on nasal continuous positive airway pressure, respiratory therapy assess nares and determine need for appliance change or resting period.   1/1/2023 1251 by Dennis Gilliam RN  Outcome: Progressing     Problem: Respiratory - Adult  Goal: Achieves optimal ventilation and oxygenation  1/1/2023 1251 by Dennis Gilliam RN  Outcome: Progressing  Flowsheets (Taken 1/1/2023 0936)  Achieves optimal ventilation and oxygenation: Assess for changes in respiratory status     Problem: Cardiovascular - Adult  Goal: Maintains optimal cardiac output and hemodynamic stability  1/1/2023 1251 by Dennis Gilliam RN  Outcome: Progressing  Flowsheets (Taken 1/1/2023 0936)  Maintains optimal cardiac output and hemodynamic stability: Monitor blood pressure and heart rate     Problem: Cardiovascular - Adult  Goal: Absence of cardiac dysrhythmias or at baseline  Outcome: Progressing  Flowsheets (Taken 1/1/2023 0936)  Absence of cardiac dysrhythmias or at baseline: Monitor cardiac rate and rhythm     Problem: Musculoskeletal - Adult  Goal: Return mobility to safest level of function  1/1/2023 1251 by Dennis Gilliam RN  Outcome: Progressing  Flowsheets (Taken 1/1/2023 0936)  Return Mobility to Safest Level of Function: Assess patient stability and activity tolerance for standing, transferring and ambulating with or without assistive devices     Problem: Musculoskeletal - Adult  Goal: Maintain proper alignment of affected body part  Outcome: Progressing  Flowsheets (Taken 1/1/2023 0936)  Maintain proper alignment of affected body part: Support and protect limb and body alignment per provider's orders     Problem: Gastrointestinal - Adult  Goal: Maintains adequate nutritional intake  1/1/2023 1251 by Juan R Wellington RN  Outcome: Progressing  Flowsheets (Taken 1/1/2023 0936)  Maintains adequate nutritional intake: Monitor percentage of each meal consumed     Problem: Pain  Goal: Verbalizes/displays adequate comfort level or baseline comfort level  Outcome: Progressing     Problem: Gastrointestinal - Adult  Goal: Maintains adequate nutritional intake  1/1/2023 1251 by Juan R Wellington RN  Outcome: Progressing  Flowsheets (Taken 1/1/2023 0936)  Maintains adequate nutritional intake: Monitor percentage of each meal consumed  1/1/2023 0517 by Les Chandler RN  Outcome: Not Progressing

## 2023-01-01 NOTE — PROGRESS NOTES
Hospitalist Progress Note      PCP: Cas Payton MD    Date of Admission: 12/28/2022    Chief Complaint: This is a 75-year-old male with significant past medical history of Parkinson's disease and unspecified liver disease who presents to [facility] emergency department due to concerns of chest pain radiating into the shoulder and back. He is unable to provide much subjective data and daughter was at bedside previously, but not for this exam.  ED documentation and ED nurse was utilized to collect subjective data. Onset of symptoms have been last few days. Daughter mentioned to ED provider that his chest pain and dyspnea began after choking on some food. It is unclear whether or not he has been febrile at home. His evaluation here included laboratory studies, EKG, chest x-ray, and CT of the chest, abdomen, and pelvis with IV contrast.  CT showed right upper and middle lobe consolidation probably pneumonia. Pertinent abnormal lab values include stable but chronic elevation of alkaline phosphatase at 274 and . Additionally, cell count showed WBC of 15.9, hemoglobin 11.4, and platelets 367. Patient was given IV fluids, Zithromax, and Rocephin in the ED. Hospital team was consulted to admit this patient for community-acquired pneumonia. Subjective:     He denies lightheadedness, shortness of breath, chest pain, palpitations. He also denies nausea, abdominal pain, dysuria, diarrhea. He continues on pureed diet with thick liquids --- family request foods for comfort while waiting for PEG.       Medications:  Reviewed    Infusion Medications    sodium chloride      sodium chloride Stopped (12/30/22 1413)     Scheduled Medications    carbidopa-levodopa  1 tablet Oral 4x Daily    carbidopa-levodopa  1 tablet Oral Nightly    gabapentin  300 mg Oral TID    potassium chloride  10 mEq Oral Daily    sertraline  100 mg Oral Daily    traZODone  50 mg Oral Nightly    sodium chloride flush  5-40 mL IntraVENous 2 times per day    enoxaparin  40 mg SubCUTAneous Daily    pantoprazole  40 mg IntraVENous Daily    piperacillin-tazobactam  3,375 mg IntraVENous Q8H    doxycycline hyclate  100 mg Oral 2 times per day     PRN Meds: sodium chloride flush, sodium chloride, ondansetron **OR** ondansetron, polyethylene glycol, acetaminophen **OR** acetaminophen, albuterol      Intake/Output Summary (Last 24 hours) at 1/1/2023 1148  Last data filed at 12/31/2022 2302  Gross per 24 hour   Intake 120 ml   Output 2400 ml   Net -2280 ml         Physical Exam Performed:    /82   Pulse 74   Temp 97.4 °F (36.3 °C) (Oral)   Resp 18   Ht 5' 11\" (1.803 m)   Wt 120 lb (54.4 kg)   SpO2 98%   BMI 16.74 kg/m²       GEN thin, in no distress  HEENT normocephalic, anicteric sclera, EOMI, mucosa moist, no stridor  NECK supple, trachea midline  CHEST  RESP on RA, in no distress, clear to auscultation  CARDS RRR, S1, S2, no murmurs, no edema, radial pulse 2+, DP pulse 2+  ABD +BS, soft nontender  MSK no cyanosis, no clubbing  SKIN warm, dry  NEURO alert, oriented x 3, no facial asymmetry, no dysarthria, moving spontaneously  PSYCH normal mood    Labs:   Recent Labs     12/30/22  0648 12/31/22  0555 01/01/23  0630   WBC 12.0* 9.7 9.1   HGB 11.0* 11.1* 11.9*   HCT 33.4* 32.6* 34.7*   * 501* 511*       Recent Labs     12/30/22  0648 12/31/22  0555    139   K 4.2 4.3    106   CO2 26 27   BUN 12 14   CREATININE 0.7* 0.7*   CALCIUM 8.6 8.4       Recent Labs     12/30/22  0648 12/31/22  0555   AST 45* 55*   ALT 11 8*   BILITOT 0.5 0.3   ALKPHOS 196* 197*       No results for input(s): INR in the last 72 hours. No results for input(s): Ashley Powell in the last 72 hours.       Urinalysis:      Lab Results   Component Value Date/Time    NITRU Negative 10/06/2022 07:12 PM    WBCUA None seen 06/12/2022 05:59 PM    RBCUA >100 06/12/2022 05:59 PM    BLOODU Negative 10/06/2022 07:12 PM    Morales Duran 1.015 10/06/2022 07:12 PM    GLUCOSEU Negative 10/06/2022 07:12 PM       Radiology:  Saratha Bernheim MODIFIED BARIUM SWALLOW W VIDEO   Final Result   Aspiration was seen      Please see separate speech pathology report for full discussion of findings   and recommendations. CTA CHEST ABDOMEN PELVIS W CONTRAST   Final Result   1. No thoracic or abdominal aortic aneurysm or dissection. 2. Dependent airspace disease likely rib representing atelectasis, with right   upper lobe and middle lobe consolidation felt to represent pneumonia. Recommend follow-up radiographs to document resolution. 3. Diffuse atherosclerosis including coronary artery involvement. 4. No acute inflammatory process seen within the abdomen or pelvis. XR CHEST PORTABLE   Final Result   New airspace consolidation the right middle lobe favored to represent   pneumonia. Radiographic follow-up to resolution is recommended. Assessment/Plan:    Active Hospital Problems    Diagnosis Date Noted    Community acquired bacterial pneumonia [J15.9] 12/29/2022     Priority: Medium    Chronic liver disease [K76.9] 12/29/2022     Priority: Medium    Chronic anemia [D64.9] 12/29/2022     Priority: Medium    Parkinson's disease ([facility] [address] Geri Calvillo 03/19/2014     RUL and RML Pneumonia, Concern for Aspiration Pneumonia  - CTA Chest 12/28 no thoracic or abdominal aortic aneurysm or dissection. Dependent airspace disease likely rib representing atelectasis with right upper lobe and middle lobe consolidation felt to represent pneumonia. - MBSS 12/30 showed aspiration.  - Continue IV zosyn and doxycycline (12/29-) for 7-10 days. - start probiotics. Aspiration  - SLP recommended NPO. However, family insists on diet. So SLP recommended pureed diet with thick liquids. -- Family agreed to PEG placement. -- GI consulted for PEG placement. Parkinson's Disease  - Continue home sinemet.     Underwent Weight  Protein Calorie Malnutrition  - PEG placement pending.  - CT A/P with contrast 12/28 showed no acute inflammatory process. - Continue IVF's. Normocytic Anemia  - adm HgB 11.4, MCV 88.2. Baseline HgB 9-12.  - iron studies. DVT Prophylaxis:   Diet: ADULT DIET; Dysphagia - Pureed;  Moderately Thick (Honey)  Code Status: DNR-CCA    PT/OT Eval ordered    Dispo - after PEG placement and transition to      Eliana Smith MD

## 2023-01-01 NOTE — PROGRESS NOTES
Hospitalist Progress Note      PCP: Janis Pierre MD    Date of Admission: 12/28/2022    Chief Complaint: This is a 78-year-old male with significant past medical history of Parkinson's disease and unspecified liver disease who [facility] department due to concerns of chest pain radiating into the shoulder and back. He is unable to provide much subjective data and daughter was at bedside previously, but not for this exam.  ED documentation and ED nurse was utilized to collect subjective data. Onset of symptoms have been last few days. Daughter mentioned to ED provider that his chest pain and dyspnea began after choking on some food. It is unclear whether or not he has been febrile at home. His evaluation here included laboratory studies, EKG, chest x-ray, and CT of the chest, abdomen, and pelvis with IV contrast.  CT showed right upper and middle lobe consolidation probably pneumonia. Pertinent abnormal lab values include stable but chronic elevation of alkaline phosphatase at 274 and . Additionally, cell count showed WBC of 15.9, hemoglobin 11.4, and platelets 102. Patient was given IV fluids, Zithromax, and Rocephin in the ED. Hospital team was consulted to admit this patient for community-acquired pneumonia. Subjective:   family request foods for comfort despite informed of patient's aspiration demonstrated on MBSS. He states that he is not choking on his foods. He denies lightheadedness, shortness of breath, chest pain, palpitations. He also denies nausea, abdominal pain, dysuria, diarrhea.         Medications:  Reviewed    Infusion Medications    sodium chloride      sodium chloride Stopped (12/30/22 1413)     Scheduled Medications    carbidopa-levodopa  1 tablet Oral 4x Daily    carbidopa-levodopa  1 tablet Oral Nightly    gabapentin  300 mg Oral TID    potassium chloride  10 mEq Oral Daily    sertraline  100 mg Oral Daily traZODone  50 mg Oral Nightly    sodium chloride flush  5-40 mL IntraVENous 2 times per day    enoxaparin  40 mg SubCUTAneous Daily    pantoprazole  40 mg IntraVENous Daily    piperacillin-tazobactam  3,375 mg IntraVENous Q8H    doxycycline hyclate  100 mg Oral 2 times per day     PRN Meds: sodium chloride flush, sodium chloride, ondansetron **OR** ondansetron, polyethylene glycol, acetaminophen **OR** acetaminophen, albuterol      Intake/Output Summary (Last 24 hours) at 1/1/2023 0224  Last data filed at 12/31/2022 2302  Gross per 24 hour   Intake 360 ml   Output 2800 ml   Net -2440 ml         Physical Exam Performed:    /68   Pulse 68   Temp 98 °F (36.7 °C) (Oral)   Resp 17   Ht 5' 11\" (1.803 m)   Wt 120 lb (54.4 kg)   SpO2 100%   BMI 16.74 kg/m²     GEN thin, in no distress  HEENT normocephalic, anicteric sclera, EOMI, mucosa moist, no stridor  NECK supple, trachea midline  CHEST  RESP on RA, in no distress, clear to auscultation  CARDS RRR, S1, S2, no murmurs, no edema, radial pulse 2+, DP pulse 2+  ABD +BS, soft nontender  MSK no cyanosis, no clubbing  SKIN warm, dry  NEURO alert, oriented x 3, no facial asymmetry, no dysarthria, moving spontaneously  PSYCH normal mood      Labs:   Recent Labs     12/29/22  0717 12/30/22  0648 12/31/22  0555   WBC 14.8* 12.0* 9.7   HGB 11.9* 11.0* 11.1*   HCT 35.9* 33.4* 32.6*   * 518* 501*       Recent Labs     12/29/22  0717 12/30/22  0648 12/31/22  0555    142 139   K 3.6 4.2 4.3    108 106   CO2 26 26 27   BUN 11 12 14   CREATININE 0.7* 0.7* 0.7*   CALCIUM 8.5 8.6 8.4       Recent Labs     12/29/22  0717 12/30/22  0648 12/31/22  0555   AST 72* 45* 55*   ALT 29 11 8*   BILITOT 0.4 0.5 0.3   ALKPHOS 245* 196* 197*       No results for input(s): INR in the last 72 hours.   Recent Labs     12/29/22  0717   TROPONINI <0.01         Urinalysis:      Lab Results   Component Value Date/Time    NITRU Negative 10/06/2022 07:12 PM    WBCUA None seen 06/12/2022 05:59 PM    RBCUA >100 06/12/2022 05:59 PM    BLOODU Negative 10/06/2022 07:12 PM    SPECGRAV 1.015 10/06/2022 07:12 PM    GLUCOSEU Negative 10/06/2022 07:12 PM       Radiology:  Rusty Yao MODIFIED BARIUM SWALLOW W VIDEO   Final Result   Aspiration was seen      Please see separate speech pathology report for full discussion of findings   and recommendations. CTA CHEST ABDOMEN PELVIS W CONTRAST   Final Result   1. No thoracic or abdominal aortic aneurysm or dissection. 2. Dependent airspace disease likely rib representing atelectasis, with right   upper lobe and middle lobe consolidation felt to represent pneumonia. Recommend follow-up radiographs to document resolution. 3. Diffuse atherosclerosis including coronary artery involvement. 4. No acute inflammatory process seen within the abdomen or pelvis. XR CHEST PORTABLE   Final Result   New airspace consolidation the right middle lobe favored to represent   pneumonia. Radiographic follow-up to resolution is recommended. Assessment/Plan:    Active Hospital Problems    Diagnosis Date Noted    Community acquired bacterial pneumonia [J15.9] 12/29/2022     Priority: Medium    Chronic liver disease [K76.9] 12/29/2022     Priority: Medium    Chronic anemia [D64.9] 12/29/2022     Priority: Medium    Parkinson's disease ([facility] [address] Namrata Chun 03/19/2014     RUL and RML Pneumonia, Concern for Aspiration Pneumonia  - CTA Chest 12/28 no thoracic or abdominal aortic aneurysm or dissection. Dependent airspace disease likely rib representing atelectasis with right upper lobe and middle lobe consolidation felt to represent pneumonia. - MBSS 12/30 showed aspiration.  - Continue IV zosyn and doxycycline (12/29-) for 7-10 days. - start probiotics. Aspiration  - SLP recommended NPO. However, family insists on diet. So SLP recommended pureed diet with thick liquids. -- Family agreed to PEG placement.   -- GI consulted for PEG placement. Parkinson's Disease  - Continue home sinemet. Underwent Weight  Protein Calorie Malnutrition  - PEG placement pending.  - CT A/P with contrast 12/28 showed no acute inflammatory process. - Continue IVF's. Normocytic Anemia  - adm HgB 11.4, MCV 88.2. Baseline HgB 9-12.  - monitor         DVT Prophylaxis:   Diet: ADULT DIET; Dysphagia - Pureed;  Moderately Thick (Honey)  Code Status: DNR-CCA    PT/OT Eval Stat    Dispo -    Nico Bassett MD

## 2023-01-01 NOTE — PLAN OF CARE
Problem: Gastrointestinal - Adult  Goal: Maintains adequate nutritional intake  Outcome: Not Progressing     Problem: Respiratory - Adult  Goal: Achieves optimal ventilation and oxygenation  Outcome: Progressing     Problem: Cardiovascular - Adult  Goal: Maintains optimal cardiac output and hemodynamic stability  Outcome: Progressing     Problem: Musculoskeletal - Adult  Goal: Return mobility to safest level of function  Outcome: Progressing

## 2023-01-02 LAB
BLOOD CULTURE, ROUTINE: NORMAL
CULTURE, BLOOD 2: NORMAL

## 2023-01-02 PROCEDURE — 2580000003 HC RX 258: Performed by: NURSE PRACTITIONER

## 2023-01-02 PROCEDURE — 97530 THERAPEUTIC ACTIVITIES: CPT

## 2023-01-02 PROCEDURE — 6370000000 HC RX 637 (ALT 250 FOR IP): Performed by: NURSE PRACTITIONER

## 2023-01-02 PROCEDURE — 6370000000 HC RX 637 (ALT 250 FOR IP): Performed by: INTERNAL MEDICINE

## 2023-01-02 PROCEDURE — 6360000002 HC RX W HCPCS: Performed by: NURSE PRACTITIONER

## 2023-01-02 PROCEDURE — 97110 THERAPEUTIC EXERCISES: CPT

## 2023-01-02 PROCEDURE — 6360000002 HC RX W HCPCS: Performed by: INTERNAL MEDICINE

## 2023-01-02 PROCEDURE — C9113 INJ PANTOPRAZOLE SODIUM, VIA: HCPCS | Performed by: NURSE PRACTITIONER

## 2023-01-02 PROCEDURE — 1200000000 HC SEMI PRIVATE

## 2023-01-02 PROCEDURE — 97535 SELF CARE MNGMENT TRAINING: CPT

## 2023-01-02 PROCEDURE — 2580000003 HC RX 258: Performed by: INTERNAL MEDICINE

## 2023-01-02 RX ADMIN — SODIUM CHLORIDE, PRESERVATIVE FREE 10 ML: 5 INJECTION INTRAVENOUS at 20:43

## 2023-01-02 RX ADMIN — SODIUM CHLORIDE, PRESERVATIVE FREE 10 ML: 5 INJECTION INTRAVENOUS at 10:03

## 2023-01-02 RX ADMIN — PIPERACILLIN AND TAZOBACTAM 3375 MG: 3; .375 INJECTION, POWDER, FOR SOLUTION INTRAVENOUS at 10:13

## 2023-01-02 RX ADMIN — GABAPENTIN 300 MG: 300 CAPSULE ORAL at 14:42

## 2023-01-02 RX ADMIN — CARBIDOPA AND LEVODOPA 1 TABLET: 25; 100 TABLET ORAL at 20:43

## 2023-01-02 RX ADMIN — DOXYCYCLINE HYCLATE 100 MG: 100 TABLET, COATED ORAL at 10:02

## 2023-01-02 RX ADMIN — CARBIDOPA AND LEVODOPA 1 TABLET: 25; 100 TABLET ORAL at 14:42

## 2023-01-02 RX ADMIN — CARBIDOPA AND LEVODOPA 1 TABLET: 25; 100 TABLET ORAL at 17:57

## 2023-01-02 RX ADMIN — CARBIDOPA AND LEVODOPA 1 TABLET: 50; 200 TABLET, EXTENDED RELEASE ORAL at 20:43

## 2023-01-02 RX ADMIN — PANTOPRAZOLE SODIUM 40 MG: 40 INJECTION, POWDER, FOR SOLUTION INTRAVENOUS at 10:02

## 2023-01-02 RX ADMIN — PIPERACILLIN AND TAZOBACTAM 3375 MG: 3; .375 INJECTION, POWDER, FOR SOLUTION INTRAVENOUS at 02:38

## 2023-01-02 RX ADMIN — ENOXAPARIN SODIUM 40 MG: 100 INJECTION SUBCUTANEOUS at 10:02

## 2023-01-02 RX ADMIN — POTASSIUM CHLORIDE 10 MEQ: 750 TABLET, EXTENDED RELEASE ORAL at 10:01

## 2023-01-02 RX ADMIN — GABAPENTIN 300 MG: 300 CAPSULE ORAL at 20:43

## 2023-01-02 RX ADMIN — PIPERACILLIN AND TAZOBACTAM 3375 MG: 3; .375 INJECTION, POWDER, FOR SOLUTION INTRAVENOUS at 17:57

## 2023-01-02 RX ADMIN — GABAPENTIN 300 MG: 300 CAPSULE ORAL at 10:01

## 2023-01-02 RX ADMIN — DOXYCYCLINE HYCLATE 100 MG: 100 TABLET, COATED ORAL at 20:43

## 2023-01-02 RX ADMIN — TRAZODONE HYDROCHLORIDE 50 MG: 50 TABLET ORAL at 20:43

## 2023-01-02 RX ADMIN — SERTRALINE HYDROCHLORIDE 100 MG: 50 TABLET ORAL at 10:02

## 2023-01-02 RX ADMIN — CARBIDOPA AND LEVODOPA 1 TABLET: 25; 100 TABLET ORAL at 10:02

## 2023-01-02 ASSESSMENT — PAIN SCALES - WONG BAKER: WONGBAKER_NUMERICALRESPONSE: 0

## 2023-01-02 NOTE — PROGRESS NOTES
Ashley Warner was evaluated today and a DME order was entered for a semi-electric hospital bed because he requires assistance for requirement of elevation of head of bed more than 30 degrees for the diagnosis of Parkinson's Disease, Aspiration Pneumonia; Tube Feeds. Patient requires frequent and immediate positioning changes for relief of pain and care needs related to medical diagnoses. Patient needs variability of bed height requirements to perform patient transfers and for eating, bathing, toileting, personal cares, and ambulating. Current body Weight: 120 lb (54.4 kg). The need for this equipment was discussed with the patient and he understands and is in agreement.

## 2023-01-02 NOTE — CARE COORDINATION
Chart reviewed. PNA, likely aspiration. Management per IM. IV ABX. SLP following, MBS completed. SLP recommends NPO. Family agreeable to PEG tube placement. Family requesting hospital bed due to aspiration risks. CM contacted provider, dme order obtained. CM faxed order to [place]. Family requesting Palliative care consult. Family has questions regarding when to know if pt is ready for hospice. Palliative care consult placed. Pt lives with granddaughter, also has bedroom at daughter's house. Pt is well set up at home with safety equipment. Pt gets [facility] services through [place]. Pt is never left alone. Family provides around the clock care. Family will refuse SNF. Plan to return home with current services. CM will continue to follow, should needs arise.  Shirley Ramírez RN

## 2023-01-02 NOTE — PROGRESS NOTES
Hospitalist Progress Note      PCP: Antionette Hobbs MD    Date of Admission: 12/28/2022    Chief Complaint: This is a 57-year-old male with significant past medical history of Parkinson's disease and unspecified liver disease who presents to [facility] emergency department due to concerns of chest pain radiating into the shoulder and back. He is unable to provide much subjective data and daughter was at bedside previously, but not for this exam.  ED documentation and ED nurse was utilized to collect subjective data. Onset of symptoms have been last few days. Daughter mentioned to ED provider that his chest pain and dyspnea began after choking on some food. It is unclear whether or not he has been febrile at home. His evaluation here included laboratory studies, EKG, chest x-ray, and CT of the chest, abdomen, and pelvis with IV contrast.  CT showed right upper and middle lobe consolidation probably pneumonia. Pertinent abnormal lab values include stable but chronic elevation of alkaline phosphatase at 274 and . Additionally, cell count showed WBC of 15.9, hemoglobin 11.4, and platelets 665. Patient was given IV fluids, Zithromax, and Rocephin in the ED. Hospital team was consulted to admit this patient for community-acquired pneumonia. Subjective:     He continues on pureed diet with thick liquids although struggles with signs of aspiration. Otherwise comfortable.    PEG planned      Medications:  Reviewed    Infusion Medications    sodium chloride      sodium chloride Stopped (12/30/22 1413)     Scheduled Medications    carbidopa-levodopa  1 tablet Oral 4x Daily    carbidopa-levodopa  1 tablet Oral Nightly    gabapentin  300 mg Oral TID    potassium chloride  10 mEq Oral Daily    sertraline  100 mg Oral Daily    traZODone  50 mg Oral Nightly    sodium chloride flush  5-40 mL IntraVENous 2 times per day    enoxaparin  40 mg SubCUTAneous Daily    pantoprazole  40 mg IntraVENous Daily    piperacillin-tazobactam  3,375 mg IntraVENous Q8H    doxycycline hyclate  100 mg Oral 2 times per day     PRN Meds: sodium chloride flush, sodium chloride, ondansetron **OR** ondansetron, polyethylene glycol, acetaminophen **OR** acetaminophen, albuterol      Intake/Output Summary (Last 24 hours) at 1/2/2023 1318  Last data filed at 1/2/2023 1879  Gross per 24 hour   Intake 661.11 ml   Output 650 ml   Net 11.11 ml         Physical Exam Performed:    BP 94/65   Pulse 82   Temp 97.3 °F (36.3 °C) (Oral)   Resp 18   Ht 5' 11\" (1.803 m)   Wt 120 lb (54.4 kg)   SpO2 94%   BMI 16.74 kg/m²       GEN thin, in no distress  HEENT normocephalic, anicteric sclera, EOMI, mucosa moist, no stridor  NECK supple, trachea midline  CHEST  RESP on RA, in no distress, clear to auscultation  CARDS RRR, S1, S2, no murmurs, no edema, radial pulse 2+, DP pulse 2+  ABD +BS, soft nontender  MSK no cyanosis, no clubbing  SKIN warm, dry  NEURO alert, oriented x 3, no facial asymmetry, no dysarthria, moving spontaneously  PSYCH normal mood    Labs:   Recent Labs     12/31/22  0555 01/01/23  0630   WBC 9.7 9.1   HGB 11.1* 11.9*   HCT 32.6* 34.7*   * 511*       Recent Labs     12/31/22  0555 01/01/23  0630    140   K 4.3 4.5    105   CO2 27 28   BUN 14 13   CREATININE 0.7* 0.8   CALCIUM 8.4 8.7   PHOS  --  3.4       Recent Labs     12/31/22  0555   AST 55*   ALT 8*   BILITOT 0.3   ALKPHOS 197*       No results for input(s): INR in the last 72 hours. No results for input(s): [place] in the last 72 hours.       Urinalysis:      Lab Results   Component Value Date/Time    NITRU Negative 10/06/2022 07:12 PM    WBCUA None seen 06/12/2022 05:59 PM    RBCUA >100 06/12/2022 05:59 PM    BLOODU Negative 10/06/2022 07:12 PM    SPECGRAV 1.015 10/06/2022 07:12 PM    GLUCOSEU Negative 10/06/2022 07:12 PM       Radiology:  Simi Stevens MODIFIED BARIUM SWALLOW W VIDEO   Final Result   Aspiration was seen      Please see separate speech pathology report for full discussion of findings   and recommendations. CTA CHEST ABDOMEN PELVIS W CONTRAST   Final Result   1. No thoracic or abdominal aortic aneurysm or dissection. 2. Dependent airspace disease likely rib representing atelectasis, with right   upper lobe and middle lobe consolidation felt to represent pneumonia. Recommend follow-up radiographs to document resolution. 3. Diffuse atherosclerosis including coronary artery involvement. 4. No acute inflammatory process seen within the abdomen or pelvis. XR CHEST PORTABLE   Final Result   New airspace consolidation the right middle lobe favored to represent   pneumonia. Radiographic follow-up to resolution is recommended. Assessment/Plan:    Active Hospital Problems    Diagnosis Date Noted    Community acquired bacterial pneumonia [J15.9] 12/29/2022     Priority: Medium    Chronic liver disease [K76.9] 12/29/2022     Priority: Medium    Chronic anemia [D64.9] 12/29/2022     Priority: Medium    Parkinson's disease ([facility] [address] Osman Leida 03/19/2014     RUL and RML Pneumonia, Concern for Aspiration Pneumonia  - CTA Chest 12/28 no thoracic or abdominal aortic aneurysm or dissection. Dependent airspace disease likely rib representing atelectasis with right upper lobe and middle lobe consolidation felt to represent pneumonia. - MBSS 12/30 showed aspiration.  - Continue IV zosyn and doxycycline (12/29-) for 7-10 days. - start probiotics. Aspiration  - SLP recommended NPO. However, family insists on diet. So SLP recommended pureed diet with thick liquids. -- Family agreed to PEG placement. -- GI consulted for PEG placement with plan for Tuesday    Parkinson's Disease  - Continue home sinemet. Underwent Weight  Protein Calorie Malnutrition  - PEG placement pending.  - CT A/P with contrast 12/28 showed no acute inflammatory process. - Continue IVF's. Normocytic Anemia  - adm HgB 11.4, MCV 88.2. Baseline HgB 9-12.  - iron studies. DVT Prophylaxis:   Diet: ADULT DIET; Dysphagia - Pureed;  Moderately Thick (Honey)  Code Status: DNR-CCA    PT/OT Eval ordered    Dispo - 2 days    Rosario Mott MD

## 2023-01-02 NOTE — PROGRESS NOTES
Progress Note    Patient Cindy Cheema  MRN: [id number]  YOB: 1945 Age: 76 y.o. Sex: male  Room: [address] [place] [address]       Admitting Physician: Ousmane Addison MD   Date of Admission: 12/28/2022  7:02 PM   Primary Care Physician: Carmen Davenport MD     Subjective:  Cindy Cheema was seen and examined. We are following for dysphagia. -- We will plan on having him have his PEG tube placed tomorrow  2 g of Ancef on-call  -Patient currently sleeping    ROS:  Constitutional: Denies fever, no change in appetite  Respiratory: Denies cough or shortness of breath  Cardiovascular: Denies chest pain or edema    Objective:  Vital Signs:   Vitals:    01/02/23 1312   BP: 94/65   Pulse: 82   Resp:    Temp:    SpO2: 94%         Physical Exam:  Constitutional: Sleeping no acute distress. Respiratory: Respirations nonlabored, no crepitus  GI: Abdomen nondistended, soft, and nontender. Neurological: No focal deficits noted. No asterixis.     Intake/Output:    Intake/Output Summary (Last 24 hours) at 1/2/2023 1842  Last data filed at 1/2/2023 1340  Gross per 24 hour   Intake 641.11 ml   Output --   Net 641.11 ml        Current Medications:  Current Facility-Administered Medications   Medication Dose Route Frequency Provider Last Rate Last Admin    carbidopa-levodopa (SINEMET)  MG per tablet 1 tablet  1 tablet Oral 4x Daily Lethea Pott, APRN - CNP   1 tablet at 01/02/23 1757    carbidopa-levodopa (SINEMET CR)  MG per extended release tablet 1 tablet  1 tablet Oral Nightly Lethea Pott, APRN - CNP   1 tablet at 01/01/23 2233    gabapentin (NEURONTIN) capsule 300 mg  300 mg Oral TID Lethea Pott, APRN - CNP   300 mg at 01/02/23 1442    potassium chloride (KLOR-CON M) extended release tablet 10 mEq  10 mEq Oral Daily Lethea Pott, APRN - CNP   10 mEq at 01/02/23 1001    sertraline (ZOLOFT) tablet 100 mg  100 mg Oral Daily Lethea Pott, APRN - CNP   100 mg at 01/02/23 1002 traZODone (DESYREL) tablet 50 mg  50 mg Oral Nightly Lethea Pott, APRN - CNP   50 mg at 01/01/23 1951    sodium chloride flush 0.9 % injection 5-40 mL  5-40 mL IntraVENous 2 times per day Lethea Pott, APRN - CNP   10 mL at 01/02/23 1003    sodium chloride flush 0.9 % injection 5-40 mL  5-40 mL IntraVENous PRN Lethea Pott, APRN - CNP        0.9 % sodium chloride infusion   IntraVENous PRN Lethea Pott, APRN - CNP        enoxaparin (LOVENOX) injection 40 mg  40 mg SubCUTAneous Daily Lethea Pott, APRN - CNP   40 mg at 01/02/23 1002    ondansetron (ZOFRAN-ODT) disintegrating tablet 4 mg  4 mg Oral Q8H PRN Lethea Pott, APRN - CNP        Or    ondansetron (ZOFRAN) injection 4 mg  4 mg IntraVENous Q6H PRN Lethea Pott, APRN - CNP        polyethylene glycol (GLYCOLAX) packet 17 g  17 g Oral Daily PRN Lethea Pott, APRN - CNP        acetaminophen (TYLENOL) tablet 650 mg  650 mg Oral Q6H PRN Lethea Pott, APRN - CNP        Or    acetaminophen (TYLENOL) suppository 650 mg  650 mg Rectal Q6H PRN Lethea Pott, APRN - CNP        0.9 % sodium chloride infusion   IntraVENous Continuous Lethea Pott, APRN - CNP   Stopped at 12/30/22 1413    pantoprazole (PROTONIX) injection 40 mg  40 mg IntraVENous Daily Lethea Pott, APRN - CNP   40 mg at 01/02/23 1002    albuterol (PROVENTIL) nebulizer solution 2.5 mg  2.5 mg Nebulization Q4H PRN Lethea Pott, APRN - CNP        piperacillin-tazobactam (ZOSYN) 3,375 mg in dextrose 5 % 50 mL IVPB extended infusion (mini-bag)  3,375 mg IntraVENous Q8H Yunior Rios MD 12.5 mL/hr at 01/02/23 1757 3,375 mg at 01/02/23 1757    doxycycline hyclate (VIBRA-TABS) tablet 100 mg  100 mg Oral 2 times per day Yunior Rios MD   100 mg at 01/02/23 1002         Recent Imaging:   [place] MODIFIED BARIUM SWALLOW W VIDEO  Narrative: EXAMINATION:  MODIFIED BARIUM SWALLOW WAS PERFORMED IN CONJUNCTION WITH SPEECH PATHOLOGY  SERVICES    TECHNIQUE:  Under fluoroscopic evaluation cineradiography/videoradiography recordings  were performed in conjunction with the speech-language pathologist (SLP). Various liquid, solid and/or semi-solid barium preparations were used to  assess swallowing function. FLUOROSCOPY DOSE AND TYPE OR TIME AND EXPOSURES:  2.7 mGy fluoroscopy    Total fluoroscopic dose 8.8 mGy. 20 fluoroscopic loops    COMPARISON:  None    HISTORY:  ORDERING SYSTEM PROVIDED HISTORY: aspiration  TECHNOLOGIST PROVIDED HISTORY:  Reason for exam:->aspiration    FINDINGS:  Aspiration was seen. There is prominent pooling of substances in the  piriform sinuses  Impression: Aspiration was seen    Please see separate speech pathology report for full discussion of findings  and recommendations. Labs:   Recent Labs     12/31/22  0555 01/01/23  0630   HGB 11.1* 11.9*   WBC 9.7 9.1   PROT 4.7*  --    LABALBU 2.3*  --    ALKPHOS 197*  --    ALT 8*  --    AST 55*  --    BILITOT 0.3  --           Assessment:    Dysphagia    Plan:  EGD with PEG tube placement  Will need to get family's consent  Will check CBC and [address] [employer], [address] [facility] [address].  Also available via [employer]

## 2023-01-02 NOTE — PROGRESS NOTES
Occupational Therapy  Facility/Department: [facility] B3 - MED SURG  Daily Co-Treatment Note  NAME: Lynn Higuera  : 1945  MRN: [id number]    Date of Service: 2023    Discharge Recommendations:  24 hour supervision or assist, Home with Home health OT  OT Equipment Recommendations  Equipment Needed: No (pt has needed DME)      Patient Diagnosis(es): The primary encounter diagnosis was Multifocal pneumonia. A diagnosis of Sepsis, due to unspecified organism, unspecified whether acute organ dysfunction present [facility]) was also pertinent to this visit. Assessment    Assessment: Pt tolerated therapy fair this date. Pt seen as co-treat w/ PT this date to safely meet goals and to maximize occupational performance outcomes greater in a co-treatment session than in 1:1 session. Upon arrival, pt found to be incontinent of bowel and bladder and req totalA for brief exchange and margaret care supine in bed, pt able to roll to L and R w/ modA. Pt able to complete STS transfer w/ modAx2 w/ HHA and SPT to chair w/ modAx2. Once in chair, pt req cues for UB stretching to open chest/airways and able to tolerate x10 BUE therex seated in chair, cues req for attending to task. Pt cont to present below his PLOF and benefits from inpatient OT services. Cont to rec home w/ 24hr assist and HHOT at d/c.   Activity Tolerance: Treatment limited secondary to decreased cognition;Patient tolerated evaluation without incident;Patient limited by fatigue  Discharge Recommendations: 24 hour supervision or assist;Home with Home health OT  Equipment Needed: No (pt has needed DME)     AM-PAC Daily Activity Inpatient   How much help for putting on and taking off regular lower body clothing?: Total  How much help for Bathing?: Total  How much help for Toileting?: Total  How much help for putting on and taking off regular upper body clothing?: A Lot  How much help for taking care of personal grooming?: A Lot  How much help for eating meals?: A Lot  AM-[facility] Inpatient Daily Activity Raw Score: 9  AM-PAC Inpatient ADL T-Scale Score : 25.33  ADL Inpatient CMS 0-100% Score: 79.59  ADL Inpatient CMS G-Code Modifier : CL      Plan   Occupational Therapy Plan  Times Per Week: 2-3x/wk  Current Treatment Recommendations: Self-Care / ADL; Patient/Caregiver education & training;Equipment evaluation, education, & procurement;Balance training;Functional mobility training; Safety education & training     Restrictions  Restrictions/Precautions  Restrictions/Precautions: Fall Risk  Required Braces or Orthoses?: No  Position Activity Restriction  Other position/activity restrictions: R UE IV,    Subjective   Subjective  Subjective: pt resting in bed upon arrival, agreeable to OT/PT tx  Pain: 10/10 pain in R hip  Orientation  Overall Orientation Status: Impaired  Orientation Level: Oriented to place;Oriented to person;Disoriented to situation;Disoriented to time  Pain: Pt reports 10/10 R hip pain with movement    Cognition  Overall Cognitive Status: Exceptions  Arousal/Alertness: Delayed responses to stimuli  Following Commands: Follows one step commands with increased time  Attention Span: Attends with cues to redirect  Memory: Decreased recall of recent events;Decreased short term memory  Safety Judgement: Decreased awareness of need for safety  Problem Solving: Decreased awareness of errors  Insights: Decreased awareness of deficits  Initiation: Requires cues for some  Sequencing: Requires cues for some        Objective    Vitals  Vitals  Heart Rate: 82  Heart Rate Source: Monitor  BP: 94/65  BP Location: Left upper arm  BP Method: Automatic  Patient Position: High fowlers  MAP (Calculated): 75  SpO2: 94 %  O2 Device: None (Room air)    Bed Mobility Training  Bed Mobility Training: Yes  Interventions: Manual cues; Tactile cues; Safety awareness training;Verbal cues (use of bed rails)  Rolling: Moderate assistance;Assist X1;Additional time; Adaptive equipment  Supine to Sit: Moderate assistance;Assist X2;Adaptive equipment (assist at trunk and BLE)  Sit to Supine: Other (comment) (CASEY: pt in chair at end of session)  Scooting: Maximum assistance  Balance  Sitting: Impaired (L lateral lean)  Sitting - Static: Fair (occasional)  Sitting - Dynamic: Fair (occasional)  Standing: Impaired (Left lateral lean requiring mod(A)x2 to correct during transfer)  Standing - Static: Constant support; Fair  Standing - Dynamic: Poor;Constant support  Transfer Training  Transfer Training: Yes  Overall Level of Assistance: Moderate assistance;Assist X2;Additional time  Interventions: Tactile cues; Verbal cues; Weight shifting training/pressure relief; Safety awareness training  Sit to Stand: Assist X2;Moderate assistance; Additional time (from EOB x2 reps; VC for hand placement)  Stand to Sit: Moderate assistance;Assist X2;Additional time  Stand Pivot Transfers: Additional time;Assist X2;Moderate assistance (SPT from bed to chair to R; narrow TIESHA, short shuffled steps)  Gait Training  Gait Training: No (CASEY due to increased assist with transfers)       ADL  Feeding: Contact guard assistance; Beverage management  Feeding Skilled Clinical Factors: Limited by tremors  Grooming: Contact guard assistance;Dependent/Total (seated in chair)  Grooming Skilled Clinical Factors: wash face, initially CGA but progressed to total 2/2 tremors  LE Dressing: Dependent/Total (supine in bed)  LE Dressing Skilled Clinical Factors: brief exchange following bowel and bladder incontinence  Toileting: Dependent/Total  Toileting Skilled Clinical Factors: bowel and bladder incontinence req totalA for margaret care    OT Exercises  A/AROM Exercises: Pt completed x10 reps BUE shoulder and elbow flexion/extension exercises to increase flexibility; pt w/ noticeable fatigue by completion of sets       Safety Devices  Type of Devices: Patient at risk for falls; All fall risk precautions in place; Left in chair;Call light within reach; Chair alarm in place;Gait belt;Nurse notified  Restraints  Restraints Initially in Place: No     Patient Education  Education Given To: Patient  Education Provided: Role of Therapy;Plan of Care;Transfer Training  Education Method: Verbal  Barriers to Learning: Cognition  Education Outcome: Continued education needed;Verbalized understanding    Disease Specific Education: Pt educated on importance of OOB mobility, prevention of complications of bedrest, and general safety during hospitalization. Pt verbalized understanding, cont ed needed    Goals  Short Term Goals  Time Frame for Short Term Goals: 1 week (1/05) unless noted  Short Term Goal 1: Perform functional transfers with mod A and AD - progressing 1/2, modAx2  Short Term Goal 2: Perform UE exer 10-15x each for endurance by 1/03 - progressing 1/2, x10  Short Term Goal 3: Perform 1-2 grooming tasks with min A  Patient Goals   Patient goals : \"Go home\"       Therapy Time   Individual Concurrent Group Co-treatment   Time In 1308         Time Out 6557         Minutes 39         Timed Code Treatment Minutes: 44 Minutes     If pt is unable to be seen after this session, please let this note serve as discharge summary. Please see case management note for discharge disposition. Thank you.      Ivette Jones, OTR/L

## 2023-01-02 NOTE — PROGRESS NOTES
Physical Therapy  Facility/Department: [facility] B3 - MED SURG  Daily Treatment Note  NAME: Parris Riggs  : 1945  MRN: [id number]    Date of Service: 2023    Discharge Recommendations:  24 hour supervision or assist, Home with Home health PT   PT Equipment Recommendations  Equipment Needed: Yes  Mobility Devices: [place] Bed : Electric - Full (Head of Bed), Bed Rails - Partial    Patient Diagnosis(es): The primary encounter diagnosis was Multifocal pneumonia. A diagnosis of Sepsis, due to unspecified organism, unspecified whether acute organ dysfunction present [facility]) was also pertinent to this visit. Assessment   Assessment: Pt seen this date as cotx with OT to progress functional mobility safely and maximize outcomes. Pt requires grossly mod(A)x2 for bed mobility and transfers to chair; pt continues to demo short, shuffled steps and left lateral lean. Pt would benefit from continued skilled PT to address current deficits. Recommend home with 24hr supervision and HHPT. Pt would also benefit from hospital bed upon d/c as pt requires bed rails for repositioning and to perform margaret-care in order to avoid skin breakdown. Activity Tolerance: Treatment limited secondary to decreased cognition;Patient tolerated evaluation without incident  Equipment Needed: Yes  Mobility Devices: Hospital Bed     Plan    Physcial Therapy Plan  General Plan: 2-3 times per week  Current Treatment Recommendations: Strengthening;ROM;Balance training;Gait training; Safety education & training;Stair training;Functional mobility training;Home exercise program;Therapeutic activities; Patient/Caregiver education & training;Neuromuscular re-education;Transfer training; Endurance training;Pain management;Positioning;Cognitive reorientation     Restrictions  Restrictions/Precautions  Restrictions/Precautions: Fall Risk  Required Braces or Orthoses?: No  Position Activity Restriction  Other position/activity restrictions: R UE IV,     Subjective    Subjective  Subjective: Pt semi-supine in bed upon arrival. Pt soiled of urine and BM, required assist to get cleaned up  Pain: Pt reports 10/10 R hip pain with movement  Orientation  Overall Orientation Status: Impaired  Orientation Level: Oriented to place;Oriented to person;Disoriented to situation;Disoriented to time  Cognition  Overall Cognitive Status: Exceptions  Arousal/Alertness: Delayed responses to stimuli  Following Commands: Follows one step commands with increased time  Attention Span: Attends with cues to redirect  Memory: Decreased recall of recent events;Decreased short term memory  Safety Judgement: Decreased awareness of need for safety  Problem Solving: Decreased awareness of errors  Insights: Decreased awareness of deficits  Initiation: Requires cues for some  Sequencing: Requires cues for some     Objective   Vitals  BP 94/65, HR 74 bpm, SpO2 96%     Bed Mobility Training  Bed Mobility Training: Yes  Interventions: Manual cues; Tactile cues; Safety awareness training;Verbal cues (use of bed rails)  Rolling: Moderate assistance;Assist X1;Additional time; Adaptive equipment  Supine to Sit: Moderate assistance;Assist X2;Adaptive equipment (assist at trunk and BLE)  Sit to Supine: Other (comment) (CASEY: pt in chair at end of session)  Scooting: Maximum assistance  Balance  Sitting: Impaired (L lateral lean)  Sitting - Static: Fair (occasional)  Sitting - Dynamic: Fair (occasional)  Standing: Impaired (Left lateral lean requiring mod(A)x2 to correct during transfer)  Standing - Static: Constant support; Fair  Standing - Dynamic: Poor;Constant support  Transfer Training  Transfer Training: Yes  Overall Level of Assistance: Moderate assistance;Assist X2;Additional time  Interventions: Tactile cues; Verbal cues; Weight shifting training/pressure relief; Safety awareness training  Sit to Stand: Assist X2;Moderate assistance; Additional time (from EOB x2 reps; VC for hand placement)  Stand to Sit: Moderate assistance;Assist X2;Additional time  Stand Pivot Transfers: Additional time;Assist X2;Moderate assistance (SPT from bed to chair to R; narrow TIESHA, short shuffled steps)  Gait Training  Gait Training: No (CASEY due to increased assist with transfers)    PT Exercises  Exercise Treatment: Pt perform ankle pumps, LAQ, marches, shoulder shrugs B x10 with VC for exaggerated movement/increased ROM    Safety Devices  Type of Devices: Patient at risk for falls; All fall risk precautions in place; Left in chair;Call light within reach; Chair alarm in place;Gait belt;Nurse notified     Goals  Short Term Goals  Time Frame for Short Term Goals: 1/5/23--1/2 All goals continue  Short Term Goal 1: pt will complete bed mobility with min A  Short Term Goal 2: pt will transfers with min A  Short Term Goal 3: pt will ambulate 20ft with LRAD and min A  Short Term Goal 4: pt will navigate up<>down 3 steps with LRAD and min A  Short Term Goal 5: pt will participate in 10-12 reps of BLE exercises by 1/1/23  Patient Goals   Patient Goals : unable to state goal due to cognition    Education  Patient Education  Education Given To: Patient  Education Provided: Role of Therapy;Plan of Care;Transfer Training;Equipment;Orientation;Home Exercise Program  Education Method: Verbal  Barriers to Learning: Cognition; Hearing  Education Outcome: Continued education needed    Therapy Time   Individual Concurrent Group Co-treatment   Time In 9888         Time Out 9046         Minutes 42         Timed Code Treatment Minutes: 42 Minutes     If pt is unable to be seen after this session, please let this note serve as discharge summary. Please see case management note for discharge disposition. Thank you.     Simba Colón, PT

## 2023-01-03 ENCOUNTER — ANESTHESIA (OUTPATIENT)
Dept: ENDOSCOPY | Age: 78
DRG: 194 | End: 2023-01-03
Payer: OTHER GOVERNMENT

## 2023-01-03 ENCOUNTER — ANESTHESIA EVENT (OUTPATIENT)
Dept: ENDOSCOPY | Age: 78
DRG: 194 | End: 2023-01-03
Payer: OTHER GOVERNMENT

## 2023-01-03 LAB
HCT VFR BLD CALC: 38.4 % (ref 40.5–52.5)
HEMOGLOBIN: 12.9 G/DL (ref 13.5–17.5)
INR BLD: 1.1 (ref 0.87–1.14)
MCH RBC QN AUTO: 29.6 PG (ref 26–34)
MCHC RBC AUTO-ENTMCNC: 33.6 G/DL (ref 31–36)
MCV RBC AUTO: 88 FL (ref 80–100)
PDW BLD-RTO: 14.8 % (ref 12.4–15.4)
PLATELET # BLD: 525 K/UL (ref 135–450)
PMV BLD AUTO: 6 FL (ref 5–10.5)
PROTHROMBIN TIME: 14.1 SEC (ref 11.7–14.5)
RBC # BLD: 4.36 M/UL (ref 4.2–5.9)
WBC # BLD: 8 K/UL (ref 4–11)

## 2023-01-03 PROCEDURE — 3700000001 HC ADD 15 MINUTES (ANESTHESIA): Performed by: INTERNAL MEDICINE

## 2023-01-03 PROCEDURE — 85027 COMPLETE CBC AUTOMATED: CPT

## 2023-01-03 PROCEDURE — 2580000003 HC RX 258: Performed by: NURSE PRACTITIONER

## 2023-01-03 PROCEDURE — 85610 PROTHROMBIN TIME: CPT

## 2023-01-03 PROCEDURE — 6370000000 HC RX 637 (ALT 250 FOR IP): Performed by: NURSE PRACTITIONER

## 2023-01-03 PROCEDURE — 1200000000 HC SEMI PRIVATE

## 2023-01-03 PROCEDURE — 6360000002 HC RX W HCPCS: Performed by: INTERNAL MEDICINE

## 2023-01-03 PROCEDURE — 3700000000 HC ANESTHESIA ATTENDED CARE: Performed by: INTERNAL MEDICINE

## 2023-01-03 PROCEDURE — 6360000002 HC RX W HCPCS: Performed by: NURSE PRACTITIONER

## 2023-01-03 PROCEDURE — 2500000003 HC RX 250 WO HCPCS: Performed by: INTERNAL MEDICINE

## 2023-01-03 PROCEDURE — 3609013300 HC EGD TUBE PLACEMENT: Performed by: INTERNAL MEDICINE

## 2023-01-03 PROCEDURE — 2580000003 HC RX 258: Performed by: INTERNAL MEDICINE

## 2023-01-03 PROCEDURE — 2709999900 HC NON-CHARGEABLE SUPPLY: Performed by: INTERNAL MEDICINE

## 2023-01-03 PROCEDURE — 2720000010 HC SURG SUPPLY STERILE: Performed by: INTERNAL MEDICINE

## 2023-01-03 PROCEDURE — 0DH63UZ INSERTION OF FEEDING DEVICE INTO STOMACH, PERCUTANEOUS APPROACH: ICD-10-PCS | Performed by: INTERNAL MEDICINE

## 2023-01-03 PROCEDURE — 6360000002 HC RX W HCPCS: Performed by: NURSE ANESTHETIST, CERTIFIED REGISTERED

## 2023-01-03 PROCEDURE — 3E0G76Z INTRODUCTION OF NUTRITIONAL SUBSTANCE INTO UPPER GI, VIA NATURAL OR ARTIFICIAL OPENING: ICD-10-PCS | Performed by: INTERNAL MEDICINE

## 2023-01-03 PROCEDURE — 36415 COLL VENOUS BLD VENIPUNCTURE: CPT

## 2023-01-03 PROCEDURE — 2500000003 HC RX 250 WO HCPCS: Performed by: NURSE ANESTHETIST, CERTIFIED REGISTERED

## 2023-01-03 PROCEDURE — 7100000010 HC PHASE II RECOVERY - FIRST 15 MIN: Performed by: INTERNAL MEDICINE

## 2023-01-03 PROCEDURE — 7100000011 HC PHASE II RECOVERY - ADDTL 15 MIN: Performed by: INTERNAL MEDICINE

## 2023-01-03 PROCEDURE — C9113 INJ PANTOPRAZOLE SODIUM, VIA: HCPCS | Performed by: NURSE PRACTITIONER

## 2023-01-03 RX ORDER — DOXYCYCLINE HYCLATE 100 MG
100 TABLET ORAL EVERY 12 HOURS SCHEDULED
Status: DISCONTINUED | OUTPATIENT
Start: 2023-01-04 | End: 2023-01-03

## 2023-01-03 RX ORDER — SODIUM CHLORIDE 0.9 % (FLUSH) 0.9 %
5-40 SYRINGE (ML) INJECTION EVERY 12 HOURS SCHEDULED
Status: DISCONTINUED | OUTPATIENT
Start: 2023-01-03 | End: 2023-01-05 | Stop reason: HOSPADM

## 2023-01-03 RX ORDER — DEXAMETHASONE SODIUM PHOSPHATE 4 MG/ML
4 INJECTION, SOLUTION INTRA-ARTICULAR; INTRALESIONAL; INTRAMUSCULAR; INTRAVENOUS; SOFT TISSUE
Status: DISCONTINUED | OUTPATIENT
Start: 2023-01-03 | End: 2023-01-03 | Stop reason: HOSPADM

## 2023-01-03 RX ORDER — TRAZODONE HYDROCHLORIDE 50 MG/1
50 TABLET ORAL NIGHTLY
Status: DISCONTINUED | OUTPATIENT
Start: 2023-01-03 | End: 2023-01-05 | Stop reason: HOSPADM

## 2023-01-03 RX ORDER — MIDAZOLAM HYDROCHLORIDE 1 MG/ML
2 INJECTION INTRAMUSCULAR; INTRAVENOUS
Status: DISCONTINUED | OUTPATIENT
Start: 2023-01-03 | End: 2023-01-03 | Stop reason: HOSPADM

## 2023-01-03 RX ORDER — SODIUM CHLORIDE 0.9 % (FLUSH) 0.9 %
5-40 SYRINGE (ML) INJECTION EVERY 12 HOURS SCHEDULED
Status: DISCONTINUED | OUTPATIENT
Start: 2023-01-03 | End: 2023-01-03 | Stop reason: HOSPADM

## 2023-01-03 RX ORDER — ONDANSETRON 2 MG/ML
4 INJECTION INTRAMUSCULAR; INTRAVENOUS
Status: DISCONTINUED | OUTPATIENT
Start: 2023-01-03 | End: 2023-01-03 | Stop reason: HOSPADM

## 2023-01-03 RX ORDER — DOXYCYCLINE HYCLATE 100 MG
100 TABLET ORAL EVERY 12 HOURS SCHEDULED
Status: COMPLETED | OUTPATIENT
Start: 2023-01-03 | End: 2023-01-04

## 2023-01-03 RX ORDER — SODIUM CHLORIDE 9 MG/ML
INJECTION, SOLUTION INTRAVENOUS PRN
Status: DISCONTINUED | OUTPATIENT
Start: 2023-01-03 | End: 2023-01-03 | Stop reason: HOSPADM

## 2023-01-03 RX ORDER — PHENYLEPHRINE HCL IN 0.9% NACL 1 MG/10 ML
SYRINGE (ML) INTRAVENOUS PRN
Status: DISCONTINUED | OUTPATIENT
Start: 2023-01-03 | End: 2023-01-03 | Stop reason: SDUPTHER

## 2023-01-03 RX ORDER — OXYCODONE HYDROCHLORIDE 5 MG/1
5 TABLET ORAL PRN
Status: DISCONTINUED | OUTPATIENT
Start: 2023-01-03 | End: 2023-01-03 | Stop reason: HOSPADM

## 2023-01-03 RX ORDER — SODIUM CHLORIDE 0.9 % (FLUSH) 0.9 %
5-40 SYRINGE (ML) INJECTION PRN
Status: DISCONTINUED | OUTPATIENT
Start: 2023-01-03 | End: 2023-01-05 | Stop reason: HOSPADM

## 2023-01-03 RX ORDER — PROPOFOL 10 MG/ML
INJECTION, EMULSION INTRAVENOUS PRN
Status: DISCONTINUED | OUTPATIENT
Start: 2023-01-03 | End: 2023-01-03 | Stop reason: SDUPTHER

## 2023-01-03 RX ORDER — OXYCODONE HYDROCHLORIDE 5 MG/1
10 TABLET ORAL PRN
Status: DISCONTINUED | OUTPATIENT
Start: 2023-01-03 | End: 2023-01-03 | Stop reason: HOSPADM

## 2023-01-03 RX ORDER — SODIUM CHLORIDE 0.9 % (FLUSH) 0.9 %
5-40 SYRINGE (ML) INJECTION PRN
Status: DISCONTINUED | OUTPATIENT
Start: 2023-01-03 | End: 2023-01-03 | Stop reason: HOSPADM

## 2023-01-03 RX ORDER — IPRATROPIUM BROMIDE AND ALBUTEROL SULFATE 2.5; .5 MG/3ML; MG/3ML
1 SOLUTION RESPIRATORY (INHALATION)
Status: DISCONTINUED | OUTPATIENT
Start: 2023-01-03 | End: 2023-01-03 | Stop reason: HOSPADM

## 2023-01-03 RX ORDER — ACETAMINOPHEN 325 MG/1
650 TABLET ORAL
Status: DISCONTINUED | OUTPATIENT
Start: 2023-01-03 | End: 2023-01-03 | Stop reason: HOSPADM

## 2023-01-03 RX ORDER — SODIUM CHLORIDE, SODIUM LACTATE, POTASSIUM CHLORIDE, CALCIUM CHLORIDE 600; 310; 30; 20 MG/100ML; MG/100ML; MG/100ML; MG/100ML
INJECTION, SOLUTION INTRAVENOUS CONTINUOUS
Status: DISCONTINUED | OUTPATIENT
Start: 2023-01-03 | End: 2023-01-03

## 2023-01-03 RX ORDER — DIPHENHYDRAMINE HYDROCHLORIDE 50 MG/ML
12.5 INJECTION INTRAMUSCULAR; INTRAVENOUS
Status: DISCONTINUED | OUTPATIENT
Start: 2023-01-03 | End: 2023-01-03 | Stop reason: HOSPADM

## 2023-01-03 RX ORDER — HYDRALAZINE HYDROCHLORIDE 20 MG/ML
10 INJECTION INTRAMUSCULAR; INTRAVENOUS
Status: DISCONTINUED | OUTPATIENT
Start: 2023-01-03 | End: 2023-01-03 | Stop reason: HOSPADM

## 2023-01-03 RX ORDER — LIDOCAINE HYDROCHLORIDE 10 MG/ML
INJECTION, SOLUTION EPIDURAL; INFILTRATION; INTRACAUDAL; PERINEURAL PRN
Status: DISCONTINUED | OUTPATIENT
Start: 2023-01-03 | End: 2023-01-03 | Stop reason: ALTCHOICE

## 2023-01-03 RX ORDER — SODIUM CHLORIDE 9 MG/ML
25 INJECTION, SOLUTION INTRAVENOUS PRN
Status: DISCONTINUED | OUTPATIENT
Start: 2023-01-03 | End: 2023-01-05 | Stop reason: HOSPADM

## 2023-01-03 RX ORDER — GABAPENTIN 300 MG/1
300 CAPSULE ORAL 3 TIMES DAILY
Status: DISCONTINUED | OUTPATIENT
Start: 2023-01-03 | End: 2023-01-05 | Stop reason: HOSPADM

## 2023-01-03 RX ADMIN — SODIUM CHLORIDE: 9 INJECTION, SOLUTION INTRAVENOUS at 10:21

## 2023-01-03 RX ADMIN — PIPERACILLIN AND TAZOBACTAM 3375 MG: 3; .375 INJECTION, POWDER, FOR SOLUTION INTRAVENOUS at 03:06

## 2023-01-03 RX ADMIN — PROPOFOL 10 MG: 10 INJECTION, EMULSION INTRAVENOUS at 12:28

## 2023-01-03 RX ADMIN — PROPOFOL 10 MG: 10 INJECTION, EMULSION INTRAVENOUS at 12:33

## 2023-01-03 RX ADMIN — CARBIDOPA AND LEVODOPA 1 TABLET: 25; 100 TABLET ORAL at 22:01

## 2023-01-03 RX ADMIN — PIPERACILLIN AND TAZOBACTAM 3375 MG: 3; .375 INJECTION, POWDER, FOR SOLUTION INTRAVENOUS at 17:33

## 2023-01-03 RX ADMIN — SODIUM CHLORIDE, PRESERVATIVE FREE 10 ML: 5 INJECTION INTRAVENOUS at 20:07

## 2023-01-03 RX ADMIN — SODIUM CHLORIDE, PRESERVATIVE FREE 10 ML: 5 INJECTION INTRAVENOUS at 10:17

## 2023-01-03 RX ADMIN — PIPERACILLIN AND TAZOBACTAM 3375 MG: 3; .375 INJECTION, POWDER, FOR SOLUTION INTRAVENOUS at 10:22

## 2023-01-03 RX ADMIN — GABAPENTIN 300 MG: 300 CAPSULE ORAL at 22:02

## 2023-01-03 RX ADMIN — TRAZODONE HYDROCHLORIDE 50 MG: 50 TABLET ORAL at 22:01

## 2023-01-03 RX ADMIN — Medication 100 MCG: at 12:30

## 2023-01-03 RX ADMIN — CARBIDOPA AND LEVODOPA 1 TABLET: 25; 100 TABLET ORAL at 17:30

## 2023-01-03 RX ADMIN — PROPOFOL 30 MG: 10 INJECTION, EMULSION INTRAVENOUS at 12:26

## 2023-01-03 RX ADMIN — SODIUM CHLORIDE: 9 INJECTION, SOLUTION INTRAVENOUS at 17:32

## 2023-01-03 RX ADMIN — PANTOPRAZOLE SODIUM 40 MG: 40 INJECTION, POWDER, FOR SOLUTION INTRAVENOUS at 10:13

## 2023-01-03 RX ADMIN — CEFAZOLIN 2000 MG: 10 INJECTION, POWDER, FOR SOLUTION INTRAVENOUS at 12:20

## 2023-01-03 RX ADMIN — PROPOFOL 10 MG: 10 INJECTION, EMULSION INTRAVENOUS at 12:30

## 2023-01-03 RX ADMIN — DOXYCYCLINE HYCLATE 100 MG: 100 TABLET, COATED ORAL at 22:02

## 2023-01-03 ASSESSMENT — PAIN SCALES - WONG BAKER
WONGBAKER_NUMERICALRESPONSE: 0

## 2023-01-03 ASSESSMENT — PAIN SCALES - GENERAL
PAINLEVEL_OUTOF10: 0
PAINLEVEL_OUTOF10: 10
PAINLEVEL_OUTOF10: 0
PAINLEVEL_OUTOF10: 0

## 2023-01-03 ASSESSMENT — PAIN DESCRIPTION - LOCATION: LOCATION: NECK

## 2023-01-03 NOTE — CARE COORDINATION
Chart reviewed. PNA, likely aspiration. Management per IM. IV ABX. SLP following, MBS completed. SLP recommends NPO. Family agreeable to PEG tube placement. PEG tube placed today. Family requesting hospital bed due to aspiration risks. CM contacted provider, dme order obtained. CM faxed order to [place]. Family requesting Palliative care consult. Family has questions regarding when to know if pt is ready for hospice. Palliative care consult placed. Pt lives with granddaughter, also has bedroom at daughter's house. Pt is well set up at home with safety equipment. Pt gets [employer] services through [place]. Pt is never left alone. Family provides around the clock care. Family will refuse SNF. Plan to return home with current services. CM will continue to follow, should needs arise. Shirley Leigh, DARIUS     Addendum 15:14. CM called [facility] regarding hospital bed order. Per [facility] Rep[facility] will likely not pay for hospital bed, since [place] insurance is primary. [facility]. Joe Waddell will discuss with his supervisor. Shirley Leigh, DARIUS     Addendum 15:52  CM spoke with Bela Goff, Palliative Care NP. Family wants hospice. Dickson Felder sent referral to [facility], per family preference. [facility] will schedule family meeting.  Shirley Leigh, RN

## 2023-01-03 NOTE — CONSULTS
PALLIATIVE MEDICINE CONSULTATION     Patient name:Mervin Mattson   MEO:[id number]    :1945  Room/Bed:0356/0356-01   LOS: 5 days         Date of consult:1/3/2023    Consult Information  Palliative Medicine Consult performed by: ZULEYMA Grissom CNP      Inpatient consult to Palliative Care  Consult performed by: ZULEYMA Mauricio CNP  Consult ordered by: Simona Stubbs MD           ASSESSMENT/RECOMMENDATIONS     76 y.o. male with advanced Parkinson's disease and aspiration pneumonia      Symptom Management:  Goals of Care - Family wants patient to have comfort and dignity and is strongly considering comfort care with hospice given his advanced Parkinson's and aspiration. G- tube placed this morning and family would like to allow pleasure feeds. Family intends for patient to return to bibi Harrison's home where he already has 24/7 care regardless of hospice status. Hospice order placed/referral sent to [facility]. Code status changed to Limited x 4 nos (until hospice status confirmed). Palliative will continue to follow. Hospice meeting Wednesday at 10 am  Protein-calorie malnutrition - BMI 16. Albumin 2.3, total protein 4.7. Per chart looks like patient has lost 20-25 lbs over the past 9 months. G tube placed today. Family intends for 90% of his nutrition to come from tube feeds and 10% from pleasure feeds. Parkinson's disease - on home sinemet. Per daughter/caregiver, patient has rapidly declined over past year and is at end stage. Uses a scooter to get around on his good days, but mostly confined to bed. Not able to communicate well due to weakness in voice. Aspirating. Patient/Family Goals of Care :    1/3/23    Spoke with daughter, Julien Reid, who is a nurse. She states patient is in the end stages of Parkinson's disease. He is cared for around the clock in her home. He is an aspirator and Marcy says she still wants to allow pleasure feeds.   She anticipated 90% of his nutrition will come from his G tube now that he has one. She wants to focus on comfort and dignity for him. He doesn't like to do therapy because it takes too much out of him. She would like to know when its time to look at hospice. Informed her that he currently qualifies for hospice due to his nutritional status. Also, all of the things she is requesting (mostly a hospital bed) would be provided under hospice. She is very interested. Offered to make a referral to a hospice agency so she could hear more specifically what they could and can't do for the patient. She requests the referral be placed to [facility]. She doesn't want the word \"hospice\" used around her mom because it will scare her. She prefers if we could refer to it as \"comfort care\". If she doesn't like what she hears from hospice then we discussed home palliative care may be another option to support the family in recognizing when it is time to re-consider hospice services. Either way they want patient to come back to daughter's house where he has been living. Discussed code status. Marcy states patient is a DNI. Advised her I would change his code status to reflect no intubation. As he is still getting Abx, will make code status a Limited x 4 Nos at this time. If/when patient enrolls in hospice we can amend to [facility]. Disposition/Discharge Plan:   - pending hospice meeting tomorrow at 10 am at daughter's residence       Advance Directives:  Surrogate Decision Maker: Spouse, Kendra Mazariegos  Code status:  Limited x 4 Nos      Palliative Performance Scale:     [] 60%  Amb reduced; Sig dz. Can't do hobbies/housework; Intake normal or reduced, Occasional assist; LOC full/confusion   [] 50%  Mainly sit/lie; Extensive disease. Mainly assist, Intake normal or reduced; Occasional assist; LOC full/confusion   [] 40%  Mainly in bed; Extensive disease; Mainly assist; Intake normal or reduced;  Occasional assist; LOC full/confusion   [] 30%  Bed bound, Extensive disease; Total care; Intake reduced; LOC full/confusion   [x] 20%  Bed bound; Extensive disease; Total care; Intake minimal; Drowsy/coma   [] 10%  Bed bound; Extensive disease; Total care; Mouth care only; Drowsy/coma   []  0%   Death      Case discussed with: patient, family, floor RN, case management, attending MD  Thank you for allowing us to participate in the care of this patient. HISTORY     CC: shoulder pain  HPI: The patient is a 76 y.o. male with significant past medical history of Parkinson's disease and unspecified liver disease who presents to [facility] emergency department due to concerns of chest pain radiating into the shoulder and back. He is unable to provide much subjective data and daughter was at bedside previously, but not for this exam.  ED documentation and ED nurse was utilized to collect subjective data. Onset of symptoms have been last few days. Daughter mentioned to ED provider that his chest pain and dyspnea began after choking on some food. It is unclear whether or not he has been febrile at home. His evaluation here included laboratory studies, EKG, chest x-ray, and CT of the chest, abdomen, and pelvis with IV contrast.  CT showed right upper and middle lobe consolidation probably pneumonia. Pertinent abnormal lab values include stable but chronic elevation of alkaline phosphatase at 274 and . Additionally, cell count showed WBC of 15.9, hemoglobin 11.4, and platelets 314. Patient was given IV fluids, Zithromax, and Rocephin in the ED. Hospital team was consulted to admit this patient for community-acquired pneumonia. Palliative Medicine SymptomScreening/ROS:    Review of Systems        Patient unable to complete full ROS due to current cognitive status/communication difficulties. Information that is obtained from nursing and chart.       Home med list and hospital medications reviewed in chart as of 1/3/2023     EXAM     Vitals:    01/03/23 1322   BP: 110/74   Pulse: 80   Resp: 18   Temp: 98.1 °F (36.7 °C)   SpO2: 95%       Physical Examination:   Physical Exam  Vitals reviewed. Constitutional:       General: He is not in acute distress. Appearance: He is ill-appearing. Comments: Lying in bed awake. Cachectic   HENT:      Head: Normocephalic and atraumatic. Nose: Nose normal.      Mouth/Throat:      Mouth: Mucous membranes are dry. Eyes:      General: No scleral icterus. Right eye: No discharge. Left eye: No discharge. Conjunctiva/sclera: Conjunctivae normal.   Cardiovascular:      Rate and Rhythm: Normal rate. Pulses: Normal pulses. Pulmonary:      Effort: Pulmonary effort is normal. No respiratory distress. Breath sounds: No stridor. No wheezing. Comments: Room air  Abdominal:      General: Abdomen is flat. Palpations: Abdomen is soft. Comments: Abd binder in place   Musculoskeletal:         General: Normal range of motion. Cervical back: Normal range of motion and neck supple. Right lower leg: No edema. Left lower leg: No edema. Skin:     General: Skin is warm and dry. Capillary Refill: Capillary refill takes less than 2 seconds. Coloration: Skin is pale. Neurological:      Mental Status: He is alert. Comments: CASEY clearly.   Tells me he had surgery today   Psychiatric:         Mood and Affect: Mood normal.         Behavior: Behavior normal.      Comments: calm           Current labs in the epic chart reviewed as of 1/3/2023   Review of previous notes, admits, labs, radiology and testing relevant to this consult done in this chart today 1/3/2023      Total time: 92 minutes  >50% of time spent counseling patient at bedside or POA/family member if applicable , reviewing information and discussing care, coordinating with care team  Signed By: Electronically signed by ZULEYMA Urena CNP on 1/3/2023 at 4:30 PM  Palliative Medicine   [phone]    January 3, 2023

## 2023-01-03 NOTE — PROGRESS NOTES
Ancef 2gm administered prior procedure start. 1:1 transillumination made, MD verified placement for PEG tube and marked location. Skin was cleansed and draped in sterile fashion with chlorhexidine. Lidocaine 4mL was used to locally anesthetize area. Small incision made in Left upper quadrant/area. PEG tube was pulled through abdomen and bumper placed at 3.5cm. Betadine placed under bumper followed by a clean dry dressing. An abdominal binder was then placed around patients abdomen. Patient tolerated procedure well and transferred to PACU.     Thom Hicks RN

## 2023-01-03 NOTE — PROGRESS NOTES
Hospitalist Progress Note      PCP: Cristo Vargas MD    Date of Admission: 12/28/2022    Chief Complaint: This is a 39-year-old male with significant past medical history of Parkinson's disease and unspecified liver disease who presents to [facility] emergency department due to concerns of chest pain radiating into the shoulder and back. He is unable to provide much subjective data and daughter was at bedside previously, but not for this exam.  ED documentation and ED nurse was utilized to collect subjective data. Onset of symptoms have been last few days. Daughter mentioned to ED provider that his chest pain and dyspnea began after choking on some food. It is unclear whether or not he has been febrile at home. His evaluation here included laboratory studies, EKG, chest x-ray, and CT of the chest, abdomen, and pelvis with IV contrast.  CT showed right upper and middle lobe consolidation probably pneumonia. Pertinent abnormal lab values include stable but chronic elevation of alkaline phosphatase at 274 and . Additionally, cell count showed WBC of 15.9, hemoglobin 11.4, and platelets 399. Patient was given IV fluids, Zithromax, and Rocephin in the ED. Hospital team was consulted to admit this patient for community-acquired pneumonia. Subjective:     PEG placed. Slightly more disoriented today.      Medications:  Reviewed    Infusion Medications    sodium chloride      lactated ringers      sodium chloride      sodium chloride 75 mL/hr at 01/03/23 1221     Scheduled Medications    sodium chloride flush  5-40 mL IntraVENous 2 times per day    carbidopa-levodopa  1 tablet Oral 4x Daily    carbidopa-levodopa  1 tablet Oral Nightly    gabapentin  300 mg Oral TID    potassium chloride  10 mEq Oral Daily    sertraline  100 mg Oral Daily    traZODone  50 mg Oral Nightly    sodium chloride flush  5-40 mL IntraVENous 2 times per day    enoxaparin  40 mg SubCUTAneous Daily pantoprazole  40 mg IntraVENous Daily    piperacillin-tazobactam  3,375 mg IntraVENous Q8H    doxycycline hyclate  100 mg Oral 2 times per day     PRN Meds: sodium chloride flush, sodium chloride, sodium chloride flush, sodium chloride, ondansetron **OR** ondansetron, polyethylene glycol, acetaminophen **OR** acetaminophen, albuterol      Intake/Output Summary (Last 24 hours) at 1/3/2023 1546  Last data filed at 1/3/2023 1244  Gross per 24 hour   Intake 110 ml   Output 200 ml   Net -90 ml         Physical Exam Performed:    /74   Pulse 80   Temp 98.1 °F (36.7 °C) (Oral)   Resp 18   Ht 5' 11\" (1.803 m)   Wt 120 lb (54.4 kg)   SpO2 95%   BMI 16.74 kg/m²       GEN thin, in no distress  HEENT normocephalic, anicteric sclera, EOMI, mucosa moist, no stridor  NECK supple, trachea midline  CHEST  RESP on RA, in no distress, clear to auscultation  CARDS RRR, S1, S2, no murmurs, no edema, radial pulse 2+, DP pulse 2+  ABD +BS, soft nontender. PEG placed. MSK no cyanosis, no clubbing  SKIN warm, dry  NEURO alert, oriented, limited insight. Labs:   Recent Labs     01/01/23  0630 01/03/23  1036   WBC 9.1 8.0   HGB 11.9* 12.9*   HCT 34.7* 38.4*   * 525*       Recent Labs     01/01/23  0630      K 4.5      CO2 28   BUN 13   CREATININE 0.8   CALCIUM 8.7   PHOS 3.4       No results for input(s): AST, ALT, BILIDIR, BILITOT, ALKPHOS in the last 72 hours. Recent Labs     01/03/23  1036   INR 1.10     No results for input(s): Normajean Scandia in the last 72 hours.       Urinalysis:      Lab Results   Component Value Date/Time    NITRU Negative 10/06/2022 07:12 PM    WBCUA None seen 06/12/2022 05:59 PM    RBCUA >100 06/12/2022 05:59 PM    BLOODU Negative 10/06/2022 07:12 PM    SPECGRAV 1.015 10/06/2022 07:12 PM    GLUCOSEU Negative 10/06/2022 07:12 PM       Radiology:  Gerry Edwards MODIFIED BARIUM SWALLOW W VIDEO   Final Result   Aspiration was seen      Please see separate speech pathology report for full discussion of findings   and recommendations. CTA CHEST ABDOMEN PELVIS W CONTRAST   Final Result   1. No thoracic or abdominal aortic aneurysm or dissection. 2. Dependent airspace disease likely rib representing atelectasis, with right   upper lobe and middle lobe consolidation felt to represent pneumonia. Recommend follow-up radiographs to document resolution. 3. Diffuse atherosclerosis including coronary artery involvement. 4. No acute inflammatory process seen within the abdomen or pelvis. XR CHEST PORTABLE   Final Result   New airspace consolidation the right middle lobe favored to represent   pneumonia. Radiographic follow-up to resolution is recommended. Assessment/Plan:    Active Hospital Problems    Diagnosis Date Noted    Community acquired bacterial pneumonia [J15.9] 12/29/2022     Priority: Medium    Chronic liver disease [K76.9] 12/29/2022     Priority: Medium    Chronic anemia [D64.9] 12/29/2022     Priority: Medium    Parkinson's disease ([facility] [address] Deisy Cates 03/19/2014     RUL and RML Pneumonia, Concern for Aspiration Pneumonia  - CTA Chest 12/28 no thoracic or abdominal aortic aneurysm or dissection. Dependent airspace disease likely rib representing atelectasis with right upper lobe and middle lobe consolidation felt to represent pneumonia. - MBSS 12/30 showed aspiration.  - Continue IV zosyn and doxycycline (12/29-) for 7-10 days.  - Probiotics. Aspiration  - SLP recommended NPO. However, family insists on diet. So SLP recommended pureed diet with thick liquids. -- Family agreed to PEG placement. Parkinson's Disease  - Continue home sinemet. Underwent Weight  Protein Calorie Malnutrition  - PEG placement pending.  - CT A/P with contrast 12/28 showed no acute inflammatory process. GI consulted and PEG placed 1/3/23.  - Dietary consult and start tube feeds.   - Monitor. Normocytic Anemia  - adm HgB 11.4, MCV 88.2. Baseline HgB 9-12. - Stable. DVT Prophylaxis: Lovenox  Diet: Diet NPO Exceptions are: Sips of Water with Meds  Code Status: DNR-CCA    PT/OT Eval ongoing    Dispo - ?2 days. Palliative Care evaluation. Family interested in meeting with Hospice.      MDM Statement: Rolanda Stubbs MD

## 2023-01-03 NOTE — ANESTHESIA PRE PROCEDURE
Department of Anesthesiology  Preprocedure Note       Name:  Niall Weaver   Age:  76 y.o.  :  1945                                          MRN:  [id number]         Date:  1/3/2023      Surgeon: Zan Neely):  Marco A Britton MD    Procedure: Procedure(s):  EGD ESOPHAGOGASTRODUODENOSCOPY PEG TUBE INSERTION    Medications prior to admission:   Prior to Admission medications    Medication Sig Start Date End Date Taking? Authorizing Provider   Pyridoxine HCl (B-6) 50 MG TABS Take 50 mg by mouth daily     Historical Provider, MD   carbidopa-levodopa (SINEMET CR)  MG per extended release tablet Take 1 tablet by mouth nightly    Historical Provider, MD   bisacodyl (DULCOLAX) 5 MG EC tablet Take 5 mg by mouth daily as needed for Constipation    Historical Provider, MD   docusate sodium (COLACE) 100 mg capsule Take 100 mg by mouth daily as needed for Constipation    Historical Provider, MD   ketoconazole (NIZORAL) 2 % shampoo Apply to affected areas three times a week (lather, leave on for 5 minutes, then rinse)    Historical Provider, MD   lidocaine (LIDODERM) 5 % Place 1 patch onto the skin daily 12 hours on, 12 hours off. Historical Provider, MD   potassium chloride (MICRO-K) 10 MEQ extended release capsule Take 10 mEq by mouth daily    Historical Provider, MD   carbidopa-levodopa (PARCOPA)  MG TBDP Take 1 tablet by mouth 4 times daily     Historical Provider, MD   traZODone (DESYREL) 50 MG tablet Take 50 mg by mouth nightly    Historical Provider, MD   sertraline (ZOLOFT) 100 MG tablet Take 100 mg by mouth nightly    Historical Provider, MD   gabapentin (NEURONTIN) 300 MG capsule Take 300 mg by mouth 3 times daily.     Historical Provider, MD       Current medications:    Current Facility-Administered Medications   Medication Dose Route Frequency Provider Last Rate Last Admin    carbidopa-levodopa (SINEMET)  MG per tablet 1 tablet  1 tablet Oral 4x Daily ZULEYMA Quiñonez - CNP   1 tablet at 01/02/23 2043    carbidopa-levodopa (SINEMET CR)  MG per extended release tablet 1 tablet  1 tablet Oral Nightly ZULEYMA Patel CNP   1 tablet at 01/02/23 2043    gabapentin (NEURONTIN) capsule 300 mg  300 mg Oral TID ZULEYMA Patel CNP   300 mg at 01/02/23 2043    potassium chloride (KLOR-CON M) extended release tablet 10 mEq  10 mEq Oral Daily ZULEYMA Patel CNP   10 mEq at 01/02/23 1001    sertraline (ZOLOFT) tablet 100 mg  100 mg Oral Daily ZULEYMA Patel CNP   100 mg at 01/02/23 1002    traZODone (DESYREL) tablet 50 mg  50 mg Oral Nightly ZULEYMA Patel CNP   50 mg at 01/02/23 2043    sodium chloride flush 0.9 % injection 5-40 mL  5-40 mL IntraVENous 2 times per day ZULEYMA Patel CNP   10 mL at 01/02/23 2043    sodium chloride flush 0.9 % injection 5-40 mL  5-40 mL IntraVENous PRN ZULEYMA Patel CNP        0.9 % sodium chloride infusion   IntraVENous PRN ZULEYMA Patel CNP        enoxaparin (LOVENOX) injection 40 mg  40 mg SubCUTAneous Daily ZULEYMA Patel CNP   40 mg at 01/02/23 1002    ondansetron (ZOFRAN-ODT) disintegrating tablet 4 mg  4 mg Oral Q8H PRN ZULEYMA Patel CNP        Or    ondansetron (ZOFRAN) injection 4 mg  4 mg IntraVENous Q6H PRN ZULEYMA Patel CNP        polyethylene glycol (GLYCOLAX) packet 17 g  17 g Oral Daily PRN ZULEYMA Patel CNP        acetaminophen (TYLENOL) tablet 650 mg  650 mg Oral Q6H PRN ZULEYMA Patel CNP        Or    acetaminophen (TYLENOL) suppository 650 mg  650 mg Rectal Q6H PRN ZULEYMA Patel CNP        0.9 % sodium chloride infusion   IntraVENous Continuous ZULEYMA Patel CNP   Stopped at 12/30/22 1413    pantoprazole (PROTONIX) injection 40 mg  40 mg IntraVENous Daily ZULEYMA Patel CNP   40 mg at 01/02/23 1002    albuterol (PROVENTIL) nebulizer solution 2.5 mg  2.5 mg Nebulization Q4H PRN [employer] Sukh Kulkarni, APRN - CNP        piperacillin-tazobactam (ZOSYN) 3,375 mg in dextrose 5 % 50 mL IVPB extended infusion (mini-bag)  3,375 mg IntraVENous Q8H Yanna Ortega MD   Stopped at 23 0734    doxycycline hyclate (VIBRA-TABS) tablet 100 mg  100 mg Oral 2 times per day Yanna Ortega MD   100 mg at 23 2043       Allergies:  No Known Allergies    Problem List:    Patient Active Problem List   Diagnosis Code    Parkinson's disease ([address] G20    Depressive disorder, not elsewhere classified F32.89    Mild cognitive impairment G31.84    Sepsis ([address] A41.9    Acute hypoxemic respiratory failure ([address] J96.01    Severe sepsis ([address] A41.9, R65.20    COVID-19 virus infection U07.1    Pneumonia of both lower lobes due to infectious organism J18.9    Thrombocytopenia (HCC) D69.6    Lymphopenia D72.810    Acute encephalopathy G93.40    Hemoptysis R04.2    Fever R50.9    General weakness R53.1    Closed fracture of left ankle S82.892A    Constipation K59.00    Generalized weakness R53.1    Closed fracture of right hip ([address] S72.001A    Severe malnutrition ([address] E43    Community acquired bacterial pneumonia J15.9    Chronic liver disease K76.9    Chronic anemia D64.9       Past Medical History:        Diagnosis Date    Arthritis     Dementia ([address]     Kidney stone     Knee pain     Parkinson disease ([address]        Past Surgical History:        Procedure Laterality Date    HIP SURGERY Right 2022    RIGHT HIP INTRAMEDULLARY GAMMA NAIL INSERTION performed by Larry Layne MD at [address] [place] [address]         Social History:    Social History     Tobacco Use    Smoking status: Former     Types: Cigarettes     Quit date: 3/19/2008     Years since quittin.8    Smokeless tobacco: Never   Substance Use Topics    Alcohol use:  No                                Counseling given: Not Answered      Vital Signs (Current):   Vitals:    23 1930 23 0000 23 0300 01/03/23 0730   BP: 111/69 (!) 86/53 117/69 99/61   Pulse: 76 58  (!) 117   Resp: 17 18  20   Temp: 97.5 °F (36.4 °C) 97.5 °F (36.4 °C)  97.5 °F (36.4 °C)   TempSrc: Oral Oral  Oral   SpO2: 100% 97%  97%   Weight:       Height:                                                  BP Readings from Last 3 Encounters:   01/03/23 99/61   10/06/22 135/73   07/08/22 117/68       NPO Status:                                                                                 BMI:   Wt Readings from Last 3 Encounters:   12/28/22 120 lb (54.4 kg)   10/06/22 130 lb (59 kg)   07/06/22 122 lb (55.3 kg)     Body mass index is 16.74 kg/m². CBC:   Lab Results   Component Value Date/Time    WBC 9.1 01/01/2023 06:30 AM    RBC 3.92 01/01/2023 06:30 AM    HGB 11.9 01/01/2023 06:30 AM    HCT 34.7 01/01/2023 06:30 AM    MCV 88.7 01/01/2023 06:30 AM    RDW 14.8 01/01/2023 06:30 AM     01/01/2023 06:30 AM       CMP:   Lab Results   Component Value Date/Time     01/01/2023 06:30 AM    K 4.5 01/01/2023 06:30 AM    K 4.3 12/31/2022 05:55 AM     01/01/2023 06:30 AM    CO2 28 01/01/2023 06:30 AM    BUN 13 01/01/2023 06:30 AM    CREATININE 0.8 01/01/2023 06:30 AM    GFRAA >60 10/06/2022 04:35 PM    GFRAA >60 10/14/2011 07:40 PM    AGRATIO 1.0 12/31/2022 05:55 AM    LABGLOM >60 01/01/2023 06:30 AM    GLUCOSE 75 01/01/2023 06:30 AM    PROT 4.7 12/31/2022 05:55 AM    PROT 6.9 10/14/2011 07:40 PM    CALCIUM 8.7 01/01/2023 06:30 AM    BILITOT 0.3 12/31/2022 05:55 AM    ALKPHOS 197 12/31/2022 05:55 AM    AST 55 12/31/2022 05:55 AM    ALT 8 12/31/2022 05:55 AM       POC Tests: No results for input(s): POCGLU, POCNA, POCK, POCCL, POCBUN, POCHEMO, POCHCT in the last 72 hours.     Coags:   Lab Results   Component Value Date/Time    PROTIME 13.7 06/12/2022 03:46 PM    INR 1.07 06/12/2022 03:46 PM    APTT 31.3 09/04/2020 10:10 PM       HCG (If Applicable): No results found for: PREGTESTUR, PREGSERUM, HCG, HCGQUANT     ABGs: No results found for: PHART, PO2ART, SIC5KXI, BFL7LRT, BEART, U6QQOSKE     Type & Screen (If Applicable):  No results found for: LABABO, LABRH    Drug/Infectious Status (If Applicable):  No results found for: HIV, HEPCAB    COVID-19 Screening (If Applicable):   Lab Results   Component Value Date/Time    COVID19 NOT DETECTED 12/28/2022 07:22 PM    COVID19 DETECTED 09/03/2020 06:07 PM           Anesthesia Evaluation  Patient summary reviewed and Nursing notes reviewed  Airway: Mallampati: II  TM distance: >3 FB   Neck ROM: full  Mouth opening: > = 3 FB   Dental: normal exam         Pulmonary:normal exam    (+) pneumonia:                             Cardiovascular:Negative CV ROS                      Neuro/Psych:   (+) neuromuscular disease (parkinson's disease):, psychiatric history (dementia):            GI/Hepatic/Renal:   (+) liver disease:,           Endo/Other: Negative Endo/Other ROS                    Abdominal:             Vascular: negative vascular ROS. Other Findings:           Anesthesia Plan      MAC     ASA 3       Induction: intravenous. MIPS: Postoperative opioids intended. Anesthetic plan and risks discussed with legal guardian. Plan discussed with CRNA.     Attending anesthesiologist reviewed and agrees with Preprocedure content                LANCE Altamirano MD   1/3/2023

## 2023-01-03 NOTE — PROGRESS NOTES
Spoke with pt.'s POA Tad Peguero (daughter), per our conversation she still wants peg tube inserted even if pt. Goes hospice. Consent signed with second nurse and placed in chart. Notified [facility] that anesthesia should be gotten before procedure d/t daughter will be at work at time of procedure.

## 2023-01-03 NOTE — PROCEDURES
Endoscopy Note    Patient: Ananya Easley  : 1945  CSN:     Procedure: Esophagogastroduodenoscopy with placement of a percutaneous endoscopic gastrostomy tube    Date:  1/3/2023     Surgeon:  Ingrid Hensley MD     Referring Provider:  Dr. Catherine Geiger    Preoperative Diagnosis:  Dysphagia    Postoperative Diagnosis:  Successful placement of 20 Belgian Envive peg tube with pull method    Anesthesia:  Propofol    EBL: <5 mL    Indications: This is a 76y.o. year old male who presents today with Insertion of PEG/PEJ/NJ tube. Description of Procedure:  Informed consent was obtained from the patient after explanation of indications, benefits and possible risks and complications of the procedure. The patient was then taken to the endoscopy suite, placed in the left lateral decubitus position and the above IV sedation was administrered. The Olympus videoendoscope was passed through the hypopharynx into the esophagus. The scope was advanced all the way to the duodenum. The mucosa in the duodenal bulb, post bulbar region in the descending duodenum appeared normal.  The mucosa in the antrum appeared normal.  The mucosa in the remaining part of the stomach and retroflexion appeared normal.  An appropriate site was identified externally with transillumination technique for peg tube insertion below the rib cage and away from the umbilicus. This site was marked then cleaned with betadine. The site was anesthetized and the trocar and guide wire placed through this. The snare was inserted endoscopically and the guide wire was brought out orally. The 20 Belgian Peg tube was inserted carefully and followed under direct visualization through the oral cavity and it's position was confirmed endoscopically. The GE junction and the mucosa in the remainder of the esophagus appeared normal.  The scope was withdrawn and the procedure was terminated. The bumper was fastened externally.     Gastric or Duodenal ulcer present: No      The patient tolerated the procedure well and was taken to the post anesthesia care unit in good condition. Impression:  -Successful PEG tube placement. Recommendations: -The PEG tube may be used for feedings as soon as bowel sounds are confirmed post-procedure.   The head of the bed should be kept elevated to 30 degrees during tube feeds, and the tube should be flushed with 30 mL of water every 8 hours and after giving m    Jessee Patel MD, MD  [facility]  [phone]

## 2023-01-03 NOTE — PLAN OF CARE
Problem: Discharge Planning  Goal: Discharge to home or other facility with appropriate resources  Outcome: Progressing     Problem: Skin/Tissue Integrity  Goal: Absence of new skin breakdown  Description: 1. Monitor for areas of redness and/or skin breakdown  2. Assess vascular access sites hourly  3. Every 4-6 hours minimum:  Change oxygen saturation probe site  4. Every 4-6 hours:  If on nasal continuous positive airway pressure, respiratory therapy assess nares and determine need for appliance change or resting period.   Outcome: Progressing     Problem: Respiratory - Adult  Goal: Achieves optimal ventilation and oxygenation  Outcome: Progressing     Problem: Cardiovascular - Adult  Goal: Maintains optimal cardiac output and hemodynamic stability  Outcome: Progressing  Goal: Absence of cardiac dysrhythmias or at baseline  Outcome: Progressing     Problem: Musculoskeletal - Adult  Goal: Return mobility to safest level of function  Outcome: Progressing  Goal: Maintain proper alignment of affected body part  Outcome: Progressing     Problem: Gastrointestinal - Adult  Goal: Maintains adequate nutritional intake  Outcome: Progressing  Goal: Minimal or absence of nausea and vomiting  Outcome: Progressing     Problem: Pain  Goal: Verbalizes/displays adequate comfort level or baseline comfort level  Outcome: Progressing     Problem: Hematologic - Adult  Goal: Maintains hematologic stability  Outcome: Progressing

## 2023-01-03 NOTE — H&P
Gastroenterology Note             Pre-operative History and Physical    Patient: Jose Doe  : 1945  CSN:     History Obtained From:  patient and/or guardian. HISTORY OF PRESENT ILLNESS:    The patient is a 76 y.o. male  here for EGD and peg tube insertion    Past Medical History:    Past Medical History:   Diagnosis Date    Arthritis     Dementia ([facility] [address]     Kidney stone     Knee pain     Parkinson disease ([facility] [address]      Past Surgical History:    Past Surgical History:   Procedure Laterality Date    HIP SURGERY Right 2022    RIGHT HIP INTRAMEDULLARY GAMMA NAIL INSERTION performed by Jace Russell MD at [facility]       Medications Prior to Admission:   No current facility-administered medications on file prior to encounter. Current Outpatient Medications on File Prior to Encounter   Medication Sig Dispense Refill    Pyridoxine HCl (B-6) 50 MG TABS Take 50 mg by mouth daily       carbidopa-levodopa (SINEMET CR)  MG per extended release tablet Take 1 tablet by mouth nightly      bisacodyl (DULCOLAX) 5 MG EC tablet Take 5 mg by mouth daily as needed for Constipation      docusate sodium (COLACE) 100 mg capsule Take 100 mg by mouth daily as needed for Constipation      ketoconazole (NIZORAL) 2 % shampoo Apply to affected areas three times a week (lather, leave on for 5 minutes, then rinse)      lidocaine (LIDODERM) 5 % Place 1 patch onto the skin daily 12 hours on, 12 hours off.      potassium chloride (MICRO-K) 10 MEQ extended release capsule Take 10 mEq by mouth daily      carbidopa-levodopa (PARCOPA)  MG TBDP Take 1 tablet by mouth 4 times daily       traZODone (DESYREL) 50 MG tablet Take 50 mg by mouth nightly      sertraline (ZOLOFT) 100 MG tablet Take 100 mg by mouth nightly      gabapentin (NEURONTIN) 300 MG capsule Take 300 mg by mouth 3 times daily. Allergies:  Patient has no known allergies.       Social History:   Social History     Tobacco Use    Smoking status: Former     Types: Cigarettes     Quit date: 3/19/2008     Years since quittin.8    Smokeless tobacco: Never   Substance Use Topics    Alcohol use: No     Family History:   Family History   Family history unknown: Yes       PHYSICAL EXAM:      /69   Pulse 87   Temp 97.7 °F (36.5 °C) (Oral)   Resp 18   Ht 5' 11\" (1.803 m)   Wt 120 lb (54.4 kg)   SpO2 98%   BMI 16.74 kg/m²  I        Heart:   RRR, normal s1s2    Lungs:  CTA bilat,  Normal effort    Abdomen:   NT, ND      ASA Grade:  ASA 3 - Patient with moderate systemic disease with functional limitations    Mallampati Class: 2          ASSESSMENT AND PLAN:    1. Patient is a 76 y.o. male here for EGD with MAC.   2.  Procedure options, risks and benefits reviewed with patient. Patient expresses understanding.     Low Leigh MD,   [address]  1/3/2023

## 2023-01-03 NOTE — ANESTHESIA POSTPROCEDURE EVALUATION
Department of Anesthesiology  Postprocedure Note    Patient: Alicia Molina  MRN: [id number]  YOB: 1945  Date of evaluation: 1/3/2023      Procedure Summary     Date: 01/03/23 Room / Location: [address] [place] [address]    Anesthesia Start: 2919 Anesthesia Stop: 4146    Procedure: EGD ESOPHAGOGASTRODUODENOSCOPY PEG TUBE INSERTION Diagnosis:       Dysphagia, unspecified type      (dysphagia)    Surgeons: Janessa Hester MD Responsible Provider: Tim Ray MD    Anesthesia Type: MAC ASA Status: 3          Anesthesia Type: No value filed.     Roselyn Phase I: Roselyn Score: 10    Roselyn Phase II: Roselyn Score: 9      Anesthesia Post Evaluation    Patient location during evaluation: PACU  Patient participation: complete - patient participated  Level of consciousness: awake  Pain score: 0  Airway patency: patent  Nausea & Vomiting: no nausea  Complications: no  Cardiovascular status: blood pressure returned to baseline  Respiratory status: acceptable  Hydration status: euvolemic

## 2023-01-03 NOTE — ACP (ADVANCE CARE PLANNING)
Advance Care Planning     General Advance Care Planning (ACP) Conversation    Date of Conversation: 12/28/2022  Conducted with:  Healthcare Decision Maker: Next of Kin by law (only applies in absence of above) (name) [facility] Decision Maker:    Primary Decision Maker: Carlotta Hernandez - [phone]    Secondary Decision Maker: [facility] - [phone]    Secondary Decision Maker: Madelnie Maldonado - [phone]  Click here to complete Healthcare Decision Makers including selection of the Healthcare Decision Maker Relationship (ie \"Primary\"). Today we documented Decision Maker(s) consistent with Legal Next of Kin hierarchy. Content/Action Overview:  Has NO ACP documents/care preferences - information provided, considering goals and options  Reviewed DNR/DNI and patient elects DNR order - completed portable DNR form & placed order  treatment goals, benefit/burden of treatment options, artificial nutrition, ventilation preferences, hospitalization preferences, resuscitation preferences, end of life care preferences (vegetative state/imminent death), and hospice care     Discussed code status. Marcy states patient is a DNI. Advised her I would change his code status to specifically reflect no intubation. She wants patient to die peacefully and would not want chest compressions, defibrillation, intubation or even resuscitative meds (given his advanced parkinson's). As he is still getting Abx, will make code status a Limited x 4 Nos at this time. If/when patient enrolls in hospice we can amend to [facility].        Length of Voluntary ACP Conversation in minutes:  20 minutes    Matilde Jacomeelor, APRN - CNP

## 2023-01-04 LAB
GLUCOSE BLD-MCNC: 73 MG/DL (ref 70–99)
GLUCOSE BLD-MCNC: 78 MG/DL (ref 70–99)
GLUCOSE BLD-MCNC: 92 MG/DL (ref 70–99)
PERFORMED ON: NORMAL

## 2023-01-04 PROCEDURE — 6360000002 HC RX W HCPCS: Performed by: INTERNAL MEDICINE

## 2023-01-04 PROCEDURE — 43762 RPLC GTUBE NO REVJ TRC: CPT

## 2023-01-04 PROCEDURE — 2580000003 HC RX 258: Performed by: NURSE PRACTITIONER

## 2023-01-04 PROCEDURE — 6370000000 HC RX 637 (ALT 250 FOR IP): Performed by: NURSE PRACTITIONER

## 2023-01-04 PROCEDURE — 6360000002 HC RX W HCPCS: Performed by: NURSE PRACTITIONER

## 2023-01-04 PROCEDURE — 2580000003 HC RX 258: Performed by: INTERNAL MEDICINE

## 2023-01-04 PROCEDURE — 97530 THERAPEUTIC ACTIVITIES: CPT

## 2023-01-04 PROCEDURE — C9113 INJ PANTOPRAZOLE SODIUM, VIA: HCPCS | Performed by: NURSE PRACTITIONER

## 2023-01-04 PROCEDURE — 97112 NEUROMUSCULAR REEDUCATION: CPT

## 2023-01-04 PROCEDURE — 97535 SELF CARE MNGMENT TRAINING: CPT

## 2023-01-04 PROCEDURE — 1200000000 HC SEMI PRIVATE

## 2023-01-04 RX ADMIN — GABAPENTIN 300 MG: 300 CAPSULE ORAL at 08:07

## 2023-01-04 RX ADMIN — PIPERACILLIN AND TAZOBACTAM 3375 MG: 3; .375 INJECTION, POWDER, FOR SOLUTION INTRAVENOUS at 02:17

## 2023-01-04 RX ADMIN — POTASSIUM CHLORIDE 10 MEQ: 750 TABLET, EXTENDED RELEASE ORAL at 08:07

## 2023-01-04 RX ADMIN — Medication 10 ML: at 08:05

## 2023-01-04 RX ADMIN — GABAPENTIN 300 MG: 300 CAPSULE ORAL at 13:40

## 2023-01-04 RX ADMIN — CARBIDOPA AND LEVODOPA 1 TABLET: 25; 100 TABLET ORAL at 12:37

## 2023-01-04 RX ADMIN — Medication 10 ML: at 21:05

## 2023-01-04 RX ADMIN — ENOXAPARIN SODIUM 40 MG: 100 INJECTION SUBCUTANEOUS at 08:06

## 2023-01-04 RX ADMIN — SODIUM CHLORIDE: 9 INJECTION, SOLUTION INTRAVENOUS at 16:48

## 2023-01-04 RX ADMIN — PIPERACILLIN AND TAZOBACTAM 3375 MG: 3; .375 INJECTION, POWDER, FOR SOLUTION INTRAVENOUS at 09:37

## 2023-01-04 RX ADMIN — SERTRALINE HYDROCHLORIDE 100 MG: 50 TABLET ORAL at 08:07

## 2023-01-04 RX ADMIN — CARBIDOPA AND LEVODOPA 1 TABLET: 25; 100 TABLET ORAL at 17:53

## 2023-01-04 RX ADMIN — CARBIDOPA AND LEVODOPA 1 TABLET: 25; 100 TABLET ORAL at 21:05

## 2023-01-04 RX ADMIN — PIPERACILLIN AND TAZOBACTAM 3375 MG: 3; .375 INJECTION, POWDER, FOR SOLUTION INTRAVENOUS at 17:55

## 2023-01-04 RX ADMIN — CARBIDOPA AND LEVODOPA 1 TABLET: 25; 100 TABLET ORAL at 08:07

## 2023-01-04 RX ADMIN — TRAZODONE HYDROCHLORIDE 50 MG: 50 TABLET ORAL at 21:05

## 2023-01-04 RX ADMIN — DOXYCYCLINE HYCLATE 100 MG: 100 TABLET, COATED ORAL at 08:07

## 2023-01-04 RX ADMIN — PANTOPRAZOLE SODIUM 40 MG: 40 INJECTION, POWDER, FOR SOLUTION INTRAVENOUS at 08:05

## 2023-01-04 RX ADMIN — GABAPENTIN 300 MG: 300 CAPSULE ORAL at 21:05

## 2023-01-04 ASSESSMENT — PAIN SCALES - WONG BAKER
WONGBAKER_NUMERICALRESPONSE: 0

## 2023-01-04 ASSESSMENT — PAIN SCALES - GENERAL
PAINLEVEL_OUTOF10: 0
PAINLEVEL_OUTOF10: 0
PAINLEVEL_OUTOF10: 2

## 2023-01-04 ASSESSMENT — PAIN DESCRIPTION - LOCATION: LOCATION: NECK

## 2023-01-04 NOTE — PROGRESS NOTES
Physical Therapy  Facility/Department: [facility] B3 - MED SURG  Daily Treatment Note  NAME: Elizabeth Boudreaux  : 1945  MRN: [id number]    Date of Service: 2023    Discharge Recommendations:  24 hour supervision or assist, Home with Home health PT   PT Equipment Recommendations  Equipment Needed: Yes  Mobility Devices: [place] Bed : Electric - Full (Head of Bed), Bed Rails - Partial    [facility] 6 Clicks Inpatient Mobility:  AM-PAC Mobility Inpatient   How much difficulty turning over in bed?: A Little  How much difficulty sitting down on / standing up from a chair with arms?: A Lot  How much difficulty moving from lying on back to sitting on side of bed?: A Little  How much help from another person moving to and from a bed to a chair?: A Lot  How much help from another person needed to walk in hospital room?: A Lot  How much help from another person for climbing 3-5 steps with a railing?: Total  AM-PAC Inpatient Mobility Raw Score : 13  AM-PAC Inpatient T-Scale Score : 36.74  Mobility Inpatient CMS 0-100% Score: 64.91  Mobility Inpatient CMS G-Code Modifier : CL    Patient Diagnosis(es): The primary encounter diagnosis was Multifocal pneumonia. A diagnosis of Sepsis, due to unspecified organism, unspecified whether acute organ dysfunction present [facility]) was also pertinent to this visit. Assessment   Assessment: Pt seen this date as cotx with OT to progress functional mobility safely and maximize outcomes. Pt demo improved ability to perform bed mobility, but continues to require Ax2 for transfers and lateral stepping. Pt would benefit from continued skilled PT to address current deficits. Recommend home with 24hr supervision and HHPT.  Pt would also benefit from hospital bed upon d/c as pt requires bed rails for repositioning and to perform margaret-care in order to avoid skin breakdown and aspiration  Activity Tolerance: Treatment limited secondary to decreased cognition;Patient tolerated evaluation without incident;Patient limited by fatigue  Equipment Needed: Yes  Mobility Devices: Hospital Bed     Plan    Physcial Therapy Plan  General Plan: 2-3 times per week  Current Treatment Recommendations: Strengthening;ROM;Balance training;Gait training; Safety education & training;Stair training;Functional mobility training;Home exercise program;Therapeutic activities; Patient/Caregiver education & training;Neuromuscular re-education;Transfer training; Endurance training;Pain management;Positioning;Cognitive reorientation     Restrictions  Restrictions/Precautions  Restrictions/Precautions: Fall Risk  Required Braces or Orthoses?: No  Position Activity Restriction  Other position/activity restrictions: R UE IV, PEG, suction, purewick     Subjective    Subjective  Subjective: Pt semi-supine in bed upon arrival. Pt soiled BM, purewick in place, required assist to get cleaned up  Pain: Denies pain at rest  Orientation  Overall Orientation Status: Impaired  Orientation Level: Oriented to place;Oriented to person;Disoriented to situation;Disoriented to time     Objective   Vitals  Heart Rate: 60  Heart Rate Source: Monitor  BP: (!) 94/56  BP Location: Left upper arm  BP Method: Automatic  Patient Position: Semi fowlers  MAP (Calculated): 69  SpO2: 95 %  O2 Device: None (Room air)  BP at end of session 113/76    Bed Mobility Training  Bed Mobility Training: Yes  Rolling: Assist X1;Additional time; Adaptive equipment;Contact-guard assistance (over multiple trials to assist wtih hygiene and brief management)  Supine to Sit: Contact-guard assistance;Assist X1  Sit to Supine: Minimum assistance;Assist X1  Scooting: Moderate assistance;Assist X2  Balance  Sitting: Impaired  Sitting - Static: Fair (occasional)  Sitting - Dynamic: Fair (occasional)  Standing - Static: Constant support; Fair  Standing - Dynamic: Constant support;Poor  Transfer Training  Transfer Training: Yes  Overall Level of Assistance:  Moderate assistance;Assist X2;Additional time;Minimum assistance  Sit to Stand: Moderate assistance;Minimum assistance;Assist X2 (min A of 1, mod A of 1 with B hand hold of therapist hand, increasing assistance to mod A x2 for sit<>stand with pt pushing off bed surface with 1 hand on bed, 1 hand held assist)  Stand to Sit: Moderate assistance;Minimum assistance;Assist X2 (x3 trials)  Gait Training  Gait Training: Yes  Gait  Overall Level of Assistance: Moderate assistance;Assist X2 (stand step EOB x 7-8 steps)  Interventions: Weight shifting training/pressure relief;Verbal cues;Manual cues; Safety awareness training;Visual cues; Tactile cues;Demonstration  Base of Support: Narrowed  Speed/Sophie: Delayed;Pace decreased (< 100 feet/min); Shuffled; Slow  Step Length: Left shortened;Right shortened  Stance: Right decreased; Left decreased  Gait Abnormalities: Decreased step clearance;Shuffling gait  Distance (ft): 5 Feet (lateral stepping to L to [facility])  Assistive Device: Gait belt (B HHA)    Safety Devices  Type of Devices: Patient at risk for falls; All fall risk precautions in place; Chair alarm in place;Gait belt;Nurse notified; Bed alarm in place; Left in bed  Restraints  Restraints Initially in Place: No     Goals  Short Term Goals  Time Frame for Short Term Goals: 1/5/23--1/04 All goals continue  Short Term Goal 1: pt will complete bed mobility with min A--1/04 Goal Met  Short Term Goal 2: pt will transfers with min A  Short Term Goal 3: pt will ambulate 20ft with LRAD and min A  Short Term Goal 4: pt will navigate up<>down 3 steps with LRAD and min A  Short Term Goal 5: pt will participate in 10-12 reps of BLE exercises by 1/1/23  Patient Goals   Patient Goals : unable to state goal due to cognition    Education  Patient Education  Education Given To: Patient  Education Provided: Role of Therapy;Plan of Care;Transfer Training;Equipment;Orientation;Home Exercise Program  Barriers to Learning: Cognition; Hearing  Education Outcome: Continued education needed    Therapy Time   Individual Concurrent Group Co-treatment   Time In 4323         Time Out 1527         Minutes 42         Timed Code Treatment Minutes: 42 Minutes     If pt is unable to be seen after this session, please let this note serve as discharge summary. Please see case management note for discharge disposition. Thank you.     Julita Weaver, PT

## 2023-01-04 NOTE — PROGRESS NOTES
Hospitalist Progress Note      PCP: Mariluz Whitaker MD    Date of Admission: 12/28/2022    Chief Complaint: This is a 68-year-old male with significant past medical history of Parkinson's disease and unspecified liver disease who presents to [facility] emergency department due to concerns of chest pain radiating into the shoulder and back. He is unable to provide much subjective data and daughter was at bedside previously, but not for this exam.  ED documentation and ED nurse was utilized to collect subjective data. Onset of symptoms have been last few days. Daughter mentioned to ED provider that his chest pain and dyspnea began after choking on some food. It is unclear whether or not he has been febrile at home. His evaluation here included laboratory studies, EKG, chest x-ray, and CT of the chest, abdomen, and pelvis with IV contrast.  CT showed right upper and middle lobe consolidation probably pneumonia. Pertinent abnormal lab values include stable but chronic elevation of alkaline phosphatase at 274 and . Additionally, cell count showed WBC of 15.9, hemoglobin 11.4, and platelets 426. Patient was given IV fluids, Zithromax, and Rocephin in the ED. Hospital team was consulted to admit this patient for community-acquired pneumonia. Subjective:     Tube feeds started. No complaints.      Medications:  Reviewed    Infusion Medications    sodium chloride      sodium chloride      sodium chloride 75 mL/hr at 01/03/23 2834     Scheduled Medications    sodium chloride flush  5-40 mL IntraVENous 2 times per day    gabapentin  300 mg PEG Tube TID    traZODone  50 mg PEG Tube Nightly    carbidopa-levodopa  1 tablet PEG Tube 4x Daily    carbidopa-levodopa  1 tablet Oral Nightly    potassium chloride  10 mEq Oral Daily    sertraline  100 mg Oral Daily    sodium chloride flush  5-40 mL IntraVENous 2 times per day    enoxaparin  40 mg SubCUTAneous Daily    pantoprazole  40 mg IntraVENous Daily    piperacillin-tazobactam  3,375 mg IntraVENous Q8H     PRN Meds: sodium chloride flush, sodium chloride, sodium chloride flush, sodium chloride, ondansetron **OR** ondansetron, polyethylene glycol, acetaminophen **OR** acetaminophen, albuterol      Intake/Output Summary (Last 24 hours) at 1/4/2023 1354  Last data filed at 1/4/2023 1344  Gross per 24 hour   Intake 270 ml   Output 600 ml   Net -330 ml         Physical Exam Performed:    /71   Pulse 89   Temp 97.6 °F (36.4 °C) (Oral)   Resp 16   Ht 5' 11\" (1.803 m)   Wt 120 lb (54.4 kg)   SpO2 93%   BMI 16.74 kg/m²       GEN thin, in no distress  HEENT normocephalic, anicteric sclera, EOMI, mucosa moist, no stridor  NECK supple, trachea midline  CHEST  RESP on RA, in no distress, clear to auscultation  CARDS RRR, S1, S2, no murmurs, no edema, radial pulse 2+, DP pulse 2+  ABD +BS, soft nontender. PEG placed. MSK no cyanosis, no clubbing  SKIN warm, dry  NEURO alert, oriented, limited insight. Labs:   Recent Labs     01/03/23  1036   WBC 8.0   HGB 12.9*   HCT 38.4*   *       No results for input(s): NA, K, CL, CO2, BUN, CREATININE, CALCIUM, PHOS in the last 72 hours. Invalid input(s): SURI    No results for input(s): AST, ALT, BILIDIR, BILITOT, ALKPHOS in the last 72 hours. Recent Labs     01/03/23  1036   INR 1.10       No results for input(s): Min Ingram in the last 72 hours. Urinalysis:      Lab Results   Component Value Date/Time    NITRU Negative 10/06/2022 07:12 PM    WBCUA None seen 06/12/2022 05:59 PM    RBCUA >100 06/12/2022 05:59 PM    BLOODU Negative 10/06/2022 07:12 PM    SPECGRAV 1.015 10/06/2022 07:12 PM    GLUCOSEU Negative 10/06/2022 07:12 PM       Radiology:  Roberto Carlos Celestin MODIFIED BARIUM SWALLOW W VIDEO   Final Result   Aspiration was seen      Please see separate speech pathology report for full discussion of findings   and recommendations.          CTA CHEST ABDOMEN PELVIS W CONTRAST   Final Result   1. No thoracic or abdominal aortic aneurysm or dissection. 2. Dependent airspace disease likely rib representing atelectasis, with right   upper lobe and middle lobe consolidation felt to represent pneumonia. Recommend follow-up radiographs to document resolution. 3. Diffuse atherosclerosis including coronary artery involvement. 4. No acute inflammatory process seen within the abdomen or pelvis. XR CHEST PORTABLE   Final Result   New airspace consolidation the right middle lobe favored to represent   pneumonia. Radiographic follow-up to resolution is recommended. Assessment/Plan:    Active Hospital Problems    Diagnosis Date Noted    Community acquired bacterial pneumonia [J15.9] 12/29/2022     Priority: Medium    Chronic liver disease [K76.9] 12/29/2022     Priority: Medium    Chronic anemia [D64.9] 12/29/2022     Priority: Medium    Severe malnutrition ([address] [E43] 07/07/2022     Priority: Medium    Parkinson's disease ([address] Frank Howard 03/19/2014     RUL and RML Pneumonia, Concern for Aspiration Pneumonia  - CTA Chest 12/28 no thoracic or abdominal aortic aneurysm or dissection. Dependent airspace disease likely rib representing atelectasis with right upper lobe and middle lobe consolidation felt to represent pneumonia. - MBSS 12/30 showed aspiration.  - Continue IV zosyn and doxycycline (12/29-) for 7-10 days.  - Probiotics. Aspiration  - SLP recommended NPO. However, family insists on diet. So SLP recommended pureed diet with thick liquids. -- Family agreed to PEG placement. Parkinson's Disease  - Continue home sinemet. Underwent Weight  Protein Calorie Malnutrition  - CT A/P with contrast 12/28 showed no acute inflammatory process. GI consulted and PEG placed 1/3/23.  - Dietary consult and started tube feeds.   - Monitor. Normocytic Anemia  - adm HgB 11.4, MCV 88.2. Baseline HgB 9-12. - Stable.        DVT Prophylaxis: Lovenox  Diet: Diet NPO Exceptions are: Sips of Water with Meds  ADULT TUBE FEEDING; PEG; Standard with Fiber; Continuous; 10; Yes; 10; Q 4 hours; 60; 150; Q 4 hours  Code Status: Limited    PT/OT Eval ongoing    Dispo - Possible DC home with Hospice tomorrow if tolerating tube feeds.      MDM Statement: Rolanda French MD

## 2023-01-04 NOTE — PROGRESS NOTES
Occupational Therapy  Facility/Department: [facility] B3 - MED SURG  Daily Treatment Note  NAME: Mervin Moore  : 1945  MRN: [id number]    Date of Service: 2023    Discharge Recommendations:  24 hour supervision or assist, Home with Home health OT  OT Equipment Recommendations  Equipment Needed: No (pt has needed DME)      Patient Diagnosis(es): The primary encounter diagnosis was Multifocal pneumonia. A diagnosis of Sepsis, due to unspecified organism, unspecified whether acute organ dysfunction present (HCC) was also pertinent to this visit.     Assessment    Assessment: Co-tx collaboration this date to safely meet goals and will have better occupational performance outcomes with in a co-treatment than 1:1 session.  Pt tolerated therapy fair this date. Upon arrival, pt found to be incontinent of bowel and bladder and req totalA for brief exchange and margaret care supine in bed, pt able to roll to L and R w/ CGA and use of bed rails. Pt requiring min A x2 for supine<>sit and mod A x2 for scooting. Pt improving with fucntional mobility completing sit<>stands with min/mod A x2 increasing to mod A x2 with fatigue and with stand step EOB. Pt able to tolerate standing EOB x 1 minute with min A x2 with max fatigue, decreased posture, forward head, narrowed TIESHA, and kyphosis.  Pt cont to present below his PLOF and benefits from inpatient OT services. Cont to rec home w/ 24hr assist and HHOT at d/c.  Activity Tolerance: Treatment limited secondary to decreased cognition;Patient tolerated evaluation without incident;Patient limited by fatigue  Discharge Recommendations: 24 hour supervision or assist;Home with Home health OT  Equipment Needed: No (pt has needed DME)      Plan   Occupational Therapy Plan  Times Per Week: 2-3x/wk  Current Treatment Recommendations: Self-Care / ADL;Patient/Caregiver education & training;Equipment evaluation, education, & procurement;Balance training;Functional mobility training;Safety  education & training     Restrictions  Restrictions/Precautions  Restrictions/Precautions: Fall Risk  Position Activity Restriction  Other position/activity restrictions: R UE IV, PEG, suction, purewick    Subjective   Subjective  Subjective: pt resting in bed upon arrival, agreeable to OT/PT tx. Pain: no pain reported  Pain: Denies pain at rest           Objective    Vitals semi-fowlers: 94/56BP, after standing return to supine 113/76BP. 83YH. Bed Mobility Training  Bed Mobility Training: Yes  Rolling: Assist X1;Additional time; Adaptive equipment;Contact-guard assistance (over multiple trials to assist wtih hygiene and brief management)  Supine to Sit: Contact-guard assistance;Assist X1  Sit to Supine: Minimum assistance;Assist X1  Scooting: Moderate assistance;Assist X2  Balance  Sitting: Impaired  Sitting - Static: Fair (occasional)  Sitting - Dynamic: Fair (occasional)  Standing - Static: Constant support; Fair  Standing - Dynamic: Constant support;Poor  Transfer Training  Transfer Training: Yes  Overall Level of Assistance: Moderate assistance;Assist X2;Additional time;Minimum assistance  Sit to Stand: Moderate assistance;Minimum assistance;Assist X2 (min A of 1, mod A of 1 with B hand hold of therapist hand, increasing assistance to mod A x2 for sit<>stand with pt pushing off bed surface with 1 hand on bed, 1 hand held assist)  Stand to Sit: Moderate assistance;Minimum assistance;Assist X2 (x3 trials)  Gait  Overall Level of Assistance: Moderate assistance;Assist X2 (stand step EOB x 7-8 steps)  Interventions: Weight shifting training/pressure relief;Verbal cues;Manual cues; Safety awareness training;Visual cues; Tactile cues;Demonstration  Base of Support: Narrowed  Speed/Sophie: Delayed;Pace decreased (< 100 feet/min); Shuffled; Slow  Step Length: Left shortened;Right shortened  Stance: Right decreased; Left decreased  Gait Abnormalities: Decreased step clearance;Shuffling gait     ADL  Grooming: Setup;Stand by assistance  Grooming Skilled Clinical Factors: pt able to brush hair EOB after set-up with SBA for sitting balance  LE Dressing: Dependent/Total  LE Dressing Skilled Clinical Factors: brief exchange following bowel and bladder incontinence; don socks  Toileting: Dependent/Total  Toileting Skilled Clinical Factors: bowel and bladder incontinence req totalA for margaret care. pt incontientn of bowel upon arrival  Additional Comments: Pt requires assistance for ADLs at baseline. Min A for support at able during suction in seated position  Safety Devices  Type of Devices: Patient at risk for falls; All fall risk precautions in place; Left in chair;Call light within reach; Chair alarm in place;Gait belt;Nurse notified  Restraints  Restraints Initially in Place: No     Patient Education  Education Given To: Patient  Education Provided: Role of Therapy;Plan of Care;Transfer Training  Education Method: Verbal  Barriers to Learning: Cognition  Education Outcome: Continued education needed;Verbalized understanding    Goals  Short Term Goals  Time Frame for Short Term Goals: 1 week (1/05) unless noted  Short Term Goal 1: Perform functional transfers with mod A and AD - MET 1/4/22 update: complete sit<>stand min Ax2  Short Term Goal 2: Perform UE exer 10-15x each for endurance by 1/03 - progressing 1/2, x10  Short Term Goal 3: Perform 1-2 grooming tasks with min A- partially met 1/4/23 1 of 2 : brushed hair  Short Term Goal 4: complete functional tranfser stand pivot min A x2  Patient Goals   Patient goals : \"Go home\"       Therapy Time   Individual Concurrent Group Co-treatment   Time In 6765         Time Out 1527         Minutes 40         Timed Code Treatment Minutes: Metsa 21, OT  If pt is unable to be seen after this session, please let this note serve as discharge summary. Please see case management note for discharge disposition. Thank you.

## 2023-01-04 NOTE — PROGRESS NOTES
Progress Note    Patient Larissa Bennett  MRN: [id number]  YOB: 1945 Age: 76 y.o. Sex: male  Room: [address] [place] [address]       Admitting Physician: Diane Souza MD   Date of Admission: 12/28/2022  7:02 PM   Primary Care Physician: Fernando Siegel MD     Subjective:  Larissa Bennett was seen and examined. We are following for PEG tube placement. -- No acute events overnight  -- Denies abdominal pain or nausea/vomiting    ROS:  Constitutional: Denies fever, no change in appetite  Respiratory: Denies cough or shortness of breath  Cardiovascular: Denies chest pain or edema    Objective:  Vital Signs:   Vitals:    01/04/23 0802   BP: 119/71   Pulse: 89   Resp: 16   Temp: 97.6 °F (36.4 °C)   SpO2: 93%         Physical Exam:  Constitutional: Alert and oriented x 4. No acute distress. Respiratory: Respirations nonlabored, no crepitus  GI: Abdomen nondistended, soft, and nontender. PEG tube site without drainage or erythema. Mild tenderness at PEG tube site. External bumper at 3.5 cm. Neurological: No focal deficits noted. No asterixis.     Intake/Output:    Intake/Output Summary (Last 24 hours) at 1/4/2023 0905  Last data filed at 1/4/2023 3177  Gross per 24 hour   Intake 110 ml   Output 500 ml   Net -390 ml        Current Medications:  Current Facility-Administered Medications   Medication Dose Route Frequency Provider Last Rate Last Admin    sodium chloride flush 0.9 % injection 5-40 mL  5-40 mL IntraVENous 2 times per day Daphne Milks Yael, DO   10 mL at 01/04/23 0805    sodium chloride flush 0.9 % injection 5-40 mL  5-40 mL IntraVENous PRN Daphne Milks Yael, DO        0.9 % sodium chloride infusion  25 mL IntraVENous PRN Daphne Milks Yael, DO        gabapentin (NEURONTIN) capsule 300 mg  300 mg PEG Tube TID ZULEYMA Alas CNP   300 mg at 01/04/23 0807    traZODone (DESYREL) tablet 50 mg  50 mg PEG Tube Nightly ZULEYMA Alas CNP   50 mg at 01/03/23 2201 carbidopa-levodopa (SINEMET)  MG per tablet 1 tablet  1 tablet PEG Tube 4x Daily Maddi Grissom, APRN - CNP   1 tablet at 01/04/23 0807    carbidopa-levodopa (SINEMET CR)  MG per extended release tablet 1 tablet  1 tablet Oral Nightly Maddi Grissom, APRN - CNP   1 tablet at 01/02/23 2043    potassium chloride (KLOR-CON M) extended release tablet 10 mEq  10 mEq Oral Daily Maddi Grissom, APRN - CNP   10 mEq at 01/04/23 8416    sertraline (ZOLOFT) tablet 100 mg  100 mg Oral Daily Maddi Grissom, APRN - CNP   100 mg at 01/04/23 3366    sodium chloride flush 0.9 % injection 5-40 mL  5-40 mL IntraVENous 2 times per day Maddi Grissom, APRN - CNP   10 mL at 01/03/23 2007    sodium chloride flush 0.9 % injection 5-40 mL  5-40 mL IntraVENous PRN Maddi Grissom, APRN - CNP        0.9 % sodium chloride infusion   IntraVENous PRN Maddi Grissom, APRN - CNP        enoxaparin (LOVENOX) injection 40 mg  40 mg SubCUTAneous Daily Maddi Grissom, APRN - CNP   40 mg at 01/04/23 0806    ondansetron (ZOFRAN-ODT) disintegrating tablet 4 mg  4 mg Oral Q8H PRN Maddireal Grissom, APRN - CNP        Or    ondansetron (ZOFRAN) injection 4 mg  4 mg IntraVENous Q6H PRN Maddi Grissom, APRN - CNP        polyethylene glycol (GLYCOLAX) packet 17 g  17 g Oral Daily PRN Maddi Grissom, APRN - CNP        acetaminophen (TYLENOL) tablet 650 mg  650 mg Oral Q6H PRN Maddi Grissom, APRN - CNP        Or    acetaminophen (TYLENOL) suppository 650 mg  650 mg Rectal Q6H PRN Maddi Grissom, APRN - CNP        0.9 % sodium chloride infusion   IntraVENous Continuous Maddi Grissom, APRN - CNP 75 mL/hr at 01/03/23 1732 New Bag at 01/03/23 1732    pantoprazole (PROTONIX) injection 40 mg  40 mg IntraVENous Daily Maddi Grissom, APRN - CNP   40 mg at 01/04/23 0805    albuterol (PROVENTIL) nebulizer solution 2.5 mg  2.5 mg Nebulization Q4H PRN Maddi Grissom, APRN - CNP        piperacillin-tazobactam (ZOSYN) 3,375 mg in dextrose 5 % 50 mL IVPB extended infusion (mini-bag)  3,375 mg IntraVENous Q8H Hermilo Mayo MD   Stopped at 01/04/23 6178         Recent Imaging:   FL MODIFIED BARIUM SWALLOW W VIDEO  Narrative: EXAMINATION:  MODIFIED BARIUM SWALLOW WAS PERFORMED IN CONJUNCTION WITH SPEECH PATHOLOGY  SERVICES    TECHNIQUE:  Under fluoroscopic evaluation cineradiography/videoradiography recordings  were performed in conjunction with the speech-language pathologist (SLP). Various liquid, solid and/or semi-solid barium preparations were used to  assess swallowing function. FLUOROSCOPY DOSE AND TYPE OR TIME AND EXPOSURES:  2.7 mGy fluoroscopy    Total fluoroscopic dose 8.8 mGy. 20 fluoroscopic loops    COMPARISON:  None    HISTORY:  ORDERING SYSTEM PROVIDED HISTORY: aspiration  TECHNOLOGIST PROVIDED HISTORY:  Reason for exam:->aspiration    FINDINGS:  Aspiration was seen. There is prominent pooling of substances in the  piriform sinuses  Impression: Aspiration was seen    Please see separate speech pathology report for full discussion of findings  and recommendations. Labs:   Recent Labs     01/03/23  1036   HGB 12.9*   WBC 8.0          Assessment:    76year old M with PMH significant for parkinson's disease, dementia, and unspecified liver disease all followed at the [place] so limited records available. He was admited 3 days ago with cheast pain and CAP. He is limited historian. He failed swallow study but family noted he was [facility] and wanted him to have soft and bite sized with thin liquids. After he failed MBS, pts family wanted him changed to NPO, therefore PEG consult placed. 1/3 EGD: Successful PEG tube placement    PEG site clean and dry. Abdominal binder remains in place. External bumper loosened to 4 cm.      Plan:  Keep head of bed elevated during feedings--minimum 30 degrees  Flush PEG tube with 30 mL of water every 8 hours  Okay to remove abdominal binder today if patient is not at risk of tube being dislodged  Change dressing twice daily for 3 days. Apply antibiotics ointment with each dressing change  Okay to shower/bath after 3 days  Do not place gauze or material between external bumper and patient's skin  GI will follow up as needed. Please call with questions or concerns. ZULEYMA Grady - KAREN    [facility]    [phone].  Also available via [employer]

## 2023-01-04 NOTE — CARE COORDINATION
Chart reviewed. Aspiration PNA. Management per GI and IM. S/p PEG tube placement. Tube feed started today. Pt will need to tolerate and reach goal rate. Family met with [facility] today. Plan is to take pt home with hospice, when tolerating tube feed. Cm will continue to follow for needs.  Shirley Justin RN

## 2023-01-04 NOTE — PROGRESS NOTES
PALLIATIVE MEDICINE PROGRESS NOTE     Patient name:Mervin Hinojosa    [facility]:[id number] :1945  Room/Bed:0356/0356-01    LOS: 6 days        ASSESSMENT/RECOMMENDATIONS     76 y.o. male with advanced Parkinson's disease and aspiration pneumonia    Symptom Management:  Goals of Care- comfort care. Family has decided today to enroll with hospice [facility]). Will receive hospice services at daughter's home where he has been living. Discharge pending TF at goal.  Protein-calorie malnutrition -  BMI 16. Albumin 2.3, total protein 4.7. Per chart looks like patient has lost 20-25 lbs over the past 9 months. G tube placed today. Family intends for 90% of his nutrition to come from tube feeds and 10% from pleasure feeds. Parkinson's disease - on home sinemet. Per daughter/caregiver, patient has rapidly declined over past year and is at end stage. Uses a scooter to get around on his good days, but mostly confined to bed. Not able to communicate well due to weakness in voice. Aspirating. Patient/Family Goals of Care :    23    Spoke with [facility]. They enrolled patient this morning and consents have been signed by wife. Patient will be enrolled under a Parkinson's disease diagnosis so tube feedings will be covered. Hospice RN is having equipment ordered today. Just awaiting our discharge. Updated team.    1/3/23     Spoke with daughter, Duran Partida, who is a nurse. She states patient is in the end stages of Parkinson's disease. He is cared for around the clock in her home. He is an aspirator and Marcy says she still wants to allow pleasure feeds. She anticipated 90% of his nutrition will come from his G tube now that he has one. She wants to focus on comfort and dignity for him. He doesn't like to do therapy because it takes too much out of him. She would like to know when its time to look at hospice.  Informed her that he currently qualifies for hospice due to his nutritional status. Also, all of the things she is requesting (mostly a hospital bed) would be provided under hospice. She is very interested. Offered to make a referral to a hospice agency so she could hear more specifically what they could and can't do for the patient. She requests the referral be placed to [facility]. She doesn't want the word \"hospice\" used around her mom because it will scare her. She prefers if we could refer to it as \"comfort care\". If she doesn't like what she hears from hospice then we discussed home palliative care may be another option to support the family in recognizing when it is time to re-consider hospice services. Either way they want patient to come back to daughter's house where he has been living. Discussed code status. Marcy states patient is a DNI. Advised her I would change his code status to reflect no intubation. As he is still getting Abx, will make code status a Limited x 4 Nos at this time. If/when patient enrolls in hospice we can amend to [facility]. Disposition/Discharge Plan :    - home with hospice ([place])    Advance Directives:  Code status:  Limited x 4 Nos  Decision Maker: Patient's daughter and wife      Palliative Performance Scale:     [] 60%  Amb reduced; Sig dz. Can't do hobbies/housework; Intake normal or reduced, Occasional assist; LOC full/confusion   [] 50%  Mainly sit/lie; Extensive disease. Mainly assist, Intake normal or reduced; Occasional assist; LOC full/confusion   [] 40%  Mainly in bed; Extensive disease; Mainly assist; Intake normal or reduced; Occasional assist; LOC full/confusion   [] 30%  Bed bound, Extensive disease; Total care; Intake reduced; LOC full/confusion   [x] 20%  Bed bound; Extensive disease; Total care; Intake minimal; Drowsy/coma   [] 10%  Bed bound; Extensive disease;  Total care; Mouth care only; Drowsy/coma   []  0%   Death      Case Discussed with:  patient, floor RN, case management, attending MD, hospice    Thank you for allowing us to participate in the care of this patient. SUBJECTIVE     Chief Complaint: shoulder pain    Last 24 hours:   Family met with hospice    ROS:  Review of Systems        Patient unable to complete full ROS due to current cognitive status/patient asleep. Information that is obtained from nursing and chart           OBJECTIVE   /71   Pulse 89   Temp 97.6 °F (36.4 °C) (Oral)   Resp 16   Ht 5' 11\" (1.803 m)   Wt 120 lb (54.4 kg)   SpO2 93%   BMI 16.74 kg/m²   I/O last 3 completed shifts: In: 110 [I.V.:110]  Out: 200 [Urine:200]  I/O this shift: In: 0   Out: 500 [Urine:500]      Physical Exam    Physical Exam  Constitutional:       General: He is not in acute distress. Appearance: He is ill-appearing. Comments: Asleep in bed   HENT:      Head: Normocephalic and atraumatic. Nose: Nose normal.      Mouth/Throat:      Mouth: Mucous membranes are dry. Pharynx: No oropharyngeal exudate or posterior oropharyngeal erythema. Cardiovascular:      Rate and Rhythm: Normal rate. Pulses: Normal pulses. Pulmonary:      Effort: Pulmonary effort is normal. No respiratory distress. Breath sounds: No stridor. No wheezing. Abdominal:      General: There is no distension. Palpations: Abdomen is soft. Musculoskeletal:         General: Normal range of motion. Cervical back: Normal range of motion and neck supple. Right lower leg: No edema. Left lower leg: No edema. Skin:     General: Skin is warm and dry. Capillary Refill: Capillary refill takes less than 2 seconds. Coloration: Skin is pale.    Neurological:      Comments: Sleeping/CASEY   Psychiatric:      Comments: Sleeping/looks calm            Total time: 35 minutes  >50% of time spent counseling patient at bedside or POA/family member if applicable , reviewing information and discussing care, coordinating with care team       Signed By: Electronically signed by Clinton Parnell Tariq Chanel - CNP on 1/4/2023 at 9:22 AM   Palliative Medicine   [phone]    January 4, 2023

## 2023-01-04 NOTE — CONSULTS
Comprehensive Nutrition Assessment    Type and Reason for Visit:  Initial, Consult    Nutrition Recommendations/Plan:   Initiate Jevity 1.5 (standard w/ fiber formula) at 10 mL/hr and as tolerated, increase by 10 mL/hr q 4 hours until goal of 60 mL/hr is met via PEG. Recommend 150 mL H20 q 4 hours. Increase flush if Na+ increases greater than 145 mEq/L. Monitor closely and correct lytes (K, Phos, Mg) before progressing TF to goal d/t risk of refeeding syndrome (hx extended inadequate nutritional intake). Ensure head of bed is 30 - 45 degrees during continuous or bolus gastric feeding and for one hour after bolus. Turn off the feeding if head of bed is lowered less than 30 degrees. RD to order new updated weight  Monitor for tolerance (bowel habits, N/V, cramping, abdominal exam findings: distended, firm, tense, guarded, discomfort). Malnutrition Assessment:  Malnutrition Status:  Severe malnutrition (01/04/23 1013)    Context:  Chronic Illness     Findings of the 6 clinical characteristics of malnutrition:  Energy Intake:  75% or less estimated energy requirements for 1 month or longer  Body Fat Loss:  Severe body fat loss Triceps, Orbital   Muscle Mass Loss:  Severe muscle mass loss Thigh (quadraceps), Clavicles (pectoralis & deltoids), Temples (temporalis)    Nutrition Assessment:    Consult for TF order and management: 76 y.o m w/ PMH of Parkinson's disease admitted d/t concerns of chest pain radiating into the shoulder and back. Pt s/p MBS on 12/30- SLP recommended NPO. Pt currently NPO for PEG placement yesterday. Family insisted on diet despite SLP recommendations, pt previously on pureed diet with thick liquids w/ intakes of 0-100% of meals. Pt sleeping at time of visit, no family in room. Difficult to assess wt hx d/t lack of weight method, will order updated weight. Spoke to RN, plan to record new weight. Consult for TF order, recommendations included. Plan to start TF today, per RN.  Pallative care following. Hospice consulted. Will monitor. Nutrition Related Findings:    + 2 BM yesterday. Labs reveiwed. Wound Type: None       Current Nutrition Intake & Therapies:    Average Meal Intake: NPO  Average Supplements Intake: NPO  Diet NPO Exceptions are: Sips of Water with Meds  ADULT TUBE FEEDING; PEG; Standard with Fiber; Continuous; 10; Yes; 10; Q 4 hours; 60; 150; Q 4 hours  Current Tube Feeding (TF) Orders:  Feeding Route: PEG  Formula: Standard with Fiber  Schedule: Continuous  Goal TF & Flush Orders Provides: Jevity 1.5 at goal rate of 60 ml/hr to provide 1200 ml TV, 1800 kcals, 77 g protein and 912 ml free water. Free water flush 150 ml q 4 hours. Anthropometric Measures:  Height: 5' 11\" (180.3 cm)  Ideal Body Weight (IBW): 172 lbs (78 kg)       Current Body Weight: 120 lb (54.4 kg), 69.8 % IBW.  Weight Source: Not Specified  Current BMI (kg/m2): 16.7                    BMI Categories: Underweight (BMI less than 22) age over 72    Estimated Daily Nutrient Needs:  Energy Requirements Based On: Kcal/kg (20-25 kcals/kg)  Weight Used for Energy Requirements: Ideal (78 kg)  Energy (kcal/day): 4789-4398  Weight Used for Protein Requirements: Ideal (1-1.2 g/kg)  Protein (g/day): 78-94 g  Method Used for Fluid Requirements: 1 ml/kcal  Fluid (ml/day): 1161-8429 ml    Nutrition Diagnosis:   Severe malnutrition related to inadequate protein-energy intake as evidenced by NPO or clear liquid status due to medical condition, swallow study results, nutrition support - enteral nutrition, Criteria as identified in malnutrition assessment, severe muscle loss, severe loss of subcutaneous fat    Nutrition Interventions:   Food and/or Nutrient Delivery: Start Tube Feeding  Nutrition Education/Counseling: No recommendation at this time  Coordination of Nutrition Care: Continue to monitor while inpatient       Goals:     Goals: Initiate nutrition support, within 2 days       Nutrition Monitoring and Evaluation: Behavioral-Environmental Outcomes: None Identified  Food/Nutrient Intake Outcomes: Enteral Nutrition Intake/Tolerance  Physical Signs/Symptoms Outcomes: None Identified, Chewing or Swallowing, Biochemical Data, GI Status, Fluid Status or Edema, Weight, Nutrition Focused Physical Findings    Discharge Planning:    Enteral Nutrition     Lorna Hatch Arjun [address], [address] [place] [address],   Contact: Office: [phone]; [address] [place] [address] Road: [facility]

## 2023-01-05 VITALS
SYSTOLIC BLOOD PRESSURE: 106 MMHG | DIASTOLIC BLOOD PRESSURE: 60 MMHG | OXYGEN SATURATION: 100 % | RESPIRATION RATE: 18 BRPM | BODY MASS INDEX: 16.8 KG/M2 | HEART RATE: 63 BPM | TEMPERATURE: 97.6 F | HEIGHT: 71 IN | WEIGHT: 120 LBS

## 2023-01-05 LAB
A/G RATIO: 1 (ref 1.1–2.2)
ALBUMIN SERPL-MCNC: 2.5 G/DL (ref 3.4–5)
ALP BLD-CCNC: 176 U/L (ref 40–129)
ALT SERPL-CCNC: 6 U/L (ref 10–40)
ANION GAP SERPL CALCULATED.3IONS-SCNC: 6 MMOL/L (ref 3–16)
AST SERPL-CCNC: 16 U/L (ref 15–37)
BILIRUB SERPL-MCNC: <0.2 MG/DL (ref 0–1)
BUN BLDV-MCNC: 19 MG/DL (ref 7–20)
CALCIUM SERPL-MCNC: 8.5 MG/DL (ref 8.3–10.6)
CHLORIDE BLD-SCNC: 103 MMOL/L (ref 99–110)
CO2: 29 MMOL/L (ref 21–32)
CREAT SERPL-MCNC: 0.8 MG/DL (ref 0.8–1.3)
GFR SERPL CREATININE-BSD FRML MDRD: >60 ML/MIN/{1.73_M2}
GLUCOSE BLD-MCNC: 120 MG/DL (ref 70–99)
GLUCOSE BLD-MCNC: 164 MG/DL (ref 70–99)
GLUCOSE BLD-MCNC: 184 MG/DL (ref 70–99)
GLUCOSE BLD-MCNC: 75 MG/DL (ref 70–99)
GLUCOSE BLD-MCNC: 84 MG/DL (ref 70–99)
PERFORMED ON: ABNORMAL
PERFORMED ON: ABNORMAL
PERFORMED ON: NORMAL
PERFORMED ON: NORMAL
PHOSPHORUS: 3 MG/DL (ref 2.5–4.9)
POTASSIUM REFLEX MAGNESIUM: 4.1 MMOL/L (ref 3.5–5.1)
SODIUM BLD-SCNC: 138 MMOL/L (ref 136–145)
TOTAL PROTEIN: 4.9 G/DL (ref 6.4–8.2)

## 2023-01-05 PROCEDURE — 6360000002 HC RX W HCPCS: Performed by: NURSE PRACTITIONER

## 2023-01-05 PROCEDURE — 84100 ASSAY OF PHOSPHORUS: CPT

## 2023-01-05 PROCEDURE — 6360000002 HC RX W HCPCS: Performed by: INTERNAL MEDICINE

## 2023-01-05 PROCEDURE — 80053 COMPREHEN METABOLIC PANEL: CPT

## 2023-01-05 PROCEDURE — 36415 COLL VENOUS BLD VENIPUNCTURE: CPT

## 2023-01-05 PROCEDURE — 2580000003 HC RX 258: Performed by: INTERNAL MEDICINE

## 2023-01-05 PROCEDURE — C9113 INJ PANTOPRAZOLE SODIUM, VIA: HCPCS | Performed by: NURSE PRACTITIONER

## 2023-01-05 PROCEDURE — 6370000000 HC RX 637 (ALT 250 FOR IP): Performed by: NURSE PRACTITIONER

## 2023-01-05 RX ORDER — MORPHINE SULFATE 20 MG/ML
5 SOLUTION ORAL
Status: DISCONTINUED | OUTPATIENT
Start: 2023-01-05 | End: 2023-01-05 | Stop reason: HOSPADM

## 2023-01-05 RX ADMIN — Medication 10 ML: at 08:00

## 2023-01-05 RX ADMIN — GABAPENTIN 300 MG: 300 CAPSULE ORAL at 12:52

## 2023-01-05 RX ADMIN — GABAPENTIN 300 MG: 300 CAPSULE ORAL at 08:02

## 2023-01-05 RX ADMIN — PIPERACILLIN AND TAZOBACTAM 3375 MG: 3; .375 INJECTION, POWDER, FOR SOLUTION INTRAVENOUS at 17:52

## 2023-01-05 RX ADMIN — ENOXAPARIN SODIUM 40 MG: 100 INJECTION SUBCUTANEOUS at 08:00

## 2023-01-05 RX ADMIN — PANTOPRAZOLE SODIUM 40 MG: 40 INJECTION, POWDER, FOR SOLUTION INTRAVENOUS at 08:00

## 2023-01-05 RX ADMIN — CARBIDOPA AND LEVODOPA 1 TABLET: 25; 100 TABLET ORAL at 12:52

## 2023-01-05 RX ADMIN — CARBIDOPA AND LEVODOPA 1 TABLET: 25; 100 TABLET ORAL at 08:02

## 2023-01-05 RX ADMIN — CARBIDOPA AND LEVODOPA 1 TABLET: 25; 100 TABLET ORAL at 17:49

## 2023-01-05 RX ADMIN — PIPERACILLIN AND TAZOBACTAM 3375 MG: 3; .375 INJECTION, POWDER, FOR SOLUTION INTRAVENOUS at 02:34

## 2023-01-05 RX ADMIN — PIPERACILLIN AND TAZOBACTAM 3375 MG: 3; .375 INJECTION, POWDER, FOR SOLUTION INTRAVENOUS at 10:11

## 2023-01-05 RX ADMIN — SERTRALINE HYDROCHLORIDE 100 MG: 50 TABLET ORAL at 08:02

## 2023-01-05 RX ADMIN — POTASSIUM CHLORIDE 10 MEQ: 750 TABLET, EXTENDED RELEASE ORAL at 08:02

## 2023-01-05 ASSESSMENT — PAIN SCALES - WONG BAKER
WONGBAKER_NUMERICALRESPONSE: 0

## 2023-01-05 ASSESSMENT — PAIN SCALES - GENERAL
PAINLEVEL_OUTOF10: 0
PAINLEVEL_OUTOF10: 4
PAINLEVEL_OUTOF10: 0

## 2023-01-05 ASSESSMENT — PAIN DESCRIPTION - LOCATION: LOCATION: NECK

## 2023-01-05 NOTE — DISCHARGE SUMMARY
Hospitalist Progress Note      PCP: Fernando Siegel MD    Date of Admission: 12/28/2022    Chief Complaint: This is a 75-year-old male with significant past medical history of Parkinson's disease and unspecified liver disease who presents to [facility] emergency department due to concerns of chest pain radiating into the shoulder and back. He is unable to provide much subjective data and daughter was at bedside previously, but not for this exam.  ED documentation and ED nurse was utilized to collect subjective data. Onset of symptoms have been last few days. Daughter mentioned to ED provider that his chest pain and dyspnea began after choking on some food. It is unclear whether or not he has been febrile at home. His evaluation here included laboratory studies, EKG, chest x-ray, and CT of the chest, abdomen, and pelvis with IV contrast.  CT showed right upper and middle lobe consolidation probably pneumonia. Pertinent abnormal lab values include stable but chronic elevation of alkaline phosphatase at 274 and . Additionally, cell count showed WBC of 15.9, hemoglobin 11.4, and platelets 496. Patient was given IV fluids, Zithromax, and Rocephin in the ED. Hospital team was consulted to admit this patient for community-acquired pneumonia. Subjective:     Tube feeds started. No complaints.      Medications:  Reviewed    Infusion Medications    sodium chloride      sodium chloride      sodium chloride 75 mL/hr at 01/04/23 1648     Scheduled Medications    sodium chloride flush  5-40 mL IntraVENous 2 times per day    gabapentin  300 mg PEG Tube TID    traZODone  50 mg PEG Tube Nightly    carbidopa-levodopa  1 tablet PEG Tube 4x Daily    carbidopa-levodopa  1 tablet Oral Nightly    potassium chloride  10 mEq Oral Daily    sertraline  100 mg Oral Daily    sodium chloride flush  5-40 mL IntraVENous 2 times per day    enoxaparin  40 mg SubCUTAneous Daily    pantoprazole  40 mg IntraVENous Daily    piperacillin-tazobactam  3,375 mg IntraVENous Q8H     PRN Meds: sodium chloride flush, sodium chloride, sodium chloride flush, sodium chloride, ondansetron **OR** ondansetron, polyethylene glycol, acetaminophen **OR** acetaminophen, albuterol      Intake/Output Summary (Last 24 hours) at 1/5/2023 1327  Last data filed at 1/5/2023 1253  Gross per 24 hour   Intake 470 ml   Output 700 ml   Net -230 ml         Physical Exam Performed:    /82   Pulse 74   Temp 97.5 °F (36.4 °C) (Axillary)   Resp 16   Ht 5' 11\" (1.803 m)   Wt 120 lb (54.4 kg)   SpO2 98%   BMI 16.74 kg/m²       GEN thin, in no distress  HEENT normocephalic, anicteric sclera, EOMI, mucosa moist, no stridor  NECK supple, trachea midline  CHEST  RESP on RA, in no distress, clear to auscultation  CARDS RRR, S1, S2, no murmurs, no edema, radial pulse 2+, DP pulse 2+  ABD +BS, soft nontender. PEG placed. MSK no cyanosis, no clubbing  SKIN warm, dry  NEURO alert, oriented, limited insight. Labs:   Recent Labs     01/03/23  1036   WBC 8.0   HGB 12.9*   HCT 38.4*   *       No results for input(s): NA, K, CL, CO2, BUN, CREATININE, CALCIUM, PHOS in the last 72 hours. Invalid input(s): MAGNES    No results for input(s): AST, ALT, BILIDIR, BILITOT, ALKPHOS in the last 72 hours. Recent Labs     01/03/23  1036   INR 1.10       No results for input(s): Ashley Bigelier in the last 72 hours. Urinalysis:      Lab Results   Component Value Date/Time    NITRU Negative 10/06/2022 07:12 PM    WBCUA None seen 06/12/2022 05:59 PM    RBCUA >100 06/12/2022 05:59 PM    BLOODU Negative 10/06/2022 07:12 PM    SPECGRAV 1.015 10/06/2022 07:12 PM    GLUCOSEU Negative 10/06/2022 07:12 PM       Radiology:  Meri Pena MODIFIED BARIUM SWALLOW W VIDEO   Final Result   Aspiration was seen      Please see separate speech pathology report for full discussion of findings   and recommendations.          CTA CHEST ABDOMEN PELVIS W CONTRAST   Final Result   1. No thoracic or abdominal aortic aneurysm or dissection. 2. Dependent airspace disease likely rib representing atelectasis, with right   upper lobe and middle lobe consolidation felt to represent pneumonia. Recommend follow-up radiographs to document resolution. 3. Diffuse atherosclerosis including coronary artery involvement. 4. No acute inflammatory process seen within the abdomen or pelvis. XR CHEST PORTABLE   Final Result   New airspace consolidation the right middle lobe favored to represent   pneumonia. Radiographic follow-up to resolution is recommended. Assessment/Plan:    Active Hospital Problems    Diagnosis Date Noted    Community acquired bacterial pneumonia [J15.9] 12/29/2022     Priority: Medium    Chronic liver disease [K76.9] 12/29/2022     Priority: Medium    Chronic anemia [D64.9] 12/29/2022     Priority: Medium    Severe malnutrition ([address] [E43] 07/07/2022     Priority: Medium    Parkinson's disease ([address] Frank Howard 03/19/2014     RUL and RML Pneumonia, Concern for Aspiration Pneumonia  - CTA Chest 12/28 no thoracic or abdominal aortic aneurysm or dissection. Dependent airspace disease likely rib representing atelectasis with right upper lobe and middle lobe consolidation felt to represent pneumonia. - MBSS 12/30 showed aspiration.  - Continue IV zosyn and doxycycline (12/29-) for 7-10 days.  - Probiotics. Aspiration  - SLP recommended NPO. However, family insists on diet. So SLP recommended pureed diet with thick liquids. -- Family agreed to PEG placement. Parkinson's Disease  - Continue home sinemet. Underwent Weight  Protein Calorie Malnutrition  - CT A/P with contrast 12/28 showed no acute inflammatory process. GI consulted and PEG placed 1/3/23.  - Dietary consult and started tube feeds.   - Monitor. Normocytic Anemia  - adm HgB 11.4, MCV 88.2. Baseline HgB 9-12. - Stable.        DVT Prophylaxis: Lovenox  Diet: Diet NPO Exceptions are: Sips of Water with Meds  ADULT TUBE FEEDING; PEG; Standard with Fiber; Continuous; 10; Yes; 10; Q 4 hours; 60; 150; Q 4 hours  Code Status: Limited    PT/OT Eval ongoing    Dispo - Possible DC home with Hospice tomorrow if tolerating tube feeds.      MDM Statement: Rolanda Liriano MD

## 2023-01-05 NOTE — DISCHARGE INSTR - COC
Continuity of Care Form    Patient Name: Deja Nicholas   :  1945  MRN:  [id number]    Admit date:  2022  Discharge date:  ***    Code Status Order: Limited   Advance Directives:     Admitting Physician:  Kayla Loredo MD  PCP: Jax Rivers MD    Discharging Nurse: [facility] Unit/Room#: 8924/8467-31  Discharging Unit Phone Number: ***    Emergency Contact:   Extended Emergency Contact Information  Primary Emergency Contact: Clifford Rodriguez  Address: [address]           [address] of [address] [facility] Phone: [phone]  Relation: Spouse  Secondary Emergency Contact: rashard prieto  Home Phone: [phone]  Mobile Phone: [phone]  Relation: Child  Preferred language: English   needed?  No    Past Surgical History:  Past Surgical History:   Procedure Laterality Date    HIP SURGERY Right 2022    RIGHT HIP INTRAMEDULLARY GAMMA NAIL INSERTION performed by Suresh Lopez MD at [address] [place] [address] ENDOSCOPY N/A 1/3/2023    EGD ESOPHAGOGASTRODUODENOSCOPY PEG TUBE INSERTION performed by Siomara Lobato MD at [facility] ENDOSCOPY       Immunization History:   Immunization History   Administered Date(s) Administered    COVID-19, PFIZER PURPLE top, DILUTE for use, (age 15 y+), 30mcg/0.3mL 2021, 2022    Influenza Virus Vaccine 2013, 2015, 10/05/2017, 10/31/2018, 2019    Pneumococcal Conjugate 13-valent (Mypdkvd95) 2017    Pneumococcal Polysaccharide (Sctjzzwzu18) 2009, 2014    Pneumococcal Vaccine 2014    Td vaccine (adult) 2014    Tdap (Boostrix, Adacel) 2020    Zoster Live (Zostavax) 2020    Zoster Recombinant (Shingrix) 2018, 05/10/2019       Active Problems:  Patient Active Problem List   Diagnosis Code    Parkinson's disease ([facility] G20    Depressive disorder, not elsewhere classified F32.89    Mild cognitive impairment G31.84    Sepsis ([facility] [address] [address] A41.9    Acute hypoxemic respiratory failure (HCC) J96.01    Severe sepsis (HCC) A41.9, R65.20    COVID-19 virus infection U07.1    Pneumonia of both lower lobes due to infectious organism J18.9    Thrombocytopenia (HCC) D69.6    Lymphopenia D72.810    Acute encephalopathy G93.40    Hemoptysis R04.2    Fever R50.9    General weakness R53.1    Closed fracture of left ankle S82.892A    Constipation K59.00    Generalized weakness R53.1    Closed fracture of right hip (HCC) S72.001A    Severe malnutrition ([facility] [address] E43    Community acquired bacterial pneumonia J15.9    Chronic liver disease K76.9    Chronic anemia D64.9       Isolation/Infection:   Isolation            No Isolation          Patient Infection Status       Infection Onset Added Last Indicated Last Indicated By Review Planned Expiration Resolved Resolved By    None active    Resolved    COVID-19 (Rule Out) 22 COVID-19 & Influenza Combo (Ordered)   22 Rule-Out Test Resulted    COVID-19 (Rule Out) 10/06/22 10/06/22 10/06/22 COVID-19 & Influenza Combo (Ordered)   10/06/22 Rule-Out Test Resulted    COVID-19 (Rule Out) 22 COVID-19 & Influenza Combo (Ordered)   22 Rule-Out Test Resulted    COVID-19 (Rule Out) 22 COVID-19, Rapid (Ordered)   22 Rule-Out Test Resulted    COVID-19 20 COVID-19   20     COVID-19 (Rule Out) 20 COVID-19 (Ordered)   20 Rule-Out Test Resulted            Nurse Assessment:  Last Vital Signs: /82   Pulse 74   Temp 97.5 °F (36.4 °C) (Axillary)   Resp 16   Ht 5' 11\" (1.803 m)   Wt 120 lb (54.4 kg)   SpO2 98%   BMI 16.74 kg/m²     Last documented pain score (0-10 scale): Pain Level: 0  Last Weight:   Wt Readings from Last 1 Encounters:   22 120 lb (54.4 kg)     Mental Status:  {IP PT MENTAL STATUS:}    IV Access:  [facility] IV RRZXVT:035206943}    Nursing Mobility/ADLs:  Walking   {CHP DME LHOL:243714525}  Transfer  {CHP DME DJPR:966887876}  Bathing  {CHP DME KDNI:982660489}  Dressing  {CHP DME PVXE:434687940}  Toileting  {CHP DME QRDK:323365928}  Feeding  {[facility] DME LVKC:991018283}  Med Admin  {P DME EJJD:342459648}  Med Delivery   {[place] MED Delivery:054900557}    Wound Care Documentation and Therapy:  Incision 22 Hip Proximal;Right (Active)   Number of days: 182        Elimination:  Continence: Bowel: {YES / XZ:92074}  Bladder: {YES / PQ:97797}  Urinary Catheter: {Urinary Catheter:052932474}   Colostomy/Ileostomy/Ileal Conduit: {YES / YS:44392}       Date of Last BM: ***    Intake/Output Summary (Last 24 hours) at 2023 1329  Last data filed at 2023 1253  Gross per 24 hour   Intake 470 ml   Output 700 ml   Net -230 ml     I/O last 3 completed shifts:   In: 520 [NG/GT:520]  Out: 800 [Urine:800]    Safety Concerns:     [address] Safety Concerns:687592923}    Impairments/Disabilities:      [address] Impairments/Disabilities:053116996}    Nutrition Therapy:  Current Nutrition Therapy:   [address] Diet List:596559057}    Routes of Feeding: {[facility] DME Other Feedings:566554210}  Liquids: {Slp liquid thickness:69932}  Daily Fluid Restriction: {P DME Yes amt example:398924637}  Last Modified Barium Swallow with Video (Video Swallowing Test): {Done Not Done TUGA:861222295}    Treatments at the Time of Hospital Discharge:   Respiratory Treatments: ***  Oxygen Therapy:  {Therapy; copd oxygen:62933}  Ventilator:    { COREY Vent LGAB:420618505}    Rehab Therapies: {THERAPEUTIC INTERVENTION:7796340402}  Weight Bearing Status/Restrictions: [address] [employer] Weight Bearin}  Other Medical Equipment (for information only, NOT a DME order):  {EQUIPMENT:005271464}  Other Treatments: ***    Patient's personal belongings (please select all that are sent with patient):  {FRED DME Belongings:317865653}    DARIUS SIGNATURE:  {Esignature:313259491}    CASE MANAGEMENT/SOCIAL WORK SECTION    Inpatient Status Date: 12/29/22    Readmission Risk Assessment Score:  Readmission Risk              Risk of Unplanned Readmission:  18           Discharging to Facility/ Agency   Home with [facility]    / signature: Electronically signed by Ann Alfred RN on 1/5/23 at 2:50 PM EST    PHYSICIAN SECTION    Prognosis: Fair    Condition at Discharge: Stable    Rehab Potential (if transferring to Rehab): Fair    Recommended Labs or Other Treatments After Discharge:   Hospice Care  Continue tube feeds as needed. Physician Certification: I certify the above information and transfer of Minh Lundy  is necessary for the continuing treatment of the diagnosis listed and that he requires Hospice.      Update Admission H&P: No change in H&P    PHYSICIAN SIGNATURE:  Electronically signed by Esther Reid MD on 1/5/23 at 1:30 PM EST

## 2023-01-05 NOTE — PROGRESS NOTES
Comprehensive Nutrition Assessment    Type and Reason for Visit:  Reassess    Nutrition Recommendations/Plan:   D/t high risk of refeeding syndrome, would not discharge until pt has reached goal rate, labs obtained, and electrolytes replaced if needed  Continue Jevity 1.5 at 50 mL/hr, advance as medically feasible to 60 mL/hr via PEG   Continue free water flush of 150 mL q 4 hrs. Monitor Na labs and increase flush if Na+ increases greater than 145 mEq/L. Ensure head of bed is 30 - 45 degrees during continuous or bolus gastric feeding and for one hour after bolus. Turn off the feeding if head of bed is lowered less than 30 degrees. Monitor for tolerance (bowel habits, N/V, cramping, abdominal exam findings: distended, firm, tense, guarded, discomfort). Bolus feed recommendations if needed:   Recommend bolus feeds of Jevity 1.5, x 5 cans daily. Begin with 60 mL and increase by 60 mL at each following feed (120 mL at 2nd feeding, 180 mL at 3rd feeding, and full can (240 mL) at 4th feeding), or as tolerated. 75 mL free water flush before and after each administration. Monitor Na and need for adjustments  Monitor TF tolerance (abdominal pain, bloating, distention, diarrhea)  Monitor nutrition adequacy, pertinent labs, bowel habits, wt changes, and clinical progress    Malnutrition Assessment:  Malnutrition Status:  Severe malnutrition (01/04/23 1013)    Context:  Chronic Illness     Findings of the 6 clinical characteristics of malnutrition:  Energy Intake:  75% or less estimated energy requirements for 1 month or longer  Body Fat Loss:  Severe body fat loss Triceps, Orbital   Muscle Mass Loss:  Severe muscle mass loss Thigh (quadraceps), Clavicles (pectoralis & deltoids), Temples (temporalis)    Nutrition Assessment:    Follow up: RN called and requested bolus tube feed recommendations as pt with plans to go home with hospice this evening. Currently TFs running at 50 mL/hr, goal of 60 mL/hr.  No labs taken in 4 days, RD ordered. D/t concerns for refeeding, recommend monitoring labs prior to discharge. Pt would benefit from continuous TFs d/t extended poor PO intakes and high risk for refeeding syndrome. But if unable to provide continuous TFs with hospice, recommendations provided. Will monitor. Nutrition Related Findings:    No labs since 1/1. Active BS. BM on 1/3. Wound Type: None       Current Nutrition Intake & Therapies:    Average Meal Intake: NPO  Average Supplements Intake: NPO  Diet NPO Exceptions are: Sips of Water with Meds  ADULT TUBE FEEDING; PEG; Standard with Fiber; Continuous; 10; Yes; 10; Q 4 hours; 60; 150; Q 4 hours  Current Tube Feeding (TF) Orders:  Feeding Route: PEG  Formula: Standard with Fiber  Schedule: Continuous  Goal TF & Flush Orders Provides: Jevity 1.5 at goal rate of 60 ml/hr to provide 1200 ml TV, 1800 kcals, 77 g protein and 912 ml free water. Free water flush 150 ml q 4 hours. Bolus feed: Jevity 1.5 x5 cans daily to provide 1185 mL, 1775 kcal, 76 g protein, and 900 mL free water. +75 mL free water before and after administration. Anthropometric Measures:  Height: 5' 11\" (180.3 cm)  Ideal Body Weight (IBW): 172 lbs (78 kg)       Current Body Weight: 120 lb (54.4 kg), 69.8 % IBW.  Weight Source: Not Specified  Current BMI (kg/m2): 16.7        Weight Adjustment For: No Adjustment                 BMI Categories: Underweight (BMI less than 22) age over 72    Estimated Daily Nutrient Needs:  Energy Requirements Based On: Kcal/kg (20-25 kcals/kg)  Weight Used for Energy Requirements: Ideal (78 kg)  Energy (kcal/day): 6569-3539  Weight Used for Protein Requirements: Ideal (1-1.2 g/kg)  Protein (g/day): 78-94 g  Method Used for Fluid Requirements: 1 ml/kcal  Fluid (ml/day): 4988-8134 ml    Nutrition Diagnosis:   Severe malnutrition related to inadequate protein-energy intake as evidenced by NPO or clear liquid status due to medical condition, swallow study results, nutrition support - enteral nutrition, Criteria as identified in malnutrition assessment, severe muscle loss, severe loss of subcutaneous fat    Nutrition Interventions:   Food and/or Nutrient Delivery: Continue Current Tube Feeding  Nutrition Education/Counseling: No recommendation at this time  Coordination of Nutrition Care: Continue to monitor while inpatient       Goals:     Goals:  Tolerate nutrition support at goal rate, prior to discharge       Nutrition Monitoring and Evaluation:   Behavioral-Environmental Outcomes: None Identified  Food/Nutrient Intake Outcomes: Enteral Nutrition Intake/Tolerance  Physical Signs/Symptoms Outcomes: Biochemical Data, GI Status, Nutrition Focused Physical Findings, Weight    Discharge Planning:    Enteral Nutrition     Lorna [address], [address] [place] [address],   Contact: [phone]

## 2023-01-05 NOTE — CARE COORDINATION
CASE MANAGEMENT DISCHARGE SUMMARY      Discharge to: Home with [facility]        IMM given: (date) 1/5/23    New Durable Medical Equipment ordered/agency:     Transportation:       Medical Transport explained to [facility]. Pt/family voice no agency preference. Agency used: [address] up time: 8:30PM   Ambulance form completed: Yes    Confirmed discharge plan with:     Patient: yes     Family:  yes/daughter, Blane Schroeder     Facility/Agency, name:  [facility] faxed        RN, name: Varun Braun    Note: Discharging nurse to complete [facility], reconcile [facility], and place final copy with patient's discharge packet. RN to ensure that written prescriptions for  Level II medications are sent with patient to the facility as per protocol.

## 2023-01-05 NOTE — CARE COORDINATION
Pt ready for discharge. LINDSAY spoke with Miguelina at [facility]. Will need order for bolus tube feed. Requesting a few cartons of tube feed to be sent home with pt. Requesting comfort meds. Please give dose prior to pt discharge. Hospice set up transport for 8:30 PM with [address] [facility].  Shirley Moise RN

## 2023-01-05 NOTE — PROGRESS NOTES
PALLIATIVE MEDICINE PROGRESS NOTE     Patient name:Mervin Echavarria    MFW:[id number] :1945  Room/Bed:0356/0356-01    LOS: 7 days        ASSESSMENT/RECOMMENDATIONS     76 y.o. male with advanced Parkinson's disease and aspiration pneumonia    Symptom Management:  Goals of Care - Comfort care with hospice. Will d/c home to daughter's home with [facility] once TF at goal.    Protein- calorie malnutrition -  BMI 16. Albumin 2.3, total protein 4.7. Per chart looks like patient has lost 20-25 lbs over the past 9 months. G tube placed today. Family intends for 90% of his nutrition to come from tube feeds and 10% from pleasure feeds. Parkinson's disease - on home sinemet. Per daughter/caregiver, patient has rapidly declined over past year and is at end stage. Uses a scooter to get around on his good days, but mostly confined to bed. Not able to communicate well due to weakness in voice. Aspirating. Patient/Family Goals of Care :    23    Spoke with hospice. D/C and transport home pending TF at goal.  Should go home today. 23     Spoke with Hosp[facility] of [address] [place] [address]. They enrolled patient this morning and consents have been signed by wife. Patient will be enrolled under a Parkinson's disease diagnosis so tube feedings will be covered. Hospice RN is having equipment ordered today. Just awaiting our discharge. Updated team.     1/3/23     Spoke with daughter, Khanh Easley, who is a nurse. She states patient is in the end stages of Parkinson's disease. He is cared for around the clock in her home. He is an aspirator and Marcy says she still wants to allow pleasure feeds. She anticipated 90% of his nutrition will come from his G tube now that he has one. She wants to focus on comfort and dignity for him. He doesn't like to do therapy because it takes too much out of him. She would like to know when its time to look at hospice.  Informed her that he currently qualifies for hospice due to his nutritional status. Also, all of the things she is requesting (mostly a hospital bed) would be provided under hospice. She is very interested. Offered to make a referral to a hospice agency so she could hear more specifically what they could and can't do for the patient. She requests the referral be placed to [facility]. She doesn't want the word \"hospice\" used around her mom because it will scare her. She prefers if we could refer to it as \"comfort care\". If she doesn't like what she hears from hospice then we discussed home palliative care may be another option to support the family in recognizing when it is time to re-consider hospice services. Either way they want patient to come back to daughter's house where he has been living. Discussed code status. Marcy states patient is a DNI. Advised her I would change his code status to reflect no intubation. As he is still getting Abx, will make code status a Limited x 4 Nos at this time. If/when patient enrolls in hospice we can amend to [facility]. Disposition/Discharge Plan :    - home with [facility] Directives:  Code status:  Limited x 4 Nos  Decision Maker: Wife and daughter      Palliative Performance Scale:     [] 60%  Amb reduced; Sig dz. Can't do hobbies/housework; Intake normal or reduced, Occasional assist; LOC full/confusion   [] 50%  Mainly sit/lie; Extensive disease. Mainly assist, Intake normal or reduced; Occasional assist; LOC full/confusion   [] 40%  Mainly in bed; Extensive disease; Mainly assist; Intake normal or reduced; Occasional assist; LOC full/confusion   [] 30%  Bed bound, Extensive disease; Total care; Intake reduced; LOC full/confusion   [x] 20%  Bed bound; Extensive disease; Total care; Intake minimal; Drowsy/coma   [] 10%  Bed bound; Extensive disease;  Total care; Mouth care only; Drowsy/coma   []  0%   Death      Case Discussed with:  patient, family, floor RN, case management, attending MD, hospice    Thank you for allowing us to participate in the care of this patient. SUBJECTIVE     Chief Complaint: shoulder pain    Last 24 hours:   TF tolerated    ROS:  Review of Systems   Unable to perform ROS: Mental status change           Patient unable to complete full ROS due to current cognitive status. Information that is obtained from nursing and chart           OBJECTIVE   /82   Pulse 74   Temp 97.5 °F (36.4 °C) (Axillary)   Resp 16   Ht 5' 11\" (1.803 m)   Wt 120 lb (54.4 kg)   SpO2 98%   BMI 16.74 kg/m²   I/O last 3 completed shifts: In: 520 [NG/GT:520]  Out: 800 [Urine:800]  I/O this shift: In: 0   Out: 400 [Urine:400]      Physical Exam    Physical Exam  Vitals reviewed. Constitutional:       General: He is not in acute distress. Appearance: He is ill-appearing. Comments: Lying in bed   HENT:      Head: Normocephalic and atraumatic. Nose: Nose normal.      Mouth/Throat:      Mouth: Mucous membranes are dry. Eyes:      General: No scleral icterus. Right eye: No discharge. Left eye: No discharge. Cardiovascular:      Rate and Rhythm: Normal rate. Pulses: Normal pulses. Pulmonary:      Effort: Pulmonary effort is normal. No respiratory distress. Breath sounds: No stridor. No wheezing. Abdominal:      Palpations: Abdomen is soft. Musculoskeletal:         General: Normal range of motion. Cervical back: Normal range of motion and neck supple. Right lower leg: No edema. Left lower leg: No edema. Skin:     General: Skin is warm and dry. Capillary Refill: Capillary refill takes 2 to 3 seconds. Coloration: Skin is pale. Neurological:      Mental Status: He is alert.       Comments: CASEY   Psychiatric:      Comments: calm            Total time: 25 minutes  >50% of time spent counseling patient at bedside or POA/family member if applicable , reviewing information and discussing care, coordinating with care team Signed By: Electronically signed by ZULEYMA Emery CNP on 1/5/2023 at 10:56 AM   Palliative Medicine   [phone]    January 5, 2023

## 2023-01-06 NOTE — PROGRESS NOTES
Pt discharged vitals stable at time of transport. Tube feed at goal rate of 60ml/hr pt tolerating 2 Liters of jevity sent with pt. Pt dentures, glasses, and all personal effects sent with pt. All question answered to transport staff and pt not concerns at time of transport. Paperwork sent with transport. Iv removed per AM RN instruction, tube feed stopped, and peg tube flushed.

## 2023-01-07 NOTE — PROGRESS NOTES
Physician Progress Note      Juhi Hawk  [facility] #:                  [id number]  :                       1945  ADMIT DATE:       2022 7:02 PM  Primo Hicks DATE:        2023 8:54 PM  RESPONDING  PROVIDER #:        Jennifer Jin MD          QUERY TEXT:    Patient admitted with PNA. If possible, please document in progress notes and   discharge summary if you are evaluating and /or treating any of the following: The medical record reflects the following:  Risk Factors: Age, parkinsons, dysphagia, anemia  Clinical Indicators: BMI 16.74, \"Underwent Weight, Protein Calorie   Malnutrition\"- progress notes  Treatment: Modified barium swallow, PEG placement, serial labs, and supportive   care    Thank you,  Dania Morton RN, BSN    ASPEN Criteria:    https://aspenjournals. onlinelibrary. walls. com/doi/full/10.1177/579824806434373  5  Options provided:  -- Protein calorie malnutrition mild  -- Protein calorie malnutrition moderate  -- Protein calorie malnutrition severe  -- Other - I will add my own diagnosis  -- Disagree - Not applicable / Not valid  -- Disagree - Clinically unable to determine / Unknown  -- Refer to Clinical Documentation Reviewer    PROVIDER RESPONSE TEXT:    This patient has severe protein calorie malnutrition. Query created by: Samina Noe on 1/3/2023 1:48 PM      QUERY TEXT:    Pt admitted with fatigue and cough. Pt noted to have PNA and possible   aspiration. If possible, please document in the progress notes and discharge   summary if you are evaluating and/or treating any of the following:    Note: CAP and HCAP indicate where the pneumonia was acquired, not a specific   type. The medical record reflects the following:  Risk Factors: Age, Parkinsons, Anemia, Dysphagia  Clinical Indicators: \"- Received IV ceftriaxone, azithromycin (). - Change   to IV zosyn, doxycycline  (-). \"- progress note   Treatment: Zosyn, doxy, cultures, serial labs, and supportive care    Thank you,  Kenard Najjar, RN, BSN  Options provided:  -- PNA due to Gram negative pneumonia and aspiration  -- PNA due to Gram negative pneumonia and aspiration PNA ruled out  -- Other - I will add my own diagnosis  -- Disagree - Not applicable / Not valid  -- Disagree - Clinically unable to determine / Unknown  -- Refer to Clinical Documentation Reviewer    PROVIDER RESPONSE TEXT:    This patient has PNA due to gram negative pneumonia and aspiration. Query created by: Luann Sepulveda on 1/3/2023 2:13 PM      Electronically signed by:   Jovany Dominguez MD 1/7/2023 1:53 AM

## 2023-03-18 ENCOUNTER — APPOINTMENT (OUTPATIENT)
Dept: CT IMAGING | Age: 78
DRG: 312 | End: 2023-03-18
Payer: OTHER GOVERNMENT

## 2023-03-18 ENCOUNTER — APPOINTMENT (OUTPATIENT)
Dept: GENERAL RADIOLOGY | Age: 78
DRG: 312 | End: 2023-03-18
Payer: OTHER GOVERNMENT

## 2023-03-18 ENCOUNTER — HOSPITAL ENCOUNTER (EMERGENCY)
Age: 78
Discharge: HOME | DRG: 312 | End: 2023-03-18
Attending: STUDENT IN AN ORGANIZED HEALTH CARE EDUCATION/TRAINING PROGRAM
Payer: OTHER GOVERNMENT

## 2023-03-18 VITALS
WEIGHT: 123 LBS | DIASTOLIC BLOOD PRESSURE: 88 MMHG | SYSTOLIC BLOOD PRESSURE: 111 MMHG | HEART RATE: 95 BPM | BODY MASS INDEX: 17.16 KG/M2 | RESPIRATION RATE: 16 BRPM | OXYGEN SATURATION: 93 % | TEMPERATURE: 98.3 F
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
## 2023-03-18 LAB
ALBUMIN SERPL-MCNC: 3.5 G/DL (ref 3.4–5)
ALBUMIN/GLOB SERPL: 1.8 {RATIO} (ref 1.1–2.2)
ALP SERPL-CCNC: 198 U/L (ref 40–129)
ALT SERPL-CCNC: 6 U/L (ref 10–40)
ANION GAP SERPL CALCULATED.3IONS-SCNC: 5 MMOL/L (ref 3–16)
AST SERPL-CCNC: 17 U/L (ref 15–37)
BASE EXCESS BLDV CALC-SCNC: 3.9 MMOL/L (ref -3–3)
BASOPHILS # BLD: 0.1 K/UL (ref 0–0.2)
BASOPHILS NFR BLD: 0.8 %
BILIRUB SERPL-MCNC: 0.4 MG/DL (ref 0–1)
BUN SERPL-MCNC: 21 MG/DL (ref 7–20)
CALCIUM SERPL-MCNC: 8.9 MG/DL (ref 8.3–10.6)
CHLORIDE SERPL-SCNC: 101 MMOL/L (ref 99–110)
CO2 BLDV-SCNC: 33 MMOL/L
CO2 SERPL-SCNC: 32 MMOL/L (ref 21–32)
COHGB MFR BLDV: 1.7 % (ref 0–1.5)
CREAT SERPL-MCNC: 0.6 MG/DL (ref 0.8–1.3)
DEPRECATED RDW RBC AUTO: 17.1 % (ref 12.4–15.4)
EOSINOPHIL # BLD: 0.1 K/UL (ref 0–0.6)
EOSINOPHIL NFR BLD: 1.9 %
GFR SERPLBLD CREATININE-BSD FMLA CKD-EPI: >60 ML/MIN/{1.73_M2}
GLUCOSE SERPL-MCNC: 134 MG/DL (ref 70–99)
HCO3 BLDV-SCNC: 31.1 MMOL/L (ref 23–29)
HCT VFR BLD AUTO: 39.2 % (ref 40.5–52.5)
HGB BLD-MCNC: 13.1 G/DL (ref 13.5–17.5)
LYMPHOCYTES # BLD: 1.1 K/UL (ref 1–5.1)
LYMPHOCYTES NFR BLD: 13.6 %
MCH RBC QN AUTO: 30.3 PG (ref 26–34)
MCHC RBC AUTO-ENTMCNC: 33.5 G/DL (ref 31–36)
MCV RBC AUTO: 90.5 FL (ref 80–100)
METHGB MFR BLDV: 0.5 %
MONOCYTES # BLD: 0.9 K/UL (ref 0–1.3)
MONOCYTES NFR BLD: 11.2 %
NEUTROPHILS # BLD: 5.6 K/UL (ref 1.7–7.7)
NEUTROPHILS NFR BLD: 72.5 %
O2 THERAPY: ABNORMAL
PCO2 BLDV: 58 MMHG (ref 40–50)
PH BLDV: 7.35 [PH] (ref 7.35–7.45)
PLATELET # BLD AUTO: 181 K/UL (ref 135–450)
PMV BLD AUTO: 6.5 FL (ref 5–10.5)
PO2 BLDV: 43.7 MMHG (ref 25–40)
POTASSIUM SERPL-SCNC: 3.9 MMOL/L (ref 3.5–5.1)
PROT SERPL-MCNC: 5.4 G/DL (ref 6.4–8.2)
RBC # BLD AUTO: 4.33 M/UL (ref 4.2–5.9)
SAO2 % BLDV: 80 %
SODIUM SERPL-SCNC: 138 MMOL/L (ref 136–145)
TROPONIN T SERPL-MCNC: <0.01 NG/ML
WBC # BLD AUTO: 7.8 K/UL (ref 4–11)

## 2023-03-18 PROCEDURE — 73502 X-RAY EXAM HIP UNI 2-3 VIEWS: CPT

## 2023-03-18 PROCEDURE — 85025 COMPLETE CBC W/AUTO DIFF WBC: CPT

## 2023-03-18 PROCEDURE — 72125 CT NECK SPINE W/O DYE: CPT

## 2023-03-18 PROCEDURE — 99285 EMERGENCY DEPT VISIT HI MDM: CPT

## 2023-03-18 PROCEDURE — 84484 ASSAY OF TROPONIN QUANT: CPT

## 2023-03-18 PROCEDURE — 70450 CT HEAD/BRAIN W/O DYE: CPT

## 2023-03-18 PROCEDURE — 82803 BLOOD GASES ANY COMBINATION: CPT

## 2023-03-18 PROCEDURE — 80053 COMPREHEN METABOLIC PANEL: CPT

## 2023-03-18 PROCEDURE — 73610 X-RAY EXAM OF ANKLE: CPT

## 2023-03-18 PROCEDURE — 2580000003 HC RX 258

## 2023-03-18 PROCEDURE — 93005 ELECTROCARDIOGRAM TRACING: CPT | Performed by: STUDENT IN AN ORGANIZED HEALTH CARE EDUCATION/TRAINING PROGRAM

## 2023-03-18 RX ORDER — SODIUM CHLORIDE 9 MG/ML
1000 INJECTION, SOLUTION INTRAVENOUS CONTINUOUS
Status: DISCONTINUED | OUTPATIENT
Start: 2023-03-18 | End: 2023-03-18 | Stop reason: HOSPADM

## 2023-03-18 RX ADMIN — SODIUM CHLORIDE 1000 ML: 9 INJECTION, SOLUTION INTRAVENOUS at 14:46

## 2023-03-18 ASSESSMENT — PAIN DESCRIPTION - LOCATION: LOCATION: LEG

## 2023-03-18 ASSESSMENT — PAIN DESCRIPTION - ORIENTATION: ORIENTATION: LEFT

## 2023-03-18 ASSESSMENT — PAIN - FUNCTIONAL ASSESSMENT
PAIN_FUNCTIONAL_ASSESSMENT: 0-10
PAIN_FUNCTIONAL_ASSESSMENT: 0-10

## 2023-03-18 ASSESSMENT — PAIN SCALES - GENERAL
PAINLEVEL_OUTOF10: 6
PAINLEVEL_OUTOF10: 5

## 2023-03-18 ASSESSMENT — ENCOUNTER SYMPTOMS
VOMITING: 0
NAUSEA: 0

## 2023-03-18 NOTE — ED NOTES
Patient states he does not have any pain in the left leg. Patient c/o right hip and groin pain from fall last night. Patient also complains of right shoulder pain \"for about a year\".       Angel Ramos RN  03/18/23 3953

## 2023-03-18 NOTE — ED NOTES
Patients daughter Rosa Elena Weems updated that patient will be discharged from the hospital.  States she will be in route to pick him up.      Talat Kay RN  03/18/23 3459

## 2023-03-18 NOTE — Clinical Note
Raymundo Og was seen and treated in our emergency department on 3/18/2023. He may return to work on 03/20/2023. If you have any questions or concerns, please don't hesitate to call.       Nilo Tomlin, DO

## 2023-03-18 NOTE — ED NOTES
Patient's daughter Abril Graham updated at this time. States she will come to the ED to  patient whenever he is ready for discharge.       Vivienne Santos RN  03/18/23 5671

## 2023-03-18 NOTE — DISCHARGE INSTRUCTIONS
Return to nearest ED if develop severe headache, worsening leg pain, or other concerning symptoms. Follow-up with your PCP in 2 to 3 days if not improving.

## 2023-03-18 NOTE — ED PROVIDER NOTES
[facility]  ED  EMERGENCY DEPARTMENT ENCOUNTER      Pt Name: Laurence Peters  MRN: [id number]  Jeannette 1945  Date of evaluation: 3/18/2023  Provider: Hoa Blake DO    CHIEF COMPLAINT       Chief Complaint   Patient presents with    Fall     Patient fell at home out of bed last night. Patients daughter wants patient checked out because he \"is not putting a lot of weight on right leg\". EMS reports patient denies any pain, history of Parkinson's. HISTORY OF PRESENT ILLNESS   (Location/Symptom, Timing/Onset, Context/Setting, Quality, Duration, Modifying Factors, Severity)  Note limiting factors. Laurence Peters is a 76 y.o. male with PMH of Parkinson's and Dementia who presents to the emergency department with complains of left leg pain. Per EMS, family stated he slid out of bed last night though he did not hit his head and denied any pain so they did not feel he needed medical attention at that time. This morning, however, he was not bearing weight on his left leg. On presentation to ED, he was complaining of pain in his left ankle and left groin. He denies hitting his head. Patient poor historian and family not present at bedside. HPI    Nursing Notes were reviewed. REVIEW OF SYSTEMS    (2-9 systems for level 4, 10 or more for level 5)     Review of Systems   Cardiovascular:  Negative for chest pain. Gastrointestinal:  Negative for nausea and vomiting. Musculoskeletal:  Positive for gait problem. Neurological:  Negative for dizziness, light-headedness and headaches. Except as noted above the remainder of the review of systems was reviewed and negative.        PAST MEDICAL HISTORY     Past Medical History:   Diagnosis Date    Arthritis     Dementia ([address]     Kidney stone     Knee pain     Parkinson disease ([address]          SURGICAL HISTORY       Past Surgical History:   Procedure Laterality Date    HIP SURGERY Right 7/6/2022    RIGHT HIP INTRAMEDULLARY GAMMA NAIL INSERTION performed by Nilam Elise MD at [address] [place] [address] ENDOSCOPY N/A 1/3/2023    EGD ESOPHAGOGASTRODUODENOSCOPY PEG TUBE INSERTION performed by Paras Addison MD at [address]       Previous Medications    BISACODYL (DULCOLAX) 5 MG EC TABLET    Take 5 mg by mouth daily as needed for Constipation    CARBIDOPA-LEVODOPA (PARCOPA)  MG TBDP    Take 1 tablet by mouth 4 times daily     CARBIDOPA-LEVODOPA (SINEMET CR)  MG PER EXTENDED RELEASE TABLET    Take 1 tablet by mouth nightly    DOCUSATE SODIUM (COLACE) 100 MG CAPSULE    Take 100 mg by mouth daily as needed for Constipation    GABAPENTIN (NEURONTIN) 300 MG CAPSULE    Take 300 mg by mouth 3 times daily. KETOCONAZOLE (NIZORAL) 2 % SHAMPOO    Apply to affected areas three times a week (lather, leave on for 5 minutes, then rinse)    LIDOCAINE (LIDODERM) 5 %    Place 1 patch onto the skin daily 12 hours on, 12 hours off. POTASSIUM CHLORIDE (MICRO-K) 10 MEQ EXTENDED RELEASE CAPSULE    Take 10 mEq by mouth daily    PYRIDOXINE HCL (B-6) 50 MG TABS    Take 50 mg by mouth daily     SERTRALINE (ZOLOFT) 100 MG TABLET    Take 100 mg by mouth nightly    TRAZODONE (DESYREL) 50 MG TABLET    Take 50 mg by mouth nightly       ALLERGIES     Patient has no known allergies.     FAMILY HISTORY       Family History   Family history unknown: Yes          SOCIAL HISTORY       Social History     Socioeconomic History    Marital status:      Spouse name: None    Number of children: None    Years of education: None    Highest education level: None   Tobacco Use    Smoking status: Former     Types: Cigarettes     Quit date: 3/19/2008     Years since quitting: 15.0    Smokeless tobacco: Never   Vaping Use    Vaping Use: Never used   Substance and Sexual Activity    Alcohol use: No    Drug use: No    Sexual activity: Never       SCREENINGS        Van Coma Scale  Eye Opening: Spontaneous  Best Verbal Response: Confused  Best Motor Response: Obeys commands  Shravan Coma Scale Score: 14               PHYSICAL EXAM    (up to 7 for level 4, 8 or more for level 5)     ED Triage Vitals   BP Temp Temp Source Heart Rate Resp SpO2 Height Weight   03/18/23 1305 03/18/23 1305 03/18/23 1305 03/18/23 1305 03/18/23 1305 03/18/23 1305 -- 03/18/23 1300   105/65 98.3 °F (36.8 °C) Oral 68 16 92 %  123 lb (55.8 kg)       Physical Exam  Constitutional:       General: He is not in acute distress.  HENT:      Head: Normocephalic and atraumatic.   Eyes:      Extraocular Movements: Extraocular movements intact.   Cardiovascular:      Rate and Rhythm: Normal rate and regular rhythm.   Pulmonary:      Effort: No respiratory distress.      Breath sounds: Normal breath sounds. No wheezing.   Musculoskeletal:      Right knee: No bony tenderness.      Left knee: No bony tenderness.      Left ankle: Tenderness present. Decreased range of motion.   Neurological:      Mental Status: Mental status is at baseline. He is confused.      Motor: Weakness (to left dorsiflexion and plantarflexion) and tremor present.       DIAGNOSTIC RESULTS     EKG: All EKG's are interpreted by the Emergency Department Physician who either signs or Co-signs this chart in the absence of a cardiologist.    Normal sinus rhythm. Left axis deviation. No significant change when compared with previous EKG of 12/28/22.     RADIOLOGY:   Non-plain film images such as CT, Ultrasound and MRI are read by the radiologist. Plain radiographic images are visualized and preliminarily interpreted by the emergency physician with the below findings:    Interpretation per the Radiologist below, if available at the time of this note:    CT C-Spine W/O Contrast   Final Result   No acute abnormality of the cervical spine.         CT Head W/O Contrast   Final Result   No acute intracranial abnormality.         XR HIP 2-3 VW W PELVIS LEFT   Final  Result   No acute radiographic abnormality. XR ANKLE LEFT (MIN 3 VIEWS)   Final Result   No acute abnormality of the ankle. ED BEDSIDE ULTRASOUND:   Performed by ED Physician - none    LABS:  Labs Reviewed   CBC WITH AUTO DIFFERENTIAL - Abnormal; Notable for the following components:       Result Value    Hemoglobin 13.1 (*)     Hematocrit 39.2 (*)     RDW 17.1 (*)     All other components within normal limits   COMPREHENSIVE METABOLIC PANEL W/ REFLEX TO MG FOR LOW K - Abnormal; Notable for the following components:    Glucose 134 (*)     BUN 21 (*)     Creatinine 0.6 (*)     Total Protein 5.4 (*)     Alkaline Phosphatase 198 (*)     ALT 6 (*)     All other components within normal limits   BLOOD GAS, VENOUS - Abnormal; Notable for the following components:    pH, Andrew 7.347 (*)     pCO2, Andrew 58.0 (*)     pO2, Andrew 43.7 (*)     HCO3, Venous 31.1 (*)     Base Excess, Andrew 3.9 (*)     Carboxyhemoglobin 1.7 (*)     All other components within normal limits   TROPONIN       All other labs were within normal range or not returned as of this dictation.     EMERGENCY DEPARTMENT COURSE and DIFFERENTIAL DIAGNOSIS/MDM:   Vitals:    Vitals:    03/18/23 1305 03/18/23 1433 03/18/23 1448 03/18/23 1646   BP: 105/65 (!) 81/51 90/62 100/66   Pulse: 68 60 60 71   Resp: 16 19 14 16   Temp: 98.3 °F (36.8 °C)      TempSrc: Oral      SpO2: 92% 93% 94% 93%   Weight:           REASSESSMENT     ED Course as of 03/18/23 1726   Sat Mar 18, 2023   1313 XR ANKLE LEFT (MIN 3 VIEWS) [MM]   1344 XR HIP 2-3 VW W PELVIS LEFT [MM]   7740 CT Head W/O Contrast [MM]   1414 CT C-Spine W/O Contrast [MM]   1435 CT C-Spine W/O Contrast [MM]   1534 Hemoglobin Quant(!): 13.1 [MM]   1534 Hematocrit(!): 39.2 [MM]   1534 CBC with Auto Differential(!):    WBC 7.8   RBC 4.33   Hemoglobin Quant 13.1(!)   Hematocrit 39.2(!)   MCV 90.5   MCH 30.3   MCHC 33.5   RDW 17.1(!)   Platelet Count 088   MPV 6.5   Neutrophils % 72.5   Lymphocyte % 13.6 Monocytes % 11.2   Eosinophils % 1.9   Basophils % 0.8   Neutrophils Absolute 5.6   Lymphocytes Absolute 1.1   Monocytes Absolute 0.9   Eosinophils Absolute 0.1   Basophils Absolute 0.1 [MM]   1543 Alk Phos(!): 198 [MM]      ED Course User Index  [MM] DO Marilu Mendes is a 76 y.o. male with PMH of Parkinson's and Dementia who presents to the emergency department with complains of left leg pain. Per EMS, family stated he slid out of bed last night though he did not hit his head and denied any pain so they did not feel he needed medical attention at that time. This morning, however, he was not bearing weight on his left leg. On presentation to ED, he was complaining of vague pain in his left ankle and left groin. No tenderness to the left knee or right leg. He denies hitting his head. X-rays of the left ankle and left hip/pelvis were without acute abnormalities. CT of the head without acute intracranial process. CT of the cervical spine with no acute abnormality. Patient did have a brief episode of asymptomatic hypotension with reported BPs in the 70s/50s. Hung 1L of fluid. BP now in the 90s/60s. CBC and CMP appear stable compared to his baseline. Troponins negative. Re-assuring venous blood BP has remained stable for the past three hours with SBP >90. Findings discussed with patient and his daughter per RN, they verbalized understanding. We will plan to DC home with strict return precautions and close follow up with PCP. CRITICAL CARE TIME   Total Critical Care time was 0 minutes, excluding separately reportable procedures. There was not a high probability of clinically significant/life threatening deterioration in the patient's condition which required my urgent intervention. CONSULTS:  None    PROCEDURES:  Unless otherwise noted below, none     Procedures      FINAL IMPRESSION      1.  Fall, initial encounter      Marilu Steward is a 76 y.o. male with PMH of Parkinson's and Dementia who presents to the emergency department with complains of left leg pain. Per EMS, family stated he slid out of bed last night though he did not hit his head and denied any pain so they did not feel he needed medical attention at that time. This morning, however, he was not bearing weight on his left leg. On presentation to ED, he was complaining of vague pain in his left ankle and left groin. No tenderness to the left knee or right leg. He denies hitting his head. X-rays of the left ankle and left hip/pelvis were without acute abnormalities. CT of the head without acute intracranial process. CT of the cervical spine with no acute abnormality. Patient did have a brief episode of asymptomatic hypotension with reported BPs in the 70s/50s. Hung 1L of fluid. BP now in the 90s/60s. CBC and CMP appear stable compared to his baseline. Troponins negative. Re-assuring venous blood BP has remained stable for the past three hours with SBP >90. We will plan to DC home with strict return precautions and close follow up with PCP. DISPOSITION/PLAN   DISPOSITION Decision To Discharge 03/18/2023 05:21:49 PM      PATIENT REFERRED TO:  Néstor Izaguirre MD  [address]  [phone]    Schedule an appointment as soon as possible for a visit in 3 days  Follow up within 3 days, Return to ED sooner if symptoms worsen    DISCHARGE MEDICATIONS:  New Prescriptions    No medications on file     Controlled Substances Monitoring:     No flowsheet data found. (Please note that portions of this note were completed with a voice recognition program.  Efforts were made to edit the dictations but occasionally words are mis-transcribed.)    Ankit Urrutia DO (electronically signed)  Attending Emergency Physician         Ankit Urrutia DO  Resident  03/18/23 [id number]    Attending addendum: I evaluated this patient independently.   I agree with the resident's documentation above. In brief, this 76-year-old male fell out of bed early this morning. Later in the day he was complaining of left leg pain for his family and was not bearing weight. He is complaining of left ankle and left hip pain at this time. Denies any other complaints. CT head and neck showed no acute process. X-ray of the left hip and left ankle show no acute process. He did have a brief episode of hypotension and was treated with 1 L of IV fluid with improvement. No lightheadedness, no chest pain no shortness of breath. Discharged with return precautions for severe headache, nausea and vomiting, other concerning symptoms. Follow-up to PCP in 2 to 3 days. ECG    The Ekg interpreted by me shows sinus rhythm with a rate of 74 bpm.  Left axis deviation. No acute injury pattern. , QRS 88, QTc 428.     No significant change from prior EKG dated 12/28/2022     Jameson Pringle, DO  03/18/23 [id number]

## 2023-03-19 ENCOUNTER — APPOINTMENT (OUTPATIENT)
Dept: GENERAL RADIOLOGY | Age: 78
DRG: 312 | End: 2023-03-19
Payer: OTHER GOVERNMENT

## 2023-03-19 ENCOUNTER — APPOINTMENT (OUTPATIENT)
Dept: CT IMAGING | Age: 78
DRG: 312 | End: 2023-03-19
Payer: OTHER GOVERNMENT

## 2023-03-19 ENCOUNTER — HOSPITAL ENCOUNTER (INPATIENT)
Age: 78
LOS: 2 days | Discharge: HOME | DRG: 312 | End: 2023-03-21
Attending: EMERGENCY MEDICINE | Admitting: INTERNAL MEDICINE
Payer: OTHER GOVERNMENT

## 2023-03-19 DIAGNOSIS — R65.21 SEPTIC SHOCK (HCC): Primary | ICD-10-CM

## 2023-03-19 DIAGNOSIS — J18.9 MULTIFOCAL PNEUMONIA: ICD-10-CM

## 2023-03-19 DIAGNOSIS — S32.592A PUBIC RAMUS FRACTURE, LEFT, CLOSED, INITIAL ENCOUNTER (HCC): ICD-10-CM

## 2023-03-19 DIAGNOSIS — A41.9 SEPTIC SHOCK (HCC): Primary | ICD-10-CM

## 2023-03-19 PROBLEM — S32.592K: Status: ACTIVE | Noted: 2023-03-19

## 2023-03-19 PROBLEM — J98.11 ATELECTASIS: Status: ACTIVE | Noted: 2023-03-19

## 2023-03-19 PROBLEM — I95.1 ORTHOSTATIC HYPOTENSION: Status: ACTIVE | Noted: 2023-03-19

## 2023-03-19 PROBLEM — R57.9 SHOCK (HCC): Status: ACTIVE | Noted: 2023-03-19

## 2023-03-19 PROBLEM — R26.9 UNSPECIFIED ABNORMALITIES OF GAIT AND MOBILITY: Status: ACTIVE | Noted: 2023-03-19

## 2023-03-19 PROBLEM — I95.2 HYPOTENSION DUE TO DRUGS: Status: ACTIVE | Noted: 2023-03-19

## 2023-03-19 LAB
ALBUMIN SERPL-MCNC: 3.2 G/DL (ref 3.4–5)
ALBUMIN/GLOB SERPL: 1.8 {RATIO} (ref 1.1–2.2)
ALP SERPL-CCNC: 160 U/L (ref 40–129)
ALT SERPL-CCNC: <5 U/L (ref 10–40)
ANION GAP SERPL CALCULATED.3IONS-SCNC: 8 MMOL/L (ref 3–16)
AST SERPL-CCNC: 15 U/L (ref 15–37)
BASE EXCESS BLDV CALC-SCNC: 2.9 MMOL/L (ref -3–3)
BASOPHILS # BLD: 0.1 K/UL (ref 0–0.2)
BASOPHILS NFR BLD: 0.6 %
BILIRUB SERPL-MCNC: 0.7 MG/DL (ref 0–1)
BILIRUB UR QL STRIP.AUTO: NEGATIVE
BUN SERPL-MCNC: 19 MG/DL (ref 7–20)
CA-I BLD-SCNC: 1.1 MMOL/L (ref 1.12–1.32)
CALCIUM SERPL-MCNC: 8.6 MG/DL (ref 8.3–10.6)
CHLORIDE SERPL-SCNC: 101 MMOL/L (ref 99–110)
CLARITY UR: CLEAR
CO2 BLDV-SCNC: 27 MMOL/L
CO2 SERPL-SCNC: 29 MMOL/L (ref 21–32)
COHGB MFR BLDV: 2.6 % (ref 0–1.5)
COLOR UR: YELLOW
CORTIS SERPL-MCNC: 10.2 UG/DL
CREAT SERPL-MCNC: 0.6 MG/DL (ref 0.8–1.3)
DEPRECATED RDW RBC AUTO: 16.5 % (ref 12.4–15.4)
EKG ATRIAL RATE: 62 BPM
EKG ATRIAL RATE: 74 BPM
EKG DIAGNOSIS: NORMAL
EKG DIAGNOSIS: NORMAL
EKG P AXIS: 30 DEGREES
EKG P AXIS: 51 DEGREES
EKG P-R INTERVAL: 180 MS
EKG P-R INTERVAL: 184 MS
EKG Q-T INTERVAL: 386 MS
EKG Q-T INTERVAL: 426 MS
EKG QRS DURATION: 88 MS
EKG QRS DURATION: 88 MS
EKG QTC CALCULATION (BAZETT): 428 MS
EKG QTC CALCULATION (BAZETT): 432 MS
EKG R AXIS: -33 DEGREES
EKG R AXIS: 1 DEGREES
EKG T AXIS: 43 DEGREES
EKG T AXIS: 56 DEGREES
EKG VENTRICULAR RATE: 62 BPM
EKG VENTRICULAR RATE: 74 BPM
EOSINOPHIL # BLD: 0.1 K/UL (ref 0–0.6)
EOSINOPHIL NFR BLD: 1.3 %
FLUAV RNA RESP QL NAA+PROBE: NOT DETECTED
FLUBV RNA RESP QL NAA+PROBE: NOT DETECTED
GFR SERPLBLD CREATININE-BSD FMLA CKD-EPI: >60 ML/MIN/{1.73_M2}
GLUCOSE SERPL-MCNC: 88 MG/DL (ref 70–99)
GLUCOSE UR STRIP.AUTO-MCNC: NEGATIVE MG/DL
HCO3 BLDV-SCNC: 26.2 MMOL/L (ref 23–29)
HCT VFR BLD AUTO: 32.8 % (ref 40.5–52.5)
HGB BLD-MCNC: 11.4 G/DL (ref 13.5–17.5)
HGB UR QL STRIP.AUTO: NEGATIVE
KETONES UR STRIP.AUTO-MCNC: NEGATIVE MG/DL
LACTATE BLDV-SCNC: 1.2 MMOL/L (ref 0.4–2)
LEUKOCYTE ESTERASE UR QL STRIP.AUTO: NEGATIVE
LYMPHOCYTES # BLD: 0.8 K/UL (ref 1–5.1)
LYMPHOCYTES NFR BLD: 7.5 %
MAGNESIUM SERPL-MCNC: 1.9 MG/DL (ref 1.8–2.4)
MCH RBC QN AUTO: 31.2 PG (ref 26–34)
MCHC RBC AUTO-ENTMCNC: 34.6 G/DL (ref 31–36)
MCV RBC AUTO: 89.9 FL (ref 80–100)
METHGB MFR BLDV: 0.3 %
MONOCYTES # BLD: 0.9 K/UL (ref 0–1.3)
MONOCYTES NFR BLD: 8.8 %
NEUTROPHILS # BLD: 8.5 K/UL (ref 1.7–7.7)
NEUTROPHILS NFR BLD: 81.8 %
NITRITE UR QL STRIP.AUTO: NEGATIVE
NT-PROBNP SERPL-MCNC: 362 PG/ML (ref 0–449)
O2 THERAPY: ABNORMAL
PCO2 BLDV: 35.6 MMHG (ref 40–50)
PH BLDV: 7.37 [PH] (ref 7.35–7.45)
PH BLDV: 7.48 [PH] (ref 7.35–7.45)
PH UR STRIP.AUTO: 7 [PH] (ref 5–8)
PLATELET # BLD AUTO: 158 K/UL (ref 135–450)
PMV BLD AUTO: 6.5 FL (ref 5–10.5)
PO2 BLDV: 128 MMHG (ref 25–40)
POTASSIUM SERPL-SCNC: 4.3 MMOL/L (ref 3.5–5.1)
PROCALCITONIN SERPL IA-MCNC: 0.14 NG/ML (ref 0–0.15)
PROT SERPL-MCNC: 5 G/DL (ref 6.4–8.2)
PROT UR STRIP.AUTO-MCNC: NEGATIVE MG/DL
RBC # BLD AUTO: 3.64 M/UL (ref 4.2–5.9)
SAO2 % BLDV: 98 %
SARS-COV-2 RNA RESP QL NAA+PROBE: NOT DETECTED
SODIUM SERPL-SCNC: 138 MMOL/L (ref 136–145)
SP GR UR STRIP.AUTO: 1.01 (ref 1–1.03)
TROPONIN T SERPL-MCNC: <0.01 NG/ML
TSH SERPL DL<=0.005 MIU/L-ACNC: 0.69 UIU/ML (ref 0.27–4.2)
UA COMPLETE W REFLEX CULTURE PNL UR: NORMAL
UA DIPSTICK W REFLEX MICRO PNL UR: NORMAL
URN SPEC COLLECT METH UR: NORMAL
UROBILINOGEN UR STRIP-ACNC: 0.2 E.U./DL
WBC # BLD AUTO: 10.4 K/UL (ref 4–11)

## 2023-03-19 PROCEDURE — 6360000002 HC RX W HCPCS: Performed by: EMERGENCY MEDICINE

## 2023-03-19 PROCEDURE — 93005 ELECTROCARDIOGRAM TRACING: CPT | Performed by: EMERGENCY MEDICINE

## 2023-03-19 PROCEDURE — 99223 1ST HOSP IP/OBS HIGH 75: CPT | Performed by: INTERNAL MEDICINE

## 2023-03-19 PROCEDURE — 87641 MR-STAPH DNA AMP PROBE: CPT

## 2023-03-19 PROCEDURE — 84145 PROCALCITONIN (PCT): CPT

## 2023-03-19 PROCEDURE — 2700000000 HC OXYGEN THERAPY PER DAY

## 2023-03-19 PROCEDURE — 2580000003 HC RX 258: Performed by: INTERNAL MEDICINE

## 2023-03-19 PROCEDURE — 93010 ELECTROCARDIOGRAM REPORT: CPT | Performed by: INTERNAL MEDICINE

## 2023-03-19 PROCEDURE — 84443 ASSAY THYROID STIM HORMONE: CPT

## 2023-03-19 PROCEDURE — 99285 EMERGENCY DEPT VISIT HI MDM: CPT

## 2023-03-19 PROCEDURE — 6360000004 HC RX CONTRAST MEDICATION: Performed by: EMERGENCY MEDICINE

## 2023-03-19 PROCEDURE — 36556 INSERT NON-TUNNEL CV CATH: CPT

## 2023-03-19 PROCEDURE — 87040 BLOOD CULTURE FOR BACTERIA: CPT

## 2023-03-19 PROCEDURE — 51702 INSERT TEMP BLADDER CATH: CPT

## 2023-03-19 PROCEDURE — 2000000000 HC ICU R&B

## 2023-03-19 PROCEDURE — 2500000003 HC RX 250 WO HCPCS: Performed by: INTERNAL MEDICINE

## 2023-03-19 PROCEDURE — 71045 X-RAY EXAM CHEST 1 VIEW: CPT

## 2023-03-19 PROCEDURE — 6360000002 HC RX W HCPCS: Performed by: INTERNAL MEDICINE

## 2023-03-19 PROCEDURE — 6370000000 HC RX 637 (ALT 250 FOR IP): Performed by: INTERNAL MEDICINE

## 2023-03-19 PROCEDURE — 96360 HYDRATION IV INFUSION INIT: CPT

## 2023-03-19 PROCEDURE — 82533 TOTAL CORTISOL: CPT

## 2023-03-19 PROCEDURE — 2580000003 HC RX 258: Performed by: EMERGENCY MEDICINE

## 2023-03-19 PROCEDURE — 81003 URINALYSIS AUTO W/O SCOPE: CPT

## 2023-03-19 PROCEDURE — 2500000003 HC RX 250 WO HCPCS: Performed by: EMERGENCY MEDICINE

## 2023-03-19 PROCEDURE — 96365 THER/PROPH/DIAG IV INF INIT: CPT

## 2023-03-19 PROCEDURE — 87636 SARSCOV2 & INF A&B AMP PRB: CPT

## 2023-03-19 PROCEDURE — 74177 CT ABD & PELVIS W/CONTRAST: CPT

## 2023-03-19 PROCEDURE — 94761 N-INVAS EAR/PLS OXIMETRY MLT: CPT

## 2023-03-19 PROCEDURE — 83605 ASSAY OF LACTIC ACID: CPT

## 2023-03-19 PROCEDURE — 83880 ASSAY OF NATRIURETIC PEPTIDE: CPT

## 2023-03-19 PROCEDURE — 84484 ASSAY OF TROPONIN QUANT: CPT

## 2023-03-19 PROCEDURE — 82330 ASSAY OF CALCIUM: CPT

## 2023-03-19 PROCEDURE — 82803 BLOOD GASES ANY COMBINATION: CPT

## 2023-03-19 PROCEDURE — 92610 EVALUATE SWALLOWING FUNCTION: CPT

## 2023-03-19 PROCEDURE — 83735 ASSAY OF MAGNESIUM: CPT

## 2023-03-19 PROCEDURE — 85025 COMPLETE CBC W/AUTO DIFF WBC: CPT

## 2023-03-19 PROCEDURE — 96375 TX/PRO/DX INJ NEW DRUG ADDON: CPT

## 2023-03-19 PROCEDURE — 80053 COMPREHEN METABOLIC PANEL: CPT

## 2023-03-19 RX ORDER — SODIUM CHLORIDE 9 MG/ML
INJECTION, SOLUTION INTRAVENOUS PRN
Status: DISCONTINUED | OUTPATIENT
Start: 2023-03-19 | End: 2023-03-21 | Stop reason: HOSPADM

## 2023-03-19 RX ORDER — ACETAMINOPHEN 325 MG/1
650 TABLET ORAL EVERY 4 HOURS PRN
Status: DISCONTINUED | OUTPATIENT
Start: 2023-03-19 | End: 2023-03-19

## 2023-03-19 RX ORDER — 0.9 % SODIUM CHLORIDE 0.9 %
1000 INTRAVENOUS SOLUTION INTRAVENOUS ONCE
Status: COMPLETED | OUTPATIENT
Start: 2023-03-19 | End: 2023-03-19

## 2023-03-19 RX ORDER — SODIUM CHLORIDE 9 MG/ML
INJECTION, SOLUTION INTRAVENOUS CONTINUOUS
Status: DISCONTINUED | OUTPATIENT
Start: 2023-03-19 | End: 2023-03-19

## 2023-03-19 RX ORDER — ONDANSETRON 2 MG/ML
4 INJECTION INTRAMUSCULAR; INTRAVENOUS EVERY 4 HOURS PRN
Status: DISCONTINUED | OUTPATIENT
Start: 2023-03-19 | End: 2023-03-21 | Stop reason: HOSPADM

## 2023-03-19 RX ORDER — TRAZODONE HYDROCHLORIDE 50 MG/1
50 TABLET ORAL NIGHTLY
Status: DISCONTINUED | OUTPATIENT
Start: 2023-03-19 | End: 2023-03-19

## 2023-03-19 RX ORDER — ACETAMINOPHEN 650 MG/1
650 SUPPOSITORY RECTAL EVERY 4 HOURS PRN
Status: DISCONTINUED | OUTPATIENT
Start: 2023-03-19 | End: 2023-03-21 | Stop reason: HOSPADM

## 2023-03-19 RX ORDER — SODIUM CHLORIDE, SODIUM GLUCONATE, SODIUM ACETATE, POTASSIUM CHLORIDE AND MAGNESIUM CHLORIDE 526; 502; 368; 37; 30 MG/100ML; MG/100ML; MG/100ML; MG/100ML; MG/100ML
INJECTION, SOLUTION INTRAVENOUS CONTINUOUS
Status: DISCONTINUED | OUTPATIENT
Start: 2023-03-19 | End: 2023-03-20

## 2023-03-19 RX ORDER — CALCIUM GLUCONATE 20 MG/ML
1000 INJECTION, SOLUTION INTRAVENOUS ONCE
Status: COMPLETED | OUTPATIENT
Start: 2023-03-19 | End: 2023-03-19

## 2023-03-19 RX ORDER — POLYETHYLENE GLYCOL 3350 17 G/17G
17 POWDER, FOR SOLUTION ORAL DAILY PRN
Status: DISCONTINUED | OUTPATIENT
Start: 2023-03-19 | End: 2023-03-19

## 2023-03-19 RX ORDER — ENOXAPARIN SODIUM 100 MG/ML
40 INJECTION SUBCUTANEOUS DAILY
Status: DISCONTINUED | OUTPATIENT
Start: 2023-03-19 | End: 2023-03-21 | Stop reason: HOSPADM

## 2023-03-19 RX ORDER — CARBIDOPA AND LEVODOPA 50; 200 MG/1; MG/1
1 TABLET, EXTENDED RELEASE ORAL NIGHTLY
Status: DISCONTINUED | OUTPATIENT
Start: 2023-03-19 | End: 2023-03-19

## 2023-03-19 RX ORDER — LIDOCAINE 4 G/G
1 PATCH TOPICAL DAILY
Status: DISCONTINUED | OUTPATIENT
Start: 2023-03-19 | End: 2023-03-21 | Stop reason: HOSPADM

## 2023-03-19 RX ORDER — ACETAMINOPHEN 325 MG/1
650 TABLET ORAL EVERY 4 HOURS PRN
Status: DISCONTINUED | OUTPATIENT
Start: 2023-03-19 | End: 2023-03-21 | Stop reason: HOSPADM

## 2023-03-19 RX ORDER — SODIUM CHLORIDE 0.9 % (FLUSH) 0.9 %
10 SYRINGE (ML) INJECTION EVERY 12 HOURS SCHEDULED
Status: DISCONTINUED | OUTPATIENT
Start: 2023-03-19 | End: 2023-03-21 | Stop reason: HOSPADM

## 2023-03-19 RX ORDER — LACTOBACILLUS RHAMNOSUS GG 10B CELL
1 CAPSULE ORAL 2 TIMES DAILY WITH MEALS
Status: DISCONTINUED | OUTPATIENT
Start: 2023-03-19 | End: 2023-03-19

## 2023-03-19 RX ORDER — TRAZODONE HYDROCHLORIDE 50 MG/1
50 TABLET ORAL NIGHTLY
Status: DISCONTINUED | OUTPATIENT
Start: 2023-03-19 | End: 2023-03-21 | Stop reason: HOSPADM

## 2023-03-19 RX ORDER — SODIUM CHLORIDE 0.9 % (FLUSH) 0.9 %
10 SYRINGE (ML) INJECTION PRN
Status: DISCONTINUED | OUTPATIENT
Start: 2023-03-19 | End: 2023-03-21 | Stop reason: HOSPADM

## 2023-03-19 RX ORDER — POLYETHYLENE GLYCOL 3350 17 G/17G
17 POWDER, FOR SOLUTION ORAL DAILY PRN
Status: DISCONTINUED | OUTPATIENT
Start: 2023-03-19 | End: 2023-03-21 | Stop reason: HOSPADM

## 2023-03-19 RX ORDER — ACETAMINOPHEN 650 MG/1
650 SUPPOSITORY RECTAL EVERY 4 HOURS PRN
Status: DISCONTINUED | OUTPATIENT
Start: 2023-03-19 | End: 2023-03-19

## 2023-03-19 RX ADMIN — SODIUM CHLORIDE: 9 INJECTION, SOLUTION INTRAVENOUS at 06:19

## 2023-03-19 RX ADMIN — CARBIDOPA AND LEVODOPA 1 TABLET: 25; 100 TABLET ORAL at 10:38

## 2023-03-19 RX ADMIN — SERTRALINE HYDROCHLORIDE 100 MG: 50 TABLET ORAL at 20:08

## 2023-03-19 RX ADMIN — VANCOMYCIN HYDROCHLORIDE 1000 MG: 1 INJECTION, POWDER, LYOPHILIZED, FOR SOLUTION INTRAVENOUS at 04:58

## 2023-03-19 RX ADMIN — CARBIDOPA AND LEVODOPA 1 TABLET: 25; 100 TABLET ORAL at 14:45

## 2023-03-19 RX ADMIN — ACETAMINOPHEN 650 MG: 325 TABLET ORAL at 20:07

## 2023-03-19 RX ADMIN — CALCIUM GLUCONATE 1000 MG: 20 INJECTION, SOLUTION INTRAVENOUS at 05:31

## 2023-03-19 RX ADMIN — SODIUM CHLORIDE, SODIUM GLUCONATE, SODIUM ACETATE, POTASSIUM CHLORIDE AND MAGNESIUM CHLORIDE: 526; 502; 368; 37; 30 INJECTION, SOLUTION INTRAVENOUS at 22:50

## 2023-03-19 RX ADMIN — SODIUM CHLORIDE, PRESERVATIVE FREE 10 ML: 5 INJECTION INTRAVENOUS at 20:17

## 2023-03-19 RX ADMIN — ENOXAPARIN SODIUM 40 MG: 100 INJECTION SUBCUTANEOUS at 10:38

## 2023-03-19 RX ADMIN — TRAZODONE HYDROCHLORIDE 50 MG: 50 TABLET ORAL at 20:08

## 2023-03-19 RX ADMIN — SODIUM CHLORIDE, SODIUM GLUCONATE, SODIUM ACETATE, POTASSIUM CHLORIDE AND MAGNESIUM CHLORIDE: 526; 502; 368; 37; 30 INJECTION, SOLUTION INTRAVENOUS at 14:45

## 2023-03-19 RX ADMIN — ONDANSETRON 4 MG: 2 INJECTION INTRAMUSCULAR; INTRAVENOUS at 20:08

## 2023-03-19 RX ADMIN — CARBIDOPA AND LEVODOPA 1 TABLET: 25; 100 TABLET ORAL at 20:09

## 2023-03-19 RX ADMIN — IOPAMIDOL 75 ML: 755 INJECTION, SOLUTION INTRAVENOUS at 04:18

## 2023-03-19 RX ADMIN — CARBIDOPA AND LEVODOPA 1 TABLET: 25; 100 TABLET ORAL at 17:10

## 2023-03-19 RX ADMIN — CEFEPIME 1000 MG: 1 INJECTION, POWDER, FOR SOLUTION INTRAMUSCULAR; INTRAVENOUS at 04:21

## 2023-03-19 RX ADMIN — SODIUM CHLORIDE 1000 ML: 9 INJECTION, SOLUTION INTRAVENOUS at 02:26

## 2023-03-19 RX ADMIN — CARBIDOPA AND LEVODOPA 2 TABLET: 25; 100 TABLET ORAL at 20:08

## 2023-03-19 RX ADMIN — SODIUM CHLORIDE 5 MCG/MIN: 9 INJECTION, SOLUTION INTRAVENOUS at 03:39

## 2023-03-19 ASSESSMENT — PAIN DESCRIPTION - LOCATION: LOCATION: BACK

## 2023-03-19 ASSESSMENT — PAIN DESCRIPTION - ORIENTATION: ORIENTATION: MID

## 2023-03-19 ASSESSMENT — PAIN SCALES - GENERAL
PAINLEVEL_OUTOF10: 0
PAINLEVEL_OUTOF10: 4

## 2023-03-19 ASSESSMENT — PAIN - FUNCTIONAL ASSESSMENT: PAIN_FUNCTIONAL_ASSESSMENT: ACTIVITIES ARE NOT PREVENTED

## 2023-03-19 ASSESSMENT — PAIN DESCRIPTION - DESCRIPTORS: DESCRIPTORS: ACHING

## 2023-03-19 ASSESSMENT — PAIN SCALES - WONG BAKER: WONGBAKER_NUMERICALRESPONSE: 0

## 2023-03-19 NOTE — H&P
abnormality. No acute abnormality of the ankle. CT Head W/O Contrast    Result Date: 3/18/2023  EXAMINATION: CT OF THE HEAD WITHOUT CONTRAST  3/18/2023 1:38 pm TECHNIQUE: CT of the head was performed without the administration of intravenous contrast. Automated exposure control, iterative reconstruction, and/or weight based adjustment of the mA/kV was utilized to reduce the radiation dose to as low as reasonably achievable. COMPARISON: 10/06/2022 HISTORY: ORDERING SYSTEM PROVIDED HISTORY: fall TECHNOLOGIST PROVIDED HISTORY: Reason for exam:->fall Has a \"code stroke\" or \"stroke alert\" been called? ->No Decision Support Exception - unselect if not a suspected or confirmed emergency medical condition->Emergency Medical Condition (MA) Reason for Exam: s/p fall last night. pt states that he did not hit his head and (-) LOC. states that his neck hurts and that he hurts all over the rest of his body Relevant Medical/Surgical History: dementia , parkinson's disease, arthritis FINDINGS: BRAIN/VENTRICLES: There is no acute intracranial hemorrhage, mass effect or midline shift. No abnormal extra-axial fluid collection. The gray-white differentiation is maintained without evidence of an acute infarct. There is no evidence of hydrocephalus. Chronic atrophic and mild ischemic microvascular changes are similar to the prior exam. ORBITS: The visualized portion of the orbits demonstrate no acute abnormality. SINUSES: The visualized paranasal sinuses and mastoid air cells demonstrate no acute abnormality. SOFT TISSUES/SKULL:  No acute abnormality of the visualized skull or soft tissues. No acute intracranial abnormality. CT C-Spine W/O Contrast    Result Date: 3/18/2023  EXAMINATION: CT OF THE CERVICAL SPINE WITHOUT CONTRAST 3/18/2023 1:38 pm TECHNIQUE: CT of the cervical spine was performed without the administration of intravenous contrast. Multiplanar reformatted images are provided for review.  Automated

## 2023-03-19 NOTE — PLAN OF CARE
SLP completed evaluation. Please refer to notes in EMR.      Forgan Katelynn, [address] [facility].[id number]  Speech-Language Pathologist

## 2023-03-19 NOTE — PLAN OF CARE
Problem: Discharge Planning  Goal: Discharge to home or other facility with appropriate resources  Outcome: Progressing  Flowsheets (Taken 3/19/2023 0600 by Spring Bates RN)  Discharge to home or other facility with appropriate resources:   Identify barriers to discharge with patient and caregiver   Arrange for needed discharge resources and transportation as appropriate   Identify discharge learning needs (meds, wound care, etc)   Refer to discharge planning if patient needs post-hospital services based on physician order or complex needs related to functional status, cognitive ability or social support system     Problem: Safety - Adult  Goal: Free from fall injury  Outcome: Progressing

## 2023-03-19 NOTE — ED PROVIDER NOTES
RBC 3.59 (*)     Hemoglobin 10.9 (*)     Hematocrit 32.1 (*)     RDW 16.9 (*)     All other components within normal limits   BASIC METABOLIC PANEL W/ REFLEX TO MG FOR LOW K - Abnormal; Notable for the following components:    Glucose 112 (*)     Creatinine <0.5 (*)     All other components within normal limits   CBC WITH AUTO DIFFERENTIAL - Abnormal; Notable for the following components:    RBC 3.98 (*)     Hemoglobin 12.3 (*)     Hematocrit 35.6 (*)     RDW 16.5 (*)     Lymphocytes Absolute 0.7 (*)     All other components within normal limits   CULTURE, BLOOD 1    Narrative:     ORDER#: [id number]                          ORDERED BY: MALATHI KEARNEY  SOURCE: Blood Antecubital-Rig              COLLECTED:  03/19/23 03:30  ANTIBIOTICS AT MATT.:                      RECEIVED :  03/19/23 17:14  If child <=2 yrs old please draw pediatric bottle. ~Blood Culture 1   CULTURE, BLOOD 2    Narrative:     ORDER#: [id number]                          ORDERED BY: Jim KEARNEY Ridrosalind  SOURCE: Blood Hand, Left                   COLLECTED:  03/19/23 03:38  ANTIBIOTICS AT MATT.:                      RECEIVED :  03/19/23 17:14  If child <=2 yrs old please draw pediatric bottle. ~Blood Culture #2   COVID-19 & INFLUENZA COMBO   MRSA DNA PROBE, NASAL    Narrative:     ORDER#: [id number]                          ORDERED BY: JEFFREY MENDEZ  SOURCE: Nares                              COLLECTED:  03/19/23 08:39  ANTIBIOTICS AT MATT.:                      RECEIVED :  03/20/23 02:39   STREP PNEUMONIAE ANTIGEN    Narrative:     ORDER#: [id number]                          ORDERED BY: Ignacia Palafox  SOURCE: Urine Clean Catch                  COLLECTED:  03/20/23 03:40  ANTIBIOTICS AT MATT.:                      RECEIVED :  03/20/23 15:25   LEGIONELLA ANTIGEN, URINE    Narrative:     ORDER#: [id number]                          ORDERED BY: Ignacia Palafox  SOURCE: Urine Clean Catch                  COLLECTED:  03/20/23 03:40  ANTIBIOTICS AT MATT.:

## 2023-03-19 NOTE — ED NOTES
Consent signed by pts daughter (POA). Dr. Ashley Laguerre at bedside placing CVC.        Eliana Archuleta, RN  03/19/23 [id number]

## 2023-03-20 LAB
ANION GAP SERPL CALCULATED.3IONS-SCNC: 5 MMOL/L (ref 3–16)
BASOPHILS # BLD: 0.1 K/UL (ref 0–0.2)
BASOPHILS NFR BLD: 0.8 %
BUN SERPL-MCNC: 12 MG/DL (ref 7–20)
CALCIUM SERPL-MCNC: 8.7 MG/DL (ref 8.3–10.6)
CHLORIDE SERPL-SCNC: 104 MMOL/L (ref 99–110)
CO2 SERPL-SCNC: 31 MMOL/L (ref 21–32)
CREAT SERPL-MCNC: <0.5 MG/DL (ref 0.8–1.3)
DEPRECATED RDW RBC AUTO: 16.9 % (ref 12.4–15.4)
EOSINOPHIL # BLD: 0.2 K/UL (ref 0–0.6)
EOSINOPHIL NFR BLD: 3.7 %
GFR SERPLBLD CREATININE-BSD FMLA CKD-EPI: >60 ML/MIN/{1.73_M2}
GLUCOSE SERPL-MCNC: 99 MG/DL (ref 70–99)
HCT VFR BLD AUTO: 32.1 % (ref 40.5–52.5)
HGB BLD-MCNC: 10.9 G/DL (ref 13.5–17.5)
LYMPHOCYTES # BLD: 1 K/UL (ref 1–5.1)
LYMPHOCYTES NFR BLD: 14.7 %
MAGNESIUM SERPL-MCNC: 2 MG/DL (ref 1.8–2.4)
MCH RBC QN AUTO: 30.5 PG (ref 26–34)
MCHC RBC AUTO-ENTMCNC: 34.1 G/DL (ref 31–36)
MCV RBC AUTO: 89.2 FL (ref 80–100)
MONOCYTES # BLD: 0.8 K/UL (ref 0–1.3)
MONOCYTES NFR BLD: 11.5 %
NEUTROPHILS # BLD: 4.5 K/UL (ref 1.7–7.7)
NEUTROPHILS NFR BLD: 69.3 %
PLATELET # BLD AUTO: 146 K/UL (ref 135–450)
PMV BLD AUTO: 6.5 FL (ref 5–10.5)
POTASSIUM SERPL-SCNC: 3.9 MMOL/L (ref 3.5–5.1)
RBC # BLD AUTO: 3.59 M/UL (ref 4.2–5.9)
SODIUM SERPL-SCNC: 140 MMOL/L (ref 136–145)
WBC # BLD AUTO: 6.5 K/UL (ref 4–11)

## 2023-03-20 PROCEDURE — 6370000000 HC RX 637 (ALT 250 FOR IP): Performed by: INTERNAL MEDICINE

## 2023-03-20 PROCEDURE — 2580000003 HC RX 258: Performed by: INTERNAL MEDICINE

## 2023-03-20 PROCEDURE — 1200000000 HC SEMI PRIVATE

## 2023-03-20 PROCEDURE — 99252 IP/OBS CONSLTJ NEW/EST SF 35: CPT | Performed by: SPECIALIST/TECHNOLOGIST

## 2023-03-20 PROCEDURE — 83735 ASSAY OF MAGNESIUM: CPT

## 2023-03-20 PROCEDURE — 51702 INSERT TEMP BLADDER CATH: CPT

## 2023-03-20 PROCEDURE — 97162 PT EVAL MOD COMPLEX 30 MIN: CPT

## 2023-03-20 PROCEDURE — 97166 OT EVAL MOD COMPLEX 45 MIN: CPT

## 2023-03-20 PROCEDURE — 80048 BASIC METABOLIC PNL TOTAL CA: CPT

## 2023-03-20 PROCEDURE — 6360000002 HC RX W HCPCS: Performed by: INTERNAL MEDICINE

## 2023-03-20 PROCEDURE — 85025 COMPLETE CBC W/AUTO DIFF WBC: CPT

## 2023-03-20 PROCEDURE — 97110 THERAPEUTIC EXERCISES: CPT

## 2023-03-20 PROCEDURE — 87449 NOS EACH ORGANISM AG IA: CPT

## 2023-03-20 RX ADMIN — SODIUM CHLORIDE, SODIUM GLUCONATE, SODIUM ACETATE, POTASSIUM CHLORIDE AND MAGNESIUM CHLORIDE: 526; 502; 368; 37; 30 INJECTION, SOLUTION INTRAVENOUS at 06:15

## 2023-03-20 RX ADMIN — MUPIROCIN: 20 OINTMENT TOPICAL at 08:44

## 2023-03-20 RX ADMIN — CARBIDOPA AND LEVODOPA 2 TABLET: 25; 100 TABLET ORAL at 21:18

## 2023-03-20 RX ADMIN — SERTRALINE HYDROCHLORIDE 100 MG: 50 TABLET ORAL at 21:18

## 2023-03-20 RX ADMIN — SODIUM CHLORIDE, PRESERVATIVE FREE 10 ML: 5 INJECTION INTRAVENOUS at 14:41

## 2023-03-20 RX ADMIN — CARBIDOPA AND LEVODOPA 1 TABLET: 25; 100 TABLET ORAL at 13:49

## 2023-03-20 RX ADMIN — CARBIDOPA AND LEVODOPA 1 TABLET: 25; 100 TABLET ORAL at 08:43

## 2023-03-20 RX ADMIN — SODIUM CHLORIDE, PRESERVATIVE FREE 10 ML: 5 INJECTION INTRAVENOUS at 21:18

## 2023-03-20 RX ADMIN — SODIUM CHLORIDE, SODIUM GLUCONATE, SODIUM ACETATE, POTASSIUM CHLORIDE AND MAGNESIUM CHLORIDE: 526; 502; 368; 37; 30 INJECTION, SOLUTION INTRAVENOUS at 13:20

## 2023-03-20 RX ADMIN — TRAZODONE HYDROCHLORIDE 50 MG: 50 TABLET ORAL at 21:18

## 2023-03-20 RX ADMIN — ACETAMINOPHEN 650 MG: 325 TABLET ORAL at 18:03

## 2023-03-20 RX ADMIN — ENOXAPARIN SODIUM 40 MG: 100 INJECTION SUBCUTANEOUS at 08:43

## 2023-03-20 RX ADMIN — MUPIROCIN: 20 OINTMENT TOPICAL at 21:18

## 2023-03-20 RX ADMIN — CARBIDOPA AND LEVODOPA 1 TABLET: 25; 100 TABLET ORAL at 18:03

## 2023-03-20 RX ADMIN — CARBIDOPA AND LEVODOPA 1 TABLET: 25; 100 TABLET ORAL at 21:18

## 2023-03-20 ASSESSMENT — PAIN SCALES - WONG BAKER
WONGBAKER_NUMERICALRESPONSE: 0
WONGBAKER_NUMERICALRESPONSE: 0

## 2023-03-20 ASSESSMENT — PAIN DESCRIPTION - DESCRIPTORS: DESCRIPTORS: ACHING;SORE

## 2023-03-20 ASSESSMENT — PAIN SCALES - GENERAL
PAINLEVEL_OUTOF10: 0
PAINLEVEL_OUTOF10: 5
PAINLEVEL_OUTOF10: 0

## 2023-03-20 ASSESSMENT — PAIN DESCRIPTION - ORIENTATION: ORIENTATION: RIGHT;LEFT

## 2023-03-20 ASSESSMENT — PAIN DESCRIPTION - LOCATION: LOCATION: HIP

## 2023-03-20 NOTE — CONSULTS
Consult PerfectServed/called to Orthopedics on 03/20/23 at 2:40 PM Irma Torres
Department of Orthopedic Surgery  Physician Assistant   Consult Note        Reason for Consult:  PT/OT needs weightbearing recommendations for pubic ramus fx, non-displaced  Requesting Physician: Harmony Harry MD  Date of Service: 3/20/2023 3:03 PM    CHIEF COMPLAINT:  As Above    History Obtained From:  patient, electronic medical record    HISTORY OF PRESENT ILLNESS:                The patient is a 76 y.o. male with pmhx of parkinson's, dementia, right hip IMN July 2022 who presents with above chief complaint. Pt slid out of bed, the next morning presented to [facility] for evaluation after difficulty weightbearing. He was found to have left sided pubic rami fractures and orthopedics was consulted regarding weightbearing recommendations. Pt reports pain in the bilateral extremities. Currently in restraints, no family at bedside to provide ambulatory status, per chart review pt lives with family and is ambulatory with a walker    Past Medical History:        Diagnosis Date    Arthritis     Dementia ([address]     Kidney stone     Knee pain     Parkinson disease [address]      Past Surgical History:        Procedure Laterality Date    HIP SURGERY Right 7/6/2022    RIGHT HIP INTRAMEDULLARY GAMMA NAIL INSERTION performed by Dara Cottrell MD at [address] N/A 1/3/2023    EGD ESOPHAGOGASTRODUODENOSCOPY PEG TUBE INSERTION performed by Jessica Sands MD at [facility]         Medications Prior to Admission:   Prior to Admission medications    Medication Sig Start Date End Date Taking?  Authorizing Provider   Pyridoxine HCl (B-6) 50 MG TABS Take 50 mg by mouth daily     Historical Provider, MD   carbidopa-levodopa (SINEMET CR)  MG per extended release tablet Take 1 tablet by mouth nightly    Historical Provider, MD   bisacodyl (DULCOLAX) 5 MG EC tablet Take 5 mg by mouth daily as needed for Constipation    Historical Provider, MD   docusate
Pharmacy to Dose Vancomycin    Dx: HAP  Goal AUC = 400-600  Pt wt = 55.8 kg  Recent Labs     03/18/23  1510 03/19/23  0155   CREATININE 0.6* 0.6*     Recent Labs     03/18/23  1510 03/19/23  0155   WBC 7.8 10.4     Regimen: 750 mg IV every 12 hours.   Start time: 1700 on 03/19/2023  Exposure target: AUC24 (range)400-600 mg/L.hr   AUC24,ss: 460 mg/L.hr  Probability of AUC24 > 400: 65 %  Ctrough,ss: 14.2 mg/L  Probability of Ctrough,ss > 20: 22 %  Probability of nephrotoxicity (Lodise OLGA 2009): 9 %  Vancomycin trough: 3/20 145 H[facility] 3/19/2023 5:53 AM
inflammatory process or infection should be   considered in the appropriate clinical setting. 2.  No acute inflammatory process identified in the abdomen or pelvis. Nonspecific mild periportal edema. 3.  Minimally displaced left pubic ramus fractures. 4.  Mild enlargement of the infrarenal aorta measuring up to 2.6 cm. RECOMMENDATIONS:   2.6 cm infrarenal abdominal aortic aneurysm suspected. Recommend follow-up   every 5 years. Reference: J Am Tasha Radiol 3910;73:261-129. XR CHEST PORTABLE   Final Result   Findings suggestive of developing edema with small pleural effusions. XR ANKLE LEFT (MIN 3 VIEWS)    Result Date: 3/18/2023  EXAMINATION: THREE XRAY VIEWS OF THE LEFT ANKLE 3/18/2023 1:16 pm COMPARISON: None. HISTORY: ORDERING SYSTEM PROVIDED HISTORY: left ankle tenderness TECHNOLOGIST PROVIDED HISTORY: Reason for exam:->left ankle tenderness Reason for Exam: left ankle tenderness FINDINGS: No evidence of acute fracture or dislocation. Normal alignment of the ankle mortise. No focal osseous lesion. No evidence of joint effusion. No focal soft tissue abnormality. No acute abnormality of the ankle. CT Head W/O Contrast    Result Date: 3/18/2023  EXAMINATION: CT OF THE HEAD WITHOUT CONTRAST  3/18/2023 1:38 pm TECHNIQUE: CT of the head was performed without the administration of intravenous contrast. Automated exposure control, iterative reconstruction, and/or weight based adjustment of the mA/kV was utilized to reduce the radiation dose to as low as reasonably achievable. COMPARISON: 10/06/2022 HISTORY: ORDERING SYSTEM PROVIDED HISTORY: fall TECHNOLOGIST PROVIDED HISTORY: Reason for exam:->fall Has a \"code stroke\" or \"stroke alert\" been called? ->No Decision Support Exception - unselect if not a suspected or confirmed emergency medical condition->Emergency Medical Condition (MA) Reason for Exam: s/p fall last night.  pt states that he did not hit his head and

## 2023-03-20 NOTE — PLAN OF CARE
Problem: Discharge Planning  Goal: Discharge to home or other facility with appropriate resources  Outcome: Progressing     Problem: Safety - Adult  Goal: Free from fall injury  Outcome: Progressing     Problem: Skin/Tissue Integrity  Goal: Absence of new skin breakdown  Description: 1. Monitor for areas of redness and/or skin breakdown  2. Assess vascular access sites hourly  3. Every 4-6 hours minimum:  Change oxygen saturation probe site  4. Every 4-6 hours:  If on nasal continuous positive airway pressure, respiratory therapy assess nares and determine need for appliance change or resting period. Outcome: Progressing     Problem: Nutrition Deficit:  Goal: Optimize nutritional status  Outcome: Progressing     Problem: Safety - Medical Restraint  Goal: Remains free of injury from restraints (Restraint for Interference with Medical Device)  Description: INTERVENTIONS:  1. Determine that other, less restrictive measures have been tried or would not be effective before applying the restraint  2. Evaluate the patient's condition at the time of restraint application  3. Inform patient/family regarding the reason for restraint  4.  Q2H: Monitor safety, psychosocial status, comfort, nutrition and hydration  Outcome: Progressing  Flowsheets (Taken 3/20/2023 1213)  Remains free of injury from restraints (restraint for interference with medical device):   Determine that other, less restrictive measures have been tried or would not be effective before applying the restraint   Evaluate the patient's condition at the time of restraint application   Inform patient/family regarding the reason for restraint   Every 2 hours: Monitor safety, psychosocial status, comfort, nutrition and hydration     Vy Luis RN

## 2023-03-21 VITALS
DIASTOLIC BLOOD PRESSURE: 66 MMHG | HEIGHT: 71 IN | HEART RATE: 65 BPM | OXYGEN SATURATION: 98 % | RESPIRATION RATE: 16 BRPM | SYSTOLIC BLOOD PRESSURE: 98 MMHG | BODY MASS INDEX: 17.16 KG/M2 | TEMPERATURE: 98.3 F

## 2023-03-21 LAB
ANION GAP SERPL CALCULATED.3IONS-SCNC: 7 MMOL/L (ref 3–16)
BASOPHILS # BLD: 0 K/UL (ref 0–0.2)
BASOPHILS NFR BLD: 0.5 %
BUN SERPL-MCNC: 11 MG/DL (ref 7–20)
CALCIUM SERPL-MCNC: 8.7 MG/DL (ref 8.3–10.6)
CHLORIDE SERPL-SCNC: 105 MMOL/L (ref 99–110)
CO2 SERPL-SCNC: 29 MMOL/L (ref 21–32)
CREAT SERPL-MCNC: <0.5 MG/DL (ref 0.8–1.3)
DEPRECATED RDW RBC AUTO: 16.5 % (ref 12.4–15.4)
EOSINOPHIL # BLD: 0 K/UL (ref 0–0.6)
EOSINOPHIL NFR BLD: 0.6 %
GFR SERPLBLD CREATININE-BSD FMLA CKD-EPI: >60 ML/MIN/{1.73_M2}
GLUCOSE SERPL-MCNC: 112 MG/DL (ref 70–99)
HCT VFR BLD AUTO: 35.6 % (ref 40.5–52.5)
HGB BLD-MCNC: 12.3 G/DL (ref 13.5–17.5)
LEGIONELLA AG UR QL: NORMAL
LYMPHOCYTES # BLD: 0.7 K/UL (ref 1–5.1)
LYMPHOCYTES NFR BLD: 8.1 %
MCH RBC QN AUTO: 30.9 PG (ref 26–34)
MCHC RBC AUTO-ENTMCNC: 34.6 G/DL (ref 31–36)
MCV RBC AUTO: 89.4 FL (ref 80–100)
MONOCYTES # BLD: 1 K/UL (ref 0–1.3)
MONOCYTES NFR BLD: 11.9 %
MRSA DNA SPEC QL NAA+PROBE: NORMAL
NEUTROPHILS # BLD: 6.4 K/UL (ref 1.7–7.7)
NEUTROPHILS NFR BLD: 78.9 %
PLATELET # BLD AUTO: 160 K/UL (ref 135–450)
PMV BLD AUTO: 6.6 FL (ref 5–10.5)
POTASSIUM SERPL-SCNC: 3.7 MMOL/L (ref 3.5–5.1)
RBC # BLD AUTO: 3.98 M/UL (ref 4.2–5.9)
S PNEUM AG UR QL: NORMAL
SODIUM SERPL-SCNC: 141 MMOL/L (ref 136–145)
WBC # BLD AUTO: 8.2 K/UL (ref 4–11)

## 2023-03-21 PROCEDURE — 2580000003 HC RX 258: Performed by: INTERNAL MEDICINE

## 2023-03-21 PROCEDURE — 85025 COMPLETE CBC W/AUTO DIFF WBC: CPT

## 2023-03-21 PROCEDURE — 97530 THERAPEUTIC ACTIVITIES: CPT

## 2023-03-21 PROCEDURE — 36415 COLL VENOUS BLD VENIPUNCTURE: CPT

## 2023-03-21 PROCEDURE — 6370000000 HC RX 637 (ALT 250 FOR IP): Performed by: INTERNAL MEDICINE

## 2023-03-21 PROCEDURE — 80048 BASIC METABOLIC PNL TOTAL CA: CPT

## 2023-03-21 PROCEDURE — 97110 THERAPEUTIC EXERCISES: CPT

## 2023-03-21 PROCEDURE — 51702 INSERT TEMP BLADDER CATH: CPT

## 2023-03-21 PROCEDURE — 6360000002 HC RX W HCPCS: Performed by: INTERNAL MEDICINE

## 2023-03-21 PROCEDURE — 97535 SELF CARE MNGMENT TRAINING: CPT

## 2023-03-21 RX ORDER — METHOCARBAMOL 500 MG/1
500 TABLET, FILM COATED ORAL 3 TIMES DAILY
Status: DISCONTINUED | OUTPATIENT
Start: 2023-03-21 | End: 2023-03-21

## 2023-03-21 RX ORDER — METHOCARBAMOL 500 MG/1
250 TABLET, FILM COATED ORAL 3 TIMES DAILY PRN
Qty: 5 TABLET | Refills: 0 | Status: SHIPPED | OUTPATIENT
Start: 2023-03-21

## 2023-03-21 RX ORDER — METHOCARBAMOL 500 MG/1
250 TABLET, FILM COATED ORAL 3 TIMES DAILY
Status: DISCONTINUED | OUTPATIENT
Start: 2023-03-21 | End: 2023-03-21 | Stop reason: HOSPADM

## 2023-03-21 RX ADMIN — SODIUM CHLORIDE, PRESERVATIVE FREE 10 ML: 5 INJECTION INTRAVENOUS at 08:46

## 2023-03-21 RX ADMIN — CARBIDOPA AND LEVODOPA 1 TABLET: 25; 100 TABLET ORAL at 13:56

## 2023-03-21 RX ADMIN — ENOXAPARIN SODIUM 40 MG: 100 INJECTION SUBCUTANEOUS at 08:45

## 2023-03-21 RX ADMIN — CARBIDOPA AND LEVODOPA 1 TABLET: 25; 100 TABLET ORAL at 08:46

## 2023-03-21 RX ADMIN — MUPIROCIN: 20 OINTMENT TOPICAL at 08:46

## 2023-03-21 RX ADMIN — ACETAMINOPHEN 650 MG: 325 TABLET ORAL at 08:45

## 2023-03-21 RX ADMIN — METHOCARBAMOL 250 MG: 500 TABLET ORAL at 13:56

## 2023-03-21 ASSESSMENT — PAIN SCALES - GENERAL
PAINLEVEL_OUTOF10: 2
PAINLEVEL_OUTOF10: 7
PAINLEVEL_OUTOF10: 0

## 2023-03-21 ASSESSMENT — PAIN DESCRIPTION - LOCATION: LOCATION: HIP;GROIN

## 2023-03-21 ASSESSMENT — PAIN DESCRIPTION - DESCRIPTORS: DESCRIPTORS: ACHING;SORE

## 2023-03-21 ASSESSMENT — PAIN DESCRIPTION - ORIENTATION: ORIENTATION: LEFT

## 2023-03-21 ASSESSMENT — PAIN SCALES - WONG BAKER: WONGBAKER_NUMERICALRESPONSE: 0

## 2023-03-21 NOTE — PROGRESS NOTES
@ 5;50 AM the pt received from ER as a case of fall doun/hit head at home, the pt family reported that pt became confused and irritable, pt brought to ER by [facility], pt was hypotensive so Levo started at R=10, then titered to 3. The pt has a temp. Sense Rubio,LF arm PIV, RT Fem. CVC. O2 N/C 2 l started at ICU, pt cleaned with HCG wipes, IV N/S started at rate of 75. No major skin issues, (dry heels). Pt is allert and oriented x4. Pt received Cefepime 2 G, Vancomycin 1 G, Ca-Gloconate 1 G, and 2 L of N/S 0.9 at ER.      Electronically signed by Sherrilee Bosworth, RN on 3/19/23 at 6:47 AM EDT
Comprehensive Nutrition Assessment    Type and Reason for Visit:  Initial, Consult (tube feeding ordering and management)    Nutrition Recommendations/Plan:   Jevity 1.5 increase by 10 ml every 4 hours to goal rate of 60 ml/hr  Water flush 30 ml every 6 hours, monitor need for adjustment once IV fluids discontinued  Will monitor TF intake and tolerance, blood sugar trends, and fluid and electrolyte balances     Malnutrition Assessment:  Malnutrition Status:  Severe malnutrition (03/20/23 1007)    Context:  Chronic Illness     Findings of the 6 clinical characteristics of malnutrition:  Energy Intake:  Unable to assess  Weight Loss:  Unable to assess     Body Fat Loss:  Severe body fat loss Orbital, Triceps   Muscle Mass Loss:  Severe muscle mass loss Calf (gastrocnemius), Thigh (quadraceps), Clavicles (pectoralis & deltoids), Scapula (trapezius), Temples (temporalis)  Fluid Accumulation:  Unable to assess     Strength:  Not Performed    Nutrition Assessment:    Patient admitted with hypotension due to medications per EMR. History of peg tube placement in December 2022 due to aspiration pneumonia. Jevity 1.5 infusing at 30 ml/hr with water flush 30 ml every 6 hours. Plasmalyte also infusing at 125 ml/hr. Dietitian consult for tube feeding ordering and management. Nutrition Related Findings:    lytes within normal limits on 3/20/23; plasmalyte at 125 ml/hr Wound Type: None       Current Nutrition Intake & Therapies:    Average Meal Intake: NPO  Average Supplements Intake: NPO  Diet NPO Exceptions are: Other (Specify); Specify Other Exceptions: pleasure feeding  ADULT TUBE FEEDING; PEG; Standard with Fiber; Continuous; 30; Yes; 10; Q 4 hours; 60; 30; Q 6 hours  Current Tube Feeding (TF) Orders:  Feeding Route: PEG  Formula: Standard with Fiber  Schedule: Continuous  Water Flushes: 30 ml every 6 hours  Goal TF & Flush Orders Provides: Jevity 1.5 currently at 30 ml/hr.   Increase by 10 ml every 4 hours to goal
Occupational Therapy  Facility/Department: [facility] C5 - MED SURG/ORTHO  Daily Treatment Note  NAME: Lee Medina  : 1945  MRN: [id number]    Date of Service: 3/21/2023    Discharge Recommendations:  Subacute/Skilled Nursing Facility       AM-PAC score  AM-PAC Inpatient Daily Activity Raw Score: 7 (23 1508)  AM-PAC Inpatient ADL T-Scale Score : 20.13 (23 1508)  ADL Inpatient CMS 0-100% Score: 92.44 (23 1508)  ADL Inpatient CMS G-Code Modifier : CM (23 1508)      Patient Diagnosis(es): The primary encounter diagnosis was Septic shock ([facility] [address] Diagnoses of Multifocal pneumonia and Pubic ramus fracture, left, closed, initial encounter [facility]) were also pertinent to this visit. Assessment    Assessment: Pt demos improved tolerance of OT treatment, pleasant and cooperative throughout. Pt demos good tolerance for transfer training with handheld Ax2. Pt tolerating UE therex seated. Pt in restraints at start of session, RN reports OK to leave untied at EOS. Co-tx collaboration this date with PT staff to safely progress pt toward goals. Pt will have better occupational performance outcomes within a co-treatment than 1:1 session. Pt functioning below his baseline, continue to recommend SNF at d/c . Activity Tolerance: Patient tolerated treatment well;Treatment limited secondary to decreased cognition  Discharge Recommendations: Subacute/Skilled Nursing Facility      Plan   Occupational Therapy Plan  Times Per Week: 2-4x/ week     Restrictions  Restrictions/Precautions  Restrictions/Precautions: General Precautions; Fall Risk;Bed Alarm;NPO  Position Activity Restriction  Other position/activity restrictions: max catheter, bilateral wrist restraints    Subjective   Subjective  Subjective: Pt in bed at approach, pleasant and agreeable to OT treatment.     Orientation  Overall Orientation Status: Impaired  Orientation Level: Oriented to place;Oriented to person;Disoriented to situation;Disoriented
Patient IV access discontinued at bedside. Catheter intact at time of removal. Site WNL. Patient and family present in room given discharge instructions. Verbalized understanding of all instructions given. Copy of discharge instructions handed to patients family member. Instructed on where to retrieve outpatient prescriptions. Verified all personal belongings present prior to departure. Patient assisted to family vehicle by family at time of departure.
Patient pulled out Central Line Femoral Catheter at 7:45. Placed patient in 2 point soft restraints, bilateral wrists for patient safety, to protect IV and max.     Africa Velasco RN
activities; Home exercise program     Restrictions  Restrictions/Precautions  Restrictions/Precautions: General Precautions, Fall Risk, Bed Alarm, NPO  Position Activity Restriction  Other position/activity restrictions: Right femoral IV line, max catheter, bilateral wrist restraints: Needs ortho consult for WBing orders     Subjective    Subjective  Subjective: Pt in bed, restrained RUE and RLE, agreeable to PT  Pain: Denies pain at rest  Orientation  Overall Orientation Status: Impaired  Orientation Level: Oriented to place;Oriented to person;Disoriented to situation;Disoriented to time  Cognition  Overall Cognitive Status: Exceptions  Arousal/Alertness: Delayed responses to stimuli  Following Commands: Follows one step commands with repetition; Follows one step commands with increased time  Attention Span: Attends with cues to redirect  Memory: Decreased recall of precautions  Safety Judgement: Decreased awareness of need for safety  Problem Solving: Assistance required to identify errors made  Insights: Not aware of deficits  Initiation: Requires cues for some  Sequencing: Requires cues for some     Objective   Vitals  Vitals:    03/21/23    BP: 106/67   Pulse: 64   Resp:    Temp:    SpO2: 95          Bed Mobility Training  Bed Mobility Training: Yes  Interventions: Manual cues; Tactile cues; Safety awareness training;Verbal cues  Supine to Sit: Moderate assistance;Assist X2;Additional time; Adaptive equipment (to pts R)  Sit to Supine: Moderate assistance;Assist X2;Additional time; Adaptive equipment  Balance  Sitting: Impaired  Sitting - Static: Good (unsupported)  Sitting - Dynamic: Fair (occasional)  Standing: Impaired  Standing - Static: Constant support; Fair (HHA of 2)  Standing - Dynamic: Constant support;Poor (HHA of 2)  Transfer Training  Transfer Training: Yes  Interventions: Tactile cues; Verbal cues; Weight shifting training/pressure relief; Safety awareness training  Sit to Stand:  Moderate
classified 03/19/2014    Mild cognitive impairment 03/19/2014     Past Medical History:   Diagnosis Date    Arthritis     Dementia ([facility] [address]     Kidney stone     Knee pain     Parkinson disease ([facility] [address]      Past Surgical History:   Procedure Laterality Date    HIP SURGERY Right 7/6/2022    RIGHT HIP INTRAMEDULLARY GAMMA NAIL INSERTION performed by Paige Lawrence MD at [address] [place] [address] ENDOSCOPY N/A 1/3/2023    EGD ESOPHAGOGASTRODUODENOSCOPY PEG TUBE INSERTION performed by Kari Gamboa MD at [address]     No Known Allergies    DATE ONSET: 3/19/23    Date of Evaluation: 3/19/2023   Evaluating Therapist: JYOTHI Russo    Chart Reviewed: : [x] Yes [] No    Current Diet: ADULT DIET; Dysphagia - Pureed; Moderately Thick (Honey)    Recent Chest Radiography: [x] Chest XR   [] CT of Chest  Date: 3/19/23  Impression   Findings suggestive of developing edema with small pleural effusions. Pain: The patient did not report any pain. Reason for Referral  Fanny Marinelli was referred for a bedside swallow evaluation to assess the efficiency of their swallow function, identify signs and symptoms of aspiration and make recommendations regarding safe dietary consistencies, effective compensatory strategies, and safe eating environment. Assessment    Medical record review/interview: Per MD H&P: \"67 y.o. WM with PMHx sig for dementia, Parkinson disease, who became agitated and fell at home. Pt was on ground for only about 10 minutes, but has been profoundly hypotensive since then. No evidence of acute bleeding, no evidence of acute infection except increased ground glass opacities in lungs. Head CT normal, pelvis CT shows non-displaced L pubic ramus fractures. Hgb has remained relatively stable accounting for IVF and no signs of acute bleeding.   Pt had recent admission in January 2023 for aspiration pneumonia and failed swallow eval.  SLP Recommended PEG,
d/c.  Treatment Diagnosis: impaired functional mobility  Specific Instructions for Next Treatment: progress mobility as tolerated  Therapy Prognosis: Fair  Decision Making: Medium Complexity  Requires PT Follow-Up: Yes  Activity Tolerance  Activity Tolerance: Patient limited by pain;Treatment limited secondary to decreased cognition     Plan   Physcial Therapy Plan  General Plan: 2-3 times per week  Specific Instructions for Next Treatment: progress mobility as tolerated  Current Treatment Recommendations: Strengthening, Balance training, Gait training, ROM, Functional mobility training, Transfer training, Endurance training, Neuromuscular re-education, Patient/Caregiver education & training, Safety education & training, Therapeutic activities, Home exercise program  Safety Devices  Type of Devices: All fall risk precautions in place, Bed alarm in place, Call light within reach, Nurse notified, Cassy Hoop in use, Left in bed  Restraints  Restraints Initially in Place: Yes  Restraints: bilateral UE wrist restraint remained in place     Restrictions  Restrictions/Precautions  Restrictions/Precautions: General Precautions, Fall Risk, Bed Alarm, NPO  Position Activity Restriction  Other position/activity restrictions: Right femoral IV line, max catheter, bilateral wrist restraints: Needs ortho consult for [address] orders     Subjective   General  Chart Reviewed: Yes  Patient assessed for rehabilitation services?: Yes  Additional Pertinent Hx: Parkinson's; dementia  Response To Previous Treatment: Not applicable  Family / Caregiver Present: No  Referring Practitioner: Dr. Valentin Schafer MD  Referral Date : 03/20/23  Diagnosis: Left Pubic rami fx nondisplaced  Follows Commands: Impaired  General Comment  Comments: Pt resting in bed on approach; RN cleared pt for therapy  Subjective  Subjective: pt agreeable to therapy. Reports LLE pain.  Did not rate         Social/Functional History  Social/Functional History  Lives With: Family
max catheter, IV    Subjective   General  Chart Reviewed: Yes  Patient assessed for rehabilitation services?: Yes  Family / Caregiver Present: No  Referring Practitioner: Dr. Brennan Lopez  Diagnosis: fall OOB per CT +Minimally displaced left pubic ramus fractures  Subjective  Subjective: reports Pain Left LE  General Comment  Comments: RN cleared pt for bed activity only     Social/Functional History  Social/Functional History  Lives With: Family (states he lives with 2 granddaughters who help him and his wife)  Type of Home: House  Home Layout: Multi-level (chair lift)  Home Access: Stairs to enter without rails  Entrance Stairs - Number of Steps: 4  Bathroom Shower/Tub: Walk-in shower, Shower chair with back  Bathroom Toilet: Standard  Bathroom Equipment: Grab bars in shower, Toilet raiser  Home Equipment: Hospital bed, Electric scooter, Grab bars, [employer], Rollator  Has the patient had two or more falls in the past year or any fall with injury in the past year?: Yes  Receives Help From: Family, Home health  ADL Assistance: Needs assistance (Assistance with bathing, dressing, toileting, and eating. Diffiuclty holding silverwear)  Homemaking Responsibilities: No  Ambulation Assistance: Needs assistance (at least CGA with 9WZ)  Transfer Assistance: Needs assistance (per chart, needs assistance)  Additional Comments: pt poor historian. History taken from prior therapy evaluation December 2022       Objective   Heart Rate: 81  Heart Rate Source: Monitor  BP: 138/85  BP Location: Left Arm  BP Method: Automatic  Patient Position: Semi fowlers  MAP (Calculated): 103  Resp: 18  SpO2: 95 %  O2 Device: None (Room air)             Safety Devices  Type of Devices: Bed alarm in place;Nurse notified;Call light within reach; Left in bed;Patient at risk for falls  Restraints  Restraints Initially in Place: Yes  Restraints: bilateral UE wrist restraint remained in place        AROM: Within functional limits (distally BUE
no rales, no rhonchi, no crackles, no use of accessory muscles  Abd: bowel sounds present, soft, nontender, nondistended, no masses  Extrem:  No clubbing, cyanosis,  no edema  Skin: no rashes or lesions  Psych: Not oriented to date or Not oriented to place  Neuro: grossly intact, moves all four extremities. 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 *      Plan:  Hypotension due to drugs - clinically improved  Pubic ramus fx - PT/OT - I believe family wants to take him home and continue to provide care, so will front load therapies  Parkinson's disease - continue with sinemet  Weakness and age related debility  This patient meets with criteria for  severe  malnutrution based on a BMI of  Body mass index is 17.16 kg/m². .  This malnutrition is likely due to  Parkinson  . Prognosis:  Fair    Code status:  Limited Code:, No Intubation/re-intubation , No Defibrillation/cardioversion, No Chest Compressions, and  No Resuscitative Medications    DVT prophylaxis: Lovenox  GI prophylaxis: none  Antibiotic prophylaxis indicated:  No     Diet:  Diet NPO Exceptions are: Other (Specify); Specify Other Exceptions: pleasure feeding  ADULT TUBE FEEDING; PEG; Standard with Fiber; Continuous; 30; No; 30; Q 6 hours    Disposition:  Home with home health in 1-2 day(s).   Appropriate for A1 discharge unit:  Yes    Medications:  Scheduled Meds:   mupirocin   Each Nostril BID    lidocaine  1 patch Topical Daily    carbidopa-levodopa  1 tablet Oral 4x Daily    sodium chloride flush  10 mL IntraVENous 2 times per day    enoxaparin  40 mg SubCUTAneous Daily    carbidopa-levodopa  2 tablet Oral Nightly    sertraline  100 mg PEG Tube Nightly

## 2023-03-21 NOTE — DISCHARGE INSTR - COC
Pulse (!) 105   Temp 98.5 °F (36.9 °C) (Axillary)   Resp 16   Ht 5' 11\" (1.803 m)   SpO2 99%   BMI 17.16 kg/m²     Last documented pain score (0-10 scale): Pain Level: 0  Last Weight:   Wt Readings from Last 1 Encounters:   23 123 lb (55.8 kg)     Mental Status:  {IP PT MENTAL STATUS:}    IV Access:  { MARGOT IV ACCESS:300462689}    Nursing Mobility/ADLs:  Walking   {CHP DME JZRX:409626970}  Transfer  {CHP DME WNTE:171807872}  Bathing  {CHP DME QALW:606674048}  Dressing  {CHP DME WPFR:838502083}  Toileting  {CHP DME WRFH:489277125}  Feeding  {CHP DME ZYHU:989766156}  Med Admin  {P DME SOQT:817736383}  Med Delivery   { MARGOT MED Delivery:150824141}    Wound Care Documentation and Therapy:  Incision 22 Hip Proximal;Right (Active)   Number of days: 257        Elimination:  Continence: Bowel: {YES / BF:01079}  Bladder: {YES / SN:39655}  Urinary Catheter: {Urinary Catheter:423938464}   Colostomy/Ileostomy/Ileal Conduit: {YES / JK:98329}       Date of Last BM: ***    Intake/Output Summary (Last 24 hours) at 3/21/2023 0723  Last data filed at 3/21/2023 0247  Gross per 24 hour   Intake 120 ml   Output 2700 ml   Net -2580 ml     I/O last 3 completed shifts:   In: 150 [P.O.:90; NG/GT:60]  Out: 4850 [Urine:4850]    Safety Concerns:     [employer] Safety Concerns:820714040}    Impairments/Disabilities:      [employer] Impairments/Disabilities:383985039}    Nutrition Therapy:  Current Nutrition Therapy:   [employer] Diet List:288912171}    Routes of Feeding: {CHP DME Other Feedings:323028148}  Liquids: {Slp liquid thickness:54559}  Daily Fluid Restriction: {CHP DME Yes amt example:029569935}  Last Modified Barium Swallow with Video (Video Swallowing Test): {Done Not Done [facility]}    Treatments at the Time of Hospital Discharge:   Respiratory Treatments: ***  Oxygen Therapy:  {Therapy; copd oxygen:34989}  Ventilator:    {MH CC Vent IXF}    Rehab Therapies: {THERAPEUTIC

## 2023-03-21 NOTE — PLAN OF CARE
Problem: Discharge Planning  Goal: Discharge to home or other facility with appropriate resources  3/20/2023 2213 by Shivani Espinoza RN  Outcome: Progressing  3/20/2023 1215 by Africa Velasco RN  Outcome: Progressing     Problem: Safety - Adult  Goal: Free from fall injury  3/20/2023 2213 by Shivani Espinoza RN  Outcome: Progressing  3/20/2023 1215 by Africa Velasco RN  Outcome: Progressing     Problem: Skin/Tissue Integrity  Goal: Absence of new skin breakdown  Description: 1. Monitor for areas of redness and/or skin breakdown  2. Assess vascular access sites hourly  3. Every 4-6 hours minimum:  Change oxygen saturation probe site  4. Every 4-6 hours:  If on nasal continuous positive airway pressure, respiratory therapy assess nares and determine need for appliance change or resting period. 3/20/2023 2213 by Shivani Espinoza RN  Outcome: Progressing  3/20/2023 1215 by Africa Velsaco RN  Outcome: Progressing     Problem: Nutrition Deficit:  Goal: Optimize nutritional status  3/20/2023 2213 by Shivani Espinoza RN  Outcome: Progressing  3/20/2023 1215 by Africa Velasco RN  Outcome: Progressing     Problem: Safety - Medical Restraint  Goal: Remains free of injury from restraints (Restraint for Interference with Medical Device)  Description: INTERVENTIONS:  1. Determine that other, less restrictive measures have been tried or would not be effective before applying the restraint  2. Evaluate the patient's condition at the time of restraint application  3. Inform patient/family regarding the reason for restraint  4.  Q2H: Monitor safety, psychosocial status, comfort, nutrition and hydration  3/20/2023 2213 by Shivani Espinoza RN  Outcome: Progressing  3/20/2023 1215 by Africa Velasco RN  Outcome: Progressing  Flowsheets (Taken 3/20/2023 1213)  Remains free of injury from restraints (restraint for interference with medical device):   Determine that other, less restrictive measures have been tried or would not be effective before applying

## 2023-03-21 NOTE — DISCHARGE SUMMARY
expected for atelectasis. Underlying inflammatory process or infection should be considered in the appropriate clinical setting. 2.  No acute inflammatory process identified in the abdomen or pelvis. Nonspecific mild periportal edema. 3.  Minimally displaced left pubic ramus fractures. 4.  Mild enlargement of the infrarenal aorta measuring up to 2.6 cm. RECOMMENDATIONS: 2.6 cm infrarenal abdominal aortic aneurysm suspected. Recommend follow-up every 5 years. Reference: J Am Tasha Radiol 2500;24:824-006. XR HIP 2-3 VW W PELVIS LEFT    Result Date: 3/18/2023  EXAMINATION: ONE XRAY VIEW OF THE PELVIS AND TWO XRAY VIEWS LEFT HIP 3/18/2023 1:16 pm COMPARISON: None. HISTORY: ORDERING SYSTEM PROVIDED HISTORY: left leg pain TECHNOLOGIST PROVIDED HISTORY: Reason for exam:->left leg pain Reason for Exam: left leg pain FINDINGS: There is no fracture or dislocation. There is mild degenerative joint space narrowing with associated rim-like acetabular and femoral neck osteophyte formation. The soft tissues are unremarkable. No acute radiographic abnormality.             Last Labs on Discharge:     Recent Results (from the past 24 hour(s))   Basic Metabolic Panel w/ Reflex to MG    Collection Time: 03/21/23  6:22 AM   Result Value Ref Range    Sodium 141 136 - 145 mmol/L    Potassium reflex Magnesium 3.7 3.5 - 5.1 mmol/L    Chloride 105 99 - 110 mmol/L    CO2 29 21 - 32 mmol/L    Anion Gap 7 3 - 16    Glucose 112 (H) 70 - 99 mg/dL    BUN 11 7 - 20 mg/dL    Creatinine <0.5 (L) 0.8 - 1.3 mg/dL    Est, Glom Filt Rate >60 >60    Calcium 8.7 8.3 - 10.6 mg/dL   CBC auto differential    Collection Time: 03/21/23  6:22 AM   Result Value Ref Range    WBC 8.2 4.0 - 11.0 K/uL    RBC 3.98 (L) 4.20 - 5.90 M/uL    Hemoglobin 12.3 (L) 13.5 - 17.5 g/dL    Hematocrit 35.6 (L) 40.5 - 52.5 %    MCV 89.4 80.0 - 100.0 fL    MCH 30.9 26.0 - 34.0 pg    MCHC 34.6 31.0 - 36.0 g/dL    RDW 16.5 (H) 12.4 - 15.4 %    Platelets 787 939 - 411 K/uL

## 2023-03-21 NOTE — CARE COORDINATION
Writer notes DC order, patient is going home with family, NN from [address] per daughter Mary Ortega.      Jorge A Garcia RN
assistance at DC: Yes  Would you like Case Management to discuss the discharge plan with any other family members/significant others, and if so, who? No  Plans to Return to Present Housing: Yes  Other Identified Issues/Barriers to RETURNING to current housing: none  Potential Assistance needed at discharge:              Potential DME:    Patient expects to discharge to: [address] [place] [address] for transportation at discharge:      Financial    Payor: Velia Ramsey / Plan: Velia Ramsey / Product Type: *No Product type* /     Does insurance require precert for SNF: Yes    Potential assistance Purchasing Medications: No  Meds-to-Beds request:        [facility] [place], [place] - [address] [facility] [phone] - F [phone] [address] [address]  Phone: [phone] Fax: [address], [facility] [address] [facility] [phone] [address] [address]  Phone: [phone] Fax: [phone]      Notes:    Factors facilitating achievement of predicted outcomes: Family support and Caregiver support    Barriers to discharge: none    Additional Case Management Notes: Patient lives with spouse at their daughters home. Family does all the care for him. Patient usually doesn't walk transfers from chair to chair. Had been in care of hospice but was recently discharged. Hospital bed provided by [facility]. The Plan for Transition of Care is related to the following treatment goals of Pubic ramus fracture, left, closed, initial encounter ([facility] [address] [S32.592A]  Septic shock ([facility] [address] [A41.9, R65.21]  Multifocal pneumonia [D57.7]    IF APPLICABLE: The Patient and/or patient representative Arnel Schwartz and his family were provided with a choice of provider and agrees with the discharge plan.  Freedom of choice list with basic dialogue that supports the patient's individualized plan of care/goals and shares the quality data associated with the

## 2023-03-23 LAB
BACTERIA BLD CULT ORG #2: NORMAL
BACTERIA BLD CULT: NORMAL

## (undated) PROCEDURE — 0DH63UZ INSERTION OF FEEDING DEVICE INTO STOMACH, PERCUTANEOUS APPROACH: ICD-10-PCS

## (undated) PROCEDURE — 3E0G76Z INTRODUCTION OF NUTRITIONAL SUBSTANCE INTO UPPER GI, VIA NATURAL OR ARTIFICIAL OPENING: ICD-10-PCS

## (undated) DEVICE — SOLUTION IV 1000ML LAC RINGERS PH 6.5 INJ USP VIAFLX PLAS

## (undated) DEVICE — MARKER SKIN REG TIP PREP RESIST INK PEN STYL GENTIAN VIO

## (undated) DEVICE — BIOGUARD A/W CLEANING ADAPTER

## (undated) DEVICE — K-WIRE
Type: IMPLANTABLE DEVICE | Status: NON-FUNCTIONAL
Brand: GAMMA
Removed: 2022-07-06

## (undated) DEVICE — ENDOSCOPIC KIT 2 12 FT OP4 DE2 GWN SYR

## (undated) DEVICE — RESTRAINT EXT ANK WRST LT BLU FOAM 2 STRP SIDE BCKL HK AND

## (undated) DEVICE — DRESSING FOAM W4XL4IN SIL FACE BORD ADH PD SUP ABSRB COR

## (undated) DEVICE — 3M™ MEDIPORE™ H SOFT CLOTH SURGICAL TAPE 2864, 4 INCH X 10 YARD (10CM X 9,14M), 12 ROLLS/CASE: Brand: 3M™ MEDIPORE™

## (undated) DEVICE — CATHETER IV 20GA L1.25IN PNK FEP SFTY STR HUB RADPQ DISP

## (undated) DEVICE — SOLUTION IV IRRIG POUR BRL 0.9% SODIUM CHL 2F7124

## (undated) DEVICE — GLOVE ORANGE PI 7 1/2   MSG9075

## (undated) DEVICE — LABOR HIP PINNING TFN ANTERIOR HIP

## (undated) DEVICE — PEG STANDARD SYSTEM: Brand: ENTAKE

## (undated) DEVICE — SUTURE VCRL SZ 2-0 L36IN ABSRB UD L36MM CT-1 1/2 CIR J945H

## (undated) DEVICE — GUIDE WIRE, BALL-TIPPED, STERILE

## (undated) DEVICE — LAG SCREW STEP DRILL: Brand: GAMMA

## (undated) DEVICE — FREEHAND DRILL

## (undated) DEVICE — SOLUTION IV IRRIG 500ML 0.9% SODIUM CHL 2F7123

## (undated) DEVICE — SET ADMIN PRIMING 7ML L30IN 7.35LB 20 GTT 2ND RLER CLMP

## (undated) DEVICE — ONE STEP CONICAL REAMER: Brand: GAMMA

## (undated) DEVICE — BINDER ABD UNISX 9IN 45IN SM AND M UNIV

## (undated) DEVICE — DRESSING ALG W6XL8IN THN ABSRB SELF ADH BORD MEPILEX

## (undated) DEVICE — STANDARD HYPODERMIC NEEDLE,POLYPROPYLENE HUB: Brand: MONOJECT

## (undated) DEVICE — REAMER SHAFT, MOD.TRINKLE: Brand: BIXCUT

## (undated) DEVICE — 6619 2 PTNT ISO SYS INCISE AREA&LT;(&GT;&&LT;)&GT;P: Brand: STERI-DRAPE™ IOBAN™ 2

## (undated) DEVICE — ADAPT CLIP: Brand: GAMMA

## (undated) DEVICE — 3M™ TEGADERM™ TRANSPARENT FILM DRESSING FRAME STYLE, 1624W, 2-3/8 IN X 2-3/4 IN (6 CM X 7 CM), 100/CT 4CT/CASE: Brand: 3M™ TEGADERM™

## (undated) DEVICE — Z DISCONTINUED NO SUB IDED SET GRAV VENT NVENT CK VLV 3 NDL FREE PRT 10 GTT

## (undated) DEVICE — K-WIRE

## (undated) DEVICE — PAD,ABDOMINAL,8"X10",ST,LF: Brand: MEDLINE

## (undated) DEVICE — DRILL, AO SMALL: Brand: GAMMA

## (undated) DEVICE — GLOVE SURG SZ 75 L12IN FNGR THK79MIL GRN LTX FREE

## (undated) DEVICE — APPLICATOR MEDICATED 10.5 CC SOLUTION HI LT ORNG CHLORAPREP

## (undated) DEVICE — SUTURE VCRL SZ 0 L36IN ABSRB UD L36MM CT-1 1/2 CIR J946H

## (undated) DEVICE — DRESSING FOAM W3XL3IN GENTLE SIL FACE BORD ADH PD SUP ABSRB